# Patient Record
Sex: FEMALE | Race: WHITE | NOT HISPANIC OR LATINO | Employment: OTHER | URBAN - METROPOLITAN AREA
[De-identification: names, ages, dates, MRNs, and addresses within clinical notes are randomized per-mention and may not be internally consistent; named-entity substitution may affect disease eponyms.]

---

## 2017-11-03 ENCOUNTER — HOSPITAL ENCOUNTER (EMERGENCY)
Facility: HOSPITAL | Age: 72
Discharge: HOME | End: 2017-11-03
Attending: EMERGENCY MEDICINE | Admitting: EMERGENCY MEDICINE
Payer: COMMERCIAL

## 2017-11-03 ENCOUNTER — APPOINTMENT (EMERGENCY)
Dept: RADIOLOGY | Facility: HOSPITAL | Age: 72
End: 2017-11-03
Payer: COMMERCIAL

## 2017-11-03 VITALS
BODY MASS INDEX: 29.18 KG/M2 | TEMPERATURE: 97.7 F | OXYGEN SATURATION: 100 % | SYSTOLIC BLOOD PRESSURE: 139 MMHG | DIASTOLIC BLOOD PRESSURE: 63 MMHG | HEART RATE: 74 BPM | WEIGHT: 170 LBS | RESPIRATION RATE: 16 BRPM

## 2017-11-03 DIAGNOSIS — R55 SYNCOPE: Primary | ICD-10-CM

## 2017-11-03 LAB
ALBUMIN SERPL BCP-MCNC: 3.7 G/DL (ref 3.5–5)
ALP SERPL-CCNC: 76 U/L (ref 46–116)
ALT SERPL W P-5'-P-CCNC: 16 U/L (ref 12–78)
ANION GAP SERPL CALCULATED.3IONS-SCNC: 13 MMOL/L (ref 4–13)
APTT PPP: 24 SECONDS (ref 24–33)
AST SERPL W P-5'-P-CCNC: 16 U/L (ref 5–45)
BASOPHILS # BLD AUTO: 0 THOUSANDS/ΜL (ref 0–0.1)
BASOPHILS NFR BLD AUTO: 1 % (ref 0–1)
BILIRUB SERPL-MCNC: 0.2 MG/DL (ref 0.2–1)
BUN SERPL-MCNC: 14 MG/DL (ref 5–25)
CALCIUM SERPL-MCNC: 9 MG/DL (ref 8.3–10.1)
CHLORIDE SERPL-SCNC: 103 MMOL/L (ref 100–108)
CO2 SERPL-SCNC: 24 MMOL/L (ref 21–32)
CREAT SERPL-MCNC: 1.27 MG/DL (ref 0.6–1.3)
EOSINOPHIL # BLD AUTO: 0.2 THOUSAND/ΜL (ref 0–0.61)
EOSINOPHIL NFR BLD AUTO: 3 % (ref 0–6)
ERYTHROCYTE [DISTWIDTH] IN BLOOD BY AUTOMATED COUNT: 16.9 % (ref 11.6–15.1)
GFR SERPL CREATININE-BSD FRML MDRD: 43 ML/MIN/1.73SQ M
GLUCOSE SERPL-MCNC: 104 MG/DL (ref 65–140)
GLUCOSE SERPL-MCNC: 120 MG/DL (ref 65–140)
HCT VFR BLD AUTO: 33.2 % (ref 37–47)
HGB BLD-MCNC: 10.4 G/DL (ref 12–16)
INR PPP: 1.02 (ref 0.86–1.16)
LYMPHOCYTES # BLD AUTO: 2.6 THOUSANDS/ΜL (ref 0.6–4.47)
LYMPHOCYTES NFR BLD AUTO: 39 % (ref 14–44)
MCH RBC QN AUTO: 22.3 PG (ref 27–31)
MCHC RBC AUTO-ENTMCNC: 31.3 G/DL (ref 31.4–37.4)
MCV RBC AUTO: 71 FL (ref 82–98)
MONOCYTES # BLD AUTO: 0.6 THOUSAND/ΜL (ref 0.17–1.22)
MONOCYTES NFR BLD AUTO: 9 % (ref 4–12)
NEUTROPHILS # BLD AUTO: 3.2 THOUSANDS/ΜL (ref 1.85–7.62)
NEUTS SEG NFR BLD AUTO: 49 % (ref 43–75)
NRBC BLD AUTO-RTO: 0 /100 WBCS
PLATELET # BLD AUTO: 335 THOUSANDS/UL (ref 130–400)
PMV BLD AUTO: 7.1 FL (ref 8.9–12.7)
POTASSIUM SERPL-SCNC: 3.4 MMOL/L (ref 3.5–5.3)
PROT SERPL-MCNC: 7.2 G/DL (ref 6.4–8.2)
PROTHROMBIN TIME: 10.7 SECONDS (ref 9.4–11.7)
RBC # BLD AUTO: 4.65 MILLION/UL (ref 4.2–5.4)
SODIUM SERPL-SCNC: 140 MMOL/L (ref 136–145)
TROPONIN I SERPL-MCNC: <0.02 NG/ML
WBC # BLD AUTO: 6.5 THOUSAND/UL (ref 4.8–10.8)

## 2017-11-03 PROCEDURE — 36415 COLL VENOUS BLD VENIPUNCTURE: CPT | Performed by: EMERGENCY MEDICINE

## 2017-11-03 PROCEDURE — 82948 REAGENT STRIP/BLOOD GLUCOSE: CPT

## 2017-11-03 PROCEDURE — 85730 THROMBOPLASTIN TIME PARTIAL: CPT | Performed by: EMERGENCY MEDICINE

## 2017-11-03 PROCEDURE — 85025 COMPLETE CBC W/AUTO DIFF WBC: CPT | Performed by: EMERGENCY MEDICINE

## 2017-11-03 PROCEDURE — 84484 ASSAY OF TROPONIN QUANT: CPT | Performed by: EMERGENCY MEDICINE

## 2017-11-03 PROCEDURE — 80053 COMPREHEN METABOLIC PANEL: CPT | Performed by: EMERGENCY MEDICINE

## 2017-11-03 PROCEDURE — 70450 CT HEAD/BRAIN W/O DYE: CPT

## 2017-11-03 PROCEDURE — 99284 EMERGENCY DEPT VISIT MOD MDM: CPT

## 2017-11-03 PROCEDURE — 93005 ELECTROCARDIOGRAM TRACING: CPT | Performed by: EMERGENCY MEDICINE

## 2017-11-03 PROCEDURE — 85610 PROTHROMBIN TIME: CPT | Performed by: EMERGENCY MEDICINE

## 2017-11-04 NOTE — DISCHARGE INSTRUCTIONS
Syncope   WHAT YOU NEED TO KNOW:   Syncope is also called fainting or passing out  Syncope is a sudden, temporary loss of consciousness, followed by a fall from a standing or sitting position  Syncope ranges from not serious to a sign of a more serious condition that needs to be treated  You can control some health conditions that cause syncope  Your healthcare providers can help you create a plan to manage syncope and prevent episodes  DISCHARGE INSTRUCTIONS:   Seek care immediately if:   · You are bleeding because you hit your head when you fainted  · You suddenly have double vision, difficulty speaking, numbness, and cannot move your arms or legs  · You have chest pain and trouble breathing  · You vomit blood or material that looks like coffee grounds  · You see blood in your bowel movement  Contact your healthcare provider if:   · You have new or worsening symptoms  · You have another syncope episode  · You have a headache, fast heartbeat, or feel too dizzy to stand up  · You have questions or concerns about your condition or care  Follow up with your healthcare provider as directed:  Write down your questions so you remember to ask them during your visits  Manage syncope:   · Keep a record of your syncope episodes  Include your symptoms and your activity before and after the episode  The record can help your healthcare provider find the cause of your syncope and help you manage episodes  · Sit or lie down when needed  This includes when you feel dizzy, your throat is getting tight, and your vision changes  Raise your legs above the level of your heart  · Take slow, deep breaths if you start to breathe faster with anxiety or fear  This can help decrease dizziness and the feeling that you might faint  · Check your blood pressure often  This is important if you take medicine to lower your blood pressure   Check your blood pressure when you are lying down and when you are standing  Ask how often to check during the day  Keep a record of your blood pressure numbers  Your healthcare provider may use the record to help plan your treatment  Prevent a syncope episode:   · Move slowly and let yourself get used to one position before you move to another position  This is very important when you change from a lying or sitting position to a standing position  Take some deep breaths before you stand up from a lying position  Stand up slowly  Sudden movements may cause a fainting spell  Sit on the side of the bed or couch for a few minutes before you stand up  If you are on bedrest, try to be upright for about 2 hours each day, or as directed  Do not lock your legs if you are standing for a long period of time  Move your legs and bend your knees to keep blood flowing  · Follow your healthcare provider's recommendations  Your provider may  recommend that you drink more liquids to prevent dehydration  You may also need to have more salt to keep your blood pressure from dropping too low and causing syncope  Your provider will tell you how much liquid and sodium to have each day  · Watch for signs of low blood sugar  These include hunger, nervousness, sweating, and fast or fluttery heartbeats  Talk with your healthcare provider about ways to keep your blood sugar level steady  · Do not strain if you are constipated  You may faint if you strain to have a bowel movement  Walking is the best way to get your bowels moving  Eat foods high in fiber to make it easier to have a bowel movement  Good examples are high-fiber cereals, beans, vegetables, and whole-grain breads  Prune juice may help make bowel movements softer  · Be careful in hot weather  Heat can cause a syncope episode  Limit activity done outside on hot days  Physical activity in hot weather can lead to dehydration  This can cause an episode    © 2017 [employer] Information is for End User's use only and may not be sold, redistributed or otherwise used for commercial purposes  All illustrations and images included in CareNotes® are the copyrighted property of A D A M , Inc  or Edvin Lott  The above information is an  only  It is not intended as medical advice for individual conditions or treatments  Talk to your doctor, nurse or pharmacist before following any medical regimen to see if it is safe and effective for you

## 2017-11-04 NOTE — ED PROCEDURE NOTE
PROCEDURE  ECG 12 Lead Documentation  Date/Time: 11/3/2017 8:27 PM  Performed by: Beuford Goodell by: Higinio Reddy     ECG reviewed by me, the ED Provider: yes    Patient location:  ED  Interpretation:     Interpretation: normal    Rate:     ECG rate:  76    ECG rate assessment: normal    Rhythm:     Rhythm: sinus rhythm    Ectopy:     Ectopy: none    QRS:     QRS axis:  Normal    QRS intervals:  Normal  Conduction:     Conduction: normal    ST segments:     ST segments:  Normal  T waves:     T waves: normal

## 2017-11-04 NOTE — ED PROVIDER NOTES
History  Chief Complaint   Patient presents with    Dizziness     pt was in the store and felt a headache come on, then became dizzy and passed out  Someone behind her caught her so she did not fall  States she feels back to normal now     Patient presents for evaluation of syncope  Patient states she was busy today and did not eat much  After dog training class she was standing and felt headache/lightheaded and passed out  Person behind her caught her  States this has happened in the past when standing for periods of time  Work up in the past has been normal as per patient  Patient denies any complaints at this time  History provided by:  Patient   used: No    Dizziness   Associated symptoms: headaches        Prior to Admission Medications   Prescriptions Last Dose Informant Patient Reported? Taking? Levothyroxine Sodium (SYNTHROID PO)   Yes Yes   Sig: Take by mouth daily Indications: unsure of dosage  esomeprazole (NexIUM) 40 MG capsule   Yes Yes   Sig: Take 40 mg by mouth every morning before breakfast    rosuvastatin (CRESTOR) 40 MG tablet   Yes Yes   Sig: Take 40 mg by mouth daily  Facility-Administered Medications: None       Past Medical History:   Diagnosis Date    Disease of thyroid gland     Hypercholesteremia     Hypertension        Past Surgical History:   Procedure Laterality Date    APPENDECTOMY      CHOLECYSTECTOMY      COLON SURGERY      ruptured bowel    COLOSTOMY  2009    and closure 6 months later    HYSTERECTOMY      KNEE ARTHROSCOPY Bilateral     right x1 left x2    HI COLSC FLX W/RMVL OF TUMOR POLYP LESION SNARE TQ N/A 5/3/2016    Procedure: COLONOSCOPY ;  Surgeon: Agueda Barrett MD;  Location: [place] [address] [facility]; Service: Gastroenterology    TONSILECTOMY AND ADNOIDECTOMY      TYMPANOPLASTY Right        History reviewed  No pertinent family history  I have reviewed and agree with the history as documented      Social History   Substance Use Topics  Smoking status: Former Smoker     Years: 20 00     Quit date: 1989    Smokeless tobacco: Not on file    Alcohol use No        Review of Systems   Neurological: Positive for dizziness, light-headedness and headaches  All other systems reviewed and are negative  Physical Exam  ED Triage Vitals   Temperature Pulse Respirations Blood Pressure SpO2   11/03/17 2028 11/03/17 2028 11/03/17 2028 11/03/17 2028 11/03/17 2028   97 7 °F (36 5 °C) 83 18 170/92 97 %      Temp Source Heart Rate Source Patient Position - Orthostatic VS BP Location FiO2 (%)   11/03/17 2028 11/03/17 2028 11/03/17 2124 11/03/17 2028 --   Oral Monitor Lying Right arm       Pain Score       11/03/17 2028       No Pain           Orthostatic Vital Signs  Vitals:    11/03/17 2028 11/03/17 2124   BP: 170/92 139/63   Pulse: 83 74   Patient Position - Orthostatic VS:  Lying       Physical Exam   Constitutional: She is oriented to person, place, and time  No distress  HENT:   Mouth/Throat: Oropharynx is clear and moist    Eyes: EOM are normal  Pupils are equal, round, and reactive to light  Neck: Normal range of motion  Neck supple  Cardiovascular: Normal rate, regular rhythm and intact distal pulses  Pulmonary/Chest: Effort normal and breath sounds normal  No respiratory distress  Abdominal: Soft  There is no tenderness  Musculoskeletal: Normal range of motion  Neurological: She is alert and oriented to person, place, and time  No cranial nerve deficit or sensory deficit  She exhibits normal muscle tone  Coordination normal    Finger to nose wnl   Skin: Capillary refill takes less than 2 seconds  She is not diaphoretic  Nursing note and vitals reviewed        ED Medications  Medications - No data to display    Diagnostic Studies  Results Reviewed     Procedure Component Value Units Date/Time    Protime-INR [[id number]]  (Normal) Collected:  11/03/17 2038    Lab Status:  Final result Specimen:  Blood Updated:  11/03/17 2110 Protime 10 7 seconds      INR 1 02    APTT [[id number]]  (Normal) Collected:  11/03/17 2038    Lab Status:  Final result Specimen:  Blood Updated:  11/03/17 2110     PTT 24 seconds     Narrative: Therapeutic Heparin Range = 60-90 seconds    Troponin I [[id number]]  (Normal) Collected:  11/03/17 2038    Lab Status:  Final result Specimen:  Blood Updated:  11/03/17 2107     Troponin I <0 02 ng/mL     Narrative:         [employer] Chemistry analyzer 99% cutoff is > 0 04 ng/mL in network labs    o cTnI 99% cutoff is useful only when applied to patients in the clinical setting of myocardial ischemia  o cTnI 99% cutoff should be interpreted in the context of clinical history, ECG findings and possibly cardiac imaging to establish correct diagnosis  o cTnI 99% cutoff may be suggestive but clearly not indicative of a coronary event without the clinical setting of myocardial ischemia  Comprehensive metabolic panel [[id number]]  (Abnormal) Collected:  11/03/17 2038    Lab Status:  Final result Specimen:  Blood Updated:  11/03/17 2104     Sodium 140 mmol/L      Potassium 3 4 (L) mmol/L      Chloride 103 mmol/L      CO2 24 mmol/L      Anion Gap 13 mmol/L      BUN 14 mg/dL      Creatinine 1 27 mg/dL      Glucose 120 mg/dL      Calcium 9 0 mg/dL      AST 16 U/L      ALT 16 U/L      Alkaline Phosphatase 76 U/L      Total Protein 7 2 g/dL      Albumin 3 7 g/dL      Total Bilirubin 0 20 mg/dL      eGFR 43 ml/min/1 73sq m     Narrative:         National Kidney Disease Education Program recommendations are as follows:  GFR calculation is accurate only with a steady state creatinine  Chronic Kidney disease less than 60 ml/min/1 73 sq  meters  Kidney failure less than 15 ml/min/1 73 sq  meters      CBC and differential [[id number]]  (Abnormal) Collected:  11/03/17 2038    Lab Status:  Final result Specimen:  Blood Updated:  11/03/17 2047     WBC 6 50 Thousand/uL      RBC 4 65 Million/uL      Hemoglobin 10 4 (L) g/dL      Hematocrit 33 2 (L) %      MCV 71 (L) fL      MCH 22 3 (L) pg      MCHC 31 3 (L) g/dL      RDW 16 9 (H) %      MPV 7 1 (L) fL      Platelets 150 Thousands/uL      nRBC 0 /100 WBCs      Neutrophils Relative 49 %      Lymphocytes Relative 39 %      Monocytes Relative 9 %      Eosinophils Relative 3 %      Basophils Relative 1 %      Neutrophils Absolute 3 20 Thousands/µL      Lymphocytes Absolute 2 60 Thousands/µL      Monocytes Absolute 0 60 Thousand/µL      Eosinophils Absolute 0 20 Thousand/µL      Basophils Absolute 0 00 Thousands/µL     Fingerstick Glucose (POCT) [[id number]]  (Normal) Collected:  11/03/17 2036    Lab Status:  Final result Updated:  11/03/17 2041     POC Glucose 104 mg/dl     UA w Reflex to Microscopic [[id number]]     Lab Status:  No result Specimen:  Urine                  CT head without contrast   Final Result by Gagan Mancini MD (11/03 2255)      No acute intracranial hemorrhage  Volume loss/atrophy and white matter microangiopathy  Workstation performed: [id number]                    Procedures  Procedures       Phone Contacts  ED Phone Contact    ED Course  ED Course                                MDM  Number of Diagnoses or Management Options  Syncope:   Diagnosis management comments: Discussed observation vs outpatient follow up  Patient felt better and wanted to follow up as outpatient  Amount and/or Complexity of Data Reviewed  Clinical lab tests: ordered and reviewed  Tests in the radiology section of CPT®: ordered and reviewed    Patient Progress  Patient progress: improved    CritCare Time    Disposition  Final diagnoses:   Syncope     Time reflects when diagnosis was documented in both MDM as applicable and the Disposition within this note     Time User Action Codes Description Comment    11/3/2017 11:02 PM Suzette Ardon Add [R55] Syncope       ED Disposition     ED Disposition Condition Comment    Discharge  Stephania Alves discharge to home/self care      Condition at discharge: stable        Follow-up Information     Follow up With Specialties Details Why Peyton Cardona MD Internal Medicine In 2 days  [address] [facility] [place] [address] [phone]          Discharge Medication List as of 11/3/2017 11:02 PM      CONTINUE these medications which have NOT CHANGED    Details   esomeprazole (NexIUM) 40 MG capsule Take 40 mg by mouth every morning before breakfast , Until Discontinued, Historical Med      Levothyroxine Sodium (SYNTHROID PO) Take by mouth daily Indications: unsure of dosage  , Until Discontinued, Historical Med      rosuvastatin (CRESTOR) 40 MG tablet Take 40 mg by mouth daily  , Until Discontinued, Historical Med           No discharge procedures on file      ED Provider  Electronically Signed by           Fransisco Meade DO  11/04/17 8428

## 2017-11-06 LAB
ATRIAL RATE: 76 BPM
P AXIS: 59 DEGREES
PR INTERVAL: 154 MS
QRS AXIS: 10 DEGREES
QRSD INTERVAL: 80 MS
QT INTERVAL: 394 MS
QTC INTERVAL: 443 MS
T WAVE AXIS: 34 DEGREES
VENTRICULAR RATE: 76 BPM

## 2017-11-12 ENCOUNTER — APPOINTMENT (EMERGENCY)
Dept: RADIOLOGY | Facility: HOSPITAL | Age: 72
End: 2017-11-12
Payer: COMMERCIAL

## 2017-11-12 ENCOUNTER — HOSPITAL ENCOUNTER (EMERGENCY)
Facility: HOSPITAL | Age: 72
Discharge: HOME | End: 2017-11-12
Attending: EMERGENCY MEDICINE
Payer: COMMERCIAL

## 2017-11-12 VITALS
BODY MASS INDEX: 26.99 KG/M2 | SYSTOLIC BLOOD PRESSURE: 143 MMHG | OXYGEN SATURATION: 94 % | WEIGHT: 162 LBS | HEART RATE: 84 BPM | TEMPERATURE: 98.3 F | DIASTOLIC BLOOD PRESSURE: 65 MMHG | HEIGHT: 65 IN | RESPIRATION RATE: 20 BRPM
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
## 2017-11-12 DIAGNOSIS — R07.89 CHEST WALL PAIN: Primary | ICD-10-CM

## 2017-11-12 PROCEDURE — 71120 X-RAY EXAM BREASTBONE 2/>VWS: CPT

## 2017-11-12 PROCEDURE — 93005 ELECTROCARDIOGRAM TRACING: CPT

## 2017-11-12 PROCEDURE — 99285 EMERGENCY DEPT VISIT HI MDM: CPT

## 2017-11-12 PROCEDURE — 71020 HB CHEST X-RAY 2VW FRONTAL&LATL: CPT
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
## 2017-11-12 NOTE — ED PROCEDURE NOTE
PROCEDURE  ECG 12 Lead Documentation  Date/Time: 11/12/2017 3:10 PM  Performed by: Daysi Pal by: Jeane Power     ECG reviewed by me, the ED Provider: yes    Patient location:  ED  Previous ECG:     Previous ECG:  Compared to current    Similarity:  No change  Interpretation:     Interpretation: normal    Rate:     ECG rate:  83    ECG rate assessment: normal    Rhythm:     Rhythm: sinus rhythm    Ectopy:     Ectopy: none    QRS:     QRS axis:  Normal    QRS intervals:  Normal  Conduction:     Conduction: normal    ST segments:     ST segments:  Normal  T waves:     T waves: normal

## 2017-11-13 LAB
ATRIAL RATE: 83 BPM
P AXIS: 68 DEGREES
PR INTERVAL: 156 MS
QRS AXIS: 24 DEGREES
QRSD INTERVAL: 84 MS
QT INTERVAL: 382 MS
QTC INTERVAL: 448 MS
T WAVE AXIS: 35 DEGREES
VENTRICULAR RATE: 83 BPM

## 2017-12-05 ENCOUNTER — TRANSCRIBE ORDERS (OUTPATIENT)
Dept: ADMINISTRATIVE | Facility: HOSPITAL | Age: 72
End: 2017-12-05

## 2017-12-05 DIAGNOSIS — Z12.39 SCREENING BREAST EXAMINATION: Primary | ICD-10-CM

## 2017-12-14 ENCOUNTER — HOSPITAL ENCOUNTER (OUTPATIENT)
Dept: RADIOLOGY | Facility: HOSPITAL | Age: 72
Discharge: HOME | End: 2017-12-14
Payer: COMMERCIAL

## 2017-12-14 ENCOUNTER — GENERIC CONVERSION - ENCOUNTER (OUTPATIENT)
Dept: OTHER | Facility: OTHER | Age: 72
End: 2017-12-14

## 2017-12-14 DIAGNOSIS — Z12.39 SCREENING BREAST EXAMINATION: ICD-10-CM

## 2017-12-14 PROCEDURE — 77063 BREAST TOMOSYNTHESIS BI: CPT

## 2017-12-14 PROCEDURE — G0202 SCR MAMMO BI INCL CAD: HCPCS

## 2018-12-06 ENCOUNTER — TRANSCRIBE ORDERS (OUTPATIENT)
Dept: ADMINISTRATIVE | Facility: HOSPITAL | Age: 73
End: 2018-12-06

## 2018-12-06 DIAGNOSIS — Z12.39 SCREENING BREAST EXAMINATION: Primary | ICD-10-CM

## 2019-01-07 ENCOUNTER — HOSPITAL ENCOUNTER (OUTPATIENT)
Dept: RADIOLOGY | Facility: HOSPITAL | Age: 74
Discharge: HOME | End: 2019-01-07
Attending: INTERNAL MEDICINE
Payer: COMMERCIAL

## 2019-01-07 VITALS — BODY MASS INDEX: 27.66 KG/M2 | WEIGHT: 162 LBS | HEIGHT: 64 IN

## 2019-01-07 DIAGNOSIS — Z12.39 SCREENING BREAST EXAMINATION: ICD-10-CM

## 2019-01-07 PROCEDURE — 77063 BREAST TOMOSYNTHESIS BI: CPT

## 2019-01-07 PROCEDURE — 77067 SCR MAMMO BI INCL CAD: CPT

## 2019-01-08 ENCOUNTER — TELEPHONE (OUTPATIENT)
Dept: RADIOLOGY | Facility: HOSPITAL | Age: 74
End: 2019-01-08

## 2019-01-23 ENCOUNTER — HOSPITAL ENCOUNTER (OUTPATIENT)
Dept: RADIOLOGY | Facility: HOSPITAL | Age: 74
Discharge: HOME | End: 2019-01-23
Attending: INTERNAL MEDICINE
Payer: COMMERCIAL

## 2019-01-23 VITALS — WEIGHT: 162 LBS | HEIGHT: 65 IN | BODY MASS INDEX: 26.99 KG/M2

## 2019-01-23 DIAGNOSIS — R92.8 ABNORMAL MAMMOGRAM: ICD-10-CM

## 2019-01-23 PROCEDURE — 77065 DX MAMMO INCL CAD UNI: CPT

## 2019-01-23 PROCEDURE — 76642 ULTRASOUND BREAST LIMITED: CPT

## 2019-01-23 PROCEDURE — G0279 TOMOSYNTHESIS, MAMMO: HCPCS

## 2019-07-09 ENCOUNTER — TRANSCRIBE ORDERS (OUTPATIENT)
Dept: ADMINISTRATIVE | Facility: HOSPITAL | Age: 74
End: 2019-07-09

## 2019-07-09 DIAGNOSIS — Z12.39 BREAST SCREENING, UNSPECIFIED: Primary | ICD-10-CM

## 2019-07-17 ENCOUNTER — OFFICE VISIT (OUTPATIENT)
Dept: PODIATRY | Facility: CLINIC | Age: 74
End: 2019-07-17
Payer: COMMERCIAL

## 2019-07-17 VITALS — SYSTOLIC BLOOD PRESSURE: 104 MMHG | DIASTOLIC BLOOD PRESSURE: 58 MMHG | RESPIRATION RATE: 16 BRPM | HEART RATE: 80 BPM

## 2019-07-17 DIAGNOSIS — B35.1 ONYCHOMYCOSIS: ICD-10-CM

## 2019-07-17 DIAGNOSIS — L60.0 INGROWN TOENAIL: ICD-10-CM

## 2019-07-17 DIAGNOSIS — R20.2 PARESTHESIA OF LOWER EXTREMITY: ICD-10-CM

## 2019-07-17 DIAGNOSIS — G60.8 PERIPHERAL SENSORY NEUROPATHY: Primary | ICD-10-CM

## 2019-07-17 PROCEDURE — 99203 OFFICE O/P NEW LOW 30 MIN: CPT | Performed by: PODIATRIST

## 2019-07-17 RX ORDER — HYDROXYCHLOROQUINE SULFATE 200 MG/1
TABLET, FILM COATED ORAL
Refills: 3 | COMMUNITY
Start: 2019-06-13

## 2019-07-17 RX ORDER — MELATONIN
1000 DAILY
COMMUNITY

## 2019-07-17 RX ORDER — FOLIC ACID 1 MG/1
TABLET ORAL
Refills: 5 | COMMUNITY
Start: 2019-07-09

## 2019-07-17 NOTE — PROGRESS NOTES
Assessment/Plan:    Aseptic debridement and planning of nails x10 and manually and mechanically  Patient will apply topical medication daily patient will call if any signs of infection  Discussed gabapentin or Lyrica pain worsens  Discussed importance of exercise patient will try B vitamin complex  Patient will discussed neuropathy with her rheumatologist  Follow-up 1 month     Diagnoses and all orders for this visit:    Peripheral sensory neuropathy    Paresthesia of lower extremity    Onychomycosis  -     ciclopirox (PENLAC) 8 % solution; Apply topically daily at bedtime    Ingrown toenail    Other orders  -     folic acid (FOLVITE) 1 mg tablet  -     hydroxychloroquine (PLAQUENIL) 200 mg tablet  -     cholecalciferol (VITAMIN D3) 1,000 units tablet; Take 1,000 Units by mouth daily          Subjective:      Patient ID: Brooke Tang is a 68 y o  female  Patient has had numbness and burning in the feet for the past year  Patient was recently diagnosed was Sjogren's syndrome  Patient has been seeing rheumatologist   Patient has severe knee arthritis only walk short distances denies any cramping in legs patient has chronic swelling in the legs  Patient has shooting and burning pain in right 2nd interspace  Previously had neuroma injection several years ago but says the pain is been back for the past year  Rates pain between 4 and 7/10 on a pain scale  Pain mostly when walking  Patient has bunion and contracted hammertoes bilateral  Patient has severe nail fungus nails are extremely elongated and several of the nails have not been cut in over a year    There is some incurvation and pain in nails      Past Medical History:   Diagnosis Date    Disease of thyroid gland     Hypercholesteremia     Hypertension          Current Outpatient Medications:     cholecalciferol (VITAMIN D3) 1,000 units tablet, Take 1,000 Units by mouth daily, Disp: , Rfl:     ciclopirox (PENLAC) 8 % solution, Apply topically daily at bedtime, Disp: 6 6 mL, Rfl: 4    esomeprazole (NexIUM) 40 MG capsule, Take 40 mg by mouth every morning before breakfast , Disp: , Rfl:     folic acid (FOLVITE) 1 mg tablet, , Disp: , Rfl: 5    hydroxychloroquine (PLAQUENIL) 200 mg tablet, , Disp: , Rfl: 3    Levothyroxine Sodium (SYNTHROID PO), Take by mouth daily Indications: unsure of dosage  , Disp: , Rfl:     rosuvastatin (CRESTOR) 40 MG tablet, Take 20 mg by mouth daily  , Disp: , Rfl:     Past Surgical History:   Procedure Laterality Date    APPENDECTOMY      BREAST BIOPSY Right     benign    CHOLECYSTECTOMY      COLON SURGERY      ruptured bowel    COLOSTOMY  2009    and closure 6 months later    HYSTERECTOMY  2002    total    KNEE ARTHROSCOPY Bilateral     right x1 left x2    [place] COLSC FLX W/RMVL OF TUMOR POLYP LESION SNARE TQ N/A 5/3/2016    Procedure: COLONOSCOPY ;  Surgeon: Lydia Brown MD;  Location: [facility] GI LAB; Service: Gastroenterology    TONSILECTOMY AND ADNOIDECTOMY      TYMPANOPLASTY Right        Allergies   Allergen Reactions    Penicillins Rash       There is no problem list on file for this patient  Review of Systems   Constitutional: Negative  HENT: Negative  Eyes: Negative  Respiratory: Negative  Cardiovascular: Negative  Gastrointestinal: Negative  Endocrine: Negative  Genitourinary: Negative  Musculoskeletal: Positive for arthralgias and back pain  Skin: Positive for color change  Allergic/Immunologic: Negative  Neurological: Negative  Hematological: Negative  Psychiatric/Behavioral: Negative  Objective:  Patient's shoes and socks were removed, feet examined       /58   Pulse 80   Resp 16       Foot Exam    General  General Appearance: appears stated age and healthy   Orientation: alert and oriented to person, place, and time   Affect: appropriate   Gait: unimpaired       Right Foot/Ankle     Inspection and Palpation  Arch: pes planus  Hammertoes: second toe, fifth toe, fourth toe and third toe  Hallux valgus: yes  Skin Exam: callus and skin changes; Neurovascular  Dorsalis pedis: 1+  Posterior tibial: 1+      Left Foot/Ankle      Inspection and Palpation  Arch: pes planus  Hammertoes: second toe, fifth toe, fourth toe and third toe  Hallux valgus: yes  Skin Exam: callus and skin changes; Neurovascular  Dorsalis pedis: 1+  Posterior tibial: 1+          Right Foot/Ankle   Right Foot Inspection  Skin Exam: callus and callus                              Vascular    The right DP pulse is 1+  The right PT pulse is 1+  Right Toe  - Comprehensive Exam  Arch: pes planus  Hammertoes: second toe, fifth toe, fourth toe and third toe  Hallux valgus: yes    Left Foot/Ankle  Left Foot Inspection  Skin Exam: callus                                           Vascular    The left DP pulse is 1+  The left PT pulse is 1+  Left Toe  - Comprehensive Exam  Arch: pes planus  Hammertoes: second toe, fifth toe, fourth toe and third toe  Hallux valgus: yes         Physical Exam   Cardiovascular:   Pulses:       Dorsalis pedis pulses are 1+ on the right side, and 1+ on the left side  Posterior tibial pulses are 1+ on the right side, and 1+ on the left side  Feet:   Right Foot:   Skin Integrity: Positive for callus  Left Foot:   Skin Integrity: Positive for callus           Procedures     Absent vibratory sensation bilateral legs up to the knee intact of than a  Monofilament sensation significantly reduced bilateral feet 8/10 absent  Negative Tinel sign tarsal tunnel bilateral  Positive Bob sign right 2nd interspace  Rigidly contracted hammertoes 2 through 5 bilateral  Moderate bunion bilateral  Plus one pitting edema bilateral feet and ankles  All nails severely mycotic thickened dystrophic  Third digit nails extremely elongated incurvated and ingrown  Mild ingrown bilateral hallux no signs of infection  Significant equinus bilateral 0 degrees dorsiflexion  Muscle strength 5/5 all groups bilateral feet and ankles

## 2019-08-07 ENCOUNTER — HOSPITAL ENCOUNTER (OUTPATIENT)
Dept: RADIOLOGY | Facility: HOSPITAL | Age: 74
Discharge: HOME | End: 2019-08-07
Attending: INTERNAL MEDICINE
Payer: COMMERCIAL

## 2019-08-07 VITALS — BODY MASS INDEX: 26.99 KG/M2 | WEIGHT: 162 LBS | HEIGHT: 65 IN

## 2019-08-07 DIAGNOSIS — Z12.39 BREAST SCREENING, UNSPECIFIED: ICD-10-CM

## 2019-08-07 PROCEDURE — 77065 DX MAMMO INCL CAD UNI: CPT

## 2019-08-07 PROCEDURE — G0279 TOMOSYNTHESIS, MAMMO: HCPCS

## 2019-09-11 ENCOUNTER — OFFICE VISIT (OUTPATIENT)
Dept: PODIATRY | Facility: CLINIC | Age: 74
End: 2019-09-11
Payer: COMMERCIAL

## 2019-09-11 VITALS
SYSTOLIC BLOOD PRESSURE: 120 MMHG | RESPIRATION RATE: 17 BRPM | DIASTOLIC BLOOD PRESSURE: 68 MMHG | HEIGHT: 65 IN | BODY MASS INDEX: 26.99 KG/M2 | HEART RATE: 113 BPM | WEIGHT: 162 LBS

## 2019-09-11 DIAGNOSIS — B35.1 ONYCHOMYCOSIS: Primary | ICD-10-CM

## 2019-09-11 DIAGNOSIS — M54.16 RADICULOPATHY OF LUMBAR REGION: ICD-10-CM

## 2019-09-11 DIAGNOSIS — M77.42 METATARSALGIA OF BOTH FEET: ICD-10-CM

## 2019-09-11 DIAGNOSIS — G60.8 PERIPHERAL SENSORY NEUROPATHY: ICD-10-CM

## 2019-09-11 DIAGNOSIS — M77.41 METATARSALGIA OF BOTH FEET: ICD-10-CM

## 2019-09-11 PROCEDURE — 99213 OFFICE O/P EST LOW 20 MIN: CPT | Performed by: PODIATRIST

## 2019-09-11 RX ORDER — TERBINAFINE HYDROCHLORIDE 250 MG/1
TABLET ORAL
Qty: 15 TABLET | Refills: 0 | Status: SHIPPED | OUTPATIENT
Start: 2019-09-11 | End: 2019-09-11

## 2019-09-11 RX ORDER — GABAPENTIN 100 MG/1
100 CAPSULE ORAL 3 TIMES DAILY
Qty: 90 CAPSULE | Refills: 0 | Status: SHIPPED | OUTPATIENT
Start: 2019-09-11 | End: 2019-10-11

## 2019-09-11 NOTE — PROGRESS NOTES
Assessment/Plan:     Aseptic debridement and planning of nails x10 and manually and mechanically  Patient will apply topical medication daily patient will call if any signs of infection  Discussed gabapentin or Lyrica pain worsens  Discussed importance of exercise patient will try B vitamin complex  Patient will discussed neuropathy with her rheumatologist  Follow-up 1 month    Patient will try terbinafine  We will also add an empiric course of gabapentin       Diagnoses and all orders for this visit:     Peripheral sensory neuropathy     Paresthesia of lower extremity     Onychomycosis  -          Ingrown toenail     Other orders  -     folic acid (FOLVITE) 1 mg tablet  -     hydroxychloroquine (PLAQUENIL) 200 mg tablet  -     cholecalciferol (VITAMIN D3) 1,000 units tablet; Take 1,000 Units by mouth daily            Subjective:       Patient ID: Mackenzie De Jesus is a 68 y o  female      Patient has had numbness and burning in the feet for the past year  Patient was recently diagnosed was Sjogren's syndrome  Patient has been seeing rheumatologist   Patient has severe knee arthritis only walk short distances denies any cramping in legs patient has chronic swelling in the legs  Patient has shooting and burning pain in right 2nd interspace  Previously had neuroma injection several years ago but says the pain is been back for the past year  Rates pain between 4 and 7/10 on a pain scale  Pain mostly when walking  Patient has bunion and contracted hammertoes bilateral  Patient has severe nail fungus nails are extremely elongated and several of the nails have not been cut in over a year    There is some incurvation and pain in nails             Past Medical History:   Diagnosis Date    Disease of thyroid gland      Hypercholesteremia      Hypertension              Current Outpatient Medications:     cholecalciferol (VITAMIN D3) 1,000 units tablet, Take 1,000 Units by mouth daily, Disp: , Rfl:     ciclopirox (PENLAC) 8 % solution, Apply topically daily at bedtime, Disp: 6 6 mL, Rfl: 4    esomeprazole (NexIUM) 40 MG capsule, Take 40 mg by mouth every morning before breakfast , Disp: , Rfl:     folic acid (FOLVITE) 1 mg tablet, , Disp: , Rfl: 5    hydroxychloroquine (PLAQUENIL) 200 mg tablet, , Disp: , Rfl: 3    Levothyroxine Sodium (SYNTHROID PO), Take by mouth daily Indications: unsure of dosage  , Disp: , Rfl:     rosuvastatin (CRESTOR) 40 MG tablet, Take 20 mg by mouth daily  , Disp: , Rfl:            Past Surgical History:   Procedure Laterality Date    APPENDECTOMY        BREAST BIOPSY Right       benign    CHOLECYSTECTOMY        COLON SURGERY         ruptured bowel    COLOSTOMY   2009     and closure 6 months later    HYSTERECTOMY   2002     total    KNEE ARTHROSCOPY Bilateral       right x1 left x2    [place] COLSC FLX W/RMVL OF TUMOR POLYP LESION SNARE TQ N/A 5/3/2016     Procedure: COLONOSCOPY ;  Surgeon: Cheryle Gavia, MD;  Location: [facility]; Service: Gastroenterology    TONSILECTOMY AND ADNOIDECTOMY        TYMPANOPLASTY Right                Allergies   Allergen Reactions    Penicillins Rash         There is no problem list on file for this patient         Review of Systems   Constitutional: Negative  HENT: Negative  Eyes: Negative  Respiratory: Negative  Cardiovascular: Negative  Gastrointestinal: Negative  Endocrine: Negative  Genitourinary: Negative  Musculoskeletal: Positive for arthralgias and back pain  Skin: Positive for color change  Allergic/Immunologic: Negative  Neurological: Negative  Hematological: Negative      Psychiatric/Behavioral: Negative            Objective:  Patient's shoes and socks were removed, feet examined       /58   Pulse 80   Resp 16         Foot Exam     General  General Appearance: appears stated age and healthy   Orientation: alert and oriented to person, place, and time   Affect: appropriate   Gait: unimpaired       Right Foot/Ankle      Inspection and Palpation  Arch: pes planus  Hammertoes: second toe, fifth toe, fourth toe and third toe  Hallux valgus: yes  Skin Exam: callus and skin changes;      Neurovascular  Dorsalis pedis: 1+  Posterior tibial: 1+        Left Foot/Ankle       Inspection and Palpation  Arch: pes planus  Hammertoes: second toe, fifth toe, fourth toe and third toe  Hallux valgus: yes  Skin Exam: callus and skin changes;      Neurovascular  Dorsalis pedis: 1+  Posterior tibial: 1+              Right Foot/Ankle   Right Foot Inspection  Skin Exam: callus and callus                                 Vascular     The right DP pulse is 1+  The right PT pulse is 1+  Right Toe  - Comprehensive Exam  Arch: pes planus  Hammertoes: second toe, fifth toe, fourth toe and third toe  Hallux valgus: yes     Left Foot/Ankle  Left Foot Inspection  Skin Exam: callus                                             Vascular     The left DP pulse is 1+  The left PT pulse is 1+  Left Toe  - Comprehensive Exam  Arch: pes planus  Hammertoes: second toe, fifth toe, fourth toe and third toe  Hallux valgus: yes          Physical Exam   Cardiovascular:   Pulses:       Dorsalis pedis pulses are 1+ on the right side, and 1+ on the left side  Posterior tibial pulses are 1+ on the right side, and 1+ on the left side  Feet:   Right Foot:   Skin Integrity: Positive for callus     Left Foot:   Skin Integrity: Positive for callus           Procedures      Absent vibratory sensation bilateral legs up to the knee intact of than a  Monofilament sensation significantly reduced bilateral feet 8/10 absent  Negative Tinel sign tarsal tunnel bilateral  Positive Bob sign right 2nd interspace  Rigidly contracted hammertoes 2 through 5 bilateral  Moderate bunion bilateral  Plus one pitting edema bilateral feet and ankles  All nails severely mycotic thickened dystrophic  Third digit nails extremely elongated incurvated and ingrown  Mild ingrown bilateral hallux no signs of infection  Significant equinus bilateral 0 degrees dorsiflexion  Muscle strength 5/5 all groups bilateral feet and ankles

## 2019-11-20 ENCOUNTER — OFFICE VISIT (OUTPATIENT)
Dept: CARDIOLOGY CLINIC | Facility: CLINIC | Age: 74
End: 2019-11-20
Payer: COMMERCIAL

## 2019-11-20 VITALS
BODY MASS INDEX: 26.33 KG/M2 | HEART RATE: 100 BPM | SYSTOLIC BLOOD PRESSURE: 122 MMHG | HEIGHT: 65 IN | OXYGEN SATURATION: 97 % | WEIGHT: 158 LBS | DIASTOLIC BLOOD PRESSURE: 82 MMHG

## 2019-11-20 DIAGNOSIS — M35.00 SJOGREN'S SYNDROME, WITH UNSPECIFIED ORGAN INVOLVEMENT (HCC): ICD-10-CM

## 2019-11-20 DIAGNOSIS — E03.9 ACQUIRED HYPOTHYROIDISM: ICD-10-CM

## 2019-11-20 DIAGNOSIS — G60.8 PERIPHERAL SENSORY NEUROPATHY: ICD-10-CM

## 2019-11-20 DIAGNOSIS — R55 RECURRENT SYNCOPE: ICD-10-CM

## 2019-11-20 DIAGNOSIS — M54.16 RADICULOPATHY OF LUMBAR REGION: ICD-10-CM

## 2019-11-20 DIAGNOSIS — I95.1 ORTHOSTATIC HYPOTENSION: ICD-10-CM

## 2019-11-20 DIAGNOSIS — E78.5 DYSLIPIDEMIA: ICD-10-CM

## 2019-11-20 PROCEDURE — 99205 OFFICE O/P NEW HI 60 MIN: CPT | Performed by: INTERNAL MEDICINE

## 2019-11-20 PROCEDURE — 93000 ELECTROCARDIOGRAM COMPLETE: CPT | Performed by: INTERNAL MEDICINE

## 2019-11-20 NOTE — PROGRESS NOTES
Consultation - Cardiology Office  [facility]  Ela Cornejo 69 y o  female MRN: [id number]  : 1945  Unit/Bed#:  Encounter: [id number]      Assessment:     1  Orthostatic hypotension    2  Recurrent syncope    3  Radiculopathy of lumbar region    4  Dyslipidemia    5  Acquired hypothyroidism    6  Peripheral sensory neuropathy    7  Sjogren's syndrome, with unspecified organ involvement ([facility] [address] )        Discussion summary and Plan:    1  History of recurrent syncope and orthostatic hypotension  Patient did drop blood pressure by about 6-8 mm in our office but was asymptomatic  Most of the episodes of syncope happened while she was standing in line for 30-40 minutes  She does get anxious also  She has no cardiac workup done recently  Agree with getting tilt-table test   Will check echo Doppler, Holter monitor to rule out any arrhythmia and exercise stress test rule ischemia  2  Dyslipidemia  Patient is taking Crestor she takes 20 mg daily  Cholesterol profile acceptable managed by patient's primary care doctor  3  History of Sjogren syndrome  She has been taking hydroxychloroquine  Management as per Rheumatology    4  History of hypothyroidism on levothyroxine  Monitor TSH    5  History of peripheral neuropathy and lumbar radiculopathy  Currently stable    Plan  In view of recurrent syncope patient will be scheduled for tilt-table test, echo Doppler, exercise stress test and Holter monitor  Further plan as also of these tests become available  Lifestyle modification recommended including decreasing ice tea intake and drinking more fluids  Avoid situation where she has to stand and prolonged period of time  Keep herself hydrated  Thank you for your consultation  If you have any question please call me at [phone]    Counseling :  A description of the counseling  Goals and Barriers    Patient's ability to self care: Yes  Medication side effect reviewed with patient in detail and all their questions answered to their satisfaction  Primary Care Physician Requesting Consult: Yadi Lawrence MD      Reason for Consult / Principal Problem:  Orthostatic hypotension and recurrent syncope  HPI :     Carlos Alberto Linares is a 69y o  year old female who was referred by primary care doctor for orthostatic hypotension and recurrent falls  Patient who has a past medical history significant for Sjogren syndrome, peripheral neuropathy, dyslipidemia, and 1 episode of syncope about 2 years ago was sent by primary care doctor due to decrease in blood pressure when she stands  Patient has learned to control her symptoms of decrease in blood pressure as she is well aware that if he stands for sometime she will feel dizzy lightheaded and 1 time she has passed down  Other time she almost passed down but she was able to sit down and she felt better  All these episode happened while she was standing in a line for 30-40 minutes  She still occasionally get dizziness when she quickly gets up  In our office her initial blood pressure checked by me was around 130 over 71 when she quickly stood up the blood pressure dropped to around 122  She was asymptomatic with it  She really never had any cardiac workup related to it  She get easily anxious her heart rate when she arrived was 100 beats per minute currently is 90 beats per minute  She has some nonspecific ST changes  She has no nausea no vomiting no other significant complaint  She used to smoke she quit 30 years ago  Patient lives alone with her 2 dogs  She had a family history of heart problem with brother having MI at the age of 48  No other significant complaint today  No nausea no vomiting no fever no chills  She does not drink alcohol  She does not use excessive caffeine  She tried to drink water and ice tea        Review of Systems   Constitutional: Negative for activity change, chills, diaphoresis, fever and unexpected weight change  HENT: Negative for congestion  Eyes: Negative for discharge and redness  Respiratory: Negative for cough, chest tightness, shortness of breath and wheezing  Cardiovascular: Negative  Negative for chest pain, palpitations and leg swelling  Gastrointestinal: Negative for abdominal pain, diarrhea and nausea  Endocrine: Negative  Genitourinary: Negative for decreased urine volume and urgency  Musculoskeletal: Negative  Negative for arthralgias, back pain and gait problem  Skin: Negative for rash and wound  Allergic/Immunologic: Negative  Neurological: Positive for dizziness  Negative for seizures, syncope, weakness, light-headedness and headaches  With history of orthostatic hypotension   Hematological: Negative  Psychiatric/Behavioral: Negative for agitation and confusion  The patient is nervous/anxious  Historical Information   Past Medical History:   Diagnosis Date    Disease of thyroid gland     Hypercholesteremia     Hypertension      Past Surgical History:   Procedure Laterality Date    APPENDECTOMY      BREAST BIOPSY Right     benign    CHOLECYSTECTOMY      COLON SURGERY      ruptured bowel    COLOSTOMY      and closure 6 months later    HYSTERECTOMY  2002    total    KNEE ARTHROSCOPY Bilateral     right x1 left x2    [place] COLSC FLX W/RMVL OF TUMOR POLYP LESION SNARE TQ N/A 5/3/2016    Procedure: COLONOSCOPY ;  Surgeon: Elroy Hearn MD;  Location: [place] [address] GI LAB;   Service: Gastroenterology    TONSILECTOMY AND ADNOIDECTOMY      TYMPANOPLASTY Right      Social History     Substance and Sexual Activity   Alcohol Use No     Social History     Substance and Sexual Activity   Drug Use No     Social History     Tobacco Use   Smoking Status Former Smoker    Years: 20 00    Last attempt to quit: Elias Castellanos Years since quittin 9   Smokeless Tobacco Never Used     Family History:   Family History   Problem Relation Age of Onset    Breast cancer Paternal Aunt 95       Meds/Allergies     Allergies   Allergen Reactions    Penicillins Rash       Current Outpatient Medications:     cholecalciferol (VITAMIN D3) 1,000 units tablet, Take 1,000 Units by mouth daily, Disp: , Rfl:     esomeprazole (NexIUM) 40 MG capsule, Take 40 mg by mouth every morning before breakfast , Disp: , Rfl:     folic acid (FOLVITE) 1 mg tablet, , Disp: , Rfl: 5    hydroxychloroquine (PLAQUENIL) 200 mg tablet, , Disp: , Rfl: 3    Levothyroxine Sodium (SYNTHROID PO), Take by mouth daily Indications: unsure of dosage  , Disp: , Rfl:     rosuvastatin (CRESTOR) 40 MG tablet, Take 20 mg by mouth daily  , Disp: , Rfl:     ciclopirox (PENLAC) 8 % solution, Apply topically daily at bedtime (Patient not taking: Reported on 11/20/2019), Disp: 6 6 mL, Rfl: 4    gabapentin (NEURONTIN) 100 mg capsule, Take 1 capsule (100 mg total) by mouth 3 (three) times a day, Disp: 90 capsule, Rfl: 0    Vitals: Blood pressure 122/82, pulse 100, height 5' 5" (1 651 m), weight 71 7 kg (158 lb), SpO2 97 %  Body mass index is 26 29 kg/m²  Vitals:    11/20/19 1620   Weight: 71 7 kg (158 lb)     BP Readings from Last 3 Encounters:   11/20/19 122/82   09/11/19 120/68   07/17/19 104/58         Physical Exam   Constitutional: She is oriented to person, place, and time  She appears well-developed and well-nourished  No distress  HENT:   Head: Normocephalic and atraumatic  Eyes: Pupils are equal, round, and reactive to light  Neck: Neck supple  No JVD present  No tracheal deviation present  No thyromegaly present  Cardiovascular: Normal rate, regular rhythm, S1 normal, S2 normal and intact distal pulses  Exam reveals no gallop, no S3, no S4, no distant heart sounds and no friction rub  Murmur heard  Systolic (ejection) murmur is present with a grade of 2/6  S1-S2 regular with a 2/6 as murmur S4 present   Pulmonary/Chest: Effort normal and breath sounds normal  No respiratory distress   She has no wheezes  She has no rales  She exhibits no tenderness  Abdominal: Soft  Bowel sounds are normal  She exhibits no distension  There is no tenderness  Musculoskeletal: She exhibits no edema or deformity  Neurological: She is alert and oriented to person, place, and time  Skin: Skin is warm and dry  No rash noted  She is not diaphoretic  No pallor  Psychiatric: She has a normal mood and affect  Her behavior is normal  Judgment normal          Diagnostic Studies Review Cardio:  None recently    EKG:    Twelve lead EKG done 11/20/2019 shows normal sinus rhythm nonspecific ST changes heart rate 90 beats per minute  She gets regular blood test with her primary care doctor  Lab Review   Lab Results   Component Value Date    WBC 6 50 11/03/2017    HGB 10 4 (L) 11/03/2017    HCT 33 2 (L) 11/03/2017    MCV 71 (L) 11/03/2017    RDW 16 9 (H) 11/03/2017     11/03/2017     BMP:  Lab Results   Component Value Date    SODIUM 140 11/03/2017    K 3 4 (L) 11/03/2017     11/03/2017    CO2 24 11/03/2017    ANIONGAP 15 2 12/28/2015    BUN 14 11/03/2017    CREATININE 1 27 11/03/2017    GLUC 120 11/03/2017    CALCIUM 9 0 11/03/2017    EGFR 43 11/03/2017    MG 1 9 10/04/2016     LFT:  Lab Results   Component Value Date    AST 16 11/03/2017    ALT 16 11/03/2017    ALKPHOS 76 11/03/2017    TP 7 2 11/03/2017    ALB 3 7 11/03/2017      Lab Results   Component Value Date    LXJ9SRQOSDNP 2 515 10/04/2016     Lab Results   Component Value Date    HGBA1C 6 0 (H) 03/21/2016     Lipid Profile:   Lab Results   Component Value Date    CHOLESTEROL 160 10/04/2016    HDL 69 (H) 10/04/2016    LDLCALC 75 10/04/2016    TRIG 78 10/04/2016     Lab Results   Component Value Date    CHOLESTEROL 160 10/04/2016    CHOLESTEROL 155 03/21/2016     Lab Results   Component Value Date    TROPONINI <0 02 11/03/2017           Dr Ximena Landin MD [facility]      "This note has been constructed using a voice recognition system  Therefore there may be syntax, spelling, and/or grammatical errors   Please call if you have any questions  "

## 2019-12-03 ENCOUNTER — OFFICE VISIT (OUTPATIENT)
Dept: PODIATRY | Facility: CLINIC | Age: 74
End: 2019-12-03
Payer: COMMERCIAL

## 2019-12-03 ENCOUNTER — TRANSCRIBE ORDERS (OUTPATIENT)
Dept: ADMINISTRATIVE | Facility: HOSPITAL | Age: 74
End: 2019-12-03

## 2019-12-03 ENCOUNTER — HOSPITAL ENCOUNTER (OUTPATIENT)
Dept: NON INVASIVE DIAGNOSTICS | Facility: HOSPITAL | Age: 74
Discharge: HOME | End: 2019-12-03
Attending: INTERNAL MEDICINE | Admitting: INTERNAL MEDICINE
Payer: COMMERCIAL

## 2019-12-03 VITALS
SYSTOLIC BLOOD PRESSURE: 132 MMHG | HEART RATE: 77 BPM | RESPIRATION RATE: 16 BRPM | WEIGHT: 158 LBS | HEIGHT: 65 IN | BODY MASS INDEX: 26.33 KG/M2 | DIASTOLIC BLOOD PRESSURE: 69 MMHG

## 2019-12-03 DIAGNOSIS — M77.41 METATARSALGIA OF BOTH FEET: Primary | ICD-10-CM

## 2019-12-03 DIAGNOSIS — B35.1 ONYCHOMYCOSIS: ICD-10-CM

## 2019-12-03 DIAGNOSIS — M54.16 RADICULOPATHY OF LUMBAR REGION: ICD-10-CM

## 2019-12-03 DIAGNOSIS — I95.1 ORTHOSTATIC HYPOTENSION: ICD-10-CM

## 2019-12-03 DIAGNOSIS — M77.42 METATARSALGIA OF BOTH FEET: Primary | ICD-10-CM

## 2019-12-03 DIAGNOSIS — R55 RECURRENT SYNCOPE: ICD-10-CM

## 2019-12-03 DIAGNOSIS — L60.0 INGROWN TOENAIL: ICD-10-CM

## 2019-12-03 DIAGNOSIS — G60.8 PERIPHERAL SENSORY NEUROPATHY: ICD-10-CM

## 2019-12-03 PROCEDURE — 99213 OFFICE O/P EST LOW 20 MIN: CPT | Performed by: PODIATRIST

## 2019-12-03 PROCEDURE — 93660 TILT TABLE EVALUATION: CPT

## 2019-12-03 NOTE — PROGRESS NOTES
Assessment/Plan:     Aseptic debridement and planning of nails x10 and manually and mechanically  Patient will apply topical medication daily patient will call if any signs of infection  Discussed gabapentin or Lyrica pain worsens  Discussed importance of exercise patient will try B vitamin complex  Patient will discussed neuropathy with her rheumatologist  Follow-up 1 month     Patient will try terbinafine  We will also add an empiric course of gabapentin       Diagnoses and all orders for this visit:     Peripheral sensory neuropathy     Paresthesia of lower extremity     Onychomycosis  -          Ingrown toenail     Other orders  -     folic acid (FOLVITE) 1 mg tablet  -     hydroxychloroquine (PLAQUENIL) 200 mg tablet  -     cholecalciferol (VITAMIN D3) 1,000 units tablet; Take 1,000 Units by mouth daily           Subjective:       Patient ID: Naomi Brennan is a 68 y  o  female      Patient has had numbness and burning in the feet for the past year  Fausto Guerrero was recently diagnosed was Sjogren's syndrome  Fausto Guerrero has been seeing rheumatologist  Fausto Guerrero has severe knee arthritis only walk short distances denies any cramping in legs patient has chronic swelling in the legs  Patient has shooting and burning pain in right 2nd interspace   Previously had neuroma injection several years ago but says the pain is been back for the past year   Rates pain between 4 and 7/10 on a pain scale   Pain mostly when walking  Patient has bunion and contracted hammertoes bilateral  Patient has severe nail fungus nails are extremely elongated and several of the nails have not been cut in over a year  Nighat Yasmin is some incurvation and pain in nails                Past Medical History:   Diagnosis Date    Disease of thyroid gland      Hypercholesteremia      Hypertension              Current Outpatient Medications:     cholecalciferol (VITAMIN D3) 1,000 units tablet, Take 1,000 Units by mouth daily, Disp: , Rfl:     ciclopirox (PENLAC) 8 % solution, Apply topically daily at bedtime, Disp: 6 6 mL, Rfl: 4    esomeprazole (NexIUM) 40 MG capsule, Take 40 mg by mouth every morning before breakfast , Disp: , Rfl:     folic acid (FOLVITE) 1 mg tablet, , Disp: , Rfl: 5    hydroxychloroquine (PLAQUENIL) 200 mg tablet, , Disp: , Rfl: 3    Levothyroxine Sodium (SYNTHROID PO), Take by mouth daily Indications: unsure of dosage  , Disp: , Rfl:     rosuvastatin (CRESTOR) 40 MG tablet, Take 20 mg by mouth daily  , Disp: , Rfl:                Past Surgical History:   Procedure Laterality Date    APPENDECTOMY        BREAST BIOPSY Right       benign    CHOLECYSTECTOMY        COLON SURGERY         ruptured bowel    COLOSTOMY   2009     and closure 6 months later    HYSTERECTOMY   2002     total    KNEE ARTHROSCOPY Bilateral       right x1 left x2    FL COLSC FLX W/RMVL OF TUMOR POLYP LESION SNARE TQ N/A 5/3/2016     Procedure: COLONOSCOPY ;  Surgeon: Elisha Bergeron MD;  Location: [facility];  Service: Gastroenterology    TONSILECTOMY AND ADNOIDECTOMY        TYMPANOPLASTY Right                   Allergies   Allergen Reactions    Penicillins Rash         There is no problem list on file for this patient         Review of Systems   Constitutional: Negative     HENT: Negative     Eyes: Negative     Respiratory: Negative     Cardiovascular: Negative     Gastrointestinal: Negative     Endocrine: Negative     Genitourinary: Negative     Musculoskeletal: Positive for arthralgias and back pain  Skin: Positive for color change     Allergic/Immunologic: Negative     Neurological: Negative     Hematological: Negative     Psychiatric/Behavioral: Negative           Objective:  Patient's shoes and socks were removed, feet examined       /58   Pulse 80   Resp 16         Foot Exam     General  General Appearance: appears stated age and healthy   Orientation: alert and oriented to person, place, and time   Affect: appropriate   Gait: unimpaired         Right Foot/Ankle      Inspection and Palpation  Arch: pes planus  Hammertoes: second toe, fifth toe, fourth toe and third toe  Hallux valgus: yes  Skin Exam: callus and skin changes;      Neurovascular  Dorsalis pedis: 1+  Posterior tibial: 1+        Left Foot/Ankle       Inspection and Palpation  Arch: pes planus  Hammertoes: second toe, fifth toe, fourth toe and third toe  Hallux valgus: yes  Skin Exam: callus and skin changes;      Neurovascular  Dorsalis pedis: 1+  Posterior tibial: 1+              Right Foot/Ankle   Right Foot Inspection  Skin Exam: callus and callus                     Vascular     The right DP pulse is 1+  The right PT pulse is 1+  Right Toe  - Comprehensive Exam  Arch: pes planus  Hammertoes: second toe, fifth toe, fourth toe and third toe  Hallux valgus: yes     Left Foot/Ankle  Left Foot Inspection  Skin Exam: callus                    Vascular     The left DP pulse is 1+  The left PT pulse is 1+  Left Toe  - Comprehensive Exam  Arch: pes planus  Hammertoes: second toe, fifth toe, fourth toe and third toe  Hallux valgus: yes          Physical Exam   Cardiovascular:   Pulses:       Dorsalis pedis pulses are 1+ on the right side, and 1+ on the left side         Posterior tibial pulses are 1+ on the right side, and 1+ on the left side  Feet:   Right Foot:   Skin Integrity: Positive for callus     Left Foot:   Skin Integrity: Positive for callus          Procedures      Absent vibratory sensation bilateral legs up to the knee intact of than a  Monofilament sensation significantly reduced bilateral feet 8/10 absent  Negative Tinel sign tarsal tunnel bilateral  Positive Bob sign right 2nd interspace  Rigidly contracted hammertoes 2 through 5 bilateral  Moderate bunion bilateral  Plus one pitting edema bilateral feet and ankles  All nails severely mycotic thickened dystrophic  Third digit nails extremely elongated incurvated and ingrown  Mild ingrown bilateral hallux no signs of infection  Significant equinus bilateral 0 degrees dorsiflexion  Muscle strength 5/5 all groups bilateral feet and ankles

## 2019-12-05 ENCOUNTER — TELEPHONE (OUTPATIENT)
Dept: CARDIOLOGY CLINIC | Facility: CLINIC | Age: 74
End: 2019-12-05

## 2019-12-05 ENCOUNTER — HOSPITAL ENCOUNTER (OUTPATIENT)
Dept: NON INVASIVE DIAGNOSTICS | Facility: HOSPITAL | Age: 74
Discharge: HOME | End: 2019-12-05
Attending: INTERNAL MEDICINE
Payer: COMMERCIAL

## 2019-12-05 DIAGNOSIS — I95.1 ORTHOSTATIC HYPOTENSION: ICD-10-CM

## 2019-12-05 DIAGNOSIS — R55 RECURRENT SYNCOPE: ICD-10-CM

## 2019-12-05 PROCEDURE — 93306 TTE W/DOPPLER COMPLETE: CPT | Performed by: INTERNAL MEDICINE

## 2019-12-05 PROCEDURE — 93306 TTE W/DOPPLER COMPLETE: CPT

## 2019-12-05 NOTE — TELEPHONE ENCOUNTER
----- Message from Yolanda Wilkinson MD sent at 12/5/2019  2:28 PM EST -----  Patient's echo shows normal LV systolic function  No significant valvular disease  Patient can keep an appointment  Please call patient about echo report

## 2019-12-08 PROCEDURE — 93660 TILT TABLE EVALUATION: CPT | Performed by: INTERNAL MEDICINE

## 2019-12-09 ENCOUNTER — TELEPHONE (OUTPATIENT)
Dept: CARDIOLOGY CLINIC | Facility: CLINIC | Age: 74
End: 2019-12-09

## 2019-12-09 NOTE — TELEPHONE ENCOUNTER
----- Message from Yolanda Wilkinson MD sent at 12/9/2019  8:22 AM EST -----  Tilt-table test is normal

## 2020-02-11 ENCOUNTER — OFFICE VISIT (OUTPATIENT)
Dept: PODIATRY | Facility: CLINIC | Age: 75
End: 2020-02-11
Payer: COMMERCIAL

## 2020-02-11 VITALS
WEIGHT: 158 LBS | SYSTOLIC BLOOD PRESSURE: 133 MMHG | HEART RATE: 87 BPM | DIASTOLIC BLOOD PRESSURE: 80 MMHG | RESPIRATION RATE: 17 BRPM | HEIGHT: 65 IN | BODY MASS INDEX: 26.33 KG/M2

## 2020-02-11 DIAGNOSIS — M77.41 METATARSALGIA OF BOTH FEET: Primary | ICD-10-CM

## 2020-02-11 DIAGNOSIS — M54.16 RADICULOPATHY OF LUMBAR REGION: ICD-10-CM

## 2020-02-11 DIAGNOSIS — M77.42 METATARSALGIA OF BOTH FEET: Primary | ICD-10-CM

## 2020-02-11 DIAGNOSIS — B35.1 ONYCHOMYCOSIS: ICD-10-CM

## 2020-02-11 PROCEDURE — 99213 OFFICE O/P EST LOW 20 MIN: CPT | Performed by: PODIATRIST

## 2020-02-11 RX ORDER — GABAPENTIN 300 MG/1
300 CAPSULE ORAL 2 TIMES DAILY
Qty: 60 CAPSULE | Refills: 1 | Status: SHIPPED | OUTPATIENT
Start: 2020-02-11 | End: 2020-03-12

## 2020-02-11 RX ORDER — KETOCONAZOLE 20 MG/G
CREAM TOPICAL DAILY
Qty: 60 G | Refills: 1 | Status: SHIPPED | OUTPATIENT
Start: 2020-02-11 | End: 2020-03-12

## 2020-02-11 NOTE — PROGRESS NOTES
Assessment/Plan:  Pain upon ambulation  Metatarsalgia  Radiculopathy  Mycosis of nail and skin resolving slowly  Plan  Patient educated on condition  All nails debrided  We will continue with topical antifungal   Will increase gabapentin dosing  Diagnoses and all orders for this visit:    Metatarsalgia of both feet  -     gabapentin (NEURONTIN) 300 mg capsule; Take 1 capsule (300 mg total) by mouth 2 (two) times a day    Radiculopathy of lumbar region  -     gabapentin (NEURONTIN) 300 mg capsule; Take 1 capsule (300 mg total) by mouth 2 (two) times a day    Onychomycosis  -     ketoconazole (NIZORAL) 2 % cream; Apply topically daily          Subjective:  Patient is doing well  She has decreased pain in her feet  She took gabapentin without problem  Allergies   Allergen Reactions    Penicillins Rash         Current Outpatient Medications:     cholecalciferol (VITAMIN D3) 1,000 units tablet, Take 1,000 Units by mouth daily, Disp: , Rfl:     ciclopirox (PENLAC) 8 % solution, Apply topically daily at bedtime (Patient not taking: Reported on 11/20/2019), Disp: 6 6 mL, Rfl: 4    esomeprazole (NexIUM) 40 MG capsule, Take 40 mg by mouth every morning before breakfast , Disp: , Rfl:     folic acid (FOLVITE) 1 mg tablet, , Disp: , Rfl: 5    gabapentin (NEURONTIN) 100 mg capsule, Take 1 capsule (100 mg total) by mouth 3 (three) times a day, Disp: 90 capsule, Rfl: 0    gabapentin (NEURONTIN) 300 mg capsule, Take 1 capsule (300 mg total) by mouth 2 (two) times a day, Disp: 60 capsule, Rfl: 1    hydroxychloroquine (PLAQUENIL) 200 mg tablet, , Disp: , Rfl: 3    ketoconazole (NIZORAL) 2 % cream, Apply topically daily, Disp: 60 g, Rfl: 1    Levothyroxine Sodium (SYNTHROID PO), Take by mouth daily Indications: unsure of dosage  , Disp: , Rfl:     rosuvastatin (CRESTOR) 40 MG tablet, Take 20 mg by mouth daily  , Disp: , Rfl:     Patient Active Problem List   Diagnosis    Onychomycosis    Peripheral sensory neuropathy    Radiculopathy of lumbar region    Metatarsalgia of both feet    Dyslipidemia    Acquired hypothyroidism    Sjogren's syndrome ([facility] [address] )    Orthostatic hypotension    Recurrent syncope          Patient ID: Joyce Duke is a 69 y o  female  HPI    The following portions of the patient's history were reviewed and updated as appropriate:     family history includes Breast cancer (age of onset: 80) in her paternal aunt  reports that she quit smoking about 31 years ago  She quit after 20 00 years of use  She has never used smokeless tobacco  She reports that she does not drink alcohol or use drugs  Vitals:    02/11/20 1001   BP: 133/80   Pulse: 87   Resp: 17       Review of Systems      Objective:  Patient's shoes and socks removed  Foot ExamPhysical Exam         Foot Exam     General  General Appearance: appears stated age and healthy   Orientation: alert and oriented to person, place, and time   Affect: appropriate   Gait: unimpaired         Right Foot/Ankle      Inspection and Palpation  Arch: pes planus  Hammertoes: second toe, fifth toe, fourth toe and third toe  Hallux valgus: yes  Skin Exam: callus and skin changes;      Neurovascular  Dorsalis pedis: 1+  Posterior tibial: 1+        Left Foot/Ankle       Inspection and Palpation  Arch: pes planus  Hammertoes: second toe, fifth toe, fourth toe and third toe  Hallux valgus: yes  Skin Exam: callus and skin changes;      Neurovascular  Dorsalis pedis: 1+  Posterior tibial: 1+              Right Foot/Ankle   Right Foot Inspection  Skin Exam: callus and callus                     Vascular     The right DP pulse is 1+  The right PT pulse is 1+  Right Toe  - Comprehensive Exam  Arch: pes planus  Hammertoes: second toe, fifth toe, fourth toe and third toe  Hallux valgus: yes     Left Foot/Ankle  Left Foot Inspection  Skin Exam: callus                    Vascular     The left DP pulse is 1+  The left PT pulse is 1+     Left Toe  - Comprehensive Exam  Arch: pes planus  Hammertoes: second toe, fifth toe, fourth toe and third toe  Hallux valgus: yes          Physical Exam   Cardiovascular:   Pulses:       Dorsalis pedis pulses are 1+ on the right side, and 1+ on the left side         Posterior tibial pulses are 1+ on the right side, and 1+ on the left side  Feet:   Right Foot:   Skin Integrity: Positive for callus    [place] tinea pedis resolving  Left Foot:   Skin Integrity: Positive for callus   [place] tinea pedis resolving      Absent vibratory sensation bilateral legs up to the knee intact of than a  Monofilament sensation significantly reduced bilateral feet 8/10 absent  Negative Tinel sign tarsal tunnel bilateral  Positive Bob sign right 2nd interspace  Rigidly contracted hammertoes 2 through 5 bilateral  Moderate bunion bilateral  Plus one pitting edema bilateral feet and ankles  All nails severely mycotic thickened dystrophic  Third digit nails extremely elongated incurvated and ingrown  Mild ingrown bilateral hallux no signs of infection  Significant equinus bilateral 0 degrees dorsiflexion  Muscle strength 5/5 all groups bilateral feet and ankles

## 2020-02-28 ENCOUNTER — TRANSCRIBE ORDERS (OUTPATIENT)
Dept: ADMINISTRATIVE | Facility: HOSPITAL | Age: 75
End: 2020-02-28

## 2020-02-28 DIAGNOSIS — Z13.820 SCREENING FOR OSTEOPOROSIS: ICD-10-CM

## 2020-02-28 DIAGNOSIS — Z12.31 ENCOUNTER FOR SCREENING MAMMOGRAM FOR MALIGNANT NEOPLASM OF BREAST: Primary | ICD-10-CM

## 2020-05-06 ENCOUNTER — OFFICE VISIT (OUTPATIENT)
Dept: PODIATRY | Facility: CLINIC | Age: 75
End: 2020-05-06
Payer: COMMERCIAL

## 2020-05-06 VITALS
SYSTOLIC BLOOD PRESSURE: 133 MMHG | DIASTOLIC BLOOD PRESSURE: 80 MMHG | BODY MASS INDEX: 26.33 KG/M2 | HEART RATE: 87 BPM | WEIGHT: 158 LBS | RESPIRATION RATE: 16 BRPM | HEIGHT: 65 IN

## 2020-05-06 DIAGNOSIS — M77.41 METATARSALGIA OF BOTH FEET: Primary | ICD-10-CM

## 2020-05-06 DIAGNOSIS — B35.1 ONYCHOMYCOSIS: ICD-10-CM

## 2020-05-06 DIAGNOSIS — G60.8 PERIPHERAL SENSORY NEUROPATHY: ICD-10-CM

## 2020-05-06 DIAGNOSIS — L60.0 INGROWN TOENAIL: ICD-10-CM

## 2020-05-06 DIAGNOSIS — M54.16 RADICULOPATHY OF LUMBAR REGION: ICD-10-CM

## 2020-05-06 DIAGNOSIS — M77.42 METATARSALGIA OF BOTH FEET: Primary | ICD-10-CM

## 2020-05-06 PROCEDURE — 99213 OFFICE O/P EST LOW 20 MIN: CPT | Performed by: PODIATRIST

## 2020-06-15 ENCOUNTER — HOSPITAL ENCOUNTER (OUTPATIENT)
Dept: RADIOLOGY | Facility: HOSPITAL | Age: 75
Discharge: HOME | End: 2020-06-15
Attending: INTERNAL MEDICINE
Payer: COMMERCIAL

## 2020-06-15 VITALS — WEIGHT: 158 LBS | BODY MASS INDEX: 26.33 KG/M2 | HEIGHT: 65 IN

## 2020-06-15 DIAGNOSIS — Z13.820 SCREENING FOR OSTEOPOROSIS: ICD-10-CM

## 2020-06-15 DIAGNOSIS — Z12.31 ENCOUNTER FOR SCREENING MAMMOGRAM FOR MALIGNANT NEOPLASM OF BREAST: ICD-10-CM

## 2020-06-15 PROCEDURE — 77067 SCR MAMMO BI INCL CAD: CPT

## 2020-06-15 PROCEDURE — 77080 DXA BONE DENSITY AXIAL: CPT

## 2020-06-15 PROCEDURE — 77063 BREAST TOMOSYNTHESIS BI: CPT

## 2020-07-08 ENCOUNTER — OFFICE VISIT (OUTPATIENT)
Dept: PODIATRY | Facility: CLINIC | Age: 75
End: 2020-07-08
Payer: COMMERCIAL

## 2020-07-08 VITALS
RESPIRATION RATE: 17 BRPM | SYSTOLIC BLOOD PRESSURE: 133 MMHG | HEIGHT: 65 IN | DIASTOLIC BLOOD PRESSURE: 80 MMHG | BODY MASS INDEX: 26.33 KG/M2 | WEIGHT: 158 LBS | HEART RATE: 87 BPM

## 2020-07-08 DIAGNOSIS — M54.16 RADICULOPATHY OF LUMBAR REGION: ICD-10-CM

## 2020-07-08 DIAGNOSIS — R20.2 PARESTHESIA OF LOWER EXTREMITY: ICD-10-CM

## 2020-07-08 DIAGNOSIS — M77.42 METATARSALGIA OF BOTH FEET: Primary | ICD-10-CM

## 2020-07-08 DIAGNOSIS — B35.1 ONYCHOMYCOSIS: ICD-10-CM

## 2020-07-08 DIAGNOSIS — M77.41 METATARSALGIA OF BOTH FEET: Primary | ICD-10-CM

## 2020-07-08 DIAGNOSIS — G60.8 PERIPHERAL SENSORY NEUROPATHY: ICD-10-CM

## 2020-07-08 PROCEDURE — 99213 OFFICE O/P EST LOW 20 MIN: CPT | Performed by: PODIATRIST

## 2020-07-08 NOTE — PROGRESS NOTES
Assessment/Plan:  Metatarsalgia  Pain upon ambulation  Mycosis of nail  Pes planus  Radiculopathy  Plan  Foot exam performed  Patient educated on condition  She will continue to take gabapentin as directed  All nails debrided without pain or complication       Diagnoses and all orders for this visit:    Metatarsalgia of both feet    Onychomycosis    Peripheral sensory neuropathy    Radiculopathy of lumbar region    Paresthesia of lower extremity          Subjective:  Patient has pain in her feet and toes with ambulation  She has pain when she wears shoes  No history of trauma  She is taking gabapentin    Allergies   Allergen Reactions    Penicillins Rash         Current Outpatient Medications:     cholecalciferol (VITAMIN D3) 1,000 units tablet, Take 1,000 Units by mouth daily, Disp: , Rfl:     ciclopirox (PENLAC) 8 % solution, Apply topically daily at bedtime (Patient not taking: Reported on 11/20/2019), Disp: 6 6 mL, Rfl: 4    esomeprazole (NexIUM) 40 MG capsule, Take 40 mg by mouth every morning before breakfast , Disp: , Rfl:     folic acid (FOLVITE) 1 mg tablet, , Disp: , Rfl: 5    gabapentin (NEURONTIN) 100 mg capsule, Take 1 capsule (100 mg total) by mouth 3 (three) times a day, Disp: 90 capsule, Rfl: 0    gabapentin (NEURONTIN) 300 mg capsule, Take 1 capsule (300 mg total) by mouth 2 (two) times a day, Disp: 60 capsule, Rfl: 1    hydroxychloroquine (PLAQUENIL) 200 mg tablet, , Disp: , Rfl: 3    ketoconazole (NIZORAL) 2 % cream, Apply topically daily, Disp: 60 g, Rfl: 1    Levothyroxine Sodium (SYNTHROID PO), Take by mouth daily Indications: unsure of dosage  , Disp: , Rfl:     rosuvastatin (CRESTOR) 40 MG tablet, Take 20 mg by mouth daily  , Disp: , Rfl:     Patient Active Problem List   Diagnosis    Onychomycosis    Peripheral sensory neuropathy    Radiculopathy of lumbar region    Metatarsalgia of both feet    Dyslipidemia    Acquired hypothyroidism    Sjogren's syndrome ([facility] )    Orthostatic hypotension    Recurrent syncope          Patient ID: Fabi Pandey is a 69 y o  female  HPI    The following portions of the patient's history were reviewed and updated as appropriate:     family history includes Breast cancer (age of onset: 80) in her paternal aunt  reports that she quit smoking about 31 years ago  She quit after 20 00 years of use  She has never used smokeless tobacco  She reports that she does not drink alcohol or use drugs  Vitals:    07/08/20 1352   BP: 133/80   Pulse: 87   Resp: 17       Review of Systems      Objective:  Patient's shoes and socks removed  Foot ExamPhysical Exam         Foot Exam     General  General Appearance: appears stated age and healthy   Orientation: alert and oriented to person, place, and time   Affect: appropriate   Gait: unimpaired         Right Foot/Ankle      Inspection and Palpation  Arch: pes planus  Hammertoes: second toe, fifth toe, fourth toe and third toe  Hallux valgus: yes  Skin Exam: callus and skin changes;      Neurovascular  Dorsalis pedis: 1+  Posterior tibial: 1+        Left Foot/Ankle       Inspection and Palpation  Arch: pes planus  Hammertoes: second toe, fifth toe, fourth toe and third toe  Hallux valgus: yes  Skin Exam: callus and skin changes;      Neurovascular  Dorsalis pedis: 1+  Posterior tibial: 1+              Right Foot/Ankle   Right Foot Inspection  Skin Exam: callus and callus                     Vascular     The right DP pulse is 1+  The right PT pulse is 1+  Right Toe  - Comprehensive Exam  Arch: pes planus  Hammertoes: second toe, fifth toe, fourth toe and third toe  Hallux valgus: yes     Left Foot/Ankle  Left Foot Inspection  Skin Exam: callus                    Vascular     The left DP pulse is 1+  The left PT pulse is 1+     Left Toe  - Comprehensive Exam  Arch: pes planus  Hammertoes: second toe, fifth toe, fourth toe and third toe  Hallux valgus: yes          Physical Exam   Cardiovascular:   Pulses:       Dorsalis pedis pulses are 1+ on the right side, and 1+ on the left side         Posterior tibial pulses are 1+ on the right side, and 1+ on the left side     Feet:   Right Foot:   Skin Integrity: Positive for callus   Philo tinea pedis resolving  Left Foot:   Skin Integrity: Positive for callus   Philo tinea pedis resolving      Absent vibratory sensation bilateral legs up to the knee intact of than a  Monofilament sensation significantly reduced bilateral feet 8/10 absent  Negative Tinel sign tarsal tunnel bilateral  Positive Bob sign right 2nd interspace  Rigidly contracted hammertoes 2 through 5 bilateral  Moderate bunion bilateral  Plus one pitting edema bilateral feet and ankles  All nails severely mycotic thickened dystrophic  Third digit nails extremely elongated incurvated and ingrown  Mild ingrown bilateral hallux no signs of infection  Significant equinus bilateral 0 degrees dorsiflexion  Muscle strength 5/5 all groups bilateral feet and ankles

## 2020-09-28 ENCOUNTER — OFFICE VISIT (OUTPATIENT)
Dept: PODIATRY | Facility: CLINIC | Age: 75
End: 2020-09-28
Payer: COMMERCIAL

## 2020-09-28 VITALS
WEIGHT: 158 LBS | SYSTOLIC BLOOD PRESSURE: 130 MMHG | RESPIRATION RATE: 17 BRPM | DIASTOLIC BLOOD PRESSURE: 75 MMHG | BODY MASS INDEX: 26.33 KG/M2 | HEIGHT: 65 IN

## 2020-09-28 DIAGNOSIS — M77.41 METATARSALGIA OF BOTH FEET: Primary | ICD-10-CM

## 2020-09-28 DIAGNOSIS — M77.42 METATARSALGIA OF BOTH FEET: Primary | ICD-10-CM

## 2020-09-28 DIAGNOSIS — B35.1 ONYCHOMYCOSIS: ICD-10-CM

## 2020-09-28 DIAGNOSIS — M54.16 RADICULOPATHY OF LUMBAR REGION: ICD-10-CM

## 2020-09-28 DIAGNOSIS — G60.8 PERIPHERAL SENSORY NEUROPATHY: ICD-10-CM

## 2020-09-28 PROCEDURE — 99213 OFFICE O/P EST LOW 20 MIN: CPT | Performed by: PODIATRIST

## 2020-09-28 NOTE — PROGRESS NOTES
Assessment/Plan:  Metatarsalgia  Pain upon ambulation  Mycosis of nail  Pes planus  Radiculopathy      Plan  Foot exam performed  Patient educated on condition  She will continue to take gabapentin as directed  All nails debrided without pain or complication         Diagnoses and all orders for this visit:     Metatarsalgia of both feet     Onychomycosis     Peripheral sensory neuropathy     Radiculopathy of lumbar region     Paresthesia of lower extremity            Subjective:  Patient has pain in her feet and toes with ambulation  She has pain when she wears shoes  No history of trauma  She is taking gabapentin  Refill called in           Allergies   Allergen Reactions    Penicillins Rash            Current Outpatient Medications:     cholecalciferol (VITAMIN D3) 1,000 units tablet, Take 1,000 Units by mouth daily, Disp: , Rfl:     ciclopirox (PENLAC) 8 % solution, Apply topically daily at bedtime (Patient not taking: Reported on 11/20/2019), Disp: 6 6 mL, Rfl: 4    esomeprazole (NexIUM) 40 MG capsule, Take 40 mg by mouth every morning before breakfast , Disp: , Rfl:     folic acid (FOLVITE) 1 mg tablet, , Disp: , Rfl: 5    gabapentin (NEURONTIN) 100 mg capsule, Take 1 capsule (100 mg total) by mouth 3 (three) times a day, Disp: 90 capsule, Rfl: 0    gabapentin (NEURONTIN) 300 mg capsule, Take 1 capsule (300 mg total) by mouth 2 (two) times a day, Disp: 60 capsule, Rfl: 1    hydroxychloroquine (PLAQUENIL) 200 mg tablet, , Disp: , Rfl: 3    ketoconazole (NIZORAL) 2 % cream, Apply topically daily, Disp: 60 g, Rfl: 1    Levothyroxine Sodium (SYNTHROID PO), Take by mouth daily Indications: unsure of dosage  , Disp: , Rfl:     rosuvastatin (CRESTOR) 40 MG tablet, Take 20 mg by mouth daily  , Disp: , Rfl:          Patient Active Problem List   Diagnosis    Onychomycosis    Peripheral sensory neuropathy    Radiculopathy of lumbar region    Metatarsalgia of both feet    Dyslipidemia    Acquired hypothyroidism    Sjogren's syndrome ([facility] )    Orthostatic hypotension    Recurrent syncope             Patient ID: Go Graham is a 69 y o  female      HPI     The following portions of the patient's history were reviewed and updated as appropriate:      family history includes Breast cancer (age of onset: 80) in her paternal aunt        reports that she quit smoking about 31 years ago  She quit after 20 00 years of use  She has never used smokeless tobacco  She reports that she does not drink alcohol or use drugs          Vitals:     07/08/20 1352   BP: 133/80   Pulse: 87   Resp: 17         Review of Systems       Objective:  Patient's shoes and socks removed  Foot ExamPhysical Exam          Foot Exam     General  General Appearance: appears stated age and healthy   Orientation: alert and oriented to person, place, and time   Affect: appropriate   Gait: unimpaired         Right Foot/Ankle      Inspection and Palpation  Arch: pes planus  Hammertoes: second toe, fifth toe, fourth toe and third toe  Hallux valgus: yes  Skin Exam: callus and skin changes;      Neurovascular  Dorsalis pedis: 1+  Posterior tibial: 1+        Left Foot/Ankle       Inspection and Palpation  Arch: pes planus  Hammertoes: second toe, fifth toe, fourth toe and third toe  Hallux valgus: yes  Skin Exam: callus and skin changes;      Neurovascular  Dorsalis pedis: 1+  Posterior tibial: 1+              Right Foot/Ankle   Right Foot Inspection  Skin Exam: callus and callus                     Vascular     The right DP pulse is 1+  The right PT pulse is 1+  Right Toe  - Comprehensive Exam  Arch: pes planus  Hammertoes: second toe, fifth toe, fourth toe and third toe  Hallux valgus: yes     Left Foot/Ankle  Left Foot Inspection  Skin Exam: callus                    Vascular     The left DP pulse is 1+  The left PT pulse is 1+     Left Toe  - Comprehensive Exam  Arch: pes planus  Hammertoes: second toe, fifth toe, fourth toe and third toe  Hallux valgus: yes          Physical Exam   Cardiovascular:   Pulses:       Dorsalis pedis pulses are 1+ on the right side, and 1+ on the left side         Posterior tibial pulses are 1+ on the right side, and 1+ on the left side     Feet:   Right Foot:   Skin Integrity: Positive for callus   Hazard tinea pedis resolving  Left Foot:   Skin Integrity: Positive for callus   Hazard tinea pedis resolving      Absent vibratory sensation bilateral legs up to the knee intact of than a  Monofilament sensation significantly reduced bilateral feet 8/10 absent  Negative Tinel sign tarsal tunnel bilateral  Positive Bob sign right 2nd interspace  Rigidly contracted hammertoes 2 through 5 bilateral  Moderate bunion bilateral  Plus one pitting edema bilateral feet and ankles  All nails severely mycotic thickened dystrophic  Third digit nails extremely elongated incurvated and ingrown  Mild ingrown bilateral hallux no signs of infection  Significant equinus bilateral 0 degrees dorsiflexion  Muscle strength 5/5 all groups bilateral feet and ankles

## 2021-11-15 ENCOUNTER — OFFICE VISIT (OUTPATIENT)
Dept: PHYSICAL THERAPY | Facility: CLINIC | Age: 76
End: 2021-11-15
Payer: COMMERCIAL

## 2021-11-15 DIAGNOSIS — R29.6 FREQUENT FALLS: Primary | ICD-10-CM

## 2021-11-15 PROCEDURE — 97163 PT EVAL HIGH COMPLEX 45 MIN: CPT

## 2021-11-19 ENCOUNTER — APPOINTMENT (OUTPATIENT)
Dept: PHYSICAL THERAPY | Facility: CLINIC | Age: 76
End: 2021-11-19
Payer: COMMERCIAL

## 2021-11-22 ENCOUNTER — OFFICE VISIT (OUTPATIENT)
Dept: PHYSICAL THERAPY | Facility: CLINIC | Age: 76
End: 2021-11-22
Payer: COMMERCIAL

## 2021-11-22 DIAGNOSIS — R29.6 FREQUENT FALLS: Primary | ICD-10-CM

## 2021-11-22 PROCEDURE — 97110 THERAPEUTIC EXERCISES: CPT

## 2021-11-22 PROCEDURE — 97530 THERAPEUTIC ACTIVITIES: CPT

## 2021-11-24 ENCOUNTER — APPOINTMENT (OUTPATIENT)
Dept: PHYSICAL THERAPY | Facility: CLINIC | Age: 76
End: 2021-11-24
Payer: COMMERCIAL

## 2021-11-29 ENCOUNTER — OFFICE VISIT (OUTPATIENT)
Dept: PHYSICAL THERAPY | Facility: CLINIC | Age: 76
End: 2021-11-29
Payer: COMMERCIAL

## 2021-11-29 DIAGNOSIS — R29.6 FREQUENT FALLS: Primary | ICD-10-CM

## 2021-11-29 PROCEDURE — 97112 NEUROMUSCULAR REEDUCATION: CPT

## 2021-12-01 ENCOUNTER — APPOINTMENT (OUTPATIENT)
Dept: PHYSICAL THERAPY | Facility: CLINIC | Age: 76
End: 2021-12-01
Payer: COMMERCIAL

## 2021-12-06 ENCOUNTER — OFFICE VISIT (OUTPATIENT)
Dept: PHYSICAL THERAPY | Facility: CLINIC | Age: 76
End: 2021-12-06
Payer: COMMERCIAL

## 2021-12-06 DIAGNOSIS — R29.6 FREQUENT FALLS: Primary | ICD-10-CM

## 2021-12-06 PROCEDURE — 97110 THERAPEUTIC EXERCISES: CPT | Performed by: PHYSICAL THERAPIST

## 2021-12-06 PROCEDURE — 97530 THERAPEUTIC ACTIVITIES: CPT | Performed by: PHYSICAL THERAPIST

## 2021-12-08 ENCOUNTER — OFFICE VISIT (OUTPATIENT)
Dept: PHYSICAL THERAPY | Facility: CLINIC | Age: 76
End: 2021-12-08
Payer: COMMERCIAL

## 2021-12-08 DIAGNOSIS — R29.6 FREQUENT FALLS: Primary | ICD-10-CM

## 2021-12-08 PROCEDURE — 97112 NEUROMUSCULAR REEDUCATION: CPT

## 2021-12-13 ENCOUNTER — OFFICE VISIT (OUTPATIENT)
Dept: PHYSICAL THERAPY | Facility: CLINIC | Age: 76
End: 2021-12-13
Payer: COMMERCIAL

## 2021-12-13 DIAGNOSIS — R29.6 FREQUENT FALLS: Primary | ICD-10-CM

## 2021-12-13 PROCEDURE — 97112 NEUROMUSCULAR REEDUCATION: CPT

## 2021-12-15 ENCOUNTER — EVALUATION (OUTPATIENT)
Dept: PHYSICAL THERAPY | Facility: CLINIC | Age: 76
End: 2021-12-15
Payer: COMMERCIAL

## 2021-12-15 DIAGNOSIS — R29.6 FREQUENT FALLS: Primary | ICD-10-CM

## 2021-12-15 PROCEDURE — 97530 THERAPEUTIC ACTIVITIES: CPT

## 2021-12-17 ENCOUNTER — HOSPITAL ENCOUNTER (OUTPATIENT)
Dept: RADIOLOGY | Facility: HOSPITAL | Age: 76
Discharge: HOME | End: 2021-12-17
Attending: INTERNAL MEDICINE
Payer: COMMERCIAL

## 2021-12-17 VITALS — BODY MASS INDEX: 24.66 KG/M2 | HEIGHT: 65 IN | WEIGHT: 148 LBS

## 2021-12-17 DIAGNOSIS — Z12.31 ENCOUNTER FOR SCREENING MAMMOGRAM FOR MALIGNANT NEOPLASM OF BREAST: ICD-10-CM

## 2021-12-17 PROCEDURE — 77067 SCR MAMMO BI INCL CAD: CPT

## 2021-12-17 PROCEDURE — 77063 BREAST TOMOSYNTHESIS BI: CPT

## 2021-12-20 ENCOUNTER — OFFICE VISIT (OUTPATIENT)
Dept: PHYSICAL THERAPY | Facility: CLINIC | Age: 76
End: 2021-12-20
Payer: COMMERCIAL

## 2021-12-20 DIAGNOSIS — R29.6 FREQUENT FALLS: Primary | ICD-10-CM

## 2021-12-20 PROCEDURE — 97112 NEUROMUSCULAR REEDUCATION: CPT

## 2021-12-21 ENCOUNTER — IMMUNIZATIONS (OUTPATIENT)
Dept: FAMILY MEDICINE CLINIC | Facility: HOSPITAL | Age: 76
End: 2021-12-21

## 2021-12-21 DIAGNOSIS — Z23 ENCOUNTER FOR IMMUNIZATION: Primary | ICD-10-CM

## 2021-12-21 PROCEDURE — 0064A COVID-19 MODERNA VACC 0.25 ML BOOSTER: CPT

## 2021-12-21 PROCEDURE — 91306 COVID-19 MODERNA VACC 0.25 ML BOOSTER: CPT

## 2021-12-22 ENCOUNTER — OFFICE VISIT (OUTPATIENT)
Dept: PHYSICAL THERAPY | Facility: CLINIC | Age: 76
End: 2021-12-22
Payer: COMMERCIAL

## 2021-12-22 DIAGNOSIS — R29.6 FREQUENT FALLS: Primary | ICD-10-CM

## 2021-12-22 PROCEDURE — 97112 NEUROMUSCULAR REEDUCATION: CPT

## 2021-12-27 ENCOUNTER — APPOINTMENT (OUTPATIENT)
Dept: PHYSICAL THERAPY | Facility: CLINIC | Age: 76
End: 2021-12-27
Payer: COMMERCIAL

## 2021-12-29 ENCOUNTER — APPOINTMENT (OUTPATIENT)
Dept: PHYSICAL THERAPY | Facility: CLINIC | Age: 76
End: 2021-12-29
Payer: COMMERCIAL

## 2021-12-30 ENCOUNTER — OFFICE VISIT (OUTPATIENT)
Dept: PHYSICAL THERAPY | Facility: CLINIC | Age: 76
End: 2021-12-30
Payer: COMMERCIAL

## 2021-12-30 DIAGNOSIS — R29.6 FREQUENT FALLS: Primary | ICD-10-CM

## 2021-12-30 PROCEDURE — 97112 NEUROMUSCULAR REEDUCATION: CPT | Performed by: PHYSICAL THERAPIST

## 2022-01-03 ENCOUNTER — OFFICE VISIT (OUTPATIENT)
Dept: PHYSICAL THERAPY | Facility: CLINIC | Age: 77
End: 2022-01-03
Payer: COMMERCIAL

## 2022-01-03 DIAGNOSIS — R29.6 FREQUENT FALLS: Primary | ICD-10-CM

## 2022-01-03 PROCEDURE — 97112 NEUROMUSCULAR REEDUCATION: CPT

## 2022-01-03 NOTE — PROGRESS NOTES
Daily Note   IE 11/15/2021 (POC 2022)    Insurance:  AMA/CMS Eval/ Re-eval POC expires Giovanna Marycarmen #/ Referral # Totalvisits Start date  Expiration date Extension  Visit limitation? PT only or  PT+OT? Co-Insurance   [employer]  CMS 11/15/21 2/7/22  [id number] 11/15/21 5/14/22   PT                                                                   Date 11/15 11/22 11/29 12/6 12/8 12/13 12/15 12/20 12/22      Visits:  Used 1 2 3 4 5 6 7 8 9      Authed:  Remaining  18 16 32 64 16 75 81 90 39              Today's date: 1/3/2022  Patient name: Delio Agee  : 1945  MRN: [id number]  Referring provider: Anita Vance*  Dx:   Encounter Diagnosis     ICD-10-CM    1  Frequent falls  R29 6                   Subjective: Patient reports to session w/ SPC; no new issues, falls, or complaints  Objective: See treatment diary below    NMR:   - STS 2 x 10 w/ one airex pad on chair; foam ball between legs  - Fwd/lat step ups (8") x 15 each; 1 UE support  - Sidestepping along // bars 4 cycles x 8 ft down/back w/ red TB proximal to knees  - Squats w/ red TB proximal to knee 2 x 10    Treadmill training: emphasis on increased step length and upright posture  - 3 mins @ 1 0 mph    Assessment: Patient tolerated treatment well today with continued focus on proximal hip strengthening and gait training  Patient required less frequent seated rest breaks today  Tolerated increase in treadmill training trial to 3 minutes, suggesting improved cardiovascular endurance and activity tolerance  Progress proximal hip strengthening exercises per patient tolerance  Patient will continue to benefit from skilled PT services to address deficits in strength, balance, and gait in order to maximize functional mobility at home and reduce overall risk for falls  Plan: Continue per plan of care  Progress treatment as tolerated           Outcome Measures Initial Eval  11/15/2021 DN  2021 PN  12/15/2021      5xSTS 17 94 sec  25 12 sec (2 UE pushoff)      TUG 22 75 sec  18 18 sec      10 meter m/s 0 59 m/s 0 63 m/s      HOLGUIN 36/56  41/56      FGA /30        DGI /24        mCTSIB  - FTEO (firm)  - FTEC (firm)  - FTEO (foam)  - FTEC (foam)   30 sec  30 sec  3 28 sec  0 sec    30 sec  30 sec  30 sec  6 02 sec      6MWT  ft 445 ft 660 ft      [facility]  70% 68 12%

## 2022-01-05 ENCOUNTER — OFFICE VISIT (OUTPATIENT)
Dept: PHYSICAL THERAPY | Facility: CLINIC | Age: 77
End: 2022-01-05
Payer: COMMERCIAL

## 2022-01-05 DIAGNOSIS — R29.6 FREQUENT FALLS: Primary | ICD-10-CM

## 2022-01-05 PROCEDURE — 97112 NEUROMUSCULAR REEDUCATION: CPT

## 2022-01-05 NOTE — PROGRESS NOTES
Daily Note   PN 12/15/2021 (POC 2022)    Insurance:  AMA/CMS Eval/ Re-eval POC expires Azael Collins #/ Referral # Totalvisits Start date  Expiration date Extension  Visit limitation? PT only or  PT+OT? Co-Insurance   [employer] Medicare  CMS 11/15/21 2/7/22  [id number] 11/15/21 5/14/22   PT                                                                   Date 11/15 11/22 11/29 12/6 12/8 12/13 12/15 12/20 12/22 1/5     Visits:  Used 1 2 3 4 5 6 7 8 9 10     Authed:  Remaining  49 48 47 46 45 44 43 42 41 40         GOES BY " PENELOPE"    Today's date: 2022  Patient name: Bel Sandoval  : 1945  MRN: [id number]  Referring provider: Naveed Ramos*  Dx:   Encounter Diagnosis     ICD-10-CM    1  Frequent falls  R29 6                   Subjective: Patient reports to session w/ SPC; no new issues, falls, or complaints  Objective: See treatment diary below    NMR: Added in B/L 3 lb ankle weights   - STS 3 x 10 w/ one airex pad on chair; foam ball between legs  - Fwd/lat step ups (6") x 15 each; 1 UE support  - Sidestepping along // bars 6 cycles x 8 ft down/back w/ red TB proximal to knees  - Overground ambulation to/from lobby 150 ft x2 w/ SPC , VC's for sequencing of SPC    Treadmill training: emphasis on increased step length and upright posture  - 3 mins @ 1 0 mph    Assessment: Patient tolerated treatment well today with continued focus on proximal hip strengthening and gait training  Patient able to be progressed with increased repetitions and addition of ankle weights this session  Patient demonstrates substantial knee valgus in standing and when performing most functional mobility, demonstrated little difference with or without TB around proximal knees  Patient unable to perform step ups at 8'' height this session, but able to complete with modification to a 6'' step   Patient will continue to benefit from skilled PT services to address deficits in strength, balance, and gait in order to maximize functional mobility at home and reduce overall risk for falls  Plan: Continue per plan of care  Progress treatment as tolerated           Outcome Measures Initial Eval  11/15/2021 DN  11/22/2021 PN  12/15/2021      5xSTS 17 94 sec  25 12 sec (2 UE pushoff)      TUG 22 75 sec  18 18 sec      10 meter m/s 0 59 m/s 0 63 m/s      HOLGUIN 36/56  41/56      FGA /30        DGI /24        mCTSIB  - FTEO (firm)  - FTEC (firm)  - FTEO (foam)  - FTEC (foam)   30 sec  30 sec  3 28 sec  0 sec    30 sec  30 sec  30 sec  6 02 sec      6MWT  ft 445 ft 660 ft      ABC  70% 68 12%

## 2022-01-12 ENCOUNTER — APPOINTMENT (OUTPATIENT)
Dept: PHYSICAL THERAPY | Facility: CLINIC | Age: 77
End: 2022-01-12
Payer: COMMERCIAL

## 2022-01-17 ENCOUNTER — APPOINTMENT (OUTPATIENT)
Dept: PHYSICAL THERAPY | Facility: CLINIC | Age: 77
End: 2022-01-17
Payer: COMMERCIAL

## 2022-01-19 ENCOUNTER — APPOINTMENT (OUTPATIENT)
Dept: PHYSICAL THERAPY | Facility: CLINIC | Age: 77
End: 2022-01-19
Payer: COMMERCIAL

## 2022-01-25 ENCOUNTER — EVALUATION (OUTPATIENT)
Dept: PHYSICAL THERAPY | Facility: CLINIC | Age: 77
End: 2022-01-25
Payer: COMMERCIAL

## 2022-01-25 DIAGNOSIS — R29.6 FREQUENT FALLS: Primary | ICD-10-CM

## 2022-01-25 PROCEDURE — 97530 THERAPEUTIC ACTIVITIES: CPT

## 2022-01-25 NOTE — LETTER
2022    Tarah Cooper MD  [address]  [address] [facility] [address]    Patient: Donte Hayward   YOB: 1945   Date of Visit: 2022     Encounter Diagnosis     ICD-10-CM    1  Frequent falls  R29 6        Dear Dr Pablo Breed:    Thank you for your recent referral of Donte Hayward  Please review the attached evaluation summary from Naomi's recent visit  Please verify that you agree with the plan of care by signing the attached order  If you have any questions or concerns, please do not hesitate to call  I sincerely appreciate the opportunity to share in the care of one of your patients and hope to have another opportunity to work with you in the near future  Sincerely,    Sandra Street, PT      Referring Provider:      I certify that I have read the below Plan of Care and certify the need for these services furnished under this plan of treatment while under my care  Tarah Cooper MD  [address]  [address] [facility] [address]  Via Fax: [phone]                                                                              PT Re-Evaluation   Today's date: 2022  Patient name: Donte Hayward  : 1945  MRN: [id number]  Referring provider: Yue Mckeon  Dx:   Encounter Diagnosis     ICD-10-CM    1  Frequent falls  R29 6          Assessment  Assessment details: Patient is a 77year old female who has been reporting to skilled outpatient PT for deficits related to LE weakness, gait, and balance resulting in frequent falls and reduced overall safety with ADLs  She has demonstrated improvements in these areas as indicated by scores associated with 5xSTS, 10 meter, and TUG with results falling below age matched normative values and depict HIGH risk for falls   Improvements in static balance via HOLGUIN depicts LOW risk for falls with ability to perform high level balance tasks and results of FGA indicate HIGH risk for falls with most difficulty and LoB when ambulating with head turns indicating increased visual reliance  Reduction in patient's balance confidence possibly due to patient with gap in therapy due to worsening cold weather and icy conditions  She will continue to benefit from skilled outpatient PT in order to maximize her function with balance tasks, reduce visual reliance, and improved safety with ambulation and ADLs  Impairments: Abnormal gait, Impaired balance and Impaired physical strength  Understanding of Dx/Px/POC: Good  Prognosis: Good    Patient verbalized understanding of POC  Please contact me if you have any questions or recommendations  Thank you for the referral and the opportunity to share in [address] [place] [address] [employer] care          Plan  Plan details: 6 MWT, 10 MWT, ABC next visit; balance training    Patient would benefit from: PT Eval and Skilled PT  Planned modality interventions: Biofeedback, Cryotherapy, TENS and Thermotherapy: Hydrocollator Packs  Planned therapy interventions: Balance, Gait training, HEP, Neuromuscular re-education, Patient education, Strengthening, Therapeutic activities and Therapeutic exercises  Frequency: 2x/wk  Duration in weeks: 12  Plan of Care beginning date: 11/15/2021  Plan of Care expiration date: 12 weeks - 2/7/2022  Treatment plan discussed with: Patient       Goals  Short Term Goals (4 weeks):    - Patient will improve time on TUG by 2 9 seconds from 22 75 seconds to 19 85 seconds to facilitate improved safety in all ambulation MET  - Patient will be independent in basic HEP 2-3 weeks MET  - Patient will improve 5xSTS score by 2 3 seconds from 17 94 seconds to 15 64 seconds to promote improved LE functional strength needed for ADLs NOT MET  - Patient will preform 6 MWT, 10 MWT, and ABC Scale in 2-3 weeks in order to assess cardiovascular endurance, ambulation speed, and balance confidence MET    Long Term Goals (12 weeks):  - Patient will be independent in a comprehensive home exercise program NOT MET  - Patient will improve gait speed by 0 18 m/s to improve safety with community ambulation NOT MET (progressing)  - Patient will improve HOLGUIN by 6 points from 36/56 to 42/56 to facilitate return to safe independent ambulation MET  - Patient will be able to demonstrate HT in gait without veering NOT MET  - Patient will improve 6 Minute Walk Test score by 190 feet to promote improved cardiovascular endurance MET  - Patient will report 50% reduction in near falls in order to improve safety with functional tasks and reduce his risk for falls NOT MET  - Patient will report going on walks at least 3 days per week to promote independence and improved cardiovascular endurance NOT MET  - Patient will be able to ascend/descend stairs reciprocally with 1 UE assist to promote independence and safety with ADLs NOT MET  - Patient will report 50% reduction in near falls when ambulating on uneven terrain NOT MET      Cut off score    All date taken from APTA Neuro Section or Rehab Measures      Holguin/64  MDC: 6 pts  Age Norms:  61-76: M - 54   F - 55  70-79: M - 47   F - 53  80-89: M - 48   F - 50 5xSTS: Roselia et al 2010  MDC: 2 3 sec  Age Norms:  60-69: 11 1 sec  70-79: 12 6 sec  80-89: 14 8 sec   TUG  MDC: 4 14 sec  Cut off score:  >13 5 sec community dwelling adults  >32 2 frail elderly  <20 I for basic transfers  >30 dependent on transfers 10 Meter Walk Test: Mandy Folks and Sharyne Skiff al   MDC: 0 18 m/s  20-29: M - 1 35 m   F - 1 34 m  30-39: M - 1 43 m   F - 1 34 m  40-49: M - 1 43 m   F - 1 39 m  50-59: M - 1 43 m   F - 1 31 m  60-69: M - 1 34 m   F - 1 24 m  70-79: M - 1 26 m   F - 1 13 m  80-89: M - 0 97 m   F - 0 94 m    Household Ambulator < 0 4 m/s  Limited [employer] 0 4 - 0 8 m/s  [employer] 0 8 - 1 2 m/s  Safely cross the street > 1 2 m/s   FGA  MCID: 4 pts  Geriatrics/community < 22/30 fall risk  Geriatrics/community < 20/30 unexplained falls    DGI  MDC: vestibular - 4 pts  MDC: geriatric/community - 3 pts  Falls risk <19/24 mCTSIB  Norm: 20-60 yrs  Eyes open firm: norm sway 0 21-0 48  Eyes closed firm: norm sway 0 48-0 99  Eyes open foam: norm sway 0 38-0 71  Eyes closed foam: norm sway 0 70-2 22   6 Minute Walk Test  MDC: 190 98 ft  MCID: 164 ft    Age Norms  61-76: M - 1876 ft (571 80 m)  F - 1765 ft (537 98 m)  70-79: M - 1729 ft (527 00 m)  F - 1545 ft (470 92 m)  80-89: M - 1368 ft (416 97 m)  F - 1286 ft (391 97 m) ABC: Na Vasquez, 2003  <67% increased risk for falls         Subjective    History of Present Illness  - Mechanism of injury: Patient is a 77year old female who is presenting to skilled OPPT for IE due to imbalance and frequent falls  Patient reports she fell on November 2nd (Tuesday) and was unable to get up  Her friend found her on the floor Wednesday night and called EMS, but patient refused to go to hospital, stating she did not feel she had broken anything  She recalls sneezing before she fell and next thing she knew she was on the floor  Patient denies any other falls in the past 6 months, but reports a fall outside approximately a year ago in which someone found her and called EMS  She states she has had a "tilt table test" and MRI completed, with no significant findings  She notes feeling lightheaded frequently  PMH significant for orthostatic hypotension and peripheral neuropathy  PSHx significant for B/L knee surgeries (including meniscus repair)  Patient additionally reports onset of tremor and contractures in fingers approximately one month ago, and has an appointment scheduled with an orthopedist this week  Updated (12/15/2021): Patient reports she enjoys coming to physical therapy  She notes she has more confidence when getting up from a chair at home  She has had several near falls since IE, but no falls  She reports compliance with HEP  She wants to work on loosening up her knees   She also notes feeling more confident when walking since obtaining the [facility]  Updated (1-): Patient reports gap in therapy due to losing her purse and worsening icy conditions  She stated "some days my balance is really good and other days it feel like I could just fall over  "    - Primary AD: none  - Assist level at home: independent  - Decreased fine motor tasks: Yes, MRI with Dr Sarah Guzman this week      Social Support  - Steps to enter house: 5 ESPERANZA  - Stairs in house: flight to basement  - Lives in: single floor home  - Lives with: alone    - Employment status: retired  - Hand dominance: (R)    Treatments  - Previous treatment: PT for prior knee surgeries  - Current treatment: none  - Diagnostic Testing: MRI      Objective     LE MMT  - R Hip Flexion: 4-/5   L Hip Flexion: 3+/5  - R Hip Extension: 4/5   L Hip Extension: 4-/5  - R Hip Abduction: 4/5   L Hip Abduction: 4/5  - R Hip Adduction: 4/5   L Hip Adduction: 4/5  - R Knee Extension: 4/5  L Knee Extension: 4-/5  - R Knee Flexion: 4/5   L Knee Flexion: 4-/5  - R Ankle DF: 4+/5   L Ankle DF: 4/5  - R Ankle PF: 4+/5   L Ankle PF: 4+/5    Sensation  - Light touch: diminished  - Deep pressure: diminished      Coordination  - Heel to Shin: intact  - Alternate Toe Taps: intact  - Finger to Nose: intact, intension tremor on right  - Finger to Finger: decreased (L > R), intention tremor on right    Myelopathy Screen (>3/5 +)  - Rose's Reflex: -  - Babinski Reflex: -  - Inverted Supinator Sign: -  - Age > 45: Yes  - Gait Deviation: Yes    Gait  - Abnormalities: wide DYLAN, diminished heel strike/toe off, B/L knee flexion, slight forward trunk lean    Outcome Measures Initial Eval  11/15/2021 DN  11/22/2021 PN  12/15/2021 RE  1-     5xSTS 17 94 sec  25 12 sec (2 UE pushoff) 19 59 sec (2 UE)     TUG 22 75 sec  18 18 sec 14 61 sec     10 meter m/s 0 59 m/s 0 63 m/s 0 80 m/s     HOLGUIN 36/56  41/56 43/56     FGA /30   14/30     DGI /24        [facility]  - FTEO (firm)  - FTEC (firm)  - FTEO (foam)  - FTEC (foam)   30 sec  30 sec  3 28 sec  0 sec    30 sec  30 sec  30 sec  6 02 sec   30 sec  30 sec  30 sec  30 sec (+)     6MWT  ft 445 ft 660 ft 750 ft     ABC  70% 68 12% 52 5%                       Precautions: orthostatic hypotension, high fall risk  Past Medical History:   Diagnosis Date    Disease of thyroid gland     Hypercholesteremia     Hypertension

## 2022-01-25 NOTE — LETTER
2022    Will Piecre MD  [address]  [address] [facility] [address]    Patient: Emily Dewey   YOB: 1945   Date of Visit: 2022     Encounter Diagnosis     ICD-10-CM    1  Frequent falls  R29 6        Dear Dr Ball Right:    Thank you for your recent referral of Emily Dewey  Please review the attached evaluation summary from Naomi's recent visit  Please verify that you agree with the plan of care by signing the attached order  If you have any questions or concerns, please do not hesitate to call  I sincerely appreciate the opportunity to share in the care of one of your patients and hope to have another opportunity to work with you in the near future  Sincerely,    Merissa Hidalgo, PT      Referring Provider:      I certify that I have read the below Plan of Care and certify the need for these services furnished under this plan of treatment while under my care  Will Pierce MD  [address]  [address] [facility] [address]  Via Fax: [phone]                                                                              PT Re-Evaluation   Today's date: 2022  Patient name: Emily Dewey  : 1945  MRN: [id number]  Referring provider: Nery Parnell*  Dx:   Encounter Diagnosis     ICD-10-CM    1  Frequent falls  R29 6          Assessment  Assessment details: Patient is a 77year old female who has been reporting to skilled outpatient PT for deficits related to LE weakness, gait, and balance resulting in frequent falls and reduced overall safety with ADLs  She has demonstrated improvements in these areas as indicated by scores associated with 5xSTS, 10 meter, and TUG with results falling below age matched normative values and depict HIGH risk for falls   Improvements in static balance via HOLGUIN depicts LOW risk for falls with ability to perform high level balance tasks and results of FGA indicate HIGH risk for falls with most difficulty and LoB when ambulating with head turns indicating increased visual reliance  Reduction in patient's balance confidence possibly due to patient with gap in therapy due to worsening cold weather and icy conditions  She will continue to benefit from skilled outpatient PT in order to maximize her function with balance tasks, reduce visual reliance, and improved safety with ambulation and ADLs  Impairments: Abnormal gait, Impaired balance and Impaired physical strength  Understanding of Dx/Px/POC: Good  Prognosis: Good    Patient verbalized understanding of POC  Please contact me if you have any questions or recommendations  Thank you for the referral and the opportunity to share in [facility] Workday care          Plan  Plan details: 6 MWT, 10 MWT, ABC next visit; balance training    Patient would benefit from: PT Eval and Skilled PT  Planned modality interventions: Biofeedback, Cryotherapy, TENS and Thermotherapy: Hydrocollator Packs  Planned therapy interventions: Balance, Gait training, HEP, Neuromuscular re-education, Patient education, Strengthening, Therapeutic activities and Therapeutic exercises  Frequency: 2x/wk  Duration in weeks: 8  Plan of Care beginning date: 1-  Plan of Care expiration date: 8 weeks - 3-  Treatment plan discussed with: Patient       Goals  Short Term Goals (4 weeks):    - Patient will improve time on TUG by 2 9 seconds from 22 75 seconds to 19 85 seconds to facilitate improved safety in all ambulation MET  - Patient will be independent in basic HEP 2-3 weeks MET  - Patient will improve 5xSTS score by 2 3 seconds from 17 94 seconds to 15 64 seconds to promote improved LE functional strength needed for ADLs NOT MET  - Patient will preform 6 MWT, 10 MWT, and ABC Scale in 2-3 weeks in order to assess cardiovascular endurance, ambulation speed, and balance confidence MET    Long Term Goals (12 weeks):  - Patient will be independent in a comprehensive home exercise program NOT MET  - Patient will improve gait speed by 0 18 m/s to improve safety with community ambulation NOT MET (progressing)  - Patient will improve HOLGUIN by 6 points from 36/56 to 42/56 to facilitate return to safe independent ambulation MET  - Patient will be able to demonstrate HT in gait without veering NOT MET  - Patient will improve 6 Minute Walk Test score by 190 feet to promote improved cardiovascular endurance MET  - Patient will report 50% reduction in near falls in order to improve safety with functional tasks and reduce his risk for falls NOT MET  - Patient will report going on walks at least 3 days per week to promote independence and improved cardiovascular endurance NOT MET  - Patient will be able to ascend/descend stairs reciprocally with 1 UE assist to promote independence and safety with ADLs NOT MET  - Patient will report 50% reduction in near falls when ambulating on uneven terrain NOT MET      Cut off score    All date taken from [facility]TA Neuro Section or Rehab Measures      Holguin/64  MDC: 6 pts  Age Norms:  61-76: M - 54   F - 55  70-79: M - 47   F - 53  80-89: M - 48   F - 50 5xSTS: Roselia et al 2010  MDC: 2 3 sec  Age Norms:  60-69: 11 1 sec  70-79: 12 6 sec  80-89: 14 8 sec   TUG  MDC: 4 14 sec  Cut off score:  >13 5 sec community dwelling adults  >32 2 frail elderly  <20 I for basic transfers  >30 dependent on transfers 10 Meter Walk Test: Andrea Homans and Isabelle garrison 2011  MDC: 0 18 m/s  20-29: M - 1 35 m   F - 1 34 m  30-39: M - 1 43 m   F - 1 34 m  40-49: M - 1 43 m   F - 1 39 m  50-59: M - 1 43 m   F - 1 31 m  60-69: M - 1 34 m   F - 1 24 m  70-79: M - 1 26 m   F - 1 13 m  80-89: M - 0 97 m   F - 0 94 m    Household Ambulator < 0 4 m/s  Limited [employer] 0 4 - 0 8 m/s  [employer] 0 8 - 1 2 m/s  Safely cross the street > 1 2 m/s   FGA  MCID: 4 pts  Geriatrics/community < 22/30 fall risk  Geriatrics/community < 20/30 unexplained falls    DGI  MDC: vestibular - 4 pts  MDC: geriatric/community - 3 pts  Falls risk <19/24 mCTSIB  Norm: 20-60 yrs  Eyes open firm: norm sway 0 21-0 48  Eyes closed firm: norm sway 0 48-0 99  Eyes open foam: norm sway 0 38-0 71  Eyes closed foam: norm sway 0 70-2 22   6 Minute Walk Test  MDC: 190 98 ft  MCID: 164 ft    Age Norms  61-76: M - 1876 ft (571 80 m)  F - 1765 ft (537 98 m)  70-79: M - 1729 ft (527 00 m)  F - 1545 ft (470 92 m)  80-89: M - 1368 ft (416 97 m)  F - 1286 ft (391 97 m) ABC: Laverne Ferguson, 2003  <67% increased risk for falls         Subjective    History of Present Illness  - Mechanism of injury: Patient is a 77year old female who is presenting to skilled OPPT for IE due to imbalance and frequent falls  Patient reports she fell on November 2nd (Tuesday) and was unable to get up  Her friend found her on the floor Wednesday night and called EMS, but patient refused to go to hospital, stating she did not feel she had broken anything  She recalls sneezing before she fell and next thing she knew she was on the floor  Patient denies any other falls in the past 6 months, but reports a fall outside approximately a year ago in which someone found her and called EMS  She states she has had a "tilt table test" and MRI completed, with no significant findings  She notes feeling lightheaded frequently  PMH significant for orthostatic hypotension and peripheral neuropathy  PSHx significant for B/L knee surgeries (including meniscus repair)  Patient additionally reports onset of tremor and contractures in fingers approximately one month ago, and has an appointment scheduled with an orthopedist this week  Updated (12/15/2021): Patient reports she enjoys coming to physical therapy  She notes she has more confidence when getting up from a chair at home  She has had several near falls since IE, but no falls  She reports compliance with HEP  She wants to work on loosening up her knees   She also notes feeling more confident when walking since obtaining the [facility]  Updated (1-): Patient reports gap in therapy due to losing her purse and worsening icy conditions  She stated "some days my balance is really good and other days it feel like I could just fall over  "    - Primary AD: none  - Assist level at home: independent  - Decreased fine motor tasks: Yes, MRI with Dr Anita Morris this week      Social Support  - Steps to enter house: 5 ESPERANZA  - Stairs in house: flight to basement  - Lives in: single floor home  - Lives with: alone    - Employment status: retired  - Hand dominance: (R)    Treatments  - Previous treatment: PT for prior knee surgeries  - Current treatment: none  - Diagnostic Testing: MRI      Objective     LE MMT  - R Hip Flexion: 4-/5   L Hip Flexion: 3+/5  - R Hip Extension: 4/5   L Hip Extension: 4-/5  - R Hip Abduction: 4/5   L Hip Abduction: 4/5  - R Hip Adduction: 4/5   L Hip Adduction: 4/5  - R Knee Extension: 4/5  L Knee Extension: 4-/5  - R Knee Flexion: 4/5   L Knee Flexion: 4-/5  - R Ankle DF: 4+/5   L Ankle DF: 4/5  - R Ankle PF: 4+/5   L Ankle PF: 4+/5    Sensation  - Light touch: diminished  - Deep pressure: diminished      Coordination  - Heel to Shin: intact  - Alternate Toe Taps: intact  - Finger to Nose: intact, intension tremor on right  - Finger to Finger: decreased (L > R), intention tremor on right    Myelopathy Screen (>3/5 +)  - Rose's Reflex: -  - Babinski Reflex: -  - Inverted Supinator Sign: -  - Age > 45: Yes  - Gait Deviation: Yes    Gait  - Abnormalities: wide DYLAN, diminished heel strike/toe off, B/L knee flexion, slight forward trunk lean    Outcome Measures Initial Eval  11/15/2021 DN  11/22/2021 PN  12/15/2021 RE  1-     5xSTS 17 94 sec  25 12 sec (2 UE pushoff) 19 59 sec (2 UE)     TUG 22 75 sec  18 18 sec 14 61 sec     10 meter m/s 0 59 m/s 0 63 m/s 0 80 m/s     HOLGUIN 36/56  41/56 43/56     FGA /30   14/30     DGI /24        mCTSIB  - FTEO (firm)  - FTEC (firm)  - FTEO (foam)  - FTEC (foam)   30 sec  30 sec  3 28 sec  0 sec    30 sec  30 sec  30 sec  6 02 sec   30 sec  30 sec  30 sec  30 sec (+)     6MWT  ft 445 ft 660 ft 750 ft     ABC  70% 68 12% 52 5%                       Precautions: orthostatic hypotension, high fall risk  Past Medical History:   Diagnosis Date    Disease of thyroid gland     Hypercholesteremia     Hypertension

## 2022-01-25 NOTE — PROGRESS NOTES
PT Re-Evaluation   Today's date: 2022  Patient name: Emily Dewey  : 1945  MRN: [id number]  Referring provider: Nery Pelaez  Dx:   Encounter Diagnosis     ICD-10-CM    1  Frequent falls  R29 6          Assessment  Assessment details: Patient is a 77year old female who has been reporting to skilled outpatient PT for deficits related to LE weakness, gait, and balance resulting in frequent falls and reduced overall safety with ADLs  She has demonstrated improvements in these areas as indicated by scores associated with 5xSTS, 10 meter, and TUG with results falling below age matched normative values and depict HIGH risk for falls  Improvements in static balance via HOLGUIN depicts LOW risk for falls with ability to perform high level balance tasks and results of FGA indicate HIGH risk for falls with most difficulty and LoB when ambulating with head turns indicating increased visual reliance  Reduction in patient's balance confidence possibly due to patient with gap in therapy due to worsening cold weather and icy conditions  She will continue to benefit from skilled outpatient PT in order to maximize her function with balance tasks, reduce visual reliance, and improved safety with ambulation and ADLs  Impairments: Abnormal gait, Impaired balance and Impaired physical strength  Understanding of Dx/Px/POC: Good  Prognosis: Good    Patient verbalized understanding of POC  Please contact me if you have any questions or recommendations  Thank you for the referral and the opportunity to share in [phone] care          Plan  Plan details: 6 MWT, 10 MWT, ABC next visit; balance training    Patient would benefit from: PT Eval and Skilled PT  Planned modality interventions: Biofeedback, Cryotherapy, TENS and Thermotherapy: Hydrocollator Packs  Planned therapy interventions: Balance, Gait training, HEP, Neuromuscular re-education, Patient education, Strengthening, Therapeutic activities and Therapeutic exercises  Frequency: 2x/wk  Duration in weeks: 8  Plan of Care beginning date: 1-  Plan of Care expiration date: 8 weeks - 3-  Treatment plan discussed with: Patient       Goals  Short Term Goals (4 weeks):    - Patient will improve time on TUG by 2 9 seconds from 22 75 seconds to 19 85 seconds to facilitate improved safety in all ambulation MET  - Patient will be independent in basic HEP 2-3 weeks MET  - Patient will improve 5xSTS score by 2 3 seconds from 17 94 seconds to 15 64 seconds to promote improved LE functional strength needed for ADLs NOT MET  - Patient will preform 6 MWT, 10 MWT, and ABC Scale in 2-3 weeks in order to assess cardiovascular endurance, ambulation speed, and balance confidence MET    Long Term Goals (12 weeks):  - Patient will be independent in a comprehensive home exercise program NOT MET  - Patient will improve gait speed by 0 18 m/s to improve safety with community ambulation NOT MET (progressing)  - Patient will improve HOLGUIN by 6 points from 36/56 to 42/56 to facilitate return to safe independent ambulation MET  - Patient will be able to demonstrate HT in gait without veering NOT MET  - Patient will improve 6 Minute Walk Test score by 190 feet to promote improved cardiovascular endurance MET  - Patient will report 50% reduction in near falls in order to improve safety with functional tasks and reduce his risk for falls NOT MET  - Patient will report going on walks at least 3 days per week to promote independence and improved cardiovascular endurance NOT MET  - Patient will be able to ascend/descend stairs reciprocally with 1 UE assist to promote independence and safety with ADLs NOT MET  - Patient will report 50% reduction in near falls when ambulating on uneven terrain NOT MET      Cut off score    All date taken from [facility] Neuro Section or Rehab Measures      Holguin: 55/64  MDC: 6 pts  Age Norms:  61-76: M - 54   F - 55  70-79: M - 47   F - 53  80-89: M - 48   F - 50 5xSTS: Roselia et al 2010  Dora Dillmar 112: 2 3 sec  Age Norms:  60-69: 11 1 sec  70-79: 12 6 sec  80-89: 14 8 sec   TUG  MDC: 4 14 sec  Cut off score:  >13 5 sec community dwelling adults  >32 2 frail elderly  <20 I for basic transfers  >30 dependent on transfers 10 Meter Walk Test: Itzel Mar President al 2011  MDC: 0 18 m/s  20-29: M - 1 35 m   F - 1 34 m  30-39: M - 1 43 m   F - 1 34 m  40-49: M - 1 43 m   F - 1 39 m  50-59: M - 1 43 m   F - 1 31 m  60-69: M - 1 34 m   F - 1 24 m  70-79: M - 1 26 m   F - 1 13 m  80-89: M - 0 97 m   F - 0 94 m    Household Ambulator < 0 4 m/s  Limited Community Ambulator 0 4 - 0 8 m/s  [employer] Ambulator 0 8 - 1 2 m/s  Safely cross the street > 1 2 m/s   FGA  MCID: 4 pts  Geriatrics/community < 22/30 fall risk  Geriatrics/community < 20/30 unexplained falls    DGI  MDC: vestibular - 4 pts  MDC: geriatric/community - 3 pts  Falls risk <19/24 mCTSIB  Norm: 20-60 yrs  Eyes open firm: norm sway 0 21-0 48  Eyes closed firm: norm sway 0 48-0 99  Eyes open foam: norm sway 0 38-0 71  Eyes closed foam: norm sway 0 70-2 22   6 Minute Walk Test  MDC: 190 98 ft  MCID: 164 ft    Age Norms  61-76: M - 1876 ft (571 80 m)  F - 1765 ft (537 98 m)  70-79: M - 1729 ft (527 00 m)  F - 1545 ft (470 92 m)  80-89: M - 1368 ft (416 97 m)  F - 1286 ft (391 97 m) ABC: Cici Thomas, 2003  <67% increased risk for falls         Subjective    History of Present Illness  - Mechanism of injury: Patient is a 77year old female who is presenting to skilled OPPT for IE due to imbalance and frequent falls  Patient reports she fell on November 2nd (Tuesday) and was unable to get up  Her friend found her on the floor Wednesday night and called EMS, but patient refused to go to hospital, stating she did not feel she had broken anything  She recalls sneezing before she fell and next thing she knew she was on the floor  Patient denies any other falls in the past 6 months, but reports a fall outside approximately a year ago in which someone found her and called EMS  She states she has had a "tilt table test" and MRI completed, with no significant findings  She notes feeling lightheaded frequently  PMH significant for orthostatic hypotension and peripheral neuropathy  PSHx significant for B/L knee surgeries (including meniscus repair)  Patient additionally reports onset of tremor and contractures in fingers approximately one month ago, and has an appointment scheduled with an orthopedist this week  Updated (12/15/2021): Patient reports she enjoys coming to physical therapy  She notes she has more confidence when getting up from a chair at home  She has had several near falls since IE, but no falls  She reports compliance with HEP  She wants to work on loosening up her knees  She also notes feeling more confident when walking since obtaining the [facility]  Updated (1-): Patient reports gap in therapy due to losing her purse and worsening icy conditions  She stated "some days my balance is really good and other days it feel like I could just fall over  "    - Primary AD: none  - Assist level at home: independent  - Decreased fine motor tasks: Yes, MRI with Dr Renato Banerjee this week      Social Support  - Steps to enter house: 5 ESPERANZA  - Stairs in house: flight to basement  - Lives in: single floor home  - Lives with: alone    - Employment status: retired  - Hand dominance: (R)    Treatments  - Previous treatment: PT for prior knee surgeries  - Current treatment: none  - Diagnostic Testing: MRI      Objective     LE MMT  - R Hip Flexion: 4-/5   L Hip Flexion: 3+/5  - R Hip Extension: 4/5   L Hip Extension: 4-/5  - R Hip Abduction: 4/5   L Hip Abduction: 4/5  - R Hip Adduction: 4/5   L Hip Adduction: 4/5  - R Knee Extension: 4/5  L Knee Extension: 4-/5  - R Knee Flexion: 4/5   L Knee Flexion: 4-/5  - R Ankle DF: 4+/5   L Ankle DF: 4/5  - R Ankle PF: 4+/5   L Ankle PF: 4+/5    Sensation  - Light touch: diminished  - Deep pressure: diminished      Coordination  - Heel to Shin: intact  - Alternate Toe Taps: intact  - Finger to Nose: intact, intension tremor on right  - Finger to Finger: decreased (L > R), intention tremor on right    Myelopathy Screen (>3/5 +)  - Rose's Reflex: -  - Babinski Reflex: -  - Inverted Supinator Sign: -  - Age > 45: Yes  - Gait Deviation: Yes    Gait  - Abnormalities: wide DYLAN, diminished heel strike/toe off, B/L knee flexion, slight forward trunk lean    Outcome Measures Initial Eval  11/15/2021 DN  11/22/2021 PN  12/15/2021 RE  1-     5xSTS 17 94 sec  25 12 sec (2 UE pushoff) 19 59 sec (2 UE)     TUG 22 75 sec  18 18 sec 14 61 sec     10 meter m/s 0 59 m/s 0 63 m/s 0 80 m/s     HOLGUIN 36/56  41/56 43/56     FGA /30   14/30     DGI /24        mCTSIB  - FTEO (firm)  - FTEC (firm)  - FTEO (foam)  - FTEC (foam)   30 sec  30 sec  3 28 sec  0 sec    30 sec  30 sec  30 sec  6 02 sec   30 sec  30 sec  30 sec  30 sec (+)     6MWT  ft 445 ft 660 ft 750 ft     ABC  70% 68 12% 52 5%                       Precautions: orthostatic hypotension, high fall risk  Past Medical History:   Diagnosis Date    Disease of thyroid gland     Hypercholesteremia     Hypertension

## 2022-01-27 ENCOUNTER — APPOINTMENT (OUTPATIENT)
Dept: PHYSICAL THERAPY | Facility: CLINIC | Age: 77
End: 2022-01-27
Payer: COMMERCIAL

## 2022-02-01 ENCOUNTER — OFFICE VISIT (OUTPATIENT)
Dept: PHYSICAL THERAPY | Facility: CLINIC | Age: 77
End: 2022-02-01
Payer: COMMERCIAL

## 2022-02-01 DIAGNOSIS — R29.6 FREQUENT FALLS: Primary | ICD-10-CM

## 2022-02-01 PROCEDURE — 97112 NEUROMUSCULAR REEDUCATION: CPT

## 2022-02-01 NOTE — PROGRESS NOTES
Daily Note     Insurance:  A/CMS Eval/ Re-eval POC expires Naveen Coronel #/ Referral # Totalvisits Start date  Expiration date Extension  Visit limitation? PT only or  PT+OT? Co-Insurance   [facility] Medicare CMS 11/15/21 2/7/22  [id number] 50 11/15/21 5/14/22   PT     1/25 3/22/22                                                             Date 11/15 11/22 11/29 12/6 12/8 12/13 12/15 12/20 12/22 12/30 1/3 1/5   Visits:  Used 1 2 3 4 5 6 7 8 9 10 11 12   Authed:  Remaining  49 48 47 46 45 44 43 42 41 40 39 38        Date              Visits:  Used 13 14             Authed:  Remaining  37 36                 GOES BY "PENELOPE"    Today's date: 2022  Patient name: Chely Campbell  : 1945  MRN: [id number]  Referring provider: Zev Rodríguez*  Dx:   Encounter Diagnosis     ICD-10-CM    1  Frequent falls  R29 6                   Subjective: Patient reports to session w/o AD  She notes no new changes, complaints, or falls  Objective: See treatment diary below    NMR: (3# ankle weights donned at start of session)  - STS 3 x 10 w/ one airex pad on chair  - Fwd/lat step ups (6") x 15 each; 1 UE support  - Sidestepping along // bars 6 cycles x 10 ft down/back; 0 UE support  - Supine SLR x 10 each, 2 second iso hold  - Supine hip abduction in hooklying w/ red tband x 20, 3 second iso hold  - Supine hip adduction x 30, 3 second iso hold       Assessment: Patient tolerated treatment well today with continued focus on proximal hip strengthening  Initiated supine exercises secondary to significant B/L genu valgus and concurrent weak proximal hip musculature  Attempted to decrease seated rest breaks today with fair patient response  Patient will continue to benefit from skilled PT services to address deficits in strength, balance, and gait in order to maximize functional mobility at home and reduce overall risk for falls  Plan: Continue per plan of care  Progress treatment as tolerated           Outcome Measures Initial Eval  11/15/2021 DN  11/22/2021 PN  12/15/2021 RE  1-     5xSTS 17 94 sec  25 12 sec (2 UE pushoff) 19 59 sec (2 UE)     TUG 22 75 sec  18 18 sec 14 61 sec     10 meter m/s 0 59 m/s 0 63 m/s 0 80 m/s     HOLGUIN 36/56  41/56 43/56     FGA /30   14/30     DGI /24        mCTSIB  - FTEO (firm)  - FTEC (firm)  - FTEO (foam)  - FTEC (foam)   30 sec  30 sec  3 28 sec  0 sec    30 sec  30 sec  30 sec  6 02 sec   30 sec  30 sec  30 sec  30 sec (+)     6MWT  ft 445 ft 660 ft 750 ft     ABC  70% 68 12% 52 5%

## 2022-02-03 ENCOUNTER — OFFICE VISIT (OUTPATIENT)
Dept: PHYSICAL THERAPY | Facility: CLINIC | Age: 77
End: 2022-02-03
Payer: COMMERCIAL

## 2022-02-03 DIAGNOSIS — R29.6 FREQUENT FALLS: Primary | ICD-10-CM

## 2022-02-03 PROCEDURE — 97112 NEUROMUSCULAR REEDUCATION: CPT

## 2022-02-03 NOTE — PROGRESS NOTES
Daily Note     Insurance:  AMA/CMS Eval/ Re-eval POC expires Nakia Francisca #/ Referral # Totalvisits Start date  Expiration date Extension  Visit limitation? PT only or  PT+OT? Co-Insurance   [employer] Medicare  CMS 11/15/21 2/7/22  [id number] 50 11/15/21 5/14/22   PT     1/25 3/22/22                                                             Date 11/15 11/22 11/29 12/6 12/8 12/13 12/15 12/20 12/22 12/30 1/3 1/5   Visits:  Used 1 2 3 4 5 6 7 8 9 10 11 12   Authed:  Remaining  49 48 47 46 45 44 43 42 41 40 39 38        Date             Visits:  Used  15            Authed:  Remaining  37 36 35                GOES BY "PENELOPE"    Today's date: 2/3/2022  Patient name: Will Cruz  : 1945  MRN: [id number]  Referring provider: Stevan Putnam*  Dx:   Encounter Diagnosis     ICD-10-CM    1  Frequent falls  R29 6                   Subjective: Patient reports to session w/o AD  She notes no new changes, complaints, or falls  Objective: See treatment diary below    NMR: (3# ankle weights donned at start of session)  - STS w/ TB (yellow) around B/L knees: 2 x 10 w/ one airex pad on chair  - Sidestepping along // bars w/ TB (yellow) around B/L knees: 6 cycles x 10 ft down/back, 0 UE support  - Fwd step ups onto foam: 4", 10 reps B/L, 0 UE  - Lat step ups onto foam: 4", 5 reps B/L, 0 UE  - Ambulation w/ alternate hand to knee taps:       Assessment: Patient able to tolerate treatment session well today with continued focus on proximal hip strengthening  Trialed use of TB as tactile cues around B/L knees to avoid B/L medial knee collapse for stability  Most difficulty with lateral step ups onto compliant surface requiring haptic UE support and demonstrated significant hesitancy and lowering strategy  Patient will continue to benefit from skilled PT services to address deficits in strength, balance, and gait in order to maximize functional mobility at home and reduce overall risk for falls        Plan: Continue per plan of care  Progress treatment as tolerated           Outcome Measures Initial Eval  11/15/2021 DN  11/22/2021 PN  12/15/2021 RE  1-     5xSTS 17 94 sec  25 12 sec (2 UE pushoff) 19 59 sec (2 UE)     TUG 22 75 sec  18 18 sec 14 61 sec     10 meter m/s 0 59 m/s 0 63 m/s 0 80 m/s     HOLGUIN 36/56  41/56 43/56     FGA /30   14/30     DGI /24        mCTSIB  - FTEO (firm)  - FTEC (firm)  - FTEO (foam)  - FTEC (foam)   30 sec  30 sec  3 28 sec  0 sec    30 sec  30 sec  30 sec  6 02 sec   30 sec  30 sec  30 sec  30 sec (+)     6MWT  ft 445 ft 660 ft 750 ft     ABC  70% 68 12% 52 5%

## 2022-02-08 ENCOUNTER — OFFICE VISIT (OUTPATIENT)
Dept: PHYSICAL THERAPY | Facility: CLINIC | Age: 77
End: 2022-02-08
Payer: COMMERCIAL

## 2022-02-08 DIAGNOSIS — R29.6 FREQUENT FALLS: Primary | ICD-10-CM

## 2022-02-08 PROCEDURE — 97112 NEUROMUSCULAR REEDUCATION: CPT

## 2022-02-08 PROCEDURE — 97110 THERAPEUTIC EXERCISES: CPT

## 2022-02-08 NOTE — PROGRESS NOTES
Daily Note     Insurance:  [place]/CMS Eval/ Re-eval POC expires Angel Franklin #/ Referral # Totalvisits Start date  Expiration date Extension  Visit limitation? PT only or  PT+OT? Co-Insurance   [address] Medicare  CMS 11/15/21 2/7/22  [id number] 50 11/15/21 5/14/22   PT     1/25 3/22/22                                                             Date 11/15 11/22 11/29 12/6 12/8 12/13 12/15 12/20 12/22 12/30 1/3 1/5   Visits:  Used 1 2 3 4 5 6 7 8 9 10 11 12   Authed:  Remaining  49 48 47 46 45 44 43 42 41 40 39 38        Date  2-8           Visits:  Used  14 15 16           Authed:  Remaining  37 36 35 34               GOES BY "PENELOPE"    Today's date: 2022  Patient name: Hyacinth Carbone  : 1945  MRN: [id number]  Referring provider: Graeme Ibrahim*  Dx:   Encounter Diagnosis     ICD-10-CM    1  Frequent falls  R29 6                   Subjective: Patient reports to session w/o AD  She notes no new changes, complaints, or falls  Objective: See treatment diary below    TE: (3# ankle weights donned at start of session)  - STS w/ TB (yellow) around B/L knees: 2 x 10 w/ one airex pad on chair  - Sidestepping along // bars w/ TB (yellow) around B/L knees: 10 cycles x 10 ft down/back, 0 UE support    NMR:  - Ambulation w/ alternate hand to knee taps: 2 laps, 50 ft down/back, CS  - Fwd step ups onto foam: 4", 10 reps, 0 UE  - Cone weave w/ posterior resistance (yellow): 4 laps, CG/Natali      Assessment: Patient able to tolerate treatment session well today with continued focus on proximal hip strengthening and dynamic balance  She continued to demonstrate hesitation with exercises on compliant surfaces and posterior LoB/retropulsion requiring UE and up to Natali to maintain balance  Patient with diminished kinesthetic awareness during cone weave negotiation requiring significant verbal cues   Patient will continue to benefit from skilled PT services to address deficits in strength, balance, and gait in order to maximize functional mobility at home and reduce overall risk for falls  Plan: Continue per plan of care  Progress treatment as tolerated           Outcome Measures Initial Eval  11/15/2021 DN  11/22/2021 PN  12/15/2021 RE  1-     5xSTS 17 94 sec  25 12 sec (2 UE pushoff) 19 59 sec (2 UE)     TUG 22 75 sec  18 18 sec 14 61 sec     10 meter m/s 0 59 m/s 0 63 m/s 0 80 m/s     HOLGUIN 36/56  41/56 43/56     FGA /30   14/30     DGI /24        mCTSIB  - FTEO (firm)  - FTEC (firm)  - FTEO (foam)  - FTEC (foam)   30 sec  30 sec  3 28 sec  0 sec    30 sec  30 sec  30 sec  6 02 sec   30 sec  30 sec  30 sec  30 sec (+)     6MWT  ft 445 ft 660 ft 750 ft     ABC  70% 68 12% 52 5%

## 2022-02-10 ENCOUNTER — OFFICE VISIT (OUTPATIENT)
Dept: PHYSICAL THERAPY | Facility: CLINIC | Age: 77
End: 2022-02-10
Payer: COMMERCIAL

## 2022-02-10 DIAGNOSIS — R29.6 FREQUENT FALLS: Primary | ICD-10-CM

## 2022-02-10 PROCEDURE — 97110 THERAPEUTIC EXERCISES: CPT | Performed by: PHYSICAL THERAPIST

## 2022-02-10 PROCEDURE — 97112 NEUROMUSCULAR REEDUCATION: CPT | Performed by: PHYSICAL THERAPIST

## 2022-02-10 NOTE — PROGRESS NOTES
Daily Note     Insurance:  AMA/CMS Eval/ Re-eval POC expires Kathryn Das #/ Referral # Totalvisits Start date  Expiration date Extension  Visit limitation? PT only or  PT+OT? Co-Insurance   [employer] Medicare  CMS 11/15/21 2/7/22  [id number] 50 11/15/21 5/14/22   PT     1/25 3/22/22                                                             Date 11/15 11/22 11/29 12/6 12/8 12/13 12/15 12/20 12/22 12/30 1/3 1/5   Visits:  Used 1 2 3 4 5 6 7 8 9 10 11 12   Authed:  Remaining  49 48 47 46 45 44 43 42 41 40 39 38        Date  2-8 2-10          Visits:  Used  14 15 16 17          Authed:  Remaining  37 36 35 34 33              GOES BY "PENELOPE"    Today's date: 2/10/2022  Patient name: Nazario Bajwa  : 1945  MRN: [id number]  Referring provider: Manuel Lancaster*  Dx:   Encounter Diagnosis     ICD-10-CM    1  Frequent falls  R29 6                   Subjective: Patient reports her knees have been starting to hurt her, thinks it's due to the weather  Objective: See treatment diary below    TE: (3# ankle weights donned at start of session)  - STS w/ TB (yellow) around B/L knees: 2 x 10 w/ one airex pad on chair  - Sidestepping along // bars w/ TB (yellow) around B/L knees: 10 cycles x 10 ft down/back, 0 UE support    NMR (3# ankle weights):  - Ambulation w/ alternate hand to knee taps: 4 laps, 50 ft down/back, CS  - Fwd step ups onto foam: 4", 10 reps each leg, 0 UE  - Cone weave w/ posterior resistance (yellow): 4 laps  - Sidestepping with cone taps, 1 UE support: 4x down/back       Assessment: Patient able to tolerate treatment session well today  She required 1 UE support for new addition of sidestepping with cone taps  Improvements in kinesthetic awareness when weaving around tall boomwhackers rather than cones  No LOB with step ups onto foam today   Patient will continue to benefit from skilled PT services to address deficits in strength, balance, and gait in order to maximize functional mobility at home and reduce overall risk for falls  Plan: Continue per plan of care  Progress treatment as tolerated           Outcome Measures Initial Eval  11/15/2021 DN  11/22/2021 PN  12/15/2021 RE  1-     5xSTS 17 94 sec  25 12 sec (2 UE pushoff) 19 59 sec (2 UE)     TUG 22 75 sec  18 18 sec 14 61 sec     10 meter m/s 0 59 m/s 0 63 m/s 0 80 m/s     HOLGUIN 36/56  41/56 43/56     FGA /30   14/30     DGI /24        mCTSIB  - FTEO (firm)  - FTEC (firm)  - FTEO (foam)  - FTEC (foam)   30 sec  30 sec  3 28 sec  0 sec    30 sec  30 sec  30 sec  6 02 sec   30 sec  30 sec  30 sec  30 sec (+)     6MWT  ft 445 ft 660 ft 750 ft     ABC  70% 68 12% 52 5%

## 2022-02-15 ENCOUNTER — OFFICE VISIT (OUTPATIENT)
Dept: PODIATRY | Facility: CLINIC | Age: 77
End: 2022-02-15
Payer: COMMERCIAL

## 2022-02-15 ENCOUNTER — OFFICE VISIT (OUTPATIENT)
Dept: PHYSICAL THERAPY | Facility: CLINIC | Age: 77
End: 2022-02-15
Payer: COMMERCIAL

## 2022-02-15 VITALS — WEIGHT: 148 LBS | HEIGHT: 65 IN | RESPIRATION RATE: 16 BRPM | BODY MASS INDEX: 24.66 KG/M2

## 2022-02-15 DIAGNOSIS — M79.671 PAIN IN BOTH FEET: ICD-10-CM

## 2022-02-15 DIAGNOSIS — M79.672 PAIN IN BOTH FEET: ICD-10-CM

## 2022-02-15 DIAGNOSIS — R29.6 FREQUENT FALLS: Primary | ICD-10-CM

## 2022-02-15 DIAGNOSIS — B35.1 ONYCHOMYCOSIS: ICD-10-CM

## 2022-02-15 DIAGNOSIS — I70.209 PERIPHERAL ARTERIOSCLEROSIS (HCC): Primary | ICD-10-CM

## 2022-02-15 DIAGNOSIS — G57.62 MORTON'S NEUROMA OF THIRD INTERSPACE OF LEFT FOOT: ICD-10-CM

## 2022-02-15 PROCEDURE — 11721 DEBRIDE NAIL 6 OR MORE: CPT | Performed by: PODIATRIST

## 2022-02-15 PROCEDURE — 64455 NJX AA&/STRD PLTR COM DG NRV: CPT | Performed by: PODIATRIST

## 2022-02-15 PROCEDURE — 97112 NEUROMUSCULAR REEDUCATION: CPT

## 2022-02-15 PROCEDURE — 97110 THERAPEUTIC EXERCISES: CPT

## 2022-02-15 NOTE — PROGRESS NOTES
Daily Note     Insurance:  A/CMS Eval/ Re-eval POC expires Naveen Coronel #/ Referral # Totalvisits Start date  Expiration date Extension  Visit limitation? PT only or  PT+OT? Co-Insurance   [address] Medicare  CMS 11/15/21 2/7/22  [id number] 50 11/15/21 5/14/22   PT     1/25 3/22/22                                                             Date 11/15 11/22 11/29 12/6 12/8 12/13 12/15 12/20 12/22 12/30 1/3 1/5   Visits:  Used 1 2 3 4 5 6 7 8 9 10 11 12   Authed:  Remaining  49 48 47 46 45 44 43 42 41 40 39 38        Date  2 2-8 2-10 2-15         Visits:  Used  14 15 16 17 18         Authed:  Remaining  37 36 35 34 33 32             GOES BY "PENELOPE"    Today's date: 2/15/2022  Patient name: Chely Campbell  : 1945  MRN: [id number]  Referring provider: Zev Rodríguez*  Dx:   Encounter Diagnosis     ICD-10-CM    1  Frequent falls  R29 6                   Subjective: Patient reports her knees have been starting to hurt her, thinks it's due to the weather  Objective: See treatment diary below    TE: (3# ankle weights donned at start of session)  - STS w/ TB (yellow) around B/L knees: 3 x 10 w/ one airex pad on chair  - Sidestepping along // bars w/ TB (yellow) around B/L knees: 10 cycles x 10 ft down/back, 0 UE support  - Hip 3-way: 20 reps B/L, 1 UE    NMR (3# ankle weights):  - Ambulation w/ alternate hand to knee taps: 10 laps, 50 ft down/back, CS  - Fwd step ups onto foam: 4", 10 reps each leg, 0 UE      Assessment: Patient able to tolerate treatment session well today with continued focus on LE strengthening and balance  She demonstrated reduced hesitancy and decreased tendency to use UE support with exercises on compliant surfaces indicating improving confidence is balance and foot placement  Reduction in duration of seated rest breaks   She will continue to benefit from skilled outpatient PT in order to address deficits in strength, balance, and gait in order to maximize functional mobility at home and reduce overall risk for falls  Plan: Continue per plan of care  Progress treatment as tolerated           Outcome Measures Initial Eval  11/15/2021 DN  11/22/2021 PN  12/15/2021 RE  1-     5xSTS 17 94 sec  25 12 sec (2 UE pushoff) 19 59 sec (2 UE)     TUG 22 75 sec  18 18 sec 14 61 sec     10 meter m/s 0 59 m/s 0 63 m/s 0 80 m/s     HOLGUIN 36/56  41/56 43/56     FGA /30   14/30     DGI /24        mCTSIB  - FTEO (firm)  - FTEC (firm)  - FTEO (foam)  - FTEC (foam)   30 sec  30 sec  3 28 sec  0 sec    30 sec  30 sec  30 sec  6 02 sec   30 sec  30 sec  30 sec  30 sec (+)     6MWT  ft 445 ft 660 ft 750 ft     ABC  70% 68 12% 52 5%

## 2022-02-15 NOTE — PROGRESS NOTES
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  Peripheral artery disease      Plan   Foot exam performed   Patient educated on condition   She will continue to take gabapentin as directed   All nails debrided without pain or complication  In addition, nerve block done today  Into the 3rd right intermetatarsal space, 1 cc of Kenalog injected with 2 cc 2% lidocaine plain         Diagnoses and all orders for this visit:     Metatarsalgia of both feet     Onychomycosis     Peripheral sensory neuropathy     Radiculopathy of lumbar region     Paresthesia of lower extremity            Subjective:  Patient has pain in her feet and toes with ambulation   She has pain when she wears shoes   No history of trauma   She is taking gabapentin  Refill called in              Allergies   Allergen Reactions    Penicillins Rash            Current Outpatient Medications:     cholecalciferol (VITAMIN D3) 1,000 units tablet, Take 1,000 Units by mouth daily, Disp: , Rfl:     ciclopirox (PENLAC) 8 % solution, Apply topically daily at bedtime (Patient not taking: Reported on 11/20/2019), Disp: 6 6 mL, Rfl: 4    esomeprazole (NexIUM) 40 MG capsule, Take 40 mg by mouth every morning before breakfast , Disp: , Rfl:     folic acid (FOLVITE) 1 mg tablet, , Disp: , Rfl: 5    gabapentin (NEURONTIN) 100 mg capsule, Take 1 capsule (100 mg total) by mouth 3 (three) times a day, Disp: 90 capsule, Rfl: 0    gabapentin (NEURONTIN) 300 mg capsule, Take 1 capsule (300 mg total) by mouth 2 (two) times a day, Disp: 60 capsule, Rfl: 1    hydroxychloroquine (PLAQUENIL) 200 mg tablet, , Disp: , Rfl: 3    ketoconazole (NIZORAL) 2 % cream, Apply topically daily, Disp: 60 g, Rfl: 1    Levothyroxine Sodium (SYNTHROID PO), Take by mouth daily Indications: unsure of dosage  , Disp: , Rfl:     rosuvastatin (CRESTOR) 40 MG tablet, Take 20 mg by mouth daily  , Disp: , Rfl:            Patient Active Problem List   Diagnosis    Onychomycosis    Peripheral sensory neuropathy    Radiculopathy of lumbar region    Metatarsalgia of both feet    Dyslipidemia    Acquired hypothyroidism    Sjogren's syndrome ([facility] [address] )    Orthostatic hypotension    Recurrent syncope             Patient ID: Naomi Brennan is a 69 y  o  female      HPI     The following portions of the patient's history were reviewed and updated as appropriate:      family history includes Breast cancer (age of onset: 80) in her paternal aunt        reports that she quit smoking about 31 years ago  She quit after 20 00 years of use  She has never used smokeless tobacco  She reports that she does not drink alcohol or use drugs         Objective:  Patient's shoes and socks removed    Foot ExamPhysical Exam          Foot Exam     General  General Appearance: appears stated age and healthy   Orientation: alert and oriented to person, place, and time   Affect: appropriate   Gait: unimpaired         Right Foot/Ankle      Inspection and Palpation  Arch: pes planus  Hammertoes: second toe, fifth toe, fourth toe and third toe  Hallux valgus: yes  Skin Exam: callus and skin changes;      Neurovascular  Dorsalis pedis: 1+  Posterior tibial: 1+        Left Foot/Ankle       Inspection and Palpation  Arch: pes planus  Hammertoes: second toe, fifth toe, fourth toe and third toe  Hallux valgus: yes  Skin Exam: callus and skin changes;      Neurovascular  Dorsalis pedis: 1+  Posterior tibial: 1+              Right Foot/Ankle   Right Foot Inspection  Skin Exam: callus and callus                     Vascular     The right DP pulse is 1+  The right PT pulse is 1+  Right Toe  - Comprehensive Exam  Arch: pes planus  Hammertoes: second toe, fifth toe, fourth toe and third toe  Hallux valgus: yes     Left Foot/Ankle  Left Foot Inspection  Skin Exam: callus                    Vascular     The left DP pulse is 1+   The left PT pulse is 1+  Left Toe  - Comprehensive Exam  Arch: pes planus  Hammertoes: second toe, fifth toe, fourth toe and third toe  Hallux valgus: yes     Positive Bob sign 3rd left intermetatarsal space         Physical Exam   Cardiovascular:   Pulses:       Dorsalis pedis pulses are 1+ on the right side, and 1+ on the left side         Posterior tibial pulses are 1+ on the right side, and 1+ on the left side     Feet:   Right Foot:   Skin Integrity: Positive for callus   [place] tinea pedis resolving  Left Foot:   Skin Integrity: Positive for callus   [place] tinea pedis resolving      Absent vibratory sensation bilateral legs up to the knee intact of than a  Monofilament sensation significantly reduced bilateral feet 8/10 absent  Negative Tinel sign tarsal tunnel bilateral  Positive Bob sign right 2nd interspace  Rigidly contracted hammertoes 2 through 5 bilateral  Moderate bunion bilateral  Plus one pitting edema bilateral feet and ankles  All nails severely mycotic thickened dystrophic  Third digit nails extremely elongated incurvated and ingrown  Mild ingrown bilateral hallux no signs of infection  Significant equinus bilateral 0 degrees dorsiflexion  Muscle strength 5/5 all groups bilateral feet and ankles

## 2022-02-17 ENCOUNTER — OFFICE VISIT (OUTPATIENT)
Dept: PHYSICAL THERAPY | Facility: CLINIC | Age: 77
End: 2022-02-17
Payer: COMMERCIAL

## 2022-02-17 DIAGNOSIS — R29.6 FREQUENT FALLS: Primary | ICD-10-CM

## 2022-02-17 PROCEDURE — 97112 NEUROMUSCULAR REEDUCATION: CPT

## 2022-02-17 NOTE — PROGRESS NOTES
Daily Note     Insurance:  AMA/CMS Eval/ Re-eval POC expires Kirby Sifuentes #/ Referral # Totalvisits Start date  Expiration date Extension  Visit limitation? PT only or  PT+OT? Co-Insurance   [place] Medicare  CMS 11/15/21 2/7/22  [id number] 50 11/15/21 5/14/22   PT     1/25 3/22/22                                                             Date 11/15 11/22 11/29 12/6 12/8 12/13 12/15 12/20 12/22 12/30 1/3 1/5   Visits:  Used 1 2 3 4 5 6 7 8 9 10 11 12   Authed:  Remaining  49 48 47 46 45 44 43 42 41 40 39 38        Date  2 2-8 2-10 2-15 2/17        Visits:  Used  14 15 16 17 18 19        Authed:  Remaining  37 36 35 34 33 32 31            GOES BY "PENELOPE"    Today's date: 2022  Patient name: Desiree Umana  : 1945  MRN: [id number]  Referring provider: Bernice Hart*  Dx:   Encounter Diagnosis     ICD-10-CM    1  Frequent falls  R29 6                   Subjective: Patient reports to session approximately 15 minutes late, states her knee is still bothering her  Objective: See treatment diary below      NMR (3# ankle weights):  - STS w/ TB (yellow) around B/L knees: 3 x 10 w/ one airex pad on chair  - Hip 3-way: 20 reps B/L, 1 UE, 2-3 sec iso hold  - Sidestepping along // bars w/ TB (yellow) around B/L knees: 10 cycles x 10 ft down/back, 0 UE support  - Ambulation w/ alternate hand to knee taps: 10 laps, 50 ft down/back, CS  - Fwd step ups onto foam: 4", 10 reps each leg, 0 UE  - Marching on foam x 20 each leg; 0 UE      Assessment: Patient able to tolerate treatment session well today with continued focus on LE strengthening and balance  Demonstrated increased genu valgus during STS with yellow tband with increased repetitions, likely secondary to hip stabilizer muscle fatigue  Unable to achieve large amplitude with marching on foam, which may be attributed to weakness and reduced dynamic balance on compliance surfaces   She will continue to benefit from skilled outpatient PT in order to address deficits in strength, balance, and gait in order to maximize functional mobility at home and reduce overall risk for falls  Plan: Continue per plan of care  Progress treatment as tolerated           Outcome Measures Initial Eval  11/15/2021 DN  11/22/2021 PN  12/15/2021 RE  1-     5xSTS 17 94 sec  25 12 sec (2 UE pushoff) 19 59 sec (2 UE)     TUG 22 75 sec  18 18 sec 14 61 sec     10 meter m/s 0 59 m/s 0 63 m/s 0 80 m/s     HOLGUIN 36/56  41/56 43/56     FGA /30   14/30     DGI /24        mCTSIB  - FTEO (firm)  - FTEC (firm)  - FTEO (foam)  - FTEC (foam)   30 sec  30 sec  3 28 sec  0 sec    30 sec  30 sec  30 sec  6 02 sec   30 sec  30 sec  30 sec  30 sec (+)     6MWT  ft 445 ft 660 ft 750 ft     ABC  70% 68 12% 52 5%

## 2022-02-22 ENCOUNTER — OFFICE VISIT (OUTPATIENT)
Dept: PHYSICAL THERAPY | Facility: CLINIC | Age: 77
End: 2022-02-22
Payer: COMMERCIAL

## 2022-02-22 DIAGNOSIS — R29.6 FREQUENT FALLS: Primary | ICD-10-CM

## 2022-02-22 PROCEDURE — 97110 THERAPEUTIC EXERCISES: CPT

## 2022-02-22 PROCEDURE — 97112 NEUROMUSCULAR REEDUCATION: CPT

## 2022-02-22 NOTE — PROGRESS NOTES
Daily Note     Insurance:  A/CMS Eval/ Re-eval POC expires Joshua Trejo #/ Referral # Totalvisits Start date  Expiration date Extension  Visit limitation? PT only or  PT+OT? Co-Insurance   [address] Medicare  CMS 11/15/21 2/7/22  [id number] 50 11/15/21 5/14/22   PT     1/25 3/22/22                                                             Date 11/15 11/22 11/29 12/6 12/8 12/13 12/15 12/20 12/22 12/30 1/3 1/5   Visits:  Used 1 2 3 4 5 6 7 8 9 10 11 12   Authed:  Remaining  49 48 47 46 45 44 43 42 41 40 39 38        Date  2-8 2-10 2-15 2/17 2-22       Visits:  Used 13 14 15 16 17 18 19 20       Authed:  Remaining  37 36 35 34 33 32 31 30           GOES BY "PENELOPE"    Today's date: 2022  Patient name: Nieves Vargas  : 1945  MRN: [id number]  Referring provider: Lukasz Garza*  Dx:   Encounter Diagnosis     ICD-10-CM    1  Frequent falls  R29 6                   Subjective: Patient reports to session with no new changes, complaints, or falls  Objective: See treatment diary below      TE (3# ankle weights):  - STS w/ TB (yellow) around B/L knees: 3 x 10 w/ one airex pad on chair  - Standing hip flexion: 3 sets, 10 reps, 1 UE, 3 sec holds  - Standing hip abduction: 3 sets, 10 reps, 2 UE, PT tactile cues    NMR:  - Fwd step ups onto foam: 4", 10 reps each leg, 0 UE  - Ambulation w/ alternate hand to knee taps: 2 laps, 50 ft down/back, CS  - Lateral hurdles (6"): 5 laps, 10 ft down/back, 0-1 UE      Assessment: Patient able to tolerate treatment session well today with continued focus on LE strengthening and balance  She continued to demonstrated lateral instability throughout with 50% self correction  She displayed significant lateral trunk lean with standing hip abduction exercises requiring 2 UE support and PT modA to maintain vertical posture and appropriate mechanics   She will continue to benefit from skilled outpatient PT in order to address deficits in strength, balance, and gait in order to maximize functional mobility at home and reduce overall risk for falls  Plan: Continue per plan of care  Progress treatment as tolerated           Outcome Measures Initial Eval  11/15/2021 DN  11/22/2021 PN  12/15/2021 RE  1-     5xSTS 17 94 sec  25 12 sec (2 UE pushoff) 19 59 sec (2 UE)     TUG 22 75 sec  18 18 sec 14 61 sec     10 meter m/s 0 59 m/s 0 63 m/s 0 80 m/s     HOLGUIN 36/56  41/56 43/56     FGA /30   14/30     DGI /24        mCTSIB  - FTEO (firm)  - FTEC (firm)  - FTEO (foam)  - FTEC (foam)   30 sec  30 sec  3 28 sec  0 sec    30 sec  30 sec  30 sec  6 02 sec   30 sec  30 sec  30 sec  30 sec (+)     6MWT  ft 445 ft 660 ft 750 ft     ABC  70% 68 12% 52 5%

## 2022-02-24 ENCOUNTER — OFFICE VISIT (OUTPATIENT)
Dept: PHYSICAL THERAPY | Facility: CLINIC | Age: 77
End: 2022-02-24
Payer: COMMERCIAL

## 2022-02-24 DIAGNOSIS — R29.6 FREQUENT FALLS: Primary | ICD-10-CM

## 2022-02-24 PROCEDURE — 97530 THERAPEUTIC ACTIVITIES: CPT

## 2022-02-24 PROCEDURE — 97112 NEUROMUSCULAR REEDUCATION: CPT
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
## 2022-02-28 ENCOUNTER — OFFICE VISIT (OUTPATIENT)
Dept: PHYSICAL THERAPY | Facility: CLINIC | Age: 77
End: 2022-02-28
Payer: COMMERCIAL

## 2022-02-28 DIAGNOSIS — R29.6 FREQUENT FALLS: Primary | ICD-10-CM

## 2022-02-28 PROCEDURE — 97110 THERAPEUTIC EXERCISES: CPT

## 2022-02-28 PROCEDURE — 97112 NEUROMUSCULAR REEDUCATION: CPT

## 2022-02-28 NOTE — PROGRESS NOTES
Daily Note     Insurance:  A/CMS Eval/ Re-eval POC expires Brenda Hannon #/ Referral # Totalvisits Start date  Expiration date Extension  Visit limitation? PT only or  PT+OT? Co-Insurance   [address] Medicare  CMS 11/15/21 2/7/22  [id number] 50 11/15/21 5/14/22   PT     1/25 3/22/22                                                             Date 11/15 11/22 11/29 12/6 12/8 12/13 12/15 12/20 12/22 12/30 1/3 1/5   Visits:  Used 1 2 3 4 5 6 7 8 9 10 11 12   Authed:  Remaining  49 48 47 46 45 44 43 42 41 40 39 38        Date  2 2-8 2-10 2-15 2/17 2- 2-      Visits:  Used 13 14 15 16 17 18 19 20 21      Authed:  Remaining  37 36 35 34 33 32 31 30 29          GOES BY "PENELOPE"    Today's date: 2022  Patient name: Maya Sierra  : 1945  MRN: [id number]  Referring provider: Cecy Salvador*  Dx:   Encounter Diagnosis     ICD-10-CM    1  Frequent falls  R29 6                   Subjective: Patient reports to session with no new changes, complaints, or falls  Objective: See treatment diary below      TE (3# ankle weights):  - STS w/ TB (yellow) around B/L knees: 3 x 10 w/ one airex pad on chair  - Standing hip flexion: 3 sets, 10 reps, 1 UE, 3 sec holds  - Standing hip abduction: 3 sets, 10 reps, 2 UE, PT tactile cues    NMR:  - TM ambulation: 1 0 mph, 7 mins, 0 11 miles total   - Ambulation w/ alternate hand to knee taps: 2 laps, 50 ft down/back, CS  - Lateral hurdles (6"): 5 laps, 10 ft down/back, 0-1 UE      Assessment: Patient able to tolerate treatment session well today with continued focus on LE strengthening and additional focus on endurance per most recent progress note  Added in more endurance based exercises this session with fair tolerance from patient  Patient required consistent verbal instructions for increased stride length with fair carryover   She will continue to benefit from skilled outpatient PT in order to address deficits in strength, balance, and gait in order to maximize functional mobility at home and reduce overall risk for falls  Plan: Continue per plan of care  Progress treatment as tolerated             Outcome Measures Initial Eval  11/15/2021 DN  11/22/2021 PN  12/15/2021 RE  1- PN  2/24/2022    5xSTS 17 94 sec  25 12 sec (2 UE pushoff) 19 59 sec (2 UE) 17 34 sec (1 airex, no UE)    TUG 22 75 sec  18 18 sec 14 61 sec 14 35 sec    10 meter m/s 0 59 m/s 0 63 m/s 0 80 m/s 0 73 m/s    HOLGUIN 36/56  41/56 43/56 44/56    FGA /30   14/30 16/30    DGI /24        mCTSIB  - FTEO (firm)  - FTEC (firm)  - FTEO (foam)  - FTEC (foam)   30 sec  30 sec  3 28 sec  0 sec    30 sec  30 sec  30 sec  6 02 sec   30 sec  30 sec  30 sec  30 sec (+)   30 sec  30 sec (+)  30 sec  30 sec (+)    6MWT  ft 445 ft 660 ft 750 ft 710 ft    ABC  70% 68 12% 52 5% 65%

## 2022-03-02 ENCOUNTER — OFFICE VISIT (OUTPATIENT)
Dept: PHYSICAL THERAPY | Facility: CLINIC | Age: 77
End: 2022-03-02
Payer: COMMERCIAL

## 2022-03-02 DIAGNOSIS — R29.6 FREQUENT FALLS: Primary | ICD-10-CM

## 2022-03-02 PROCEDURE — 97112 NEUROMUSCULAR REEDUCATION: CPT

## 2022-03-02 PROCEDURE — 97110 THERAPEUTIC EXERCISES: CPT

## 2022-03-02 NOTE — PROGRESS NOTES
Daily Note     Insurance:  A/CMS Eval/ Re-eval POC expires Adriane William #/ Referral # Totalvisits Start date  Expiration date Extension  Visit limitation? PT only or  PT+OT? Co-Insurance   [address] Medicare  CMS 11/15/21 2/7/22  [id number] 50 11/15/21 5/14/22   PT     1/25 3/22/22                                                             Date 11/15 11/22 11/29 12/6 12/8 12/13 12/15 12/20 12/22 12/30 1/3 1/5   Visits:  Used 1 2 3 4 5 6 7 8 9 10 11 12   Authed:  Remaining  49 48 47 46 45 44 43 42 41 40 39 38        Date  2/ 2-8 2-10 2-15 2/17 2- 2-24 2- 3-2    Visits:  Used 13 14 15 16 17 18 19 20 21 22 23    Authed:  Remaining  37 36 35 34 33 32 31 30 29 28 27        GOES BY "PENELOPE"    Today's date: 3/2/2022  Patient name: Fabi Sultana  : 1945  MRN: [id number]  Referring provider: Siva Hearn*  Dx:   Encounter Diagnosis     ICD-10-CM    1  Frequent falls  R29 6                   Subjective: Patient reports to session with no new changes, complaints, or falls  Objective: See treatment diary below      TE (3# ankle weights):  - STS w/ TB (yellow) around B/L knees: 2 x 10 w/ one airex pad on chair  - Standing hip flexion: 3 sets, 10 reps, 1 UE, 3 sec holds  - Standing hip abduction: 3 sets, 10 reps, 2 UE, PT tactile cues    NMR:  - TM ambulation: 1 0 mph, 7 mins  - Dynamic gait drills   FWD ambulation EC, BWD ambulation EO 2 x 30 ft each   Ambulation w/ HT/HN 2 x 30 ft each      Assessment: Patient able to tolerate treatment session well today with continued focus on LE strengthening, dynamic balance, and endurance  Incorporated dynamic gait drills today to address patient's low FGA and DGI scores that place her at increased risk for falls  Demonstrated significant lateral deviation with EC ambulation, suggesting increased visual reliance for balance  Plan to progress dynamic balance tasks per patient tolerance   She will continue to benefit from skilled outpatient PT in order to address deficits in strength, balance, and gait in order to maximize functional mobility at home and reduce overall risk for falls  Plan: Continue per plan of care  Progress treatment as tolerated             Outcome Measures Initial Eval  11/15/2021 DN  11/22/2021 PN  12/15/2021 RE  1- PN  2/24/2022    5xSTS 17 94 sec  25 12 sec (2 UE pushoff) 19 59 sec (2 UE) 17 34 sec (1 airex, no UE)    TUG 22 75 sec  18 18 sec 14 61 sec 14 35 sec    10 meter m/s 0 59 m/s 0 63 m/s 0 80 m/s 0 73 m/s    HOLGUIN 36/56  41/56 43/56 44/56    FGA /30   14/30 16/30    DGI /24        mCTSIB  - FTEO (firm)  - FTEC (firm)  - FTEO (foam)  - FTEC (foam)   30 sec  30 sec  3 28 sec  0 sec    30 sec  30 sec  30 sec  6 02 sec   30 sec  30 sec  30 sec  30 sec (+)   30 sec  30 sec (+)  30 sec  30 sec (+)    6MWT  ft 445 ft 660 ft 750 ft 710 ft    [facility]  70% 68 12% 52 5% 65%

## 2022-03-07 ENCOUNTER — OFFICE VISIT (OUTPATIENT)
Dept: PHYSICAL THERAPY | Facility: CLINIC | Age: 77
End: 2022-03-07
Payer: COMMERCIAL

## 2022-03-07 DIAGNOSIS — R29.6 FREQUENT FALLS: Primary | ICD-10-CM

## 2022-03-07 PROCEDURE — 97110 THERAPEUTIC EXERCISES: CPT | Performed by: PHYSICAL THERAPIST

## 2022-03-07 PROCEDURE — 97112 NEUROMUSCULAR REEDUCATION: CPT | Performed by: PHYSICAL THERAPIST

## 2022-03-07 NOTE — PROGRESS NOTES
Daily Note     Insurance:  [place]/CMS Eval/ Re-eval POC expires Odalis Malik #/ Referral # Totalvisits Start date  Expiration date Extension  Visit limitation? PT only or  PT+OT? Co-Insurance   [address] Medicare  CMS 11/15/21 2/7/22  [id number] 50 11/15/21 5/14/22   PT     1/25 3/22/22             2/24                                                Date 11/15 11/22 11/29 12/6 12/8 12/13 12/15 12/20 12/22 12/30 1/3 1/5   Visits:  Used 1 2 3 4 5 6 7 8 9 10 11 12   Authed:  Remaining  49 48 47 46 45 44 43 42 41 40 39 38        Date  2/ 2-8 2-10 2-15 2/17 2- 2-24 2-28 3-2 3-7   Visits:  Used 13 14 15 16 17 18 19 20 21 22 23 24   Authed:  Remaining  37 36 35 34 33 32 31 30 29 28 27 26       GOES BY "PENELOPE"    Today's date: 3/7/2022  Patient name: Consuelo Balderas  : 1945  MRN: [id number]  Referring provider: Terry Cole*  Dx:   Encounter Diagnosis     ICD-10-CM    1  Frequent falls  R29 6                   Subjective: Patient reports to session with no new changes, complaints, or falls  Objective: See treatment diary below      TE (3# ankle weights):  - STS w/ TB (yellow) around B/L knees: 2 x 10 w/ one airex pad on chair  - Standing hip flexion: 3 sets, 10 reps, 1 UE, 3 sec holds, hold 3 to 10 seconds iso hold   - Standing hip abduction: 3 sets, 10 reps, 2 UE, PT tactile cues, hold 3 to 10 seconds iso hold   - standing hip extension: 2 sets, 10 reps 2 UE suppot, PT TC, hold 3 to 10 seconds iso hold     NMR:  - TM ambulation: 1 1 mph, 8 mins  - Dynamic gait drills   FWD ambulation EC, BWD ambulation EO 2 x 30 ft each   Ambulation w/ HT/HN 2 x 30 ft each      Assessment: Patient able to tolerate treatment session well today with continued focus on LE strengthening, dynamic balance, and endurance  Improvement in dynamic gait with reduction in sway, most challenged with vertical direction  Cuing to reduce speed with head turns  Modification to NMR noted above with iso hold with 3 way hip   Increase in TM speed to 1 1 secondary to shorten steps, overall improvement with TM with speed and endurance  She will continue to benefit from skilled outpatient PT in order to address deficits in strength, balance, and gait in order to maximize functional mobility at home and reduce overall risk for falls  Plan: Continue per plan of care  Progress treatment as tolerated             Outcome Measures Initial Eval  11/15/2021 DN  11/22/2021 PN  12/15/2021 RE  1- PN  2/24/2022    5xSTS 17 94 sec  25 12 sec (2 UE pushoff) 19 59 sec (2 UE) 17 34 sec (1 airex, no UE)    TUG 22 75 sec  18 18 sec 14 61 sec 14 35 sec    10 meter m/s 0 59 m/s 0 63 m/s 0 80 m/s 0 73 m/s    HOLGUIN 36/56  41/56 43/56 44/56    FGA /30   14/30 16/30    DGI /24        mCTSIB  - FTEO (firm)  - FTEC (firm)  - FTEO (foam)  - FTEC (foam)   30 sec  30 sec  3 28 sec  0 sec    30 sec  30 sec  30 sec  6 02 sec   30 sec  30 sec  30 sec  30 sec (+)   30 sec  30 sec (+)  30 sec  30 sec (+)    6MWT  ft 445 ft 660 ft 750 ft 710 ft    [facility]  70% 68 12% 52 5% 65%

## 2022-03-09 ENCOUNTER — OFFICE VISIT (OUTPATIENT)
Dept: PHYSICAL THERAPY | Facility: CLINIC | Age: 77
End: 2022-03-09
Payer: COMMERCIAL

## 2022-03-09 DIAGNOSIS — R29.6 FREQUENT FALLS: Primary | ICD-10-CM

## 2022-03-09 PROCEDURE — 97112 NEUROMUSCULAR REEDUCATION: CPT | Performed by: PHYSICAL THERAPIST

## 2022-03-09 PROCEDURE — 97116 GAIT TRAINING THERAPY: CPT | Performed by: PHYSICAL THERAPIST

## 2022-03-09 NOTE — PROGRESS NOTES
Daily Note     Insurance:  A/CMS Eval/ Re-eval POC expires Carmen Anders #/ Referral # Totalvisits Start date  Expiration date Extension  Visit limitation? PT only or  PT+OT? Co-Insurance   [address] Medicare  CMS 11/15/21 2/7/22  [id number] 50 11/15/21 5/14/22   PT     1/25 3/22/22             2/24                                                Date 11/15 11/22 11/29 12/6 12/8 12/13 12/15 12/20 12/22 12/30 1/3 1/5   Visits:  Used 1 2 3 4 5 6 7 8 9 10 11 12   Authed:  Remaining  49 48 47 46 45 44 43 42 41 40 39 38        Date  2/ 2-8 2-10 2-15 2/17 2- 2-24 2-28 3-2 3-7   Visits:  Used 13 14 15 16 17 18 19 20 21 22 23 24   Authed:  Remaining  37 36 35 34 33 32 31 30 29 28 27 26       GOES BY "PENELOPE"    Today's date: 3/9/2022  Patient name: Santos Cooney  : 1945  MRN: [id number]  Referring provider: Juan Miguel Chester*  Dx:   Encounter Diagnosis     ICD-10-CM    1  Frequent falls  R29 6        Start Time:   Stop Time: 930  Total time in clinic (min): 38 minutes    Subjective: Patient reports to session with no new changes, complaints, or falls  Mild left knee pain reported  Tolerated last PT session well  Objective: See treatment diary below      TE (3# ankle weights):  - STS w/ TB (yellow) around B/L knees: 2 x 10 w/ one airex pad on chair  - Standing hip flexion: 2 sets, 10 reps, 1 UE, 3 sec holds, hold 3 to 10 seconds iso hold   - Standing hip abduction: 2 sets, 10 reps, 1 UE, PT tactile cues, hold 3 to 10 seconds iso hold   - standing hip extension: 2 sets, 10 reps 2 UE suppot, PT TC, hold 3 to 10 seconds iso hold     NMR:  - TM ambulation: 1 1 mph, 8 mins (not today, TM in use)  - Dynamic gait drills   FWD ambulation EC, BWD ambulation EO 2 x 30 ft each   Ambulation w/ HT/HN 5 x 30 ft each      Assessment: Patient able to tolerate treatment session well today with continued focus on LE strengthening, dynamic balance, and endurance   Pt required verbal cuing to maintain hip hip abduction during STS  Patient began to struggle with last 3-5 reps of 2nd set of STS and required VC to scoot forward in chair to improve efficiency of movement  Pt able to progress to unilateral UE support with standing hip abd but required VC/TC to prevent trunk lean  During dynamic gait activities patient demonstrates continued decreased step length and foot clearance (L>R) but is able to briefly correct with cuing  Pt will continue to benefit from skilled outpatient PT in order to address deficits in strength, balance, and gait in order to maximize functional mobility at home and reduce overall risk for falls  Plan: Continue per plan of care  Progress treatment as tolerated             Outcome Measures Initial Eval  11/15/2021 DN  11/22/2021 PN  12/15/2021 RE  1- PN  2/24/2022    5xSTS 17 94 sec  25 12 sec (2 UE pushoff) 19 59 sec (2 UE) 17 34 sec (1 airex, no UE)    TUG 22 75 sec  18 18 sec 14 61 sec 14 35 sec    10 meter m/s 0 59 m/s 0 63 m/s 0 80 m/s 0 73 m/s    HOLGUIN 36/56  41/56 43/56 44/56    FGA /30   14/30 16/30    DGI /24        mCTSIB  - FTEO (firm)  - FTEC (firm)  - FTEO (foam)  - FTEC (foam)   30 sec  30 sec  3 28 sec  0 sec    30 sec  30 sec  30 sec  6 02 sec   30 sec  30 sec  30 sec  30 sec (+)   30 sec  30 sec (+)  30 sec  30 sec (+)    6MWT  ft 445 ft 660 ft 750 ft 710 ft    [facility]  70% 68 12% 52 5% 65%

## 2022-03-14 ENCOUNTER — OFFICE VISIT (OUTPATIENT)
Dept: PHYSICAL THERAPY | Facility: CLINIC | Age: 77
End: 2022-03-14
Payer: COMMERCIAL

## 2022-03-14 DIAGNOSIS — R29.6 FREQUENT FALLS: Primary | ICD-10-CM

## 2022-03-14 PROCEDURE — 97112 NEUROMUSCULAR REEDUCATION: CPT

## 2022-03-14 NOTE — PROGRESS NOTES
Daily Note     Insurance:  [place]/CMS Eval/ Re-eval POC expires Donna Whalen #/ Referral # Totalvisits Start date  Expiration date Extension  Visit limitation? PT only or  PT+OT? Co-Insurance   [address] Medicare  CMS 11/15/21 2/7/22  [id number] 50 11/15/21 5/14/22   PT     1/25 3/22/22             2/24                                                Date 11/15 11/22 11/29 12/6 12/8 12/13 12/15 12/20 12/22 12/30 1/3 1/5   Visits:  Used 1 2 3 4 5 6 7 8 9 10 11 12   Authed:  Remaining  49 48 47 46 45 44 43 42 41 40 39 38        Date / 2/ 2-8 2-10 2-15 2 2- 2-24 2-28 3-2 3-7   Visits:  Used 13 14 15 16 17 18 19 20 21 22 23 24   Authed:  Remaining  37 36 35 34 33 32 31 30 29 28 27 26        Date 3/9 3/14             Visits:  Used              Authed:  Remaining  25 24                 GOES BY "PENELOPE"    Today's date: 3/14/2022  Patient name: Dori Mendez  : 1945  MRN: [id number]  Referring provider: Shahla Brewster*  Dx:   Encounter Diagnosis     ICD-10-CM    1  Frequent falls  R29 6                   Subjective: Patient reports to session with reports of a fall on Saturday 3/12  States she did not notice her dogs lying by her feet and when she went to get up she tripped forward over them and fell on to her knees, she was able to get up immediately  She denies hitting her head but states reports mild pain in B/L knees      Objective: See treatment diary below      TE (3# ankle weights - not today):  - STS w/ TB (yellow) around B/L knees: 2 x 15 w/ one airex pad on chair (holding 4# med ball for 2nd set)  - Sidestepping w/ yellow tband around knees 8 x 10 ft  - Monster walk w/ yellow tband around knees 6 x 10 ft    NMR:  - TM ambulation: 1 1 mph, 8 mins  - Dynamic gait drills (NOT TODAY)   FWD ambulation EC, BWD ambulation EO 2 x 30 ft each   Ambulation w/ HT/HN 5 x 30 ft each  - VOR x 1 (self-paced "as fast as possible," FA, firm) x 30 sec (H&V) - no dizziness reported    TA:  - Head thrust test: (+) to R      Assessment: Patient able to tolerate treatment session well today with continued focus on LE strengthening, dynamic balance, and endurance  Increased difficulty of STS (repetitions and static med ball hold) with good patient tolerance  Plan to initiate floor to stand transfers and fall recovery in upcoming sessions when knee pain is resolved  Performed head thrust test today due to patient's past medical history being significant for autoimmune disease, with (+) findings to R  Based on these findings, initiated VOR x 1 exercises  Patient will continue to benefit from skilled outpatient PT in order to address deficits in strength, balance, and gait in order to maximize functional mobility at home and reduce overall risk for falls  Plan: Continue per plan of care  Progress treatment as tolerated             Outcome Measures Initial Eval  11/15/2021 DN  11/22/2021 PN  12/15/2021 RE  1- PN  2/24/2022    5xSTS 17 94 sec  25 12 sec (2 UE pushoff) 19 59 sec (2 UE) 17 34 sec (1 airex, no UE)    TUG 22 75 sec  18 18 sec 14 61 sec 14 35 sec    10 meter m/s 0 59 m/s 0 63 m/s 0 80 m/s 0 73 m/s    HOLGUIN 36/56  41/56 43/56 44/56    FGA /30   14/30 16/30    DGI /24        mCTSIB  - FTEO (firm)  - FTEC (firm)  - FTEO (foam)  - FTEC (foam)   30 sec  30 sec  3 28 sec  0 sec    30 sec  30 sec  30 sec  6 02 sec   30 sec  30 sec  30 sec  30 sec (+)   30 sec  30 sec (+)  30 sec  30 sec (+)    6MWT  ft 445 ft 660 ft 750 ft 710 ft    [facility]  70% 68 12% 52 5% 65%

## 2022-03-16 ENCOUNTER — OFFICE VISIT (OUTPATIENT)
Dept: PHYSICAL THERAPY | Facility: CLINIC | Age: 77
End: 2022-03-16
Payer: COMMERCIAL

## 2022-03-16 DIAGNOSIS — R29.6 FREQUENT FALLS: Primary | ICD-10-CM

## 2022-03-16 PROCEDURE — 97112 NEUROMUSCULAR REEDUCATION: CPT

## 2022-03-16 PROCEDURE — 97110 THERAPEUTIC EXERCISES: CPT

## 2022-03-16 NOTE — PROGRESS NOTES
Daily Note     Insurance:  [place]/CMS Eval/ Re-eval POC expires Naveen Coronel #/ Referral # Totalvisits Start date  Expiration date Extension  Visit limitation? PT only or  PT+OT? Co-Insurance   [address] Medicare  CMS 11/15/21 2/7/22  [id number] 50 11/15/21 5/14/22   PT     1/25 3/22/22             2/24                                                Date 11/15 11/22 11/29 12/6 12/8 12/13 12/15 12/20 12/22 12/30 1/3 1/5   Visits:  Used 1 2 3 4 5 6 7 8 9 10 11 12   Authed:  Remaining  49 48 47 46 45 44 43 42 41 40 39 38        Date / 2/ 2-8 2-10 2-15 2/17 2- 2-24 2-28 3-2 3-7   Visits:  Used 13 14 15 16 17 18 19 20 21 22 23 24   Authed:  Remaining  37 36 35 34 33 32 31 30 29 28 27 26        Date 3/9 3/14 3/16            Visits:  Used 25 26 27            Authed:  Remaining  25 24 23                GOES BY "PENELOPE"    Today's date: 3/16/2022  Patient name: Chely Campbell  : 1945  MRN: [id number]  Referring provider: Zev Rodríguez*  Dx:   Encounter Diagnosis     ICD-10-CM    1  Frequent falls  R29 6                   Subjective: Patient reports to session and states she woke up feeling sore in B/L hips and knees, which she attributes to changes in the weather  Objective: See treatment diary below      TE (3# ankle weights):  - STS w/ TB (yellow) around B/L knees + 4# med ball hold: 2 x 15 w/ one airex pad on chair  - Sidestepping w/ yellow tband around knees 8 x 10 ft  - Monster walk w/ yellow tband around knees 2 x 10 ft    NMR (3# ankle weights):  - TM ambulation: 1 1 mph, 8 mins (focus on heel strike and upright posture)  - Dynamic gait drills   FWD ambulation EC, BWD ambulation EO 2 x 30 ft each   Ambulation w/ HT/HN 2 x 30 ft each  - VOR x 1 (self-paced "as fast as possible," FA, firm) 2 x 30 sec (H&V) - no dizziness reported      Assessment: Patient able to tolerate treatment session well today with continued focus on LE strengthening, dynamic balance, and endurance   Demonstrated overall poor mechanics with monster walks, likely secondary to poor proximal hip strength and overall excessive anterior trunk lean  VCs for increasing heel strike and step length on treadmill with fair-good improvements noted  Required occasional haptic touch when performing VOR x 1 to maintain balance  Patient will continue to benefit from skilled outpatient PT in order to address deficits in strength, balance, and gait in order to maximize functional mobility at home and reduce overall risk for falls  Plan: Continue per plan of care  Progress treatment as tolerated             Outcome Measures Initial Eval  11/15/2021 DN  11/22/2021 PN  12/15/2021 RE  1- PN  2/24/2022    5xSTS 17 94 sec  25 12 sec (2 UE pushoff) 19 59 sec (2 UE) 17 34 sec (1 airex, no UE)    TUG 22 75 sec  18 18 sec 14 61 sec 14 35 sec    10 meter m/s 0 59 m/s 0 63 m/s 0 80 m/s 0 73 m/s    HOLGUIN 36/56  41/56 43/56 44/56    FGA /30   14/30 16/30    DGI /24        mCTSIB  - FTEO (firm)  - FTEC (firm)  - FTEO (foam)  - FTEC (foam)   30 sec  30 sec  3 28 sec  0 sec    30 sec  30 sec  30 sec  6 02 sec   30 sec  30 sec  30 sec  30 sec (+)   30 sec  30 sec (+)  30 sec  30 sec (+)    6MWT  ft 445 ft 660 ft 750 ft 710 ft    [facility]  70% 68 12% 52 5% 65%

## 2022-03-21 ENCOUNTER — OFFICE VISIT (OUTPATIENT)
Dept: PHYSICAL THERAPY | Facility: CLINIC | Age: 77
End: 2022-03-21
Payer: COMMERCIAL

## 2022-03-21 DIAGNOSIS — R29.6 FREQUENT FALLS: Primary | ICD-10-CM

## 2022-03-21 PROCEDURE — 97110 THERAPEUTIC EXERCISES: CPT | Performed by: PHYSICAL THERAPIST

## 2022-03-21 PROCEDURE — 97112 NEUROMUSCULAR REEDUCATION: CPT | Performed by: PHYSICAL THERAPIST

## 2022-03-21 NOTE — PROGRESS NOTES
Daily Note     Insurance:  A/CMS Eval/ Re-eval POC expires Jose A Koroma #/ Referral # Totalvisits Start date  Expiration date Extension  Visit limitation? PT only or  PT+OT? Co-Insurance   [address] Medicare  CMS 11/15/21 2/7/22  [id number] 50 11/15/21 5/14/22   PT     1/25 3/22/22             2/24                                                Date 11/15 11/22 11/29 12/6 12/8 12/13 12/15 12/20 12/22 12/30 1/3 1/5   Visits:  Used 1 2 3 4 5 6 7 8 9 10 11 12   Authed:  Remaining  49 48 47 46 45 44 43 42 41 40 39 38        Date / 2/ 2-8 2-10 2-15 2/17 2- 2-24 2-28 3-2 3-7   Visits:  Used 13 14 15 16 17 18 19 20 21 22 23 24   Authed:  Remaining  37 36 35 34 33 32 31 30 29 28 27 26        Date 3/9 3/14 3/16 3/21           Visits:  Used 25 26 27 28           Authed:  Remaining  25 24 23 22               GOES BY "PENELOPE"    Today's date: 3/21/2022  Patient name: Brandy Lepe  : 1945  MRN: [id number]  Referring provider: Jonathan Clarke*  Dx:   Encounter Diagnosis     ICD-10-CM    1  Frequent falls  R29 6                   Subjective: Patient reports to session stating her legs are very sore today, states she thinks it is because of the weather      Objective: See treatment diary below      TE:  - STS w/ TB (green) around B/L knees + 4# med ball hold: 2 x 15 w/ one airex pad on chair  - Seated hip abduction: 15x 2 sets w/ green TB  - Sidestepping w/ yellow tband around knees 10 x 10 ft      NMR:  - TM ambulation: 1 1 mph, 8:30 (focus on heel strike and upright posture)  - Dynamic gait drills   FWD/BWD ambulation: 30 ft x 5 laps   FWD/BWD ambulation w/ EC: 30 ft x 5 laps      Assessment: Patient tolerated treatment well  Increased patient to use green TB around knees to provide resistance and tactile cueing to prevent valgus collapse when in standing or transferring to/from standing   She was able to increase 30 seconds to overall treadmill ambulation, with occasional verbal cueing to prevent forward trunk flexion  Patient does demonstrate step-through gait mechanics when moving forward but requires step-to when moving backward to prevent LOB  Patient will continue to benefit from skilled outpatient PT in order to address deficits in strength, balance, and gait in order to maximize functional mobility at home and reduce overall risk for falls  Plan: Continue per plan of care  Progress treatment as tolerated             Outcome Measures Initial Eval  11/15/2021 DN  11/22/2021 PN  12/15/2021 RE  1- PN  2/24/2022    5xSTS 17 94 sec  25 12 sec (2 UE pushoff) 19 59 sec (2 UE) 17 34 sec (1 airex, no UE)    TUG 22 75 sec  18 18 sec 14 61 sec 14 35 sec    10 meter m/s 0 59 m/s 0 63 m/s 0 80 m/s 0 73 m/s    HOLGUIN 36/56  41/56 43/56 44/56    FGA /30   14/30 16/30    DGI /24        mCTSIB  - FTEO (firm)  - FTEC (firm)  - FTEO (foam)  - FTEC (foam)   30 sec  30 sec  3 28 sec  0 sec    30 sec  30 sec  30 sec  6 02 sec   30 sec  30 sec  30 sec  30 sec (+)   30 sec  30 sec (+)  30 sec  30 sec (+)    6MWT  ft 445 ft 660 ft 750 ft 710 ft    [facility]  70% 68 12% 52 5% 65%

## 2022-03-28 ENCOUNTER — EVALUATION (OUTPATIENT)
Dept: PHYSICAL THERAPY | Facility: CLINIC | Age: 77
End: 2022-03-28
Payer: COMMERCIAL

## 2022-03-28 DIAGNOSIS — R29.6 FREQUENT FALLS: Primary | ICD-10-CM

## 2022-03-28 PROCEDURE — 97164 PT RE-EVAL EST PLAN CARE: CPT

## 2022-03-28 NOTE — PROGRESS NOTES
PT Re-Evaluation   Today's date: 3/28/2022  Patient name: Will Cruz  : 1945  MRN: [id number]  Referring provider: Stevan Putnam*  Dx:   Encounter Diagnosis     ICD-10-CM    1  Frequent falls  R29 6          Assessment  Assessment details: Patient is a 77year old female who has been reporting to skilled outpatient PT for deficits related to LE weakness, gait, and balance resulting in frequent falls and reduced overall safety with ADLs  Patient demonstrated overall improvements in the following outcome measures since last reassessment: 5 x STS, TUG, Brown, 10 MWT, and 6 MWT suggesting overall improvements in functional LE strength, safe ambulation, ambulation speed, and cardiovascular endurance, respectively  Overall plateau noted in FGA, which may be secondary to lack of focus on dynamic balance exercises in past month  Per cutoff scores for the Brown, FGA, and TUG she is considered HIGH fall risk  All cutoff scores and normative values taken from the APTA Neuro Section and Rehab Measures  Additional improvements noted in subjective balance confidence per improvements in ABC Scale  Plan to incorporate floor transfers due to patient's continued reports of falling at home to ensure safe fall recovery as well as dynamic balance activities to address continued lack of progress in FGA score  She has met 3/4 short term goals and 4/10 long term goals  She will continue to benefit from skilled outpatient PT in order to maximize her function with balance tasks, reduce visual reliance, and improved safety with ambulation and ADLs  Impairments: Abnormal gait, Impaired balance and Impaired physical strength  Understanding of Dx/Px/POC: Good  Prognosis: Good    Patient verbalized understanding of POC  Please contact me if you have any questions or recommendations   Thank you for the referral and the opportunity to share in [facility] YESSICA Brennan's care          Plan  Plan details: 6 MWT, 10 MWT, ABC next visit; balance training    Patient would benefit from: PT Eval and Skilled PT  Planned modality interventions: Biofeedback, Cryotherapy, TENS and Thermotherapy: Hydrocollator Packs  Planned therapy interventions: Balance, Gait training, HEP, Neuromuscular re-education, Patient education, Strengthening, Therapeutic activities and Therapeutic exercises  Frequency: 2x/wk  Duration in weeks: 8  Plan of Care beginning date: 3/28/2022  Plan of Care expiration date: 8 weeks - 5/23/2022  Treatment plan discussed with: Patient       Goals  Short Term Goals (4 weeks):    - Patient will improve time on TUG by 2 9 seconds from 22 75 seconds to 19 85 seconds to facilitate improved safety in all ambulation MET  - Patient will be independent in basic HEP 2-3 weeks MET  - Patient will improve 5xSTS score by 2 3 seconds from 17 94 seconds to 15 64 seconds to promote improved LE functional strength needed for ADLs NEARLY MET  - Patient will preform 6 MWT, 10 MWT, and ABC Scale in 2-3 weeks in order to assess cardiovascular endurance, ambulation speed, and balance confidence MET    Long Term Goals (12 weeks):  - Patient will be independent in a comprehensive home exercise program NOT MET  - Patient will improve gait speed by 0 18 m/s to improve safety with community ambulation MET  - Patient will improve HOLGUIN by 6 points from 36/56 to 42/56 to facilitate return to safe independent ambulation MET  - Patient will be able to demonstrate HT in gait without veering NOT MET  - Patient will improve 6 Minute Walk Test score by 190 feet to promote improved cardiovascular endurance MET  - Patient will report 50% reduction in near falls in order to improve safety with functional tasks and reduce his risk for falls MET  - Patient will report going on walks at least 3 days per week to promote independence and improved cardiovascular endurance NOT MET  - Patient will be able to ascend/descend stairs reciprocally with 1 UE assist to promote independence and safety with ADLs NOT MET  - Patient will report 50% reduction in near falls when ambulating on uneven terrain NOT MET  - Patient will improve FGA score by 4 points from  to  to progress safety with dynamic tasks - NOT MET      Cut off score    All date taken from APTA Neuro Section or Rehab Measures      Brown/56  MDC: 6 pts  Age Norms:  61-76: M - 54   F - 55  70-79: M - 47   F - 53  80-89: M - 48   F - 50 5xSTS: Roselia et al 2010  MDC: 2 3 sec  Age Norms:  60-69: 11 1 sec  70-79: 12 6 sec  80-89: 14 8 sec   TUG  MDC: 4 14 sec  Cut off score:  >13 5 sec community dwelling adults  >32 2 frail elderly  <20 I for basic transfers  >30 dependent on transfers 10 Meter Walk Test: David Sullivan and Marita garrison 2011  MDC: 0 18 m/s  20-29: M - 1 35 m   F - 1 34 m  30-39: M - 1 43 m   F - 1 34 m  40-49: M - 1 43 m   F - 1 39 m  50-59: M - 1 43 m   F - 1 31 m  60-69: M - 1 34 m   F - 1 24 m  70-79: M - 1 26 m   F - 1 13 m  80-89: M - 0 97 m   F - 0 94 m    Household Ambulator < 0 4 m/s  [employer] Community Ambulator 0 4 - 0 8 m/s  [employer] Ambulator 0 8 - 1 2 m/s  Safely cross the street > 1 2 m/s   FGA  MCID: 4 pts  Geriatrics/community < 22/30 fall risk  Geriatrics/community < 20/30 unexplained falls    DGI  MDC: vestibular - 4 pts  MDC: geriatric/community - 3 pts  Falls risk <19/24 mCTSIB  Norm: 20-60 yrs  Eyes open firm: norm sway 0 21-0 48  Eyes closed firm: norm sway 0 48-0 99  Eyes open foam: norm sway 0 38-0 71  Eyes closed foam: norm sway 0 70-2 22   6 Minute Walk Test  MDC: 190 98 ft  MCID: 164 ft    Age Norms  61-76: M - 1876 ft (571 80 m)  F - 1765 ft (537 98 m)  70-79: M - 1729 ft (527 00 m)  F - 1545 ft (470 92 m)  80-89: M - 1368 ft (416 97 m)  F - 1286 ft (391 97 m) ABC: [facility], 2003  <67% increased risk for falls         Subjective    History of Present Illness  - Mechanism of injury: Patient is a 77year old female who is presenting to skilled OPPT for IE due to imbalance and frequent falls  Patient reports she fell on November 2nd (Tuesday) and was unable to get up  Her friend found her on the floor Wednesday night and called EMS, but patient refused to go to hospital, stating she did not feel she had broken anything  She recalls sneezing before she fell and next thing she knew she was on the floor  Patient denies any other falls in the past 6 months, but reports a fall outside approximately a year ago in which someone found her and called EMS  She states she has had a "tilt table test" and MRI completed, with no significant findings  She notes feeling lightheaded frequently  PMH significant for orthostatic hypotension and peripheral neuropathy  PSHx significant for B/L knee surgeries (including meniscus repair)  Patient additionally reports onset of tremor and contractures in fingers approximately one month ago, and has an appointment scheduled with an orthopedist this week  Updated (12/15/2021): Patient reports she enjoys coming to physical therapy  She notes she has more confidence when getting up from a chair at home  She has had several near falls since IE, but no falls  She reports compliance with HEP  She wants to work on loosening up her knees  She also notes feeling more confident when walking since obtaining the [address]  Updated (1-): Patient reports gap in therapy due to losing her purse and worsening icy conditions  She stated "some days my balance is really good and other days it feel like I could just fall over "    Updated (2/24/2022): Patient reports she feels she is getting stronger and is starting to feel more confident  She does not feel as confident on foam and notices she sometimes will step on blankets at home that her dogs drag around and she feels unsteady  She wants to specifically work on strengthening her legs   Her most recent fall was after tripping over the weekend, but notes she was able to get up right away  Updated (3/28/2022): Patient states she feels she has been doing pretty good with therapy until this past weekend, when she developed B/L knee pain  She reports more recently she notices when she stands up she feels lightheaded and has to take her time        - Primary AD: none  - Assist level at home: independent  - Decreased fine motor tasks: Yes, MRI with Dr Rubi Ron this week      Social Support  - Steps to enter house: 5 ESPERANZA  - Stairs in house: flight to basement  - Lives in: single floor home  - Lives with: alone    - Employment status: retired  - Hand dominance: (R)    Treatments  - Previous treatment: PT for prior knee surgeries  - Current treatment: none  - Diagnostic Testing: MRI      Objective     LE MMT  - R Hip Flexion: 4-/5   L Hip Flexion: 3+/5  - R Hip Extension: 4/5   L Hip Extension: 4-/5  - R Hip Abduction: 4/5   L Hip Abduction: 4/5  - R Hip Adduction: 4/5   L Hip Adduction: 4/5  - R Knee Extension: 4/5  L Knee Extension: 4-/5  - R Knee Flexion: 4/5   L Knee Flexion: 4-/5  - R Ankle DF: 4+/5   L Ankle DF: 4/5  - R Ankle PF: 4+/5   L Ankle PF: 4+/5    Sensation  - Light touch: diminished  - Deep pressure: diminished      Coordination  - Heel to Shin: intact  - Alternate Toe Taps: intact  - Finger to Nose: intact, intension tremor on right  - Finger to Finger: decreased (L > R), intention tremor on right    Myelopathy Screen (>3/5 +)  - Rose's Reflex: -  - Babinski Reflex: -  - Inverted Supinator Sign: -  - Age > 45: Yes  - Gait Deviation: Yes    Gait  - Abnormalities: wide DYLAN, diminished heel strike/toe off, B/L knee flexion, slight forward trunk lean    Outcome Measures Initial Eval  11/15/2021 DN  11/22/2021 PN  12/15/2021 RE  1- PN  2/24/2022 PN  3/28/2022   5xSTS 17 94 sec  25 12 sec (2 UE pushoff) 19 59 sec (2 UE) 17 34 sec (1 airex, no UE) 15 93 sec (1 airex, no UE)   TUG 22 75 sec  18 18 sec 14 61 sec 14 35 sec 13 25 sec   10 meter m/s 0 59 m/s 0 63 m/s 0 80 m/s 0 73 m/s 0 78 m/s   HOLGUIN 36/56  41/56 43/56 44/56 46/56   FGA /30   14/30 16/30 16/30   DGI /24        mCTSIB  - FTEO (firm)  - FTEC (firm)  - FTEO (foam)  - FTEC (foam)   30 sec  30 sec  3 28 sec  0 sec    30 sec  30 sec  30 sec  6 02 sec   30 sec  30 sec  30 sec  30 sec (+)   30 sec  30 sec (+)  30 sec  30 sec (+)   30 sec  30 sec  30 sec  23 sec   6MWT  ft 445 ft 660 ft 750 ft 710 ft 800 ft   ABC  70% 68 12% 52 5% 65% 68 75%                   Precautions: orthostatic hypotension, high fall risk  Past Medical History:   Diagnosis Date    Disease of thyroid gland     Hypercholesteremia     Hypertension

## 2022-03-28 NOTE — LETTER
2022    Nathan Dejesus MD  [address]  [address] [facility] [address]    Patient: Jong Waldron   YOB: 1945   Date of Visit: 3/28/2022     Encounter Diagnosis     ICD-10-CM    1  Frequent falls  R29 6        Dear Dr Renee Atkinson:    Thank you for your recent referral of Jong Waldron  Please review the attached evaluation summary from Naomi's recent visit  Please verify that you agree with the plan of care by signing the attached order  If you have any questions or concerns, please do not hesitate to call  I sincerely appreciate the opportunity to share in the care of one of your patients and hope to have another opportunity to work with you in the near future  Sincerely,    Suzanne Krishnan, PT      Referring Provider:      I certify that I have read the below Plan of Care and certify the need for these services furnished under this plan of treatment while under my care  Nathan Dejesus MD  [address]  [address] [facility] [address]  Via Fax: [phone]                                                                              PT Re-Evaluation   Today's date: 3/28/2022  Patient name: Jong Waldron  : 1945  MRN: [id number]  Referring provider: Ysabel Cabello*  Dx:   Encounter Diagnosis     ICD-10-CM    1  Frequent falls  R29 6          Assessment  Assessment details: Patient is a 77year old female who has been reporting to skilled outpatient PT for deficits related to LE weakness, gait, and balance resulting in frequent falls and reduced overall safety with ADLs  Patient demonstrated overall improvements in the following outcome measures since last reassessment: 5 x STS, TUG, Brown, 10 MWT, and 6 MWT suggesting overall improvements in functional LE strength, safe ambulation, ambulation speed, and cardiovascular endurance, respectively   Overall plateau noted in FGA, which may be secondary to lack of focus on dynamic balance exercises in past month  Per cutoff scores for the Brown, FGA, and TUG she is considered HIGH fall risk  All cutoff scores and normative values taken from the APTA Neuro Section and Rehab Measures  Additional improvements noted in subjective balance confidence per improvements in ABC Scale  Plan to incorporate floor transfers due to patient's continued reports of falling at home to ensure safe fall recovery as well as dynamic balance activities to address continued lack of progress in FGA score  She has met 3/4 short term goals and 4/10 long term goals  She will continue to benefit from skilled outpatient PT in order to maximize her function with balance tasks, reduce visual reliance, and improved safety with ambulation and ADLs  Impairments: Abnormal gait, Impaired balance and Impaired physical strength  Understanding of Dx/Px/POC: Good  Prognosis: Good    Patient verbalized understanding of POC  Please contact me if you have any questions or recommendations  Thank you for the referral and the opportunity to share in [employer] care          Plan  Plan details: 6 MWT, 10 MWT, ABC next visit; balance training    Patient would benefit from: PT Eval and Skilled PT  Planned modality interventions: Biofeedback, Cryotherapy, TENS and Thermotherapy: Hydrocollator Packs  Planned therapy interventions: Balance, Gait training, HEP, Neuromuscular re-education, Patient education, Strengthening, Therapeutic activities and Therapeutic exercises  Frequency: 2x/wk  Duration in weeks: 8  Plan of Care beginning date: 3/28/2022  Plan of Care expiration date: 8 weeks - 5/23/2022  Treatment plan discussed with: Patient       Goals  Short Term Goals (4 weeks):    - Patient will improve time on TUG by 2 9 seconds from 22 75 seconds to 19 85 seconds to facilitate improved safety in all ambulation MET  - Patient will be independent in basic HEP 2-3 weeks MET  - Patient will improve 5xSTS score by 2 3 seconds from 17 94 seconds to 15 64 seconds to promote improved LE functional strength needed for ADLs NEARLY MET  - Patient will preform 6 MWT, 10 MWT, and ABC Scale in 2-3 weeks in order to assess cardiovascular endurance, ambulation speed, and balance confidence MET    Long Term Goals (12 weeks):  - Patient will be independent in a comprehensive home exercise program NOT MET  - Patient will improve gait speed by 0 18 m/s to improve safety with community ambulation MET  - Patient will improve HOLGUIN by 6 points from 36/56 to 42/56 to facilitate return to safe independent ambulation MET  - Patient will be able to demonstrate HT in gait without veering NOT MET  - Patient will improve 6 Minute Walk Test score by 190 feet to promote improved cardiovascular endurance MET  - Patient will report 50% reduction in near falls in order to improve safety with functional tasks and reduce his risk for falls MET  - Patient will report going on walks at least 3 days per week to promote independence and improved cardiovascular endurance NOT MET  - Patient will be able to ascend/descend stairs reciprocally with 1 UE assist to promote independence and safety with ADLs NOT MET  - Patient will report 50% reduction in near falls when ambulating on uneven terrain NOT MET  - Patient will improve FGA score by 4 points from  to  to progress safety with dynamic tasks - NOT MET      Cut off score    All date taken from APTA Neuro Section or Rehab Measures      Holguni/64  MDC: 6 pts  Age Norms:  60-69: M - 54   F - 55  70-79: M - 47   F - 53  80-89: M - 48   F - 50 5xSTS: Roselia et al 2010  MDC: 2 3 sec  Age Norms:  60-69: 11 1 sec  70-79: 12 6 sec  80-: 14 8 sec   TUG  MDC: 4 14 sec  Cut off score:  >13 5 sec community dwelling adults  >32 2 frail elderly  <20 I for basic transfers  >30 dependent on transfers 10 Meter Walk Test: Misael Patch al 2011  MDC: 0 18 m/s  20-29: M - 1 35 m   F - 1 34 m  30-39: M - 1 43 m   F - 1 34 m  40-49: M - 1 43 m   F - 1 39 m  50-59: M - 1 43 m   F - 1 31 m  60-69: M - 1 34 m   F - 1 24 m  70-79: M - 1 26 m   F - 1 13 m  80-89: M - 0 97 m   F - 0 94 m    Household Ambulator < 0 4 m/s  [employer] Ambulator 0 4 - 0 8 m/s  [employer] Ambulator 0 8 - 1 2 m/s  Safely cross the street > 1 2 m/s   FGA  MCID: 4 pts  Geriatrics/community < 22/30 fall risk  Geriatrics/community < 20/30 unexplained falls    DGI  MDC: vestibular - 4 pts  MDC: geriatric/community - 3 pts  Falls risk <19/24 mCTSIB  Norm: 20-60 yrs  Eyes open firm: norm sway 0 21-0 48  Eyes closed firm: norm sway 0 48-0 99  Eyes open foam: norm sway 0 38-0 71  Eyes closed foam: norm sway 0 70-2 22   6 Minute Walk Test  MDC: 190 98 ft  MCID: 164 ft    Age Norms  61-76: M - 1876 ft (571 80 m)  F - 1765 ft (537 98 m)  70-79: M - 1729 ft (527 00 m)  F - 1545 ft (470 92 m)  80-89: M - 1368 ft (416 97 m)  F - 1286 ft (391 97 m) ABC: Octavio Bautista, 2003  <67% increased risk for falls         Subjective    History of Present Illness  - Mechanism of injury: Patient is a 77year old female who is presenting to skilled OPPT for IE due to imbalance and frequent falls  Patient reports she fell on November 2nd (Tuesday) and was unable to get up  Her friend found her on the floor Wednesday night and called EMS, but patient refused to go to hospital, stating she did not feel she had broken anything  She recalls sneezing before she fell and next thing she knew she was on the floor  Patient denies any other falls in the past 6 months, but reports a fall outside approximately a year ago in which someone found her and called EMS  She states she has had a "tilt table test" and MRI completed, with no significant findings  She notes feeling lightheaded frequently  PMH significant for orthostatic hypotension and peripheral neuropathy  PSHx significant for B/L knee surgeries (including meniscus repair)   Patient additionally reports onset of tremor and contractures in fingers approximately one month ago, and has an appointment scheduled with an orthopedist this week  Updated (12/15/2021): Patient reports she enjoys coming to physical therapy  She notes she has more confidence when getting up from a chair at home  She has had several near falls since IE, but no falls  She reports compliance with HEP  She wants to work on loosening up her knees  She also notes feeling more confident when walking since obtaining the [facility]  Updated (1-): Patient reports gap in therapy due to losing her purse and worsening icy conditions  She stated "some days my balance is really good and other days it feel like I could just fall over "    Updated (2/24/2022): Patient reports she feels she is getting stronger and is starting to feel more confident  She does not feel as confident on foam and notices she sometimes will step on blankets at home that her dogs drag around and she feels unsteady  She wants to specifically work on strengthening her legs  Her most recent fall was after tripping over the weekend, but notes she was able to get up right away  Updated (3/28/2022): Patient states she feels she has been doing pretty good with therapy until this past weekend, when she developed B/L knee pain  She reports more recently she notices when she stands up she feels lightheaded and has to take her time        - Primary AD: none  - Assist level at home: independent  - Decreased fine motor tasks: Yes, MRI with Dr Arianna Gonsales this week      Social Support  - Steps to enter house: 5 ESPERANZA  - Stairs in house: flight to basement  - Lives in: single floor home  - Lives with: alone    - Employment status: retired  - Hand dominance: (R)    Treatments  - Previous treatment: PT for prior knee surgeries  - Current treatment: none  - Diagnostic Testing: MRI      Objective     LE MMT  - R Hip Flexion: 4-/5   L Hip Flexion: 3+/5  - R Hip Extension: 4/5   L Hip Extension: 4-/5  - R Hip Abduction: 4/5   L Hip Abduction: 4/5  - R Hip Adduction: 4/5   L Hip Adduction: 4/5  - R Knee Extension: 4/5  L Knee Extension: 4-/5  - R Knee Flexion: 4/5   L Knee Flexion: 4-/5  - R Ankle DF: 4+/5   L Ankle DF: 4/5  - R Ankle PF: 4+/5   L Ankle PF: 4+/5    Sensation  - Light touch: diminished  - Deep pressure: diminished      Coordination  - Heel to Shin: intact  - Alternate Toe Taps: intact  - Finger to Nose: intact, intension tremor on right  - Finger to Finger: decreased (L > R), intention tremor on right    Myelopathy Screen (>3/5 +)  - Rose's Reflex: -  - Babinski Reflex: -  - Inverted Supinator Sign: -  - Age > 45: Yes  - Gait Deviation: Yes    Gait  - Abnormalities: wide DYLAN, diminished heel strike/toe off, B/L knee flexion, slight forward trunk lean    Outcome Measures Initial Eval  11/15/2021 DN  11/22/2021 PN  12/15/2021 RE  1- PN  2/24/2022 PN  3/28/2022   5xSTS 17 94 sec  25 12 sec (2 UE pushoff) 19 59 sec (2 UE) 17 34 sec (1 airex, no UE) 15 93 sec (1 airex, no UE)   TUG 22 75 sec  18 18 sec 14 61 sec 14 35 sec 13 25 sec   10 meter m/s 0 59 m/s 0 63 m/s 0 80 m/s 0 73 m/s 0 78 m/s   HOLGUIN 36/56  41/56 43/56 44/56 46/56   FGA /30 14/30 16/30 16/30   DGI /24        mCTSIB  - FTEO (firm)  - FTEC (firm)  - FTEO (foam)  - FTEC (foam)   30 sec  30 sec  3 28 sec  0 sec    30 sec  30 sec  30 sec  6 02 sec   30 sec  30 sec  30 sec  30 sec (+)   30 sec  30 sec (+)  30 sec  30 sec (+)   30 sec  30 sec  30 sec  23 sec   6MWT  ft 445 ft 660 ft 750 ft 710 ft 800 ft   ABC  70% 68 12% 52 5% 65% 68 75%                   Precautions: orthostatic hypotension, high fall risk  Past Medical History:   Diagnosis Date    Disease of thyroid gland     Hypercholesteremia     Hypertension

## 2022-03-30 ENCOUNTER — APPOINTMENT (OUTPATIENT)
Dept: PHYSICAL THERAPY | Facility: CLINIC | Age: 77
End: 2022-03-30
Payer: COMMERCIAL

## 2022-04-06 ENCOUNTER — OFFICE VISIT (OUTPATIENT)
Dept: PHYSICAL THERAPY | Facility: CLINIC | Age: 77
End: 2022-04-06
Payer: COMMERCIAL

## 2022-04-06 DIAGNOSIS — R29.6 FREQUENT FALLS: Primary | ICD-10-CM

## 2022-04-06 PROCEDURE — 97112 NEUROMUSCULAR REEDUCATION: CPT

## 2022-04-06 PROCEDURE — 97110 THERAPEUTIC EXERCISES: CPT

## 2022-04-06 NOTE — PROGRESS NOTES
Daily Note     Insurance:  AMA/CMS Eval/ Re-eval POC expires Kiran Card #/ Referral # Totalvisits Start date  Expiration date Extension  Visit limitation? PT only or  PT+OT? Co-Insurance   Baker Davis Leonela Medicare  CMS 11/15/21 2/7/22  [id number] 50 11/15/21 5/14/22   PT     1/25 3/22/22             2/24                                                Date 11/15 11/22 11/29 12/6 12/8 12/13 12/15 12/20 12/22 12/30 1/3 1/5   Visits:  Used 1 2 3 4 5 6 7 8 9 10 11 12   Authed:  Remaining  49 48 47 46 45 44 43 42 41 40 39 38        Date / 2/ 2-8 2-10 2-15 2/17 2- 2-24 2- 3-2 3-7   Visits:  Used 13 14 15 16 17 18 19 20 21 22 23 24   Authed:  Remaining  37 36 35 34 33 32 31 30 29 28 27 26        Date 3/9 3/14 3/16 3/21 3/28 4/4         Visits:  Used 25 26 27 28 29 30         Authed:  Remaining  25 24 23 22 21 20             GOES BY "PENELOPE"    Today's date: 2022  Patient name: Deandre Jay  : 1945  MRN: [id number]  Referring provider: Claudene Seitz*  Dx:   Encounter Diagnosis     ICD-10-CM    1  Frequent falls  R29 6                   Subjective: Patient reports to session with no new issues or complaints  Objective: See treatment diary below      TE:  - STS w/ TB (yellow) around B/L knee: 3 x 10 w/ one airex pad on chair  - TM ambulation: 1 1 mph, 8:30 (focus on heel strike and upright posture)    NMR:  - Dynamic gait drills   Ambulation EC/Backwards ambulation 2 x 50 ft each   Ambulation w/ HT (H&V) 2 x 50 ft each  - Multidirectional stepping w/ opposite side resistance (anterior, posterior, and lateral - yellow) 5 x 15 ft each direction      Assessment: Patient tolerated treatment well today with increased focus on higher level dynamic balance activities  Patient demonstrates overall decreased amplitude of movement when ambulating backwards  Incorporated resisted multidirectional stepping to challenge reactive balance, with patient demonstrating occasional stepping strategy   Continues to demonstrate knee valgus B/L during functional mobility  Patient will continue to benefit from skilled outpatient PT in order to address deficits in strength, balance, and gait in order to maximize functional mobility at home and reduce overall risk for falls  Plan: Continue per plan of care  Progress treatment as tolerated             Outcome Measures Initial Eval  11/15/2021 DN  11/22/2021 PN  12/15/2021 RE  1- PN  2/24/2022 PN  3/28/2022   5xSTS 17 94 sec  25 12 sec (2 UE pushoff) 19 59 sec (2 UE) 17 34 sec (1 airex, no UE) 15 93 sec (1 airex, no UE)   TUG 22 75 sec  18 18 sec 14 61 sec 14 35 sec 13 25 sec   10 meter m/s 0 59 m/s 0 63 m/s 0 80 m/s 0 73 m/s 0 78 m/s   HOLGUIN 36/56  41/56 43/56 44/56 46/56   FGA /30   14/30 16/30 16/30   DGI /24        mCTSIB  - FTEO (firm)  - FTEC (firm)  - FTEO (foam)  - FTEC (foam)   30 sec  30 sec  3 28 sec  0 sec    30 sec  30 sec  30 sec  6 02 sec   30 sec  30 sec  30 sec  30 sec (+)   30 sec  30 sec (+)  30 sec  30 sec (+)   30 sec  30 sec  30 sec  23 sec   6MWT  ft 445 ft 660 ft 750 ft 710 ft 800 ft   ABC  70% 68 12% 52 5% 65% 68 75%

## 2022-04-11 ENCOUNTER — OFFICE VISIT (OUTPATIENT)
Dept: PHYSICAL THERAPY | Facility: CLINIC | Age: 77
End: 2022-04-11
Payer: COMMERCIAL

## 2022-04-11 DIAGNOSIS — R29.6 FREQUENT FALLS: Primary | ICD-10-CM

## 2022-04-11 PROCEDURE — 97112 NEUROMUSCULAR REEDUCATION: CPT

## 2022-04-11 PROCEDURE — 97110 THERAPEUTIC EXERCISES: CPT

## 2022-04-11 NOTE — PROGRESS NOTES
Daily Note     Insurance:  AMA/CMS Eval/ Re-eval POC expires Shoshana Palm #/ Referral # Totalvisits Start date  Expiration date Extension  Visit limitation? PT only or  PT+OT? Co-Insurance   Caden Vincent Medicare  CMS 11/15/21 2/7/22  [id number] 50 11/15/21 5/14/22   PT     1/25 3/22/22             2/24                                                Date 11/15 11/22 11/29 12/6 12/8 12/13 12/15 12/20 12/22 12/30 1/3 1/5   Visits:  Used 1 2 3 4 5 6 7 8 9 10 11 12   Authed:  Remaining  49 48 47 46 45 44 43 42 41 40 39 38        Date  2/ 2-8 2-10 2-15 2/17 2- 2-24 2- 3-2 3-7   Visits:  Used 13 14 15 16 17 18 19 20 21 22 23 24   Authed:  Remaining  37 36 35 34 33 32 31 30 29 28 27 26        Date 3/9 3/14 3/16 3/21 3/28 4/6 4/11        Visits:  Used 25 26 27 28 29 30 31        Authed:  Remaining  25 24 23 22 21 20 19            GOES BY "PENELOPE"    Today's date: 2022  Patient name: Carlos Alberto Linares  : 1945  MRN: [id number]  Referring provider: Arleth Branham*  Dx:   Encounter Diagnosis     ICD-10-CM    1  Frequent falls  R29 6                   Subjective: Patient reports to session with no new falls, issues, or complaints  Objective: See treatment diary below      TE:  - STS w/ TB (yellow) around B/L knee: 2 x 10 w/ one airex pad on chair + 5# med ball hold  - TM ambulation: 1 1 mph, 8:30 (focus on heel strike and upright posture)    NMR:  - Dynamic gait drills   Ambulation EC/Backwards ambulation 2 x 50 ft each   Ambulation w/ HT (H&V) 2 x 50 ft each  - Multidirectional stepping w/ opposite side resistance (anterior, posterior, and lateral - yellow) 4 x 15 ft each direction + yellow tband around knees      Assessment: Patient tolerated treatment well today with increased focus on higher level dynamic balance activities  Difficulty observed with maintaining gluteus medius activation with functional activities including STS as evidenced by B/L knee collapse into genu valgus   Poor-fair carryover noted with use of theraband as tactile cue for proximal hip activation during ambulatory tasks  Patient will continue to benefit from skilled outpatient PT in order to address deficits in strength, balance, and gait in order to maximize functional mobility at home and reduce overall risk for falls  Plan: Continue per plan of care  Progress treatment as tolerated             Outcome Measures Initial Eval  11/15/2021 DN  11/22/2021 PN  12/15/2021 RE  1- PN  2/24/2022 PN  3/28/2022   5xSTS 17 94 sec  25 12 sec (2 UE pushoff) 19 59 sec (2 UE) 17 34 sec (1 airex, no UE) 15 93 sec (1 airex, no UE)   TUG 22 75 sec  18 18 sec 14 61 sec 14 35 sec 13 25 sec   10 meter m/s 0 59 m/s 0 63 m/s 0 80 m/s 0 73 m/s 0 78 m/s   HOLGUIN 36/56  41/56 43/56 44/56 46/56   FGA /30   14/30 16/30 16/30   DGI /24        mCTSIB  - FTEO (firm)  - FTEC (firm)  - FTEO (foam)  - FTEC (foam)   30 sec  30 sec  3 28 sec  0 sec    30 sec  30 sec  30 sec  6 02 sec   30 sec  30 sec  30 sec  30 sec (+)   30 sec  30 sec (+)  30 sec  30 sec (+)   30 sec  30 sec  30 sec  23 sec   6MWT  ft 445 ft 660 ft 750 ft 710 ft 800 ft   ABC  70% 68 12% 52 5% 65% 68 75%

## 2022-04-13 ENCOUNTER — OFFICE VISIT (OUTPATIENT)
Dept: PHYSICAL THERAPY | Facility: CLINIC | Age: 77
End: 2022-04-13
Payer: COMMERCIAL

## 2022-04-13 DIAGNOSIS — R29.6 FREQUENT FALLS: Primary | ICD-10-CM

## 2022-04-13 PROCEDURE — 97112 NEUROMUSCULAR REEDUCATION: CPT

## 2022-04-13 NOTE — PROGRESS NOTES
Daily Note     Insurance:  AMA/CMS Eval/ Re-eval POC expires Zoila Kenyon #/ Referral # Totalvisits Start date  Expiration date Extension  Visit limitation? PT only or  PT+OT? Co-Insurance   Baker Davis Leonela Medicare  CMS 11/15/21 2/7/22  [id number] 50 11/15/21 5/14/22   PT     1/25 3/22/22             2/24                                                Date 11/15 11/22 11/29 12/6 12/8 12/13 12/15 12/20 12/22 12/30 1/3 1/5   Visits:  Used 1 2 3 4 5 6 7 8 9 10 11 12   Authed:  Remaining  49 48 47 46 45 44 43 42 41 40 39 38        Date  2/ 2/ 2-8 2-10 2-15 2/17 2- 2- 2- 3-2 3-7   Visits:  Used 13 14 15 16 17 18 19 20 21 22 23 24   Authed:  Remaining  37 36 35 34 33 32 31 30 29 28 27 26        Date 3/9 3/14 3/16 3/21 3/28 4/6 4/11 4/13       Visits:  Used 25 26 27 28 29 30 31 32       Authed:  Remaining  25 24 23 22 21 20 19 18           GOES BY "PENELOPE"    Today's date: 2022  Patient name: Kyleigh Farr  : 1945  MRN: [id number]  Referring provider: Tyrno Martinez*  Dx:   Encounter Diagnosis     ICD-10-CM    1  Frequent falls  R29 6                   Subjective: Patient reports to session with no new falls, issues, or complaints  Objective: See treatment diary below      TE:  - STS w/ TB (yellow) around B/L knee: 2 x 10 w/ one airex pad on chair + 5# med ball hold (no ball today)  - TM ambulation: 1 1 mph, 8:30 (focus on heel strike and upright posture) - NOT TODAY    NMR:  - VOR x 1 (FT on firm, self-paced "as fast as possible") x 30 sec   H: 4/10    V: 0/10   H: 3/10   V: 0/10    - Dynamic gait drills   Ambulation EC/Backwards ambulation 2 x 50 ft each   Ambulation w/ HT (H&V) 2 x 50 ft each  - Sidestepping on foam beam x 4 cycles down/back; haptic touch  - Tandem ambulation along // bars 6 x 10 ft; haptic touch      Assessment: Patient tolerated treatment well today with shifted focus to balance exercises that sensitize the vestibular system   Patient demonstrated increased difficulty with initiation of STS today, reporting increased stiffness in posterior R knee and thus withheld ball hold  Reintegrated VOR x 1 exercise today as patient reports occasional dizziness and she had a (+) head thrust test in the past  Patient will continue to benefit from skilled outpatient PT in order to address deficits in strength, balance, and gait in order to maximize functional mobility at home and reduce overall risk for falls  Plan: Continue per plan of care  Progress treatment as tolerated             Outcome Measures Initial Eval  11/15/2021 DN  11/22/2021 PN  12/15/2021 RE  1- PN  2/24/2022 PN  3/28/2022   5xSTS 17 94 sec  25 12 sec (2 UE pushoff) 19 59 sec (2 UE) 17 34 sec (1 airex, no UE) 15 93 sec (1 airex, no UE)   TUG 22 75 sec  18 18 sec 14 61 sec 14 35 sec 13 25 sec   10 meter m/s 0 59 m/s 0 63 m/s 0 80 m/s 0 73 m/s 0 78 m/s   HOLGUIN 36/56  41/56 43/56 44/56 46/56   FGA /30   14/30 16/30 16/30   DGI /24        mCTSIB  - FTEO (firm)  - FTEC (firm)  - FTEO (foam)  - FTEC (foam)   30 sec  30 sec  3 28 sec  0 sec    30 sec  30 sec  30 sec  6 02 sec   30 sec  30 sec  30 sec  30 sec (+)   30 sec  30 sec (+)  30 sec  30 sec (+)   30 sec  30 sec  30 sec  23 sec   6MWT  ft 445 ft 660 ft 750 ft 710 ft 800 ft   ABC  70% 68 12% 52 5% 65% 68 75%

## 2022-04-18 ENCOUNTER — OFFICE VISIT (OUTPATIENT)
Dept: PHYSICAL THERAPY | Facility: CLINIC | Age: 77
End: 2022-04-18
Payer: COMMERCIAL

## 2022-04-18 DIAGNOSIS — R29.6 FREQUENT FALLS: Primary | ICD-10-CM

## 2022-04-18 PROCEDURE — 97110 THERAPEUTIC EXERCISES: CPT

## 2022-04-18 PROCEDURE — 97112 NEUROMUSCULAR REEDUCATION: CPT

## 2022-04-18 NOTE — PROGRESS NOTES
Daily Note     Insurance:  AMA/CMS Eval/ Re-eval POC expires Yasmine Grajeda #/ Referral # Totalvisits Start date  Expiration date Extension  Visit limitation? PT only or  PT+OT? Co-Insurance   Eleonora Ortiz Medicare  CMS 11/15/21 2/7/22  [id number] 50 11/15/21 5/14/22   PT     1/25 3/22/22             2/24                                                Date 11/15 11/22 11/29 12/6 12/8 12/13 12/15 12/20 12/22 12/30 1/3 1/5   Visits:  Used 1 2 3 4 5 6 7 8 9 10 11 12   Authed:  Remaining  49 48 47 46 45 44 43 42 41 40 39 38        Date  2/ 2/ 2-8 2-10 2-15 2/17 2- 2- 2- 3-2 3-7   Visits:  Used 13 14 15 16 17 18 19 20 21 22 23 24   Authed:  Remaining  37 36 35 34 33 32 31 30 29 28 27 26        Date 3/9 3/14 3/16 3/21 3/28 4/6 4/11 4/13 4/18      Visits:  Used 25 26 27 28 29 30 31 32 33      Authed:  Remaining  25 24 23 22 21 20 19 18 17          GOES BY "PENELOPE"    Today's date: 2022  Patient name: Jessika Gonzalez  : 1945  MRN: [id number]  Referring provider: Hilary Torrez*  Dx:   Encounter Diagnosis     ICD-10-CM    1  Frequent falls  R29 6                   Subjective: Patient reports to session approximately 5 minutes late with no new falls, issues, or complaints  Objective: See treatment diary below      TE:  - STS w/ TB (yellow) around B/L knee: 2 x 10 w/ one airex pad on chair  - Physioball rollouts 10 x 10 sec hold    NMR:  - VOR x 1 (FT on firm, self-paced "as fast as possible") x 30 sec   H: 4/10   V: 0/10   H: 3/10   V: 0/10    - Dynamic gait drills   Ambulation w/ HT (H&V) 2 x 50 ft each  - Sidestepping on foam beam x 2 cycles down/back; haptic touch  - Tandem ambulation along // bars 6 x 10 ft; haptic touch      Assessment: Patient tolerated treatment well today with continued focus on sensitization of the vestibular system   Patient continues to demonstrate inconsistent performance with STS with overall increased fatigue with second set as evidenced by use of UE to pushoff from armrest or thighs  Difficulty with head/neck dissociation (H > V) while performing VOR x 1 today  Performance improved with second set with feet apart, suggesting balance component  Patient will continue to benefit from skilled outpatient PT in order to address deficits in strength, balance, and gait in order to maximize functional mobility at home and reduce overall risk for falls  Plan: Continue per plan of care  Progress treatment as tolerated             Outcome Measures Initial Eval  11/15/2021 DN  11/22/2021 PN  12/15/2021 RE  1- PN  2/24/2022 PN  3/28/2022   5xSTS 17 94 sec  25 12 sec (2 UE pushoff) 19 59 sec (2 UE) 17 34 sec (1 airex, no UE) 15 93 sec (1 airex, no UE)   TUG 22 75 sec  18 18 sec 14 61 sec 14 35 sec 13 25 sec   10 meter m/s 0 59 m/s 0 63 m/s 0 80 m/s 0 73 m/s 0 78 m/s   HOLGUIN 36/56  41/56 43/56 44/56 46/56   FGA /30   14/30 16/30 16/30   DGI /24        mCTSIB  - FTEO (firm)  - FTEC (firm)  - FTEO (foam)  - FTEC (foam)   30 sec  30 sec  3 28 sec  0 sec    30 sec  30 sec  30 sec  6 02 sec   30 sec  30 sec  30 sec  30 sec (+)   30 sec  30 sec (+)  30 sec  30 sec (+)   30 sec  30 sec  30 sec  23 sec   6MWT  ft 445 ft 660 ft 750 ft 710 ft 800 ft   ABC  70% 68 12% 52 5% 65% 68 75%

## 2022-04-19 ENCOUNTER — OFFICE VISIT (OUTPATIENT)
Dept: OCCUPATIONAL THERAPY | Facility: CLINIC | Age: 77
End: 2022-04-19
Payer: COMMERCIAL

## 2022-04-19 ENCOUNTER — OFFICE VISIT (OUTPATIENT)
Dept: OBGYN CLINIC | Facility: CLINIC | Age: 77
End: 2022-04-19
Payer: COMMERCIAL

## 2022-04-19 VITALS
HEIGHT: 65 IN | BODY MASS INDEX: 23.43 KG/M2 | WEIGHT: 140.6 LBS | SYSTOLIC BLOOD PRESSURE: 136 MMHG | HEART RATE: 100 BPM | DIASTOLIC BLOOD PRESSURE: 69 MMHG

## 2022-04-19 DIAGNOSIS — M66.242 SPONTANEOUS RUPTURE OF EXTENSOR TENDON OF LEFT HAND: Primary | ICD-10-CM

## 2022-04-19 DIAGNOSIS — M66.241 NONTRAUMATIC SUBLUXATION OF EXTENSOR TENDON AT MCP JOINT OF HAND, RIGHT: ICD-10-CM

## 2022-04-19 DIAGNOSIS — M66.242 NONTRAUMATIC SUBLUXATION OF EXTENSOR TENDON AT MCP JOINT OF HAND, LEFT: Primary | ICD-10-CM

## 2022-04-19 PROCEDURE — L3913 HFO W/O JOINTS CF: HCPCS

## 2022-04-19 PROCEDURE — 99203 OFFICE O/P NEW LOW 30 MIN: CPT | Performed by: ORTHOPAEDIC SURGERY

## 2022-04-19 RX ORDER — DULOXETIN HYDROCHLORIDE 30 MG/1
1 CAPSULE, DELAYED RELEASE ORAL DAILY
COMMUNITY
Start: 2021-11-16

## 2022-04-19 RX ORDER — SOLIFENACIN SUCCINATE 5 MG/1
1 TABLET, FILM COATED ORAL DAILY
COMMUNITY
Start: 2021-11-09

## 2022-04-19 NOTE — PROGRESS NOTES
Assessment/Plan:  1  Nontraumatic subluxation of extensor tendon at MCP joint of hand, left  Ambulatory Referral to Orthopedic Surgery    Ambulatory Referral to PT/OT Hand Therapy   2  Nontraumatic subluxation of extensor tendon at MCP joint of hand, right  Ambulatory Referral to Orthopedic Surgery    Ambulatory Referral to PT/OT Hand Therapy       Scribe Attestation    I,:  Ottoniel Paz PA-C am acting as a scribe while in the presence of the attending physician :       I,:  Saint Buff, DO personally performed the services described in this documentation    as scribed in my presence  :           77year old female with left ring/long and right long finger 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  We discussed patient's MRI report findings with her today and explained the pathology of rheumatological disease and how this can lead to the development of soft tissue injury  Since patient's finger has been flexed for quite some time, she has likely developed some joint contractures, so I cannot guarantee that bracing will be optimal for her  We did discuss that surgery to correct this would likely need to involve both correction of the tendon as well possible joint release  This type of surgery will have a long recovery and since I am leaving the practice in June, I did recommend she follow up with one of my partners to further discuss the possibility of this  In the meantime, we will trial a yoke splint  Patient states understanding and agreement with tx plan  Subjective: Soham Reese is a 77 y o  female who presents to the office today for evaluation and treatment of left ring finger deformity  Patient has a h/o Sjrogen's syndrome and states she has had some discomfort and stiffness, but her symptoms started to worsen over the last summer  Since early Fall, she's had trouble with her left ring finger being stuck in a flexed position   She did see Dr Maryam Kevin who provided pt with a left ring trigger finger injx on 21  She states this did not help at all  She then had an MRI which showed lateral subluxation of the extensor tendon and was informed that she would need a much larger surgery to correct this  She is here today for a second opinion  She states her right middle finger also gets stuck, but not nearly as frequently  Patient does follow up with Dr CHRISTIANSON [facility] [place] from rheumatology  States at one point she was on methotrexate which seemed to help her discomfort, but she ran out of pills and they were not called in for refill  Patient is retired  Review of Systems   Musculoskeletal:        As noted in HPI  All other systems reviewed and are negative  Past Medical History:   Diagnosis Date    Disease of thyroid gland     Hypercholesteremia     Hypertension        Past Surgical History:   Procedure Laterality Date    APPENDECTOMY      BREAST BIOPSY Right     benign    CHOLECYSTECTOMY      COLON SURGERY      ruptured bowel    COLOSTOMY      and closure 6 months later    HYSTERECTOMY      total    KNEE ARTHROSCOPY Bilateral     right x1 left x2    [place] COLSC FLX W/RMVL OF TUMOR POLYP LESION SNARE TQ N/A 5/3/2016    Procedure: COLONOSCOPY ;  Surgeon: Tomeka Cartwright MD;  Location: [place] [address] [facility];   Service: Gastroenterology    TONSILECTOMY AND ADNOIDECTOMY      TYMPANOPLASTY Right        Family History   Problem Relation Age of Onset    Breast cancer Paternal Aunt 95       Social History     Occupational History    Not on file   Tobacco Use    Smoking status: Former Smoker     Years: 20 00     Quit date:      Years since quittin 3    Smokeless tobacco: Never Used   Substance and Sexual Activity    Alcohol use: No    Drug use: No    Sexual activity: Not on file         Current Outpatient Medications:     cholecalciferol (VITAMIN D3) 1,000 units tablet, Take 1,000 Units by mouth daily, Disp: , Rfl:     DULoxetine (CYMBALTA) 30 mg delayed release capsule, Take 1 capsule by mouth daily, Disp: , Rfl:     esomeprazole (NexIUM) 40 MG capsule, Take 40 mg by mouth every morning before breakfast , Disp: , Rfl:     folic acid (FOLVITE) 1 mg tablet, , Disp: , Rfl: 5    hydroxychloroquine (PLAQUENIL) 200 mg tablet, , Disp: , Rfl: 3    Levothyroxine Sodium (SYNTHROID PO), Take by mouth daily Indications: unsure of dosage  , Disp: , Rfl:     methotrexate 2 5 mg tablet, Take 12 5 mg by mouth, Disp: , Rfl:     rosuvastatin (CRESTOR) 40 MG tablet, Take 20 mg by mouth daily  , Disp: , Rfl:     solifenacin (VESICARE) 5 mg tablet, Take 1 tablet by mouth daily, Disp: , Rfl:     ciclopirox (PENLAC) 8 % solution, Apply topically daily at bedtime (Patient not taking: Reported on 11/20/2019), Disp: 6 6 mL, Rfl: 4    gabapentin (NEURONTIN) 300 mg capsule, Take 1 capsule (300 mg total) by mouth 2 (two) times a day, Disp: 60 capsule, Rfl: 1    ketoconazole (NIZORAL) 2 % cream, Apply topically daily, Disp: 60 g, Rfl: 1    Allergies   Allergen Reactions    Penicillins Rash       Objective:  Vitals:    04/19/22 1026   BP: 136/69   Pulse: 100       Ortho Exam   Left Hand:  Ulnar deviation deformity to the MCP joints  Patient with flexion deformity of the ring > long finger  She is noted to have ulnar extensor tendon subluxation of both the long and ring fingers  She is able to extend the long finger, but cannot extend the ring finger  Passively, I am able to extend the finger more, but still not completely and patient likely with some joint contracture here  Brisk capillary refill  Right Hand:  Patient currently has no flexion deformities here  Her extensor tendon of the long finger does slightly sublux ulnarly with further degrees of flexion, but patient is able to actively extend this finger without issue  Brisk capillary refill  Physical Exam  Vitals reviewed  Constitutional:       Appearance: Normal appearance  She is well-developed     HENT:      Head: Normocephalic and atraumatic  Eyes:      Extraocular Movements: Extraocular movements intact  Conjunctiva/sclera: Conjunctivae normal    Neck:      Trachea: No tracheal deviation  Cardiovascular:      Pulses: Normal pulses  Pulmonary:      Effort: Pulmonary effort is normal    Abdominal:      Tenderness: There is no guarding  Skin:     General: Skin is warm and dry  Neurological:      Mental Status: She is alert and oriented to person, place, and time  Psychiatric:         Behavior: Behavior normal          Thought Content: Thought content normal          Judgment: Judgment normal          Studies Reviewed:  I have personally reviewed pertinent films in PACS and my interpretation is as follows:  MRI images not available for review but report describes ulnar subluxation of the extensor tendon of the left ring finger at the MP joint  Mild tendonitis of flexor tendon complex of the ring finger as well  Evidence of osteoarthritis and joint effusions       Procedures Performed:  Procedures  No Procedures performed today

## 2022-04-19 NOTE — PROGRESS NOTES
Orthosis    Diagnosis:   1  Spontaneous rupture of extensor tendon of left hand       Indication: Motion Blocking    Location: Left  ring finger  Supplies: Custom Fit Orthotic  Orthosis type: MP motion blocking  Wearing Schedule: Remove for hygiene only  Describe Position: Extension at MP  PIP/DIP free      Precautions: Universal (skin contact/breakdown)    Patient or Caregiver expresses understanding of wearing Schedule and Precautions? Yes  Patient or Caregiver able to don/doff orthotic independently? Yes    Written orders provided to patient?  No  Orders Obtained: Written  Orders Obtained from: Dr Simba Alvarez    Return for evaluation and treatment No

## 2022-04-20 ENCOUNTER — OFFICE VISIT (OUTPATIENT)
Dept: PHYSICAL THERAPY | Facility: CLINIC | Age: 77
End: 2022-04-20
Payer: COMMERCIAL

## 2022-04-20 DIAGNOSIS — R29.6 FREQUENT FALLS: Primary | ICD-10-CM

## 2022-04-20 PROCEDURE — 97112 NEUROMUSCULAR REEDUCATION: CPT

## 2022-04-20 PROCEDURE — 97110 THERAPEUTIC EXERCISES: CPT

## 2022-04-20 NOTE — PROGRESS NOTES
Daily Note     Insurance:  [employer]/CMS Eval/ Re-eval POC expires Lexie Varghese #/ Referral # Totalvisits Start date  Expiration date Extension  Visit limitation? PT only or  PT+OT? Co-Insurance   Costa dunlap Medicare  CMS 11/15/21 2/7/22  [id number] 50 11/15/21 5/14/22   PT     1/25 3/22/22             2/24                                                Date 11/15 11/22 11/29 12/6 12/8 12/13 12/15 12/20 12/22 12/30 1/3 1/5   Visits:  Used 1 2 3 4 5 6 7 8 9 10 11 12   Authed:  Remaining  49 48 47 46 45 44 43 42 41 40 39 38        Date / 2/ 2-8 2-10 2-15 2/17 2- 2- 2- 3-2 3-7   Visits:  Used 13 14 15 16 17 18 19 20 21 22 23 24   Authed:  Remaining  37 36 35 34 33 32 31 30 29 28 27 26        Date 3/9 3/14 3/16 3/21 3/28 4/6 4/11 4/13 4/18 4/20     Visits:  Used 25 26 27 28 29 30 31 32 33 34     Authed:  Remaining  25 24 23 22 21 20 19 18 17 16         GOES BY "PENELOPE"    Today's date: 2022  Patient name: Ilda Perez  : 1945  MRN: [id number]  Referring provider: Sofia Lewis*  Dx:   Encounter Diagnosis     ICD-10-CM    1  Frequent falls  R29 6                   Subjective: Patient reports to session approximately 5 minutes early with no new falls  Patient presents with L hand splint and states that Dr Madhu Pino is suggesting use of it to decrease the chance of need for surgery         Objective: See treatment diary below      TE:  - STS w/ TB (yellow) around B/L knee: 2 x 10 w/ one airex pad on chair  - Physioball rollouts 10 x 10 sec hold    NMR:  - VOR x 1 (FT on firm, self-paced "as fast as possible")    H: 1-2/10, 30 sec   V: 5 /10, 60 sec     - VOR x1  (FT on firm, self-paced "as fast as possible")    H: 5/10   V: 3/10    - Sidestepping on foam beam x 8 cycles down/back; haptic touch    - Step on/off foam beam: HN/HT x10 ea     - Tandem ambulation along // bars 8 x 10 ft; haptic touch      Assessment: Patient tolerated treatment well today with continued focus on sensitization of the vestibular system  Patient did require occasional verbal instructions for proper technique with VOR x1, but overall progressing well  Patient demonstrated occasional stepping strategy when negotiating a compliant surface  Patient will continue to benefit from skilled outpatient PT in order to address deficits in strength, balance, and gait in order to maximize functional mobility at home and reduce overall risk for falls  Plan: Continue per plan of care  Progress treatment as tolerated             Outcome Measures Initial Eval  11/15/2021 DN  11/22/2021 PN  12/15/2021 RE  1- PN  2/24/2022 PN  3/28/2022   5xSTS 17 94 sec  25 12 sec (2 UE pushoff) 19 59 sec (2 UE) 17 34 sec (1 airex, no UE) 15 93 sec (1 airex, no UE)   TUG 22 75 sec  18 18 sec 14 61 sec 14 35 sec 13 25 sec   10 meter m/s 0 59 m/s 0 63 m/s 0 80 m/s 0 73 m/s 0 78 m/s   HOLGUIN 36/56  41/56 43/56 44/56 46/56   FGA /30   14/30 16/30 16/30   DGI /24        mCTSIB  - FTEO (firm)  - FTEC (firm)  - FTEO (foam)  - FTEC (foam)   30 sec  30 sec  3 28 sec  0 sec    30 sec  30 sec  30 sec  6 02 sec   30 sec  30 sec  30 sec  30 sec (+)   30 sec  30 sec (+)  30 sec  30 sec (+)   30 sec  30 sec  30 sec  23 sec   6MWT  ft 445 ft 660 ft 750 ft 710 ft 800 ft   ABC  70% 68 12% 52 5% 65% 68 75%

## 2022-04-25 ENCOUNTER — OFFICE VISIT (OUTPATIENT)
Dept: PHYSICAL THERAPY | Facility: CLINIC | Age: 77
End: 2022-04-25
Payer: COMMERCIAL

## 2022-04-25 DIAGNOSIS — R29.6 FREQUENT FALLS: Primary | ICD-10-CM

## 2022-04-25 PROCEDURE — 97112 NEUROMUSCULAR REEDUCATION: CPT

## 2022-04-25 PROCEDURE — 97110 THERAPEUTIC EXERCISES: CPT

## 2022-04-25 NOTE — PROGRESS NOTES
Daily Note     Insurance:  AMA/CMS Eval/ Re-eval POC expires Abby Melendez #/ Referral # Totalvisits Start date  Expiration date Extension  Visit limitation? PT only or  PT+OT? Co-Insurance   [facility] [employer] Medicare  CMS 11/15/21 2/7/22  [id number] 50 11/15/21 5/14/22   PT     1/25 3/22/22             2/24                                                Date 11/15 11/22 11/29 12/6 12/8 12/13 12/15 12/20 12/22 12/30 1/3 1/5   Visits:  Used 1 2 3 4 5 6 7 8 9 10 11 12   Authed:  Remaining  49 48 47 46 45 44 43 42 41 40 39 38        Date / 2/ 2-8 2-10 2-15 2/17 2- 2- 2- 3-2 3-7   Visits:  Used 13 14 15 16 17 18 19 20 21 22 23 24   Authed:  Remaining  37 36 35 34 33 32 31 30 29 28 27 26        Date 3/9 3/14 3/16 3/21 3/28 4/6 4/11 4/13 4/18 4/20 4/25    Visits:  Used 25 26 27 28 29 30 31 32 33 34 35    Authed:  Remaining  25 24 23 22 21 20 19 18 17 16 15        GOES BY "PENELOPE"    Today's date: 2022  Patient name: Eboni Fan  : 1945  MRN: [id number]  Referring provider: Santos Mitchell*  Dx:   Encounter Diagnosis     ICD-10-CM    1  Frequent falls  R29 6                   Subjective: Patient reports to session reporting near-fall this morning in which she sneezed and lost her balance  Objective: See treatment diary below      TE:  - STS w/ TB (yellow) around B/L knee: 2 x 10 w/ one airex pad on chair  - Cervical snags 5 x 10 sec B/L    NMR:  - VOR x 1 (FT on firm, self-paced "as fast as possible") x 60 sec   H: 0/10   V: 4/10     - VOR x1  (FT on firm, self-paced "as fast as possible") x 30 sec   H: 0/10   V: 0/10    - Sidestepping on foam beam x 8 cycles down/back; haptic touch    - Backward ambulation w/ anterior resistance (yellow) 4 x 40 ft      Assessment: Patient tolerated treatment well today with continued focus on sensitization of the vestibular system  Demonstrated overall neck fatigue and decreased amplitude of movement with VOR in both directions   Overall difficulty with head/neck dissociations; consider incorporation of cervical mobility exercises to assist  Decreased reliance on haptic touch observed with tandem ambulation  Plan to perform progress note next session  Patient will continue to benefit from skilled outpatient PT in order to address deficits in strength, balance, and gait in order to maximize functional mobility at home and reduce overall risk for falls  Plan: Continue per plan of care  Progress treatment as tolerated             Outcome Measures Initial Eval  11/15/2021 DN  11/22/2021 PN  12/15/2021 RE  1- PN  2/24/2022 PN  3/28/2022   5xSTS 17 94 sec  25 12 sec (2 UE pushoff) 19 59 sec (2 UE) 17 34 sec (1 airex, no UE) 15 93 sec (1 airex, no UE)   TUG 22 75 sec  18 18 sec 14 61 sec 14 35 sec 13 25 sec   10 meter m/s 0 59 m/s 0 63 m/s 0 80 m/s 0 73 m/s 0 78 m/s   HOLGUIN 36/56  41/56 43/56 44/56 46/56   FGA /30   14/30 16/30 16/30   DGI /24        mCTSIB  - FTEO (firm)  - FTEC (firm)  - FTEO (foam)  - FTEC (foam)   30 sec  30 sec  3 28 sec  0 sec    30 sec  30 sec  30 sec  6 02 sec   30 sec  30 sec  30 sec  30 sec (+)   30 sec  30 sec (+)  30 sec  30 sec (+)   30 sec  30 sec  30 sec  23 sec   6MWT  ft 445 ft 660 ft 750 ft 710 ft 800 ft   ABC  70% 68 12% 52 5% 65% 68 75%

## 2022-04-27 ENCOUNTER — EVALUATION (OUTPATIENT)
Dept: PHYSICAL THERAPY | Facility: CLINIC | Age: 77
End: 2022-04-27
Payer: COMMERCIAL

## 2022-04-27 DIAGNOSIS — R29.6 FREQUENT FALLS: Primary | ICD-10-CM

## 2022-04-27 PROCEDURE — 97530 THERAPEUTIC ACTIVITIES: CPT

## 2022-04-27 NOTE — LETTER
April 27, 2022    MD Alessio Trujillo [place] [id number]    Patient: Claudia Orellana   YOB: 1945   Date of Visit: 4/27/2022     Encounter Diagnosis     ICD-10-CM    1  Frequent falls  R29 6        Dear Dr Cesar Medina:    Thank you for your recent referral of Claudia Orellana  Please review the attached evaluation summary from Naomi's recent visit  Please verify that you agree with the plan of care by signing the attached order  If you have any questions or concerns, please do not hesitate to call  I sincerely appreciate the opportunity to share in the care of one of your patients and hope to have another opportunity to work with you in the near future  Sincerely,    Johana Leigh, PT      Referring Provider:      I certify that I have read the below Plan of Care and certify the need for these services furnished under this plan of treatment while under my care  MD Alessio Trujillo [address]  [place] [id number]  Via Fax: [phone]                                                                              PT Re-Evaluation        Insurance:  AMA/CMS Eval/ Re-eval POC expires Jaswant Zoiehoff #/ Referral # Totalvisits Start date  Expiration date Extension  Visit limitation? PT only or  PT+OT?  Co-Insurance   [facility] Medicare CMS 11/15/21 2/7/22  [id number] 27 11/15/21 5/14/22   PT     1/25 3/22/22             2/24                                                Date 11/15 11/22 11/29 12/6 12/8 12/13 12/15 12/20 12/22 12/30 1/3 1/5   Visits:  Used 1 2 3 4 5 6 7 8 9 10 11 12   Authed:  Remaining  49 48 47 46 45 44 43 42 41 40 39 38        Date 1/25 2/1 2/2 2-8 2-10 2-15 2/17 2-22 2-24 2-28 3-2 3-7   Visits:  Used 13 14 15 16 17 18 19 20 21 22 23 24   Authed:  Remaining  37 36 35 34 33 32 31 30 29 28 27 26        Date 3/9 3/14 3/16 3/21 3/28 4/6 4/11 4/13 4/18 4/20 4/25 4/27   Visits:  Used 25 26 27 28 29 30 31 32 33 34 35 36 Authed:  Remaining  25 24 23 22 21 20 19 18 17 16 15 14         Today's date: 2022  Patient name: Ya Jalloh  : 1945  MRN: [id number]  Referring provider: Adriel Whalen*  Dx:   Encounter Diagnosis     ICD-10-CM    1  Frequent falls  R29 6          Assessment  Assessment details: Patient is a 77year old female who has been reporting to skilled outpatient PT for deficits related to LE weakness, gait, and balance resulting in frequent falls and reduced overall safety with functional mobility  Patient demonstrated overall improvements in the following outcome measures since last reassessment: Brown, mCTSIB, FGA, 10 MWT, and 6 MWT suggesting overall improvements in safe ambulation, dynamic balance, static balance, and cardiovascular endurance, respectively  Overall plateau noted in TUG, ABC and 5x STS which may be secondary to overall fatigue due to lack of sleep last night  Patient a LOW risk for falls according to TUG  However, per cutoff scores for the Brown and FGA, she is considered HIGH fall risk  All cutoff scores and normative values taken from the APTA Neuro Section and Rehab Measures  Plan is one more month focused on dynamic balance with goal to transition to skilled maintenance as patient unable to safely complete balance program at home independently as well as a history of regression without skilled PT services  She has met 3/4 short term goals and 5/10 long term goals  She will continue to benefit from skilled outpatient PT in order to maximize her function with balance tasks, reduce visual reliance, and improved safety with ambulation and ADLs  Impairments: Abnormal gait, Impaired balance and Impaired physical strength  Understanding of Dx/Px/POC: Good  Prognosis: Good    Patient verbalized understanding of POC  Please contact me if you have any questions or recommendations   Thank you for the referral and the opportunity to share in [address] care         Plan  Plan details: 6 MWT, 10 MWT, ABC next visit; balance training    Patient would benefit from: PT Eval and Skilled PT  Planned modality interventions: Biofeedback, Cryotherapy, TENS and Thermotherapy: Hydrocollator Packs  Planned therapy interventions: Balance, Gait training, HEP, Neuromuscular re-education, Patient education, Strengthening, Therapeutic activities and Therapeutic exercises  Frequency: 2x/wk  Duration in weeks: 4  Plan of Care beginning date: 4/27/2022  Plan of Care expiration date: 4 weeks - 5/27/2022  Treatment plan discussed with: Patient       Goals  Short Term Goals (4 weeks):    - Patient will improve time on TUG by 2 9 seconds from 22 75 seconds to 19 85 seconds to facilitate improved safety in all ambulation MET  - Patient will be independent in basic HEP 2-3 weeks MET  - Patient will improve 5xSTS score by 2 3 seconds from 17 94 seconds to 15 64 seconds to promote improved LE functional strength needed for ADLs NEARLY MET  - Patient will preform 6 MWT, 10 MWT, and ABC Scale in 2-3 weeks in order to assess cardiovascular endurance, ambulation speed, and balance confidence MET    Long Term Goals (12 weeks):  - Patient will be independent in a comprehensive home exercise program PROGRESSING  - Patient will improve gait speed by 0 18 m/s to improve safety with community ambulation MET  - Patient will improve HOLGUIN by 6 points from 36/56 to 42/56 to facilitate return to safe independent ambulation MET  - Patient will be able to demonstrate HT in gait without veering NOT MET  - Patient will improve 6 Minute Walk Test score by 190 feet to promote improved cardiovascular endurance MET  - Patient will report 50% reduction in near falls in order to improve safety with functional tasks and reduce his risk for falls MET  - Patient will report going on walks at least 3 days per week to promote independence and improved cardiovascular endurance NOT MET  - Patient will be able to ascend/descend stairs reciprocally with 1 UE assist to promote independence and safety with ADLs NOT MET  - Patient will report 50% reduction in near falls when ambulating on uneven terrain NOT MET  - Patient will improve FGA score by 4 points from  to  to progress safety with dynamic tasks - MET      Cut off score    All date taken from APTA Neuro Section or Rehab Measures      Brown/56  MDC: 6 pts  Age Norms:  61-76: M - 54   F - 55  70-79: M - 47   F - 53  80-89: M - 48   F - 50 5xSTS: Roselia et al 2010  MDC: 2 3 sec  Age Norms:  60-69: 11 1 sec  70-79: 12 6 sec  80-89: 14 8 sec   TUG  MDC: 4 14 sec  Cut off score:  >13 5 sec community dwelling adults  >32 2 frail elderly  <20 I for basic transfers  >30 dependent on transfers 10 Meter Walk Test: Maryam Ashraf and [place] Del al 2011  MDC: 0 18 m/s  20-29: M - 1 35 m   F - 1 34 m  30-39: M - 1 43 m   F - 1 34 m  40-49: M - 1 43 m   F - 1 39 m  50-59: M - 1 43 m   F - 1 31 m  60-69: M - 1 34 m   F - 1 24 m  70-79: M - 1 26 m   F - 1 13 m  80-89: M - 0 97 m   F - 0 94 m    Household Ambulator < 0 4 m/s  Limited Community Ambulator 0 4 - 0 8 m/s  [employer] Ambulator 0 8 - 1 2 m/s  Safely cross the street > 1 2 m/s   FGA  MCID: 4 pts  Geriatrics/community < 22/30 fall risk  Geriatrics/community < 20/30 unexplained falls    DGI  MDC: vestibular - 4 pts  MDC: geriatric/community - 3 pts  Falls risk <19/24 mCTSIB  Norm: 20-60 yrs  Eyes open firm: norm sway 0 21-0 48  Eyes closed firm: norm sway 0 48-0 99  Eyes open foam: norm sway 0 38-0 71  Eyes closed foam: norm sway 0 70-2 22   6 Minute Walk Test  MDC: 190 98 ft  MCID: 164 ft    Age Norms  61-76: M - 1876 ft (571 80 m)  F - 1765 ft (537 98 m)  70-79: M - 1729 ft (527 00 m)  F - 1545 ft (470 92 m)  80-89: M - 1368 ft (416 97 m)  F - 1286 ft (391 97 m) ABC: [facility], 2003  <67% increased risk for falls         Subjective    History of Present Illness  - Mechanism of injury: Patient is a 77year old female who is presenting to skilled OPPT for IE due to imbalance and frequent falls  Patient reports she fell on November 2nd (Tuesday) and was unable to get up  Her friend found her on the floor Wednesday night and called EMS, but patient refused to go to hospital, stating she did not feel she had broken anything  She recalls sneezing before she fell and next thing she knew she was on the floor  Patient denies any other falls in the past 6 months, but reports a fall outside approximately a year ago in which someone found her and called EMS  She states she has had a "tilt table test" and MRI completed, with no significant findings  She notes feeling lightheaded frequently  PMH significant for orthostatic hypotension and peripheral neuropathy  PSHx significant for B/L knee surgeries (including meniscus repair)  Patient additionally reports onset of tremor and contractures in fingers approximately one month ago, and has an appointment scheduled with an orthopedist this week  Updated (12/15/2021): Patient reports she enjoys coming to physical therapy  She notes she has more confidence when getting up from a chair at home  She has had several near falls since IE, but no falls  She reports compliance with HEP  She wants to work on loosening up her knees  She also notes feeling more confident when walking since obtaining the [facility]  Updated (1-): Patient reports gap in therapy due to losing her purse and worsening icy conditions  She stated "some days my balance is really good and other days it feel like I could just fall over "    Updated (2/24/2022): Patient reports she feels she is getting stronger and is starting to feel more confident  She does not feel as confident on foam and notices she sometimes will step on blankets at home that her dogs drag around and she feels unsteady  She wants to specifically work on strengthening her legs   Her most recent fall was after tripping over the weekend, but notes she was able to get up right away  Updated (3/28/2022): Patient states she feels she has been doing pretty good with therapy until this past weekend, when she developed B/L knee pain  She reports more recently she notices when she stands up she feels lightheaded and has to take her time  Update (4/27/22): Patient reports the hardest thing are stairs and getting up from the chair  Patient reports if she did not come in she would be nervous of getting worst  Would like to transition to maintenance       - Primary AD: none  - Assist level at home: independent  - Decreased fine motor tasks: Yes, MRI with Dr Mario Alberto Chavez this week      Social Support  - Steps to enter house: 5 ESPERANZA  - Stairs in house: flight to basement  - Lives in: single floor home  - Lives with: alone    - Employment status: retired  - Hand dominance: (R)    Treatments  - Previous treatment: PT for prior knee surgeries  - Current treatment: none  - Diagnostic Testing: MRI      Objective     LE MMT  - R Hip Flexion: 4-/5   L Hip Flexion: 3+/5  - R Hip Extension: 4/5   L Hip Extension: 4-/5  - R Hip Abduction: 4/5   L Hip Abduction: 4/5  - R Hip Adduction: 4/5   L Hip Adduction: 4/5  - R Knee Extension: 4/5  L Knee Extension: 4-/5  - R Knee Flexion: 4/5   L Knee Flexion: 4-/5  - R Ankle DF: 4+/5   L Ankle DF: 4/5  - R Ankle PF: 4+/5   L Ankle PF: 4+/5    Sensation  - Light touch: diminished  - Deep pressure: diminished      Coordination  - Heel to Shin: intact  - Alternate Toe Taps: intact  - Finger to Nose: intact, intension tremor on right  - Finger to Finger: decreased (L > R), intention tremor on right    Myelopathy Screen (>3/5 +)  - Rose's Reflex: -  - Babinski Reflex: -  - Inverted Supinator Sign: -  - Age > 45: Yes  - Gait Deviation: Yes    Gait  - Abnormalities: wide DYLAN, diminished heel strike/toe off, B/L knee flexion, slight forward trunk lean    Outcome Measures Initial Eval  11/15/2021 DN  11/22/2021 PN  12/15/2021 RE  1- PN  2/24/2022 PN  3/28/2022 PN  4/27/22   5xSTS 17 94 sec  25 12 sec (2 UE pushoff) 19 59 sec (2 UE) 17 34 sec (1 airex, no UE) 15 93 sec (1 airex, no UE) 16 01 sec (1 airex, no UE)   TUG 22 75 sec  18 18 sec 14 61 sec 14 35 sec 13 25 sec 13 30 sec   10 meter m/s 0 59 m/s 0 63 m/s 0 80 m/s 0 73 m/s 0 78 m/s 0 82 m/s   HOLGUIN 36/56  41/56 43/56 44/56 46/56 47/56   FGA /30   14/30 16/30 16/30 19/30   DGI /24         mCTSIB  - FTEO (firm)  - FTEC (firm)  - FTEO (foam)  - FTEC (foam)   30 sec  30 sec  3 28 sec  0 sec    30 sec  30 sec  30 sec  6 02 sec   30 sec  30 sec  30 sec  30 sec (+)   30 sec  30 sec (+)  30 sec  30 sec (+)   30 sec  30 sec  30 sec  23 sec   30 sec  30 sec  30 sec  30 sec   6MWT  ft 445 ft 660 ft 750 ft 710 ft 800 ft 840 ft    ABC  70% 68 12% 52 5% 65% 68 75% 68 75%                    Precautions: orthostatic hypotension, high fall risk  Past Medical History:   Diagnosis Date    Disease of thyroid gland     Hypercholesteremia     Hypertension

## 2022-04-27 NOTE — PROGRESS NOTES
PT Re-Evaluation        Insurance:  A/CMS Eval/ Re-eval POC expires Jarrett Wells #/ Referral # Totalvisits Start date  Expiration date Extension  Visit limitation? PT only or  PT+OT? Co-Insurance   [place] Medicare CMS 11/15/21 2/7/22  [id number] 50 11/15/21 5/14/22   PT     1/25 3/22/22             2/24                                                Date 11/15 11/22 11/29 12/6 12/8 12/13 12/15 12/20 12/22 12/30 1/3 1/5   Visits:  Used 1 2 3 4 5 6 7 8 9 10 11 12   Authed:  Remaining  49 48 47 46 45 44 43 42 41 40 39 38        Date  2/ 2/2 2-8 2-10 2-15 2/17 2- 2- 2- 3-2 3-7   Visits:  Used 13 14 15 16 17 18 19 20 21 22 23 24   Authed:  Remaining  37 36 35 34 33 32 31 30 29 28 27 26        Date 3/9 3/14 3/16 3/21 3/28 4/6 4/11 4/13 4/18 4/20 4/25 4/27   Visits:  Used 25 26 27 28 29 30 31 32 33 34 35 36   Authed:  Remaining  25 24 23 22 21 20 19 18 17 16 15 14         Today's date: 2022  Patient name: Srniath Freeman  : 1945  MRN: [id number]  Referring provider: Katie Smallwood*  Dx:   Encounter Diagnosis     ICD-10-CM    1  Frequent falls  R29 6          Assessment  Assessment details: Patient is a 77year old female who has been reporting to skilled outpatient PT for deficits related to LE weakness, gait, and balance resulting in frequent falls and reduced overall safety with functional mobility  Patient demonstrated overall improvements in the following outcome measures since last reassessment: Brown, mCTSIB, FGA, 10 MWT, and 6 MWT suggesting overall improvements in safe ambulation, dynamic balance, static balance, and cardiovascular endurance, respectively  Overall plateau noted in TUG, ABC and 5x STS which may be secondary to overall fatigue due to lack of sleep last night  Patient a LOW risk for falls according to TUG  However, per cutoff scores for the Brown and FGA, she is considered HIGH fall risk   All cutoff scores and normative values taken from the APTA Neuro Section and Rehab Measures  Plan is one more month focused on dynamic balance with goal to transition to skilled maintenance as patient unable to safely complete balance program at home independently as well as a history of regression without skilled PT services  She has met 3/4 short term goals and 5/10 long term goals  She will continue to benefit from skilled outpatient PT in order to maximize her function with balance tasks, reduce visual reliance, and improved safety with ambulation and ADLs  Impairments: Abnormal gait, Impaired balance and Impaired physical strength  Understanding of Dx/Px/POC: Good  Prognosis: Good    Patient verbalized understanding of POC  Please contact me if you have any questions or recommendations  Thank you for the referral and the opportunity to share in [phone] care          Plan  Plan details: 6 MWT, 10 MWT, ABC next visit; balance training    Patient would benefit from: PT Eval and Skilled PT  Planned modality interventions: Biofeedback, Cryotherapy, TENS and Thermotherapy: Hydrocollator Packs  Planned therapy interventions: Balance, Gait training, HEP, Neuromuscular re-education, Patient education, Strengthening, Therapeutic activities and Therapeutic exercises  Frequency: 2x/wk  Duration in weeks: 4  Plan of Care beginning date: 4/27/2022  Plan of Care expiration date: 4 weeks - 5/27/2022  Treatment plan discussed with: Patient       Goals  Short Term Goals (4 weeks):    - Patient will improve time on TUG by 2 9 seconds from 22 75 seconds to 19 85 seconds to facilitate improved safety in all ambulation MET  - Patient will be independent in basic HEP 2-3 weeks MET  - Patient will improve 5xSTS score by 2 3 seconds from 17 94 seconds to 15 64 seconds to promote improved LE functional strength needed for ADLs NEARLY MET  - Patient will preform 6 MWT, 10 MWT, and ABC Scale in 2-3 weeks in order to assess cardiovascular endurance, ambulation speed, and balance confidence MET    Long Term Goals (12 weeks):  - Patient will be independent in a comprehensive home exercise program PROGRESSING  - Patient will improve gait speed by 0 18 m/s to improve safety with community ambulation MET  - Patient will improve HOLGUIN by 6 points from 36/56 to 42/56 to facilitate return to safe independent ambulation MET  - Patient will be able to demonstrate HT in gait without veering NOT MET  - Patient will improve 6 Minute Walk Test score by 190 feet to promote improved cardiovascular endurance MET  - Patient will report 50% reduction in near falls in order to improve safety with functional tasks and reduce his risk for falls MET  - Patient will report going on walks at least 3 days per week to promote independence and improved cardiovascular endurance NOT MET  - Patient will be able to ascend/descend stairs reciprocally with 1 UE assist to promote independence and safety with ADLs NOT MET  - Patient will report 50% reduction in near falls when ambulating on uneven terrain NOT MET  - Patient will improve FGA score by 4 points from  to  to progress safety with dynamic tasks - MET      Cut off score    All date taken from APTA Neuro Section or Rehab Measures      Holguin/64  MDC: 6 pts  Age Norms:  60-69: M - 54   F - 55  70-79: M - 47   F - 53  80-89: M - 48   F - 50 5xSTS: Roselia et al 2010  MDC: 2 3 sec  Age Norms:  60-69: 11 1 sec  70-79: 12 6 sec  80-89: 14 8 sec   TUG  MDC: 4 14 sec  Cut off score:  >13 5 sec community dwelling adults  >32 2 frail elderly  <20 I for basic transfers  >30 dependent on transfers 10 Meter Walk Test: Fely Daugherty and Malou garrison 2011  MDC: 0 18 m/s  20-29: M - 1 35 m   F - 1 34 m  30-39: M - 1 43 m   F - 1 34 m  40-49: M - 1 43 m   F - 1 39 m  50-59: M - 1 43 m   F - 1 31 m  60-69: M - 1 34 m   F - 1 24 m  70-79: M - 1 26 m   F - 1 13 m  80-89: M - 0 97 m   F - 0 94 m    Household Ambulator < 0 4 m/s  Limited Community Ambulator 0 4 - 0 8 m/s  Community Ambulator 0 8 - 1 2 m/s  Safely cross the street > 1 2 m/s   FGA  MCID: 4 pts  Geriatrics/community < 22/30 fall risk  Geriatrics/community < 20/30 unexplained falls    DGI  MDC: vestibular - 4 pts  MDC: geriatric/community - 3 pts  Falls risk <19/24 mCTSIB  Norm: 20-60 yrs  Eyes open firm: norm sway 0 21-0 48  Eyes closed firm: norm sway 0 48-0 99  Eyes open foam: norm sway 0 38-0 71  Eyes closed foam: norm sway 0 70-2 22   6 Minute Walk Test  MDC: 190 98 ft  MCID: 164 ft    Age Norms  61-76: M - 1876 ft (571 80 m)  F - 1765 ft (537 98 m)  70-79: M - 1729 ft (527 00 m)  F - 1545 ft (470 92 m)  80-89: M - 1368 ft (416 97 m)  F - 1286 ft (391 97 m) ABC: [facility], 2003  <67% increased risk for falls         Subjective    History of Present Illness  - Mechanism of injury: Patient is a 77year old female who is presenting to skilled OPPT for IE due to imbalance and frequent falls  Patient reports she fell on November 2nd (Tuesday) and was unable to get up  Her friend found her on the floor Wednesday night and called EMS, but patient refused to go to hospital, stating she did not feel she had broken anything  She recalls sneezing before she fell and next thing she knew she was on the floor  Patient denies any other falls in the past 6 months, but reports a fall outside approximately a year ago in which someone found her and called EMS  She states she has had a "tilt table test" and MRI completed, with no significant findings  She notes feeling lightheaded frequently  PMH significant for orthostatic hypotension and peripheral neuropathy  PSHx significant for B/L knee surgeries (including meniscus repair)  Patient additionally reports onset of tremor and contractures in fingers approximately one month ago, and has an appointment scheduled with an orthopedist this week  Updated (12/15/2021): Patient reports she enjoys coming to physical therapy   She notes she has more confidence when getting up from a chair at home  She has had several near falls since IE, but no falls  She reports compliance with HEP  She wants to work on loosening up her knees  She also notes feeling more confident when walking since obtaining the [facility]  Updated (1-): Patient reports gap in therapy due to losing her purse and worsening icy conditions  She stated "some days my balance is really good and other days it feel like I could just fall over "    Updated (2/24/2022): Patient reports she feels she is getting stronger and is starting to feel more confident  She does not feel as confident on foam and notices she sometimes will step on blankets at home that her dogs drag around and she feels unsteady  She wants to specifically work on strengthening her legs  Her most recent fall was after tripping over the weekend, but notes she was able to get up right away  Updated (3/28/2022): Patient states she feels she has been doing pretty good with therapy until this past weekend, when she developed B/L knee pain  She reports more recently she notices when she stands up she feels lightheaded and has to take her time  Update (4/27/22): Patient reports the hardest thing are stairs and getting up from the chair  Patient reports if she did not come in she would be nervous of getting worst  Would like to transition to maintenance       - Primary AD: none  - Assist level at home: independent  - Decreased fine motor tasks: Yes, MRI with Dr Brady Cee this week      Social Support  - Steps to enter house: 5 ESPERANZA  - Stairs in house: flight to basement  - Lives in: single floor home  - Lives with: alone    - Employment status: retired  - Hand dominance: (R)    Treatments  - Previous treatment: PT for prior knee surgeries  - Current treatment: none  - Diagnostic Testing: MRI      Objective     LE MMT  - R Hip Flexion: 4-/5   L Hip Flexion: 3+/5  - R Hip Extension: 4/5   L Hip Extension: 4-/5  - R Hip Abduction: 4/5   L Hip Abduction: 4/5  - R Hip Adduction: 4/5   L Hip Adduction: 4/5  - R Knee Extension: 4/5  L Knee Extension: 4-/5  - R Knee Flexion: 4/5   L Knee Flexion: 4-/5  - R Ankle DF: 4+/5   L Ankle DF: 4/5  - R Ankle PF: 4+/5   L Ankle PF: 4+/5    Sensation  - Light touch: diminished  - Deep pressure: diminished      Coordination  - Heel to Shin: intact  - Alternate Toe Taps: intact  - Finger to Nose: intact, intension tremor on right  - Finger to Finger: decreased (L > R), intention tremor on right    Myelopathy Screen (>3/5 +)  - Rose's Reflex: -  - Babinski Reflex: -  - Inverted Supinator Sign: -  - Age > 45: Yes  - Gait Deviation: Yes    Gait  - Abnormalities: wide DYLAN, diminished heel strike/toe off, B/L knee flexion, slight forward trunk lean    Outcome Measures Initial Eval  11/15/2021 DN  11/22/2021 PN  12/15/2021 RE  1- PN  2/24/2022 PN  3/28/2022 PN  4/27/22   5xSTS 17 94 sec  25 12 sec (2 UE pushoff) 19 59 sec (2 UE) 17 34 sec (1 airex, no UE) 15 93 sec (1 airex, no UE) 16 01 sec (1 airex, no UE)   TUG 22 75 sec  18 18 sec 14 61 sec 14 35 sec 13 25 sec 13 30 sec   10 meter m/s 0 59 m/s 0 63 m/s 0 80 m/s 0 73 m/s 0 78 m/s 0 82 m/s   HOLGUIN 36/56  41/56 43/56 44/56 46/56 47/56   FGA /30   14/30 16/30 16/30 19/30   DGI /24         mCTSIB  - FTEO (firm)  - FTEC (firm)  - FTEO (foam)  - FTEC (foam)   30 sec  30 sec  3 28 sec  0 sec    30 sec  30 sec  30 sec  6 02 sec   30 sec  30 sec  30 sec  30 sec (+)   30 sec  30 sec (+)  30 sec  30 sec (+)   30 sec  30 sec  30 sec  23 sec   30 sec  30 sec  30 sec  30 sec   6MWT  ft 445 ft 660 ft 750 ft 710 ft 800 ft 840 ft    ABC  70% 68 12% 52 5% 65% 68 75% 68 75%                    Precautions: orthostatic hypotension, high fall risk  Past Medical History:   Diagnosis Date    Disease of thyroid gland     Hypercholesteremia     Hypertension

## 2022-05-02 ENCOUNTER — OFFICE VISIT (OUTPATIENT)
Dept: PHYSICAL THERAPY | Facility: CLINIC | Age: 77
End: 2022-05-02
Payer: COMMERCIAL

## 2022-05-02 DIAGNOSIS — R29.6 FREQUENT FALLS: Primary | ICD-10-CM

## 2022-05-02 PROCEDURE — 97112 NEUROMUSCULAR REEDUCATION: CPT

## 2022-05-02 NOTE — PROGRESS NOTES
Daily Note       Insurance:  A/CMS Eval/ Re-eval POC expires Marilee Curiel #/ Referral # Totalvisits Start date  Expiration date Extension  Visit limitation? PT only or  PT+OT? Co-Insurance   [place] dunlap Medicare  CMS 11/15/21 2/7/22  [id number] 50 11/15/21 5/14/22   PT     1/25 3/22/22             2/24                                                Date 11/15 11/22 11/29 12/6 12/8 12/13 12/15 12/20 12/22 12/30 1/3 1/5   Visits:  Used 1 2 3 4 5 6 7 8 9 10 11 12   Authed:  Remaining  49 48 47 46 45 44 43 42 41 40 39 38        Date / 2/ 2-8 2-10 2-15 2/17 2- 2- 2- 3-2 3-7   Visits:  Used 13 14 15 16 17 18 19 20 21 22 23 24   Authed:  Remaining  37 36 35 34 33 32 31 30 29 28 27 26        Date 3/9 3/14 3/16 3/21 3/28 4/6 4/11 4/13 4/18 4/20 4/25 4/27   Visits:  Used 25 26 27 28 29 30 31 32 33 34 35 36   Authed:  Remaining  25 24 23 22 21 20 19 18 17 16 15 14         GOES BY "PENELOPE"    Today's date: 2022  Patient name: Mo Williamson  : 1945  MRN: [id number]  Referring provider: Vladimir Gavin*  Dx:   Encounter Diagnosis     ICD-10-CM    1  Frequent falls  R29 6                   Subjective: Patient reports to session reporting no falls since she was last seen  Patient reports going shopping this weekend and felt pretty confident moving  Objective: See treatment diary below      TE:  - STS w/ TB (yellow) around B/L knee: 2 x 10 + 5 reps for 3rd set, w/ one airex pad on chair  - Cervical snags 5 x 10 sec B/L    NMR:  - VOR x 1 (FT on firm, self-paced "as fast as possible"- metronome 100 BPM vertical) x 60 sec   H: 3/10   V: 4/10     - VOR x1  (FT on firm, self-paced "as fast as possible") x 30 sec   H: 2/10   V: 2/10    - Sidestepping on foam beam x 6 cycles down/back; haptic touch    - Backward ambulation w/ anterior resistance (yellow) 4 x 50 ft      Assessment: Patient tolerated treatment well today with continued focus on sensitization of the vestibular system   Patient unable to keep sequencing for VOR x1 and therefore re-introduced metronome with fair carryover  Performed second set without metronome and decreased duration to 30 seconds with patient illustrating improved technique  Patient demonstrated improved step length when verbally cued  Patient will continue to benefit from skilled outpatient PT in order to address deficits in strength, balance, and gait in order to maximize functional mobility at home and reduce overall risk for falls  Plan: Continue per plan of care  Progress treatment as tolerated             Outcome Measures Initial Eval  11/15/2021 DN  11/22/2021 PN  12/15/2021 RE  1- PN  2/24/2022 PN  3/28/2022 PN  4/27/22   5xSTS 17 94 sec  25 12 sec (2 UE pushoff) 19 59 sec (2 UE) 17 34 sec (1 airex, no UE) 15 93 sec (1 airex, no UE) 16 01 sec (1 airex, no UE)   TUG 22 75 sec  18 18 sec 14 61 sec 14 35 sec 13 25 sec 13 30 sec   10 meter m/s 0 59 m/s 0 63 m/s 0 80 m/s 0 73 m/s 0 78 m/s 0 82 m/s   HOLGUIN 36/56  41/56 43/56 44/56 46/56 47/56   FGA /30   14/30 16/30 16/30 19/30   DGI /24         mCTSIB  - FTEO (firm)  - FTEC (firm)  - FTEO (foam)  - FTEC (foam)   30 sec  30 sec  3 28 sec  0 sec    30 sec  30 sec  30 sec  6 02 sec   30 sec  30 sec  30 sec  30 sec (+)   30 sec  30 sec (+)  30 sec  30 sec (+)   30 sec  30 sec  30 sec  23 sec   30 sec  30 sec  30 sec  30 sec   6MWT  ft 445 ft 660 ft 750 ft 710 ft 800 ft 840 ft    ABC  70% 68 12% 52 5% 65% 68 75% 68 75%

## 2022-05-04 ENCOUNTER — OFFICE VISIT (OUTPATIENT)
Dept: PHYSICAL THERAPY | Facility: CLINIC | Age: 77
End: 2022-05-04
Payer: COMMERCIAL

## 2022-05-04 DIAGNOSIS — R29.6 FREQUENT FALLS: Primary | ICD-10-CM

## 2022-05-04 PROCEDURE — 97112 NEUROMUSCULAR REEDUCATION: CPT

## 2022-05-04 NOTE — PROGRESS NOTES
Daily Note       Insurance:  A/CMS Eval/ Re-eval POC expires [place] Dave #/ Referral # Totalvisits Start date  Expiration date Extension  Visit limitation? PT only or  PT+OT? Co-Insurance   Baker Davis Leonela Medicare  CMS 11/15/21 2/7/22  [id number] 50 11/15/21 5/14/22   PT     1/25 3/22/22             2/24                                                Date 11/15 11/22 11/29 12/6 12/8 12/13 12/15 12/20 12/22 12/30 1/3 1/5   Visits:  Used 1 2 3 4 5 6 7 8 9 10 11 12   Authed:  Remaining  49 48 47 46 45 44 43 42 41 40 39 38        Date / 2/ 2-8 2-10 2-15 2/17 2- 2- 2- 3-2 3-   Visits:  Used 13 14 15 16 17 18 19 20 21 22 23 24   Authed:  Remaining  37 36 35 34 33 32 31 30 29 28 27 26        Date 3/9 3/14 3/16 3/21 3/28 4/6 4/11 4/13 4/18 4/20 4/25 4/27   Visits:  Used 25 26 27 28 29 30 31 32 33 34 35 36   Authed:  Remaining  25 24 23 22 21 20 19 18 17 16 15 14      Date               Visits:  Used 37              Authed:  Remaining  13                    GOES BY "PENELOPE"    Today's date: 2022  Patient name: Sarahi Atkinson  : 1945  MRN: [id number]  Referring provider: Carmen Gonsalez*  Dx:   Encounter Diagnosis     ICD-10-CM    1  Frequent falls  R29 6                   Subjective: Patient reports to session reporting no falls since she was last seen         Objective: See treatment diary below      TE:  - STS w/o Band (would use next session): 2 x 10 + 5 reps for 3rd set, w/ one airex pad on chair  - Cervical snags 5 x 10 sec B/L    NMR:  - VOR x 1 (FT on firm, self-paced "as fast as possible") x 45 sec   H: 5/10   V: 0/10 (slowed pace)     - VOR x1  (FT on firm, self-paced "as fast as possible") x 45 sec   H: 3/10   V: 3/10    - VOR Cx: firm surface  H: 0/10 D, 30 sec  V: 1-2/10 D, 60 sec    - Sidestepping on foam beam x 6 cycles down/back; haptic touch    - FWD/BCK hurdles 6'' height: 3 cycles up/down; haptic touch    -LAT hurdles 6-8'' height: 3 cycles up/down; haptic touch      Assessment: Patient tolerated treatment well today with continued focus on sensitization of the vestibular system  Patient consistently able to perform increased repetitions  Patient able to reach therapeutic level of dizziness for VOR x1, but can likely be progressed next session  Added in VOR cx this session with only a slight increase in dizziness would progress next session  Patient will continue to benefit from skilled outpatient PT in order to address deficits in strength, balance, and gait in order to maximize functional mobility at home and reduce overall risk for falls  Plan: Continue per plan of care  Progress treatment as tolerated             Outcome Measures Initial Eval  11/15/2021 DN  11/22/2021 PN  12/15/2021 RE  1- PN  2/24/2022 PN  3/28/2022 PN  4/27/22   5xSTS 17 94 sec  25 12 sec (2 UE pushoff) 19 59 sec (2 UE) 17 34 sec (1 airex, no UE) 15 93 sec (1 airex, no UE) 16 01 sec (1 airex, no UE)   TUG 22 75 sec  18 18 sec 14 61 sec 14 35 sec 13 25 sec 13 30 sec   10 meter m/s 0 59 m/s 0 63 m/s 0 80 m/s 0 73 m/s 0 78 m/s 0 82 m/s   HOLGUIN 36/56  41/56 43/56 44/56 46/56 47/56   FGA /30   14/30 16/30 16/30 19/30   DGI /24         mCTSIB  - FTEO (firm)  - FTEC (firm)  - FTEO (foam)  - FTEC (foam)   30 sec  30 sec  3 28 sec  0 sec    30 sec  30 sec  30 sec  6 02 sec   30 sec  30 sec  30 sec  30 sec (+)   30 sec  30 sec (+)  30 sec  30 sec (+)   30 sec  30 sec  30 sec  23 sec   30 sec  30 sec  30 sec  30 sec   6MWT  ft 445 ft 660 ft 750 ft 710 ft 800 ft 840 ft    ABC  70% 68 12% 52 5% 65% 68 75% 68 75%

## 2022-05-11 ENCOUNTER — APPOINTMENT (OUTPATIENT)
Dept: PHYSICAL THERAPY | Facility: CLINIC | Age: 77
End: 2022-05-11
Payer: COMMERCIAL

## 2022-05-16 ENCOUNTER — OFFICE VISIT (OUTPATIENT)
Dept: PHYSICAL THERAPY | Facility: CLINIC | Age: 77
End: 2022-05-16
Payer: COMMERCIAL

## 2022-05-16 DIAGNOSIS — R29.6 FREQUENT FALLS: Primary | ICD-10-CM

## 2022-05-16 PROCEDURE — 97110 THERAPEUTIC EXERCISES: CPT

## 2022-05-16 PROCEDURE — 97112 NEUROMUSCULAR REEDUCATION: CPT

## 2022-05-16 NOTE — PROGRESS NOTES
Daily Note       Insurance:  AMA/CMS Eval/ Re-eval POC expires Lucio Dire #/ Referral # Totalvisits Start date  Expiration date Extension  Visit limitation? PT only or  PT+OT? Co-Insurance   [employer] Medicare  CMS   PT No, 0$ copay                    Approved 99 units 5/15 11/11/22                           APPROVED Date               Units: 99 units Used 3              Authed:  Remaining  96                    GOES BY "PENELOPE"    Today's date: 2022  Patient name: Joyce Duke  : 1945  MRN: [id number]  Referring provider: Laura Panda*  Dx:   Encounter Diagnosis     ICD-10-CM    1  Frequent falls  R29 6                   Subjective: Patient reports to PT session with reports of a fall on Thursday in which she was coming home with her dog and was on her porch when the dog's leash got wrapped around her feet and tripped her, causing her to fall forward  She denies hitting her head, but plans to make an appt with her doctor  Objective: See treatment diary below      TE:  - STS w/o Band (would use next session): 2 x 10 + 5 reps  - Physioball rollouts 10 x 5-10 sec holds      NMR:  - VOR x 1 (FT on firm, self-paced "as fast as possible") x 45 sec   H: 3/10   V: 3/10     - VOR x1  (FT on firm, self-paced "as fast as possible") x 60 sec   H: 3/10   V: 3/10    - VOR Cx: firm surface (10 reps each)   H: 0/10   V: 1-2/10    - Sidestepping on foam beam x 3 cycles down/back; haptic touch    TA:  - Skin check on (L) lower back due to recent fall and complaints of soreness - no abrasions noted x 5 mins    Assessment: Patient tolerated treatment fairlytoday with continued focus on sensitization of the vestibular system  Deferred use of resistance today during exercises including STS due to patient's residual soreness from fall  Demonstrated min-mod sway with VOR x 1 exercise, requiring occasional haptic touch to maintain balance independently   Attempted to increase duration of VOR x 1 with poor patient tolerance; noted significant reduction in head movement likely secondary to cervical musculature fatigue  Patient will continue to benefit from skilled outpatient PT in order to address deficits in strength, balance, and gait in order to maximize functional mobility at home and reduce overall risk for falls  Plan: Continue per plan of care  Progress treatment as tolerated             Outcome Measures Initial Eval  11/15/2021 DN  11/22/2021 PN  12/15/2021 RE  1- PN  2/24/2022 PN  3/28/2022 PN  4/27/22   5xSTS 17 94 sec  25 12 sec (2 UE pushoff) 19 59 sec (2 UE) 17 34 sec (1 airex, no UE) 15 93 sec (1 airex, no UE) 16 01 sec (1 airex, no UE)   TUG 22 75 sec  18 18 sec 14 61 sec 14 35 sec 13 25 sec 13 30 sec   10 meter m/s 0 59 m/s 0 63 m/s 0 80 m/s 0 73 m/s 0 78 m/s 0 82 m/s   HOLGUIN 36/56  41/56 43/56 44/56 46/56 47/56   FGA /30   14/30 16/30 16/30 19/30   DGI /24         mCTSIB  - FTEO (firm)  - FTEC (firm)  - FTEO (foam)  - FTEC (foam)   30 sec  30 sec  3 28 sec  0 sec    30 sec  30 sec  30 sec  6 02 sec   30 sec  30 sec  30 sec  30 sec (+)   30 sec  30 sec (+)  30 sec  30 sec (+)   30 sec  30 sec  30 sec  23 sec   30 sec  30 sec  30 sec  30 sec   6MWT  ft 445 ft 660 ft 750 ft 710 ft 800 ft 840 ft    ABC  70% 68 12% 52 5% 65% 68 75% 68 75%

## 2022-05-18 ENCOUNTER — OFFICE VISIT (OUTPATIENT)
Dept: PHYSICAL THERAPY | Facility: CLINIC | Age: 77
End: 2022-05-18
Payer: COMMERCIAL

## 2022-05-18 DIAGNOSIS — R29.6 FREQUENT FALLS: Primary | ICD-10-CM

## 2022-05-18 PROCEDURE — 97110 THERAPEUTIC EXERCISES: CPT

## 2022-05-18 PROCEDURE — 97112 NEUROMUSCULAR REEDUCATION: CPT

## 2022-05-18 NOTE — PROGRESS NOTES
Daily Note       Insurance:  AMA/CMS Eval/ Re-eval POC expires Cindy Arzate #/ Referral # Totalvisits Start date  Expiration date Extension  Visit limitation? PT only or  PT+OT? Co-Insurance   [employer] Medicare  CMS   PT No, 0$ copay                    Approved 99 units 5/15 11/11/22                           APPROVED Date               Units: 99 units Used 3              Authed:  Remaining  96                    GOES BY "PENELOPE"    Today's date: 2022  Patient name: Yara Serrano  : 1945  MRN: [id number]  Referring provider: Lali Franklin*  Dx:   Encounter Diagnosis     ICD-10-CM    1  Frequent falls  R29 6                   Subjective: Patient reports to PT session with reports of no new falls  She does report feeling more out of it due to a lack of sleep  Objective: See treatment diary below      TE:  - STS w/ Band (yellow TBand): 2 x 10 + 5 reps  - Physioball rollouts 10 x 5-10 sec holds      NMR:  - VOR x 1 (FT on firm, self-paced "as fast as possible") x 45 sec   H: 3/10   V: 1-2/10     - VOR x1  (FT on firm, self-paced "as fast as possible") x 60 sec   H: 2/10   V: 3/10    - VOR Cx: firm surface , 60 sec   H: 2/10   V: 1-2/10    - Sidestepping on foam beam x 5 cycles down/back; haptic touch    - Standing chops (split stance 1 foot on firm ground 1 foot on incline in //bars) 5# 15 reps ea     - Standing Core ROT FT 5# MB: 15 reps ea side    Assessment: Patient tolerated treatment fairly today with continued focus on sensitization of the vestibular system  Added in core stabilization exercises as patient reporting increased pain in paraspinal region with transitional movements  Patient able to achieve therapeutic level of dizziness for VOR x1 and VOR Cx  Patient will continue to benefit from skilled outpatient PT in order to address deficits in strength, balance, and gait in order to maximize functional mobility at home and reduce overall risk for falls          Plan: Continue per plan of care  Progress treatment as tolerated             Outcome Measures Initial Eval  11/15/2021 DN  11/22/2021 PN  12/15/2021 RE  1- PN  2/24/2022 PN  3/28/2022 PN  4/27/22   5xSTS 17 94 sec  25 12 sec (2 UE pushoff) 19 59 sec (2 UE) 17 34 sec (1 airex, no UE) 15 93 sec (1 airex, no UE) 16 01 sec (1 airex, no UE)   TUG 22 75 sec  18 18 sec 14 61 sec 14 35 sec 13 25 sec 13 30 sec   10 meter m/s 0 59 m/s 0 63 m/s 0 80 m/s 0 73 m/s 0 78 m/s 0 82 m/s   HOLGUIN 36/56  41/56 43/56 44/56 46/56 47/56   FGA /30   14/30 16/30 16/30 19/30   DGI /24         mCTSIB  - FTEO (firm)  - FTEC (firm)  - FTEO (foam)  - FTEC (foam)   30 sec  30 sec  3 28 sec  0 sec    30 sec  30 sec  30 sec  6 02 sec   30 sec  30 sec  30 sec  30 sec (+)   30 sec  30 sec (+)  30 sec  30 sec (+)   30 sec  30 sec  30 sec  23 sec   30 sec  30 sec  30 sec  30 sec   6MWT  ft 445 ft 660 ft 750 ft 710 ft 800 ft 840 ft    ABC  70% 68 12% 52 5% 65% 68 75% 68 75%

## 2022-05-23 ENCOUNTER — OFFICE VISIT (OUTPATIENT)
Dept: PHYSICAL THERAPY | Facility: CLINIC | Age: 77
End: 2022-05-23
Payer: COMMERCIAL

## 2022-05-23 DIAGNOSIS — R29.6 FREQUENT FALLS: Primary | ICD-10-CM

## 2022-05-23 PROCEDURE — 97110 THERAPEUTIC EXERCISES: CPT

## 2022-05-23 PROCEDURE — 97112 NEUROMUSCULAR REEDUCATION: CPT

## 2022-05-23 NOTE — PROGRESS NOTES
Daily Note       Insurance:  AMA/CMS Eval/ Re-eval POC expires Sooc Beams #/ Referral # Totalvisits Start date  Expiration date Extension  Visit limitation? PT only or  PT+OT? Co-Insurance   A[employer]na Medicare  CMS   PT No, 0$ copay                    Approved 99 units 5/15 11/11/22                           APPROVED Date             Units: 99 units Used 3 3 3            Authed:  Remaining  96 93 90                  GOES BY "PENELOPE"    Today's date: 2022  Patient name: Dawson Meneses  : 1945  MRN: [id number]  Referring provider: Paulina Chauhan*  Dx:   Encounter Diagnosis     ICD-10-CM    1  Frequent falls  R29 6                   Subjective: Patient reports to PT session with reports of increased low back pain on R side secondary to recent fall  Patient advised to make an appointment with her doctor  Objective: See treatment diary below    TE:  - STS w/ Band (yellow TBand): 2 x 10  - Cervical SNAGS 5 x 10-15 sec hold    NMR:  - VOR x1  (FT on firm, self-paced "as fast as possible") x 60 sec   H: 2/10   V: 3/10   H: 2/10   V: 2/10    - VOR Cx: firm surface , 60 sec   H: 0/10   V: 0/10   CW: 2/10   CCW: 3-4/10    - Sidestepping on single foam beam x 8 cycles down/back; haptic touch  - Standing Core ROT FT on foam beam 5# MB: 15 reps ea side    TA:   - Updated HEP (2022): VOR x 1 for 60 sec (H&V), VOR cx (CW, CCW) x 60 sec - to be performed at countertop    Assessment: Patient tolerated treatment fairly today with continued focus on sensitization of the vestibular system  Patient continues to demonstrate smaller amplitude movements with VOR x 1 towards end of each trial, likely secondary to muscle fatigue  Incorporated cervical snags in between VOR x 1 trials today to aide with head/neck dissociation and maintaining larger amplitude head turns and nods during vestibular exercises  Patient provided updated HEP exercises; she verbalized good understanding   Patient will continue to benefit from skilled outpatient PT in order to address deficits in strength, balance, and gait in order to maximize functional mobility at home and reduce overall risk for falls  Plan: Continue per plan of care  Progress treatment as tolerated             Outcome Measures Initial Eval  11/15/2021 DN  11/22/2021 PN  12/15/2021 RE  1- PN  2/24/2022 PN  3/28/2022 PN  4/27/22   5xSTS 17 94 sec  25 12 sec (2 UE pushoff) 19 59 sec (2 UE) 17 34 sec (1 airex, no UE) 15 93 sec (1 airex, no UE) 16 01 sec (1 airex, no UE)   TUG 22 75 sec  18 18 sec 14 61 sec 14 35 sec 13 25 sec 13 30 sec   10 meter m/s 0 59 m/s 0 63 m/s 0 80 m/s 0 73 m/s 0 78 m/s 0 82 m/s   HOLGUIN 36/56  41/56 43/56 44/56 46/56 47/56   FGA /30   14/30 16/30 16/30 19/30   DGI /24         mCTSIB  - FTEO (firm)  - FTEC (firm)  - FTEO (foam)  - FTEC (foam)   30 sec  30 sec  3 28 sec  0 sec    30 sec  30 sec  30 sec  6 02 sec   30 sec  30 sec  30 sec  30 sec (+)   30 sec  30 sec (+)  30 sec  30 sec (+)   30 sec  30 sec  30 sec  23 sec   30 sec  30 sec  30 sec  30 sec   6MWT  ft 445 ft 660 ft 750 ft 710 ft 800 ft 840 ft    ABC  70% 68 12% 52 5% 65% 68 75% 68 75%

## 2022-05-25 ENCOUNTER — EVALUATION (OUTPATIENT)
Dept: PHYSICAL THERAPY | Facility: CLINIC | Age: 77
End: 2022-05-25
Payer: COMMERCIAL

## 2022-05-25 DIAGNOSIS — R29.6 FREQUENT FALLS: Primary | ICD-10-CM

## 2022-05-25 PROCEDURE — 97530 THERAPEUTIC ACTIVITIES: CPT

## 2022-05-25 NOTE — PROGRESS NOTES
PT/DC to Maintenance        Insurance:  AMA/CMS Eval/ Re-eval POC expires Abby Melendez #/ Referral # Totalvisits Start date  Expiration date Extension  Visit limitation? PT only or  PT+OT? Co-Insurance   A[employer]na Medicare  CMS   PT No, 0$ copay                    Approved 99 units 5/15 11/11/22                           APPROVED Date             Units: 99 units Used 3 3 3            Authed:  Remaining  96 93 90                  Today's date: 2022  Patient name: Eboni Fan  : 1945  MRN: [id number]  Referring provider: Santos Mitchell*  Dx:   Encounter Diagnosis     ICD-10-CM    1  Frequent falls  R29 6          Assessment  Assessment details: Patient is a 77year old female who has been reporting to skilled outpatient PT for deficits related to LE weakness, gait, and balance resulting in frequent falls and reduced overall safety with functional mobility  Patient demonstrated overall improvements in the following outcome measures since last reassessment: Holgu[place], FGA, 5x STS, TUG, ABC and 6 MWT suggesting overall improvements in safe ambulation, dynamic balance, static balance, and cardiovascular endurance, respectively  Overall plateau noted in 10 Meter and 6 MWT, however not enough to change MDC  Patient a LOW risk for falls according to TUG and HOLGUIN  However, per cutoff scores for FGA, she is considered HIGH fall risk  All cutoff scores and normative values taken from the APTA Neuro Section and Rehab Measures  Planl to transition to skilled maintenance as patient unable to safely complete balance program at home independently as well as a history of regression without skilled PT services  She has met 4/4 short term goals and /10 long term goals   She will continue to benefit from skilled outpatient PT in order to maximize her function with balance tasks, reduce visual reliance, and improved safety with ambulation and ADLs  Impairments: Abnormal gait, Impaired balance and Impaired physical strength  Understanding of Dx/Px/POC: Good  Prognosis: Good    Patient verbalized understanding of POC  Please contact me if you have any questions or recommendations  Thank you for the referral and the opportunity to share in [phone] care          Plan  Plan details: 6 MWT, 10 MWT, ABC next visit; balance training    Patient would benefit from: PT Eval and Skilled PT  Planned modality interventions: Biofeedback, Cryotherapy, TENS and Thermotherapy: Hydrocollator Packs  Planned therapy interventions: Balance, Gait training, HEP, Neuromuscular re-education, Patient education, Strengthening, Therapeutic activities and Therapeutic exercises  Frequency: 2x/wk  Duration in weeks: 4  Plan of Care beginning date: 4/27/2022  Plan of Care expiration date: 4 weeks - 5/27/2022  Treatment plan discussed with: Patient       Goals  Short Term Goals (4 weeks):    - Patient will improve time on TUG by 2 9 seconds from 22 75 seconds to 19 85 seconds to facilitate improved safety in all ambulation MET  - Patient will be independent in basic HEP 2-3 weeks MET  - Patient will improve 5xSTS score by 2 3 seconds from 17 94 seconds to 15 64 seconds to promote improved LE functional strength needed for ADLs -MET  - Patient will preform 6 MWT, 10 MWT, and ABC Scale in 2-3 weeks in order to assess cardiovascular endurance, ambulation speed, and balance confidence MET    Long Term Goals (12 weeks):  - Patient will be independent in a comprehensive home exercise program PROGRESSING  - Patient will improve gait speed by 0 18 m/s to improve safety with community ambulation MET  - Patient will improve HOLGUIN by 6 points from 36/56 to 42/56 to facilitate return to safe independent ambulation MET  - Patient will be able to demonstrate HT in gait without veering  MET  - Patient will improve 6 Minute Walk Test score by 190 feet to promote improved cardiovascular endurance MET  - Patient will report 50% reduction in near falls in order to improve safety with functional tasks and reduce his risk for falls MET  - Patient will report going on walks at least 3 days per week to promote independence and improved cardiovascular endurance NOT MET  - Patient will be able to ascend/descend stairs reciprocally with 1 UE assist to promote independence and safety with ADLs NOT MET  - Patient will report 50% reduction in near falls when ambulating on uneven terrain NOT MET  - Patient will improve FGA score by 4 points from  to  to progress safety with dynamic tasks - MET      Cut off score    All date taken from APTA Neuro Section or Rehab Measures      Brown/64  MDC: 6 pts  Age Norms:  61-76: M - 54   F - 55  70-79: M - 47   F - 53  80-89: M - 48   F - 50 5xSTS: Roselia et al 2010  MDC: 2 3 sec  Age Norms:  60-69: 11 1 sec  70-79: 12 6 sec  80-89: 14 8 sec   TUG  MDC: 4 14 sec  Cut off score:  >13 5 sec community dwelling adults  >32 2 frail elderly  <20 I for basic transfers  >30 dependent on transfers 10 Meter Walk Test: Brooke garrison   MDC: 0 18 m/s  20-29: M - 1 35 m   F - 1 34 m  30-39: M - 1 43 m   F - 1 34 m  40-49: M - 1 43 m   F - 1 39 m  50-59: M - 1 43 m   F - 1 31 m  60-69: M - 1 34 m   F - 1 24 m  70-79: M - 1 26 m   F - 1 13 m  80-89: M - 0 97 m   F - 0 94 m    Household Ambulator < 0 4 m/s  Limited Community Ambulator 0 4 - 0 8 m/s  [employer] Ambulator 0 8 - 1 2 m/s  Safely cross the street > 1 2 m/s   FGA  MCID: 4 pts  Geriatrics/community < 22/30 fall risk  Geriatrics/community < 20/30 unexplained falls    DGI  MDC: vestibular - 4 pts  MDC: geriatric/community - 3 pts  Falls risk <19/24 mCTSIB  Norm: 20-60 yrs  Eyes open firm: norm sway 0 21-0 48  Eyes closed firm: norm sway 0 48-0 99  Eyes open foam: norm sway 0 38-0 71  Eyes closed foam: norm sway 0 70-2 22   6 Minute Walk Test  MDC: 190 98 ft  MCID: 164 ft    Age Norms  60-69: M - 1876 ft (571 80 m)  F - 1765 ft (537 98 m)  70-79: M - 1729 ft (527 00 m)  F - 1545 ft (470 92 m)  80-89: M - 1368 ft (416 97 m)  F - 1286 ft (391 97 m) ABC: Adriana Bhandari, 2003  <67% increased risk for falls         Subjective    History of Present Illness  - Mechanism of injury: Patient is a 77year old female who is presenting to skilled OPPT for IE due to imbalance and frequent falls  Patient reports she fell on November 2nd (Tuesday) and was unable to get up  Her friend found her on the floor Wednesday night and called EMS, but patient refused to go to hospital, stating she did not feel she had broken anything  She recalls sneezing before she fell and next thing she knew she was on the floor  Patient denies any other falls in the past 6 months, but reports a fall outside approximately a year ago in which someone found her and called EMS  She states she has had a "tilt table test" and MRI completed, with no significant findings  She notes feeling lightheaded frequently  PMH significant for orthostatic hypotension and peripheral neuropathy  PSHx significant for B/L knee surgeries (including meniscus repair)  Patient additionally reports onset of tremor and contractures in fingers approximately one month ago, and has an appointment scheduled with an orthopedist this week  Updated (12/15/2021): Patient reports she enjoys coming to physical therapy  She notes she has more confidence when getting up from a chair at home  She has had several near falls since IE, but no falls  She reports compliance with HEP  She wants to work on loosening up her knees  She also notes feeling more confident when walking since obtaining the [facility]  Updated (1-): Patient reports gap in therapy due to losing her purse and worsening icy conditions  She stated "some days my balance is really good and other days it feel like I could just fall over "    Updated (2/24/2022):  Patient reports she feels she is getting stronger and is starting to feel more confident  She does not feel as confident on foam and notices she sometimes will step on blankets at home that her dogs drag around and she feels unsteady  She wants to specifically work on strengthening her legs  Her most recent fall was after tripping over the weekend, but notes she was able to get up right away  Updated (3/28/2022): Patient states she feels she has been doing pretty good with therapy until this past weekend, when she developed B/L knee pain  She reports more recently she notices when she stands up she feels lightheaded and has to take her time  Update (4/27/22): Patient reports the hardest thing are stairs and getting up from the chair  Patient reports if she did not come in she would be nervous of getting worst  Would like to transition to maintenance  Update (5/25/22): Patient reports that she has a hard time getting on the escalator, moving in crowds and general fear of balance        - Primary AD: none  - Assist level at home: independent  - Decreased fine motor tasks: Yes, MRI with Dr Jean Oleary this week      Social Support  - Steps to enter house: 5 ESPERANZA  - Stairs in house: flight to basement  - Lives in: single floor home  - Lives with: alone    - Employment status: retired  - Hand dominance: (R)    Treatments  - Previous treatment: PT for prior knee surgeries  - Current treatment: none  - Diagnostic Testing: MRI      Objective     LE MMT  - R Hip Flexion: 4-/5   L Hip Flexion: 3+/5  - R Hip Extension: 4/5   L Hip Extension: 4-/5  - R Hip Abduction: 4/5   L Hip Abduction: 4/5  - R Hip Adduction: 4/5   L Hip Adduction: 4/5  - R Knee Extension: 4/5  L Knee Extension: 4-/5  - R Knee Flexion: 4/5   L Knee Flexion: 4-/5  - R Ankle DF: 4+/5   L Ankle DF: 4/5  - R Ankle PF: 4+/5   L Ankle PF: 4+/5    Sensation  - Light touch: diminished  - Deep pressure: diminished      Coordination  - Heel to Shin: intact  - Alternate Toe Taps: intact  - Finger to Nose: intact, intension tremor on right  - Finger to Finger: decreased (L > R), intention tremor on right    Myelopathy Screen (>3/5 +)  - Rose's Reflex: -  - Babinski Reflex: -  - Inverted Supinator Sign: -  - Age > 45: Yes  - Gait Deviation: Yes    Gait  - Abnormalities: wide DYLAN, diminished heel strike/toe off, B/L knee flexion, slight forward trunk lean    Outcome Measures Initial Eval  11/15/2021 DN  11/22/2021 PN  12/15/2021 RE  1- PN  2/24/2022 PN  3/28/2022 PN  4/27/22 PN  5/25/22   5xSTS 17 94 sec  25 12 sec (2 UE pushoff) 19 59 sec (2 UE) 17 34 sec (1 airex, no UE) 15 93 sec (1 airex, no UE) 16 01 sec (1 airex, no UE) 14 49 sec (1 airex, no UE)   TUG 22 75 sec  18 18 sec 14 61 sec 14 35 sec 13 25 sec 13 30 sec 13 04 sec no AD   10 meter m/s 0 59 m/s 0 63 m/s 0 80 m/s 0 73 m/s 0 78 m/s 0 82 m/s 0 80 m s   HOLGUIN 36/56  41/56 43/56 44/56 46/56 47/56 49/56   FGA /30   14/30 16/30 16/30 19/30 20/30   DGI /24          mCTSIB  - FTEO (firm)  - FTEC (firm)  - FTEO (foam)  - FTEC (foam)   30 sec  30 sec  3 28 sec  0 sec    30 sec  30 sec  30 sec  6 02 sec   30 sec  30 sec  30 sec  30 sec (+)   30 sec  30 sec (+)  30 sec  30 sec (+)   30 sec  30 sec  30 sec  23 sec   30 sec  30 sec  30 sec  30 sec   30 sec  30 sec  30 sec  30 sec   6MWT  ft 445 ft 660 ft 750 ft 710 ft 800 ft 840 ft  840 ft   ABC  70% 68 12% 52 5% 65% 68 75% 68 75% 78 13%                     Precautions: orthostatic hypotension, high fall risk  Past Medical History:   Diagnosis Date    Disease of thyroid gland     Hypercholesteremia     Hypertension

## 2022-06-23 ENCOUNTER — TELEPHONE (OUTPATIENT)
Dept: OBGYN CLINIC | Facility: HOSPITAL | Age: 77
End: 2022-06-23

## 2022-06-23 NOTE — TELEPHONE ENCOUNTER
Sent to [facility] 60    [birth year]    MRN: [id number]     C/B - [phone]     Cov:  [facility]     RE:  Reschedule:  n2u ref by dr Humphrey Res subluxation extensor tendon mcp joint left hand and right hand    Mariah/[place]

## 2022-07-06 ENCOUNTER — EVALUATION (OUTPATIENT)
Dept: PHYSICAL THERAPY | Facility: CLINIC | Age: 77
End: 2022-07-06
Payer: COMMERCIAL

## 2022-07-06 DIAGNOSIS — R26.9 GAIT DISORDER: ICD-10-CM

## 2022-07-06 DIAGNOSIS — R26.89 BALANCE DISORDER: ICD-10-CM

## 2022-07-06 DIAGNOSIS — R29.6 FREQUENT FALLS: Primary | ICD-10-CM

## 2022-07-06 PROCEDURE — 97164 PT RE-EVAL EST PLAN CARE: CPT

## 2022-07-06 NOTE — LETTER
2022    MD [address]  [place] [address]    Patient: Go Graham   YOB: 1945   Date of Visit: 2022     Encounter Diagnosis     ICD-10-CM    1  Frequent falls  R29 6    2  Balance disorder  R26 89    3  Gait disorder  R26 9        Dear Dr Bc Grayson:    Thank you for your recent referral of Go Graham  Please review the attached evaluation summary from Naomi's recent visit  Please verify that you agree with the plan of care by signing the attached order  If you have any questions or concerns, please do not hesitate to call  I sincerely appreciate the opportunity to share in the care of one of your patients and hope to have another opportunity to work with you in the near future  Sincerely,    Lewis Vega, PT      Referring Provider:      I certify that I have read the below Plan of Care and certify the need for these services furnished under this plan of treatment while under my care  MD [address]  [place] [address]  Via Fax: [phone]                                                                              PT Re-Evaluation           Insurance:  AMA/CMS Eval/ Re-eval POC expires Jimbo Boast #/ Referral # Totalvisits Start date  Expiration date Extension  Visit limitation? PT only or  PT+OT? Co-Insurance   [employer] Medicare  CMS   PT No, 0$ copay                    Approved 99 units 5/15 11/11/22        7/6/22 9/28/22                  APPROVED Date            Units: 99 units Used 3 3 3 3           Authed:  Remaining  96 [phone]                      Today's date: 2022  Patient name: Go Graham  : 1945  MRN: [id number]  Referring provider: Ly Ross*  Dx:   Encounter Diagnosis     ICD-10-CM    1  Frequent falls  R29 6    2  Balance disorder  R26 89    3   Gait disorder  R26 9          Assessment  Assessment details: Patient is a 77year old female who has been reporting to skilled outpatient PT for deficits related to LE weakness, gait, and balance resulting in frequent falls and reduced overall safety with functional mobility  Patient presenting after an approximate month-long hiatus from PT services secondary to (L) sided flank pain with complaints of increasing mobility and balance deficits  She notes she has been utilizing her SPC more consistently for mobility at home and the community  Patient demonstrated overall regressions in the following outcome measures since last reassessment: 5 x STS, TUG, and Brown, suggesting deficits in functional LE strength, safe ambulation, and static balance, respectively  Due to time constraints, unable to assess FGA, 6 MWT, and 10 MWT; plan to assess next visit  Per cutoff scores for the 5 x STS, TUG, and Brown she is classified as HIGH risk for falls  All cutoff scores and normative values taken from the APTA Neuro Section and Rehab Measures  Plan to continue to restorative therapy and delay skilled maintenance due to patient's demonstration of regression in all outcome measures  She is in verbal agreement with plan  She has currently met 2 short term goals and no long term goals  She will continue to benefit from skilled outpatient PT in order to maximize her function with balance tasks, reduce visual reliance, and improved safety with ambulation and ADLs  Impairments: Abnormal gait, Impaired balance and Impaired physical strength  Understanding of Dx/Px/POC: Good  Prognosis: Good    Patient verbalized understanding of POC  Please contact me if you have any questions or recommendations  Thank you for the referral and the opportunity to share in [phone] care          Plan  Plan details: 6 MWT, 10 MWT, ABC next visit; balance training  Patient would benefit from: PT Eval and Skilled PT  Planned modality interventions: Biofeedback, Cryotherapy, TENS and Thermotherapy: Hydrocollator Packs  Planned therapy interventions: Balance, Gait training, HEP, Neuromuscular re-education, Patient education, Strengthening, Therapeutic activities and Therapeutic exercises  Frequency: 2x/wk  Duration in weeks: 12  Plan of Care beginning date: 7/6/2022  Plan of Care expiration date: 12 weeks - 9/28/2022  Treatment plan discussed with: Patient     Goals  Short Term Goals (4 weeks):    - Patient will improve time on TUG by 2 9 seconds from 22 75 seconds to 19 85 seconds to facilitate improved safety in all ambulation NOT MET  - Patient will be independent in basic HEP 2-3 weeks MET  - Patient will improve 5xSTS score by 2 3 seconds from 17 94 seconds to 15 64 seconds to promote improved LE functional strength needed for ADLs - NOT MET  - Patient will preform 6 MWT, 10 MWT, and ABC Scale in 2-3 weeks in order to assess cardiovascular endurance, ambulation speed, and balance confidence MET    Long Term Goals (12 weeks):  - Patient will be independent in a comprehensive home exercise program PROGRESSING  - Patient will improve gait speed by 0 18 m/s to improve safety with community ambulation NOT MET  - Patient will improve HOLGUIN by 6 points from 36/56 to 42/56 to facilitate return to safe independent ambulation NOT MET  - Patient will be able to demonstrate HT in gait without veering NOT MET  - Patient will improve 6 Minute Walk Test score by 190 feet to promote improved cardiovascular endurance NOT MET  - Patient will report 50% reduction in near falls in order to improve safety with functional tasks and reduce his risk for falls NOT MET  - Patient will report going on walks at least 3 days per week to promote independence and improved cardiovascular endurance NOT MET  - Patient will be able to ascend/descend stairs reciprocally with 1 UE assist to promote independence and safety with ADLs NOT MET  - Patient will report 50% reduction in near falls when ambulating on uneven terrain NOT MET  - Patient will improve FGA score by 4 points from  to  to progress safety with dynamic tasks - NOT MET      Cut off score    All date taken from APTA Neuro Section or Rehab Measures      Brown/56  MDC: 6 pts  Age Norms:  61-76: M - 54   F - 55  70-79: M - 47   F - 53  80-: M - 48   F - 50 5xSTS: Roselia et al 2010  MDC: 2 3 sec  Age Norms:  60-69: 11 1 sec  70-79: 12 6 sec  80-: 14 8 sec   TUG  MDC: 4 14 sec  Cut off score:  >13 5 sec community dwelling adults  >32 2 frail elderly  <20 I for basic transfers  >30 dependent on transfers 10 Meter Walk Test: Rubi Amato and Kyle Sweeneyo al 2011  MDC: 0 18 m/s  20-29: M - 1 35 m   F - 1 34 m  30-39: M - 1 43 m   F - 1 34 m  40-49: M - 1 43 m   F - 1 39 m  50-59: M - 1 43 m   F - 1 31 m  60-69: M - 1 34 m   F - 1 24 m  70-79: M - 1 26 m   F - 1 13 m  80-: M - 0 97 m   F - 0 94 m    Household Ambulator < 0 4 m/s  Limited Community Ambulator 0 4 - 0 8 m/s  [employer] Ambulator 0 8 - 1 2 m/s  Safely cross the street > 1 2 m/s   FGA  MCID: 4 pts  Geriatrics/community < 22/30 fall risk  Geriatrics/community < 20/30 unexplained falls    DGI  MDC: vestibular - 4 pts  MDC: geriatric/community - 3 pts  Falls risk <19/24 mCTSIB  Norm: 20-60 yrs  Eyes open firm: norm sway 0 21-0 48  Eyes closed firm: norm sway 0 48-0 99  Eyes open foam: norm sway 0 38-0 71  Eyes closed foam: norm sway 0 70-2 22   6 Minute Walk Test  MDC: 190 98 ft  MCID: 164 ft    Age Norms  61-76: M - 1876 ft (571 80 m)  F - 1765 ft (537 98 m)  70-79: M - 1729 ft (527 00 m)  F - 1545 ft (470 92 m)  80-89: M - 1368 ft (416 97 m)  F - 1286 ft (391 97 m) ABC: [facility],   <67% increased risk for falls         Subjective    History of Present Illness  - Mechanism of injury: Patient is a 77year old female who is presenting to skilled OPPT for IE due to imbalance and frequent falls  Patient reports she fell on  (Tuesday) and was unable to get up   Her friend found her on the floor Wednesday night and called EMS, but patient refused to go to hospital, stating she did not feel she had broken anything  She recalls sneezing before she fell and next thing she knew she was on the floor  Patient denies any other falls in the past 6 months, but reports a fall outside approximately a year ago in which someone found her and called EMS  She states she has had a "tilt table test" and MRI completed, with no significant findings  She notes feeling lightheaded frequently  PMH significant for orthostatic hypotension and peripheral neuropathy  PSHx significant for B/L knee surgeries (including meniscus repair)  Patient additionally reports onset of tremor and contractures in fingers approximately one month ago, and has an appointment scheduled with an orthopedist this week  Update (5/25/22): Patient reports that she has a hard time getting on the escalator, moving in crowds and general fear of balance  Update (7/6/2022): Patient reports to PT for reassessment after an approximate month long hiatus  Patient had a mechanical fall which resulted in (L) sided flank pain that in turn limited functional mobility  Patient reports she went to see her PCP to discuss the pain where she had her blood work taken and was placed on thyroid medication  She then had an appt with her rheumatologist a week later, where she was prescribed medication for the pain, which she feels did not significantly felt  She additionally reports getting an x-ray done where she was told the flank pain is likely due to her Sjogren's Syndrome and she should see a pain management doctor if the pain persisted  She notes she continues to experience near daily pain that she states varies from dull to sharp  She denies any falls, but has been utilizing a cane as the pain often makes her feel off balance        - Primary AD: SPC at home and in the community  - Assist level at home: independent  - Decreased fine motor tasks: No      Pain  - Current pain ratin/10  - At best pain ratin/10  - At worst pain ratin/10  - Location: (L) flank  - Aggravating factors:       Social Support  - Steps to enter house: 5 ESPERANZA  - Stairs in house: flight to basement  - Lives in: single floor home  - Lives with: alone    - Employment status: retired  - Hand dominance: (R)    Treatments  - Previous treatment: PT for prior knee surgeries  - Current treatment: none  - Diagnostic Testing: MRI      Objective     LE MMT  - R Hip Flexion: 4-/5   L Hip Flexion: 3+/5  - R Hip Extension: 4/5   L Hip Extension: 4-/5  - R Hip Abduction: 4/5   L Hip Abduction: 4/5  - R Hip Adduction: 4/5   L Hip Adduction: 4/5  - R Knee Extension: 4/5  L Knee Extension: 4-/5  - R Knee Flexion: 4/5   L Knee Flexion: 4-/5  - R Ankle DF: 4+/5   L Ankle DF: 4/5  - R Ankle PF: 4+/5   L Ankle PF: 4+/5    Sensation  - Light touch: diminished  - Deep pressure: diminished    Coordination  - Heel to Shin: intact  - Alternate Toe Taps: intact  - Finger to Nose: intact, intension tremor on right  - Finger to Finger: decreased (L > R), intention tremor on right    Myelopathy Screen (>3/5 +)  - Rose's Reflex: -  - Babinski Reflex: -  - Inverted Supinator Sign: -  - Age > 45: Yes  - Gait Deviation: Yes    OT Screen  - Difficulty w/ clothing fasteners: No  - Difficulty w/ bathing: No  - Difficulty w/ dressing: No  - Difficulty w/ toileting: No      Gait  - Abnormalities: wide DYLAN, diminished heel strike/toe off, B/L knee flexion, slight forward trunk lean    Updated (2022)    LE MMT  - R Hip Flexion: 4-/5   L Hip Flexion: 3+/5  - R Hip Extension: 4/5   L Hip Extension: 4-/5  - R Hip Abduction: 4/5   L Hip Abduction: 4/5  - R Hip Adduction: 4/5   L Hip Adduction: 4/5  - R Knee Extension: 4-/5  L Knee Extension: 3+/5  - R Knee Flexion: 4/5   L Knee Flexion: 4-/5  - R Ankle DF: 4+/5   L Ankle DF: 4/5  - R Ankle PF: 4+/5   L Ankle PF: 4+/5         Outcome Measures PN  2022 PN  2022       5xSTS 14 49 sec 21 32 sec w/ 2 UE pushoff       TUG  - Regular    - Cognitive     13 04 sec no AD     16 01 sec no AD  17 20 sec no AD       10 meter 0 80 m/s defer       HOLGUIN 49/56 41/56       FGA 20/30 defer       mCTSIB  - FTEO (firm)  - FTEC (firm)  - FTEO (foam)  - FTEC (foam)   30 sec  30 sec  30 sec  30 sec   30 sec  18 sec  30 sec  3 sec       6MWT 840 ft defer       ABC Scale 78 13% 69 38%                           Precautions: Sjogren's Syndrome, fall risk, thyroid condition  Past Medical History:   Diagnosis Date    Disease of thyroid gland     Hypercholesteremia     Hypertension

## 2022-07-06 NOTE — PROGRESS NOTES
PT Re-Evaluation           Insurance:  AMA/CMS Eval/ Re-eval POC expires Kathryn Das #/ Referral # Totalvisits Start date  Expiration date Extension  Visit limitation? PT only or  PT+OT? Co-Insurance   A[employer]na Medicare  CMS   PT No, 0$ copay                    Approved 99 units 5/15 11/11/22        7/6/22 9/28/22                  APPROVED Date            Units: 99 units Used 3 3 3 3           Authed:  Remaining  96 93 90 87                      Today's date: 2022  Patient name: Nazario Bajwa  : 1945  MRN: [id number]  Referring provider: Manuel Lancaster*  Dx:   Encounter Diagnosis     ICD-10-CM    1  Frequent falls  R29 6    2  Balance disorder  R26 89    3  Gait disorder  R26 9          Assessment  Assessment details: Patient is a 77year old female who has been reporting to skilled outpatient PT for deficits related to LE weakness, gait, and balance resulting in frequent falls and reduced overall safety with functional mobility  Patient presenting after an approximate month-long hiatus from PT services secondary to (L) sided flank pain with complaints of increasing mobility and balance deficits  She notes she has been utilizing her SPC more consistently for mobility at home and the community  Patient demonstrated overall regressions in the following outcome measures since last reassessment: 5 x STS, TUG, and Brown, suggesting deficits in functional LE strength, safe ambulation, and static balance, respectively  Due to time constraints, unable to assess FGA, 6 MWT, and 10 MWT; plan to assess next visit  Per cutoff scores for the 5 x STS, TUG, and Brown she is classified as HIGH risk for falls  All cutoff scores and normative values taken from the APTA Neuro Section and Rehab Measures   Plan to continue to restorative therapy and delay skilled maintenance due to patient's demonstration of regression in all outcome measures  She is in verbal agreement with plan  She has currently met 2 short term goals and no long term goals  She will continue to benefit from skilled outpatient PT in order to maximize her function with balance tasks, reduce visual reliance, and improved safety with ambulation and ADLs  Impairments: Abnormal gait, Impaired balance and Impaired physical strength  Understanding of Dx/Px/POC: Good  Prognosis: Good    Patient verbalized understanding of POC  Please contact me if you have any questions or recommendations  Thank you for the referral and the opportunity to share in [address] [place] [address] [facility] care          Plan  Plan details: 6 MWT, 10 MWT, ABC next visit; balance training  Patient would benefit from: PT Eval and Skilled PT  Planned modality interventions: Biofeedback, Cryotherapy, TENS and Thermotherapy: Hydrocollator Packs  Planned therapy interventions: Balance, Gait training, HEP, Neuromuscular re-education, Patient education, Strengthening, Therapeutic activities and Therapeutic exercises  Frequency: 2x/wk  Duration in weeks: 12  Plan of Care beginning date: 7/6/2022  Plan of Care expiration date: 12 weeks - 9/28/2022  Treatment plan discussed with: Patient     Goals  Short Term Goals (4 weeks):    - Patient will improve time on TUG by 2 9 seconds from 22 75 seconds to 19 85 seconds to facilitate improved safety in all ambulation NOT MET  - Patient will be independent in basic HEP 2-3 weeks MET  - Patient will improve 5xSTS score by 2 3 seconds from 17 94 seconds to 15 64 seconds to promote improved LE functional strength needed for ADLs - NOT MET  - Patient will preform 6 MWT, 10 MWT, and ABC Scale in 2-3 weeks in order to assess cardiovascular endurance, ambulation speed, and balance confidence MET    Long Term Goals (12 weeks):  - Patient will be independent in a comprehensive home exercise program PROGRESSING  - Patient will improve gait speed by 0 18 m/s to improve safety with community ambulation NOT MET  - Patient will improve HOLGUIN by 6 points from 36/56 to 42/56 to facilitate return to safe independent ambulation NOT MET  - Patient will be able to demonstrate HT in gait without veering NOT MET  - Patient will improve 6 Minute Walk Test score by 190 feet to promote improved cardiovascular endurance NOT MET  - Patient will report 50% reduction in near falls in order to improve safety with functional tasks and reduce his risk for falls NOT MET  - Patient will report going on walks at least 3 days per week to promote independence and improved cardiovascular endurance NOT MET  - Patient will be able to ascend/descend stairs reciprocally with 1 UE assist to promote independence and safety with ADLs NOT MET  - Patient will report 50% reduction in near falls when ambulating on uneven terrain NOT MET  - Patient will improve FGA score by 4 points from  to  to progress safety with dynamic tasks - NOT MET      Cut off score    All date taken from APTA Neuro Section or Rehab Measures      Holguin/64  MDC: 6 pts  Age Norms:  60-69: M - 54   F - 55  70-79: M - 47   F - 53  80-89: M - 48   F - 50 5xSTS: Roselia et al 2010  MDC: 2 3 sec  Age Norms:  60-69: 11 1 sec  70-79: 12 6 sec  80-89: 14 8 sec   TUG  MDC: 4 14 sec  Cut off score:  >13 5 sec community dwelling adults  >32 2 frail elderly  <20 I for basic transfers  >30 dependent on transfers 10 Meter Walk Test: [place] al 2011  MDC: 0 18 m/s  20-29: M - 1 35 m   F - 1 34 m  30-39: M - 1 43 m   F - 1 34 m  40-49: M - 1 43 m   F - 1 39 m  50-59: M - 1 43 m   F - 1 31 m  60-69: M - 1 34 m   F - 1 24 m  70-79: M - 1 26 m   F - 1 13 m  80-89: M - 0 97 m   F - 0 94 m    Household Ambulator < 0 4 m/s  Limited Community Ambulator 0 4 - 0 8 m/s  [employer] 0 8 - 1 2 m/s  Safely cross the street > 1 2 m/s   FGA  MCID: 4 pts  Geriatrics/community < 22/30 fall risk  Geriatrics/community < 20/30 unexplained falls    DGI  MDC: vestibular - 4 pts  MDC: geriatric/community - 3 pts  Falls risk <19/24 mCTSIB  Norm: 20-60 yrs  Eyes open firm: norm sway 0 21-0 48  Eyes closed firm: norm sway 0 48-0 99  Eyes open foam: norm sway 0 38-0 71  Eyes closed foam: norm sway 0 70-2 22   6 Minute Walk Test  MDC: 190 98 ft  MCID: 164 ft    Age Norms  61-76: M - 1876 ft (571 80 m)  F - 1765 ft (537 98 m)  70-79: M - 1729 ft (527 00 m)  F - 1545 ft (470 92 m)  80-89: M - 1368 ft (416 97 m)  F - 1286 ft (391 97 m) ABC: Yas Lala, 2003  <67% increased risk for falls         Subjective    History of Present Illness  - Mechanism of injury: Patient is a 77year old female who is presenting to skilled OPPT for IE due to imbalance and frequent falls  Patient reports she fell on November 2nd (Tuesday) and was unable to get up  Her friend found her on the floor Wednesday night and called EMS, but patient refused to go to hospital, stating she did not feel she had broken anything  She recalls sneezing before she fell and next thing she knew she was on the floor  Patient denies any other falls in the past 6 months, but reports a fall outside approximately a year ago in which someone found her and called EMS  She states she has had a "tilt table test" and MRI completed, with no significant findings  She notes feeling lightheaded frequently  PMH significant for orthostatic hypotension and peripheral neuropathy  PSHx significant for B/L knee surgeries (including meniscus repair)  Patient additionally reports onset of tremor and contractures in fingers approximately one month ago, and has an appointment scheduled with an orthopedist this week  Update (5/25/22): Patient reports that she has a hard time getting on the escalator, moving in crowds and general fear of balance  Update (7/6/2022): Patient reports to PT for reassessment after an approximate month long hiatus   Patient had a mechanical fall which resulted in (L) sided flank pain that in turn limited functional mobility  Patient reports she went to see her PCP to discuss the pain where she had her blood work taken and was placed on thyroid medication  She then had an appt with her rheumatologist a week later, where she was prescribed medication for the pain, which she feels did not significantly felt  She additionally reports getting an x-ray done where she was told the flank pain is likely due to her Sjogren's Syndrome and she should see a pain management doctor if the pain persisted  She notes she continues to experience near daily pain that she states varies from dull to sharp  She denies any falls, but has been utilizing a cane as the pain often makes her feel off balance        - Primary AD: SPC at home and in the community  - Assist level at home: independent  - Decreased fine motor tasks: No      Pain  - Current pain ratin/10  - At best pain ratin/10  - At worst pain ratin/10  - Location: (L) flank  - Aggravating factors:       Social Support  - Steps to enter house: 5 ESPERANZA  - Stairs in house: flight to basement  - Lives in: single floor home  - Lives with: alone    - Employment status: retired  - Hand dominance: (R)    Treatments  - Previous treatment: PT for prior knee surgeries  - Current treatment: none  - Diagnostic Testing: MRI      Objective     LE MMT  - R Hip Flexion: 4-/5   L Hip Flexion: 3+/5  - R Hip Extension: 4/5   L Hip Extension: 4-/5  - R Hip Abduction: 4/5   L Hip Abduction: 4/5  - R Hip Adduction: 4/5   L Hip Adduction: 4/5  - R Knee Extension: 4/5  L Knee Extension: 4-/5  - R Knee Flexion: 4/5   L Knee Flexion: 4-/5  - R Ankle DF: 4+/5   L Ankle DF: 4/5  - R Ankle PF: 4+/5   L Ankle PF: 4+/5    Sensation  - Light touch: diminished  - Deep pressure: diminished    Coordination  - Heel to Shin: intact  - Alternate Toe Taps: intact  - Finger to Nose: intact, intension tremor on right  - Finger to Finger: decreased (L > R), intention tremor on right    Myelopathy Screen (>3/5 +)  - Rose's Reflex: -  - Babinski Reflex: -  - Inverted Supinator Sign: -  - Age > 45: Yes  - Gait Deviation: Yes    OT Screen  - Difficulty w/ clothing fasteners: No  - Difficulty w/ bathing: No  - Difficulty w/ dressing: No  - Difficulty w/ toileting: No      Gait  - Abnormalities: wide DYLAN, diminished heel strike/toe off, B/L knee flexion, slight forward trunk lean    Updated (7/6/2022)    LE MMT  - R Hip Flexion: 4-/5   L Hip Flexion: 3+/5  - R Hip Extension: 4/5   L Hip Extension: 4-/5  - R Hip Abduction: 4/5   L Hip Abduction: 4/5  - R Hip Adduction: 4/5   L Hip Adduction: 4/5  - R Knee Extension: 4-/5  L Knee Extension: 3+/5  - R Knee Flexion: 4/5   L Knee Flexion: 4-/5  - R Ankle DF: 4+/5   L Ankle DF: 4/5  - R Ankle PF: 4+/5   L Ankle PF: 4+/5         Outcome Measures PN  5/25/2022 PN  7/6/2022       5xSTS 14 49 sec 21 32 sec w/ 2 UE pushoff       TUG  - Regular    - Cognitive     13 04 sec no AD     16 01 sec no AD  17 20 sec no AD       10 meter 0 80 m/s defer       HOLGUIN 49/56 41/56       FGA 20/30 defer       mCTSIB  - FTEO (firm)  - FTEC (firm)  - FTEO (foam)  - FTEC (foam)   30 sec  30 sec  30 sec  30 sec   30 sec  18 sec  30 sec  3 sec       6MWT 840 ft defer       ABC Scale 78 13% 69 38%                           Precautions: Sjogren's Syndrome, fall risk, thyroid condition  Past Medical History:   Diagnosis Date    Disease of thyroid gland     Hypercholesteremia     Hypertension

## 2022-07-12 ENCOUNTER — OFFICE VISIT (OUTPATIENT)
Dept: PHYSICAL THERAPY | Facility: CLINIC | Age: 77
End: 2022-07-12
Payer: COMMERCIAL

## 2022-07-12 DIAGNOSIS — R29.6 FREQUENT FALLS: Primary | ICD-10-CM

## 2022-07-12 DIAGNOSIS — R26.89 BALANCE DISORDER: ICD-10-CM

## 2022-07-12 DIAGNOSIS — R26.9 GAIT DISORDER: ICD-10-CM

## 2022-07-12 PROCEDURE — 97112 NEUROMUSCULAR REEDUCATION: CPT

## 2022-07-12 NOTE — PROGRESS NOTES
Daily Note       Insurance:  AMA/CMS Eval/ Re-eval POC expires Giovanna Conroy #/ Referral # Totalvisits Start date  Expiration date Extension  Visit limitation? PT only or  PT+OT? Co-Insurance   [employer] Medicare  CMS   PT No, 0$ copay                    Approved 99 units 5/15 11/11/22        722                  APPROVED Date           Units: 99 units Used 3 3 3 3 1          Authed:  Remaining  96 93 90 87 86                GOES BY "PENELOPE"    Today's date: 2022  Patient name: Delio Agee  : 1945  MRN: [id number]  Referring provider: Anita Vance*  Dx:   Encounter Diagnosis     ICD-10-CM    1  Frequent falls  R29 6    2  Balance disorder  R26 89    3  Gait disorder  R26 9                   Subjective: Patient reports to PT session with complaints of 4-5/10 (L) sided flank pain  States she plans to go back to her rheumatologist to discuss the ongoing pain  Objective: See treatment diary below    NMR (2# ankle weights donned):  - STS from standard chair + airex pad 2 x 10  - Standing hip 3-way 2 x 10; 3 sec iso hold  - FWD/LAT step ups (6") x 10 each  - Standing heel/toe raises x 20; 2 sec iso hold    Assessment: Patient tolerated treatment fairly today with with focus on mobility and LE strength due to a recent prolonged break from PT services  Patient presented with increased levels of fatigue this date, requiring frequent seated rest breaks  One occasion of knee buckling noted when performing step ups, but able to recover independently  Patient will continue to benefit from skilled outpatient PT in order to address deficits in strength, balance, and gait in order to maximize functional mobility at home and reduce overall risk for falls  Plan: Continue per plan of care  Progress treatment as tolerated             Outcome Measures Initial Eval  11/15/2021 DN  2021 PN  12/15/2021 RE  2022 PN  2022 PN  3/28/2022 PN  22   5xSTS 17 94 sec  25 12 sec (2 UE pushoff) 19 59 sec (2 UE) 17 34 sec (1 airex, no UE) 15 93 sec (1 airex, no UE) 16 01 sec (1 airex, no UE)   TUG 22 75 sec  18 18 sec 14 61 sec 14 35 sec 13 25 sec 13 30 sec   10 meter m/s 0 59 m/s 0 63 m/s 0 80 m/s 0 73 m/s 0 78 m/s 0 82 m/s   HOLGUIN 36/56  41/56 43/56 44/56 46/56 47/56   FGA /30   14/30 16/30 16/30 19/30   DGI /24         mCTSIB  - FTEO (firm)  - FTEC (firm)  - FTEO (foam)  - FTEC (foam)   30 sec  30 sec  3 28 sec  0 sec    30 sec  30 sec  30 sec  6 02 sec   30 sec  30 sec  30 sec  30 sec (+)   30 sec  30 sec (+)  30 sec  30 sec (+)   30 sec  30 sec  30 sec  23 sec   30 sec  30 sec  30 sec  30 sec   6MWT  ft 445 ft 660 ft 750 ft 710 ft 800 ft 840 ft    ABC  70% 68 12% 52 5% 65% 68 75% 68 75%

## 2022-07-14 ENCOUNTER — OFFICE VISIT (OUTPATIENT)
Dept: PHYSICAL THERAPY | Facility: CLINIC | Age: 77
End: 2022-07-14
Payer: COMMERCIAL

## 2022-07-14 DIAGNOSIS — R26.89 BALANCE DISORDER: ICD-10-CM

## 2022-07-14 DIAGNOSIS — R26.9 GAIT DISORDER: ICD-10-CM

## 2022-07-14 DIAGNOSIS — R29.6 FREQUENT FALLS: Primary | ICD-10-CM

## 2022-07-14 PROCEDURE — 97112 NEUROMUSCULAR REEDUCATION: CPT

## 2022-07-14 NOTE — PROGRESS NOTES
Daily Note       Insurance:  AMA/CMS Eval/ Re-eval POC expires Angel Franklin #/ Referral # Totalvisits Start date  Expiration date Extension  Visit limitation? PT only or  PT+OT? Co-Insurance   [employer] Medicare  CMS   PT No, 0$ copay                    Approved 99 units 5/15 11/11/22        722                  APPROVED Date          Units: 99 units Used 3 3 3 3 1 1         Authed:  Remaining  96 93 90 87 86 85               GOES BY "PENELOPE"    Today's date: 2022  Patient name: Hyacinth Carbone  : 1945  MRN: [id number]  Referring provider: Graeme Ibrahim*  Dx:   Encounter Diagnosis     ICD-10-CM    1  Frequent falls  R29 6    2  Balance disorder  R26 89    3  Gait disorder  R26 9                   Subjective: Patient reports to PT session with complaints of 4-5/10 (L) sided flank pain  States she plans to go back to her rheumatologist to discuss the ongoing pain  Objective: See treatment diary below    NMR (2# ankle weights donned):  - STS from standard chair + airex pad 2 x 10  - Seated Pball flexion + Lateral flexion: 15 reps ea direction  - Standing hip 3-way 2 x 10; 3 sec iso hold  - FWD/LAT step ups (6") x 10 each  - Side stepping 2 foam beams x 2 cycles down/back    Assessment: Patient tolerated treatment fairly today with with focus on mobility and LE strength due to a recent prolonged break from PT services  Patient presented with increased levels of fatigue this date, requiring frequent seated rest breaks  Added in pball flexion as she continues to be challenged by pain on her L side, with reports of decreased pain post performance  Patient will continue to benefit from skilled outpatient PT in order to address deficits in strength, balance, and gait in order to maximize functional mobility at home and reduce overall risk for falls  Plan: Continue per plan of care  Progress treatment as tolerated             Outcome Measures Initial Eval  11/15/2021 DN  11/22/2021 PN  12/15/2021 RE  1- PN  2/24/2022 PN  3/28/2022 PN  4/27/22   5xSTS 17 94 sec  25 12 sec (2 UE pushoff) 19 59 sec (2 UE) 17 34 sec (1 airex, no UE) 15 93 sec (1 airex, no UE) 16 01 sec (1 airex, no UE)   TUG 22 75 sec  18 18 sec 14 61 sec 14 35 sec 13 25 sec 13 30 sec   10 meter m/s 0 59 m/s 0 63 m/s 0 80 m/s 0 73 m/s 0 78 m/s 0 82 m/s   HOLGUIN 36/56  41/56 43/56 44/56 46/56 47/56   FGA /30   14/30 16/30 16/30 19/30   DGI /24         mCTSIB  - FTEO (firm)  - FTEC (firm)  - FTEO (foam)  - FTEC (foam)   30 sec  30 sec  3 28 sec  0 sec    30 sec  30 sec  30 sec  6 02 sec   30 sec  30 sec  30 sec  30 sec (+)   30 sec  30 sec (+)  30 sec  30 sec (+)   30 sec  30 sec  30 sec  23 sec   30 sec  30 sec  30 sec  30 sec   6MWT  ft 445 ft 660 ft 750 ft 710 ft 800 ft 840 ft    ABC  70% 68 12% 52 5% 65% 68 75% 68 75%

## 2022-07-21 ENCOUNTER — OFFICE VISIT (OUTPATIENT)
Dept: PHYSICAL THERAPY | Facility: CLINIC | Age: 77
End: 2022-07-21
Payer: COMMERCIAL

## 2022-07-21 DIAGNOSIS — R26.89 BALANCE DISORDER: ICD-10-CM

## 2022-07-21 DIAGNOSIS — R26.9 GAIT DISORDER: ICD-10-CM

## 2022-07-21 DIAGNOSIS — R29.6 FREQUENT FALLS: Primary | ICD-10-CM

## 2022-07-21 PROCEDURE — 97112 NEUROMUSCULAR REEDUCATION: CPT

## 2022-07-21 NOTE — PROGRESS NOTES
Daily Note       Insurance:  AMA/CMS Eval/ Re-eval POC expires Jose Donaldson #/ Referral # Totalvisits Start date  Expiration date Extension  Visit limitation? PT only or  PT+OT? Co-Insurance   [employer] Medicare  CMS   PT No, 0$ copay                    Approved 99 units 5/15 11/11/22        722                  APPROVED Date         Units: 99 units Used 3 3 3 3 1 1 1        Authed:  Remaining  96 93 90 87 86 85 84              GOES BY "PENELOPE"    Today's date: 2022  Patient name: Cameron Stover  : 1945  MRN: [id number]  Referring provider: Freya Davidson*  Dx:   Encounter Diagnosis     ICD-10-CM    1  Frequent falls  R29 6    2  Balance disorder  R26 89    3  Gait disorder  R26 9                   Subjective: Patient reports to PT session with complaints of 3/10 (L) sided flank pain  States she received a prescription for pain medication from her rheumatologist but has not yet taken it  Objective: See treatment diary below    NMR (2# ankle weights donned):  - STS from standard chair + airex pad 2 x 10  - Seated Pball flexion + Lateral flexion: 15 reps ea direction  - Standing hip 3-way 2 x 10; 3 sec iso hold  - FWD/LAT step ups (6") x 10 each  - Side stepping 2 foam beams x 2 cycles down/back    Assessment: Patient tolerated treatment fairly today with with focus on mobility and LE strength due to a recent prolonged break from PT services  Patient presented with fatigue and general malaise this date, thus requiring frequent seated rest breaks  Continues to significantly compensate for weakened gluteal musculature with forward trunk flexion  Patient will continue to benefit from skilled outpatient PT in order to address deficits in strength, balance, and gait in order to maximize functional mobility at home and reduce overall risk for falls  Plan: Continue per plan of care  Progress treatment as tolerated             Outcome Measures Initial Eval  11/15/2021 DN  11/22/2021 PN  12/15/2021 RE  1- PN  2/24/2022 PN  3/28/2022 PN  4/27/22   5xSTS 17 94 sec  25 12 sec (2 UE pushoff) 19 59 sec (2 UE) 17 34 sec (1 airex, no UE) 15 93 sec (1 airex, no UE) 16 01 sec (1 airex, no UE)   TUG 22 75 sec  18 18 sec 14 61 sec 14 35 sec 13 25 sec 13 30 sec   10 meter m/s 0 59 m/s 0 63 m/s 0 80 m/s 0 73 m/s 0 78 m/s 0 82 m/s   HOLGUIN 36/56  41/56 43/56 44/56 46/56 47/56   FGA /30   14/30 16/30 16/30 19/30   DGI /24         mCTSIB  - FTEO (firm)  - FTEC (firm)  - FTEO (foam)  - FTEC (foam)   30 sec  30 sec  3 28 sec  0 sec    30 sec  30 sec  30 sec  6 02 sec   30 sec  30 sec  30 sec  30 sec (+)   30 sec  30 sec (+)  30 sec  30 sec (+)   30 sec  30 sec  30 sec  23 sec   30 sec  30 sec  30 sec  30 sec   6MWT  ft 445 ft 660 ft 750 ft 710 ft 800 ft 840 ft    ABC  70% 68 12% 52 5% 65% 68 75% 68 75%

## 2022-07-29 ENCOUNTER — OFFICE VISIT (OUTPATIENT)
Dept: PHYSICAL THERAPY | Facility: CLINIC | Age: 77
End: 2022-07-29
Payer: COMMERCIAL

## 2022-07-29 DIAGNOSIS — R29.6 FREQUENT FALLS: Primary | ICD-10-CM

## 2022-07-29 DIAGNOSIS — R26.9 GAIT DISORDER: ICD-10-CM

## 2022-07-29 DIAGNOSIS — R26.89 BALANCE DISORDER: ICD-10-CM

## 2022-07-29 PROCEDURE — 97112 NEUROMUSCULAR REEDUCATION: CPT

## 2022-07-29 NOTE — PROGRESS NOTES
Daily Note       Insurance:  AMA/CMS Eval/ Re-eval POC expires Queenie Daughters #/ Referral # Totalvisits Start date  Expiration date Extension  Visit limitation? PT only or  PT+OT? Co-Insurance   [employer] Medicare  CMS   PT No, 0$ copay                    Approved 99 units 5/15 11/11/22        722                  APPROVED Date        Units: 99 units Used 3 3 3 3 1 1 1 3       Authed:  Remaining  96 93 90 87 86 85 84 81             GOES BY "PENELOPE"    Today's date: 2022  Patient name: Valeri Lucero  : 1945  MRN: [id number]  Referring provider: Roselia Miller*  Dx:   Encounter Diagnosis     ICD-10-CM    1  Frequent falls  R29 6    2  Balance disorder  R26 89    3  Gait disorder  R26 9                   Subjective: Patient reports to PT session with complaints of 3/10 (L) sided flank pain  States she is worried about her dog since she forgot to register him  Patient arrives 8 mins late this date  Objective: See treatment diary below    NMR (2# ankle weights donned):  - STS from standard chair + airex pad 2 x 10  - Stepping on/off Foam pad: 10 reps L/R side leading ea, 0-1 UE  - FWD hurdles 6'' height (5) hurdles: 2 cycles down/back, 0-1 UE  - LAT hurdles 6'' height (5) hurdles: 2 cycles down/back, haptic touch  - FWD tandem in // bars: 3 cycles down/back  - Side stepping on foam beams: 2 cycles down/back, haptic touch  - Step ups on 6'' step: 20 reps ea side, 0-1 UE    Assessment: Patient tolerated treatment fairly today with with focus on mobility and general balance  Patient demonstrates increased need for UE support with a longer duration of an activity  This was evident when she performed stepping to/from an unstable surface, the first few repetitons she was able to complete with 0 UE support, however at the end required 2 UE   Patient will continue to benefit from skilled outpatient PT in order to address deficits in strength, balance, and gait in order to maximize functional mobility at home and reduce overall risk for falls  Plan: Continue per plan of care  Progress treatment as tolerated             Outcome Measures PN  5/25/2022 PN  7/6/2022       5xSTS 14 49 sec 21 32 sec w/ 2 UE pushoff       TUG  - Regular    - Cognitive     13 04 sec no AD     16 01 sec no AD  17 20 sec no AD       10 meter 0 80 m/s defer       HOLGUIN 49/56 41/56       FGA 20/30 defer       mCTSIB  - FTEO (firm)  - FTEC (firm)  - FTEO (foam)  - FTEC (foam)   30 sec  30 sec  30 sec  30 sec   30 sec  18 sec  30 sec  3 sec       6MWT 840 ft defer       ABC Scale 78 13% 69 38%

## 2022-08-02 ENCOUNTER — OFFICE VISIT (OUTPATIENT)
Dept: PHYSICAL THERAPY | Facility: CLINIC | Age: 77
End: 2022-08-02
Payer: COMMERCIAL

## 2022-08-02 DIAGNOSIS — R26.89 BALANCE DISORDER: ICD-10-CM

## 2022-08-02 DIAGNOSIS — R29.6 FREQUENT FALLS: Primary | ICD-10-CM

## 2022-08-02 PROCEDURE — 97112 NEUROMUSCULAR REEDUCATION: CPT

## 2022-08-02 NOTE — PROGRESS NOTES
Daily Note       Insurance:  AMA/CMS Eval/ Re-eval POC expires Doc Flatten #/ Referral # Totalvisits Start date  Expiration date Extension  Visit limitation? PT only or  PT+OT? Co-Insurance   [employer] Medicare  CMS   PT No, 0$ copay                    Approved 99 units 5/15 11/11/22        722                  APPROVED Date       Units: 99 units Used 3 3 3 3 1 1 1 3 3      Authed:  Remaining  96 93 90 87 86 85 84 81 78            GOES BY "PENELOPE"    Today's date: 2022  Patient name: Rafael Pritchett  : 1945  MRN: [id number]  Referring provider: Mimi Vizcarra  Dx:   Encounter Diagnosis     ICD-10-CM    1  Frequent falls  R29 6    2  Balance disorder  R26 89                   Subjective: Patient reports to PT session with complaints of some left sided flank pain  She also reports an instance of lightheadedness which she does not have currently  However discussed monitoring BP at home  Objective: See treatment diary below    TA  - BP: 102/62 mmHg    NMR (2# ankle weights donned):  - STS from standard chair + airex pad 2 x 10  - FWD Marching with walking x 40 ft 2 cycles CGA  - FWD/BCK walking with ball toss x 100 ft x 2 cycles  - Walking Weave + facility dog weave x 15 cycles    Assessment: Patient tolerated treatment fairly today with with focus on mobility and general balance  Patient continues to require frequent rest breaks during session, but was able to complete more dynamic balance focused activities  Patient does require CS-CGA to complete most dynamic balance tasks  Patient will continue to benefit from skilled outpatient PT in order to address deficits in strength, balance, and gait in order to maximize functional mobility at home and reduce overall risk for falls  Plan: Continue per plan of care  Progress treatment as tolerated             Outcome Measures PN  2022 PN  2022       5xSTS 14 49 sec 21 32 sec w/ 2 UE pushoff       TUG  - Regular    - Cognitive     13 04 sec no AD     16 01 sec no AD  17 20 sec no AD       10 meter 0 80 m/s defer       HOLGUIN 49/56 41/56       FGA 20/30 defer       mCTSIB  - FTEO (firm)  - FTEC (firm)  - FTEO (foam)  - FTEC (foam)   30 sec  30 sec  30 sec  30 sec   30 sec  18 sec  30 sec  3 sec       6MWT 840 ft defer       ABC Scale 78 13% 69 38%

## 2022-08-11 ENCOUNTER — APPOINTMENT (OUTPATIENT)
Dept: PHYSICAL THERAPY | Facility: CLINIC | Age: 77
End: 2022-08-11
Payer: COMMERCIAL

## 2022-08-16 ENCOUNTER — APPOINTMENT (OUTPATIENT)
Dept: PHYSICAL THERAPY | Facility: CLINIC | Age: 77
End: 2022-08-16
Payer: COMMERCIAL

## 2022-08-18 ENCOUNTER — APPOINTMENT (OUTPATIENT)
Dept: PHYSICAL THERAPY | Facility: CLINIC | Age: 77
End: 2022-08-18
Payer: COMMERCIAL

## 2022-08-23 ENCOUNTER — TELEPHONE (OUTPATIENT)
Dept: OBGYN CLINIC | Facility: HOSPITAL | Age: 77
End: 2022-08-23

## 2022-08-23 ENCOUNTER — APPOINTMENT (OUTPATIENT)
Dept: PHYSICAL THERAPY | Facility: CLINIC | Age: 77
End: 2022-08-23
Payer: COMMERCIAL

## 2022-08-23 NOTE — TELEPHONE ENCOUNTER
Patient had fall, can not attend tomorrows appt, told she can not drive for 2 weeks, please call to r/s   Thanks

## 2022-08-24 ENCOUNTER — APPOINTMENT (OUTPATIENT)
Dept: LAB | Facility: HOSPITAL | Age: 77
End: 2022-08-24
Payer: COMMERCIAL

## 2022-08-24 PROCEDURE — 84165 PROTEIN E-PHORESIS SERUM: CPT | Performed by: PATHOLOGY

## 2022-08-25 ENCOUNTER — APPOINTMENT (OUTPATIENT)
Dept: PHYSICAL THERAPY | Facility: CLINIC | Age: 77
End: 2022-08-25
Payer: COMMERCIAL

## 2022-08-30 ENCOUNTER — APPOINTMENT (OUTPATIENT)
Dept: PHYSICAL THERAPY | Facility: CLINIC | Age: 77
End: 2022-08-30
Payer: COMMERCIAL

## 2022-09-19 NOTE — PROGRESS NOTES
Spoke with patient over phone and patient agreed to d/c at this time due to chronically missing appointments and other primary medical issues

## 2022-09-22 ENCOUNTER — HOSPITAL ENCOUNTER (OUTPATIENT)
Dept: RADIOLOGY | Facility: HOSPITAL | Age: 77
Discharge: HOME | End: 2022-09-22
Attending: INTERNAL MEDICINE
Payer: COMMERCIAL

## 2022-09-22 DIAGNOSIS — R10.9 ABDOMINAL PAIN, UNSPECIFIED ABDOMINAL LOCATION: ICD-10-CM

## 2022-09-22 PROCEDURE — 74150 CT ABDOMEN W/O CONTRAST: CPT

## 2022-09-22 RX ADMIN — IOHEXOL 50 ML: 240 INJECTION, SOLUTION INTRATHECAL; INTRAVASCULAR; INTRAVENOUS; ORAL at 13:37

## 2022-09-30 ENCOUNTER — TELEPHONE (OUTPATIENT)
Dept: SURGICAL ONCOLOGY | Facility: CLINIC | Age: 77
End: 2022-09-30

## 2022-09-30 NOTE — TELEPHONE ENCOUNTER
09/30/22  Intake received  [employer] Medicare PPO [facility] REP   [place] PPO [id number]  Deductible/ MET  OOP/ $1,000   MET $675  No outreach needed

## 2022-10-06 ENCOUNTER — PATIENT OUTREACH (OUTPATIENT)
Dept: HEMATOLOGY ONCOLOGY | Facility: CLINIC | Age: 77
End: 2022-10-06

## 2022-10-06 NOTE — PROGRESS NOTES
Intake received/ Chart reviewed: 10/6/22  Pathology completed: N/A  Imaging completed: 9/22/22 CT ABN    All records needed are in patients chart  No records retrieval needed at this time  Please review notes below regarding request for PET/CT and Bx    ----- Message -----   From: Wilman Hale MD   Sent: 10/6/2022   2:54 PM EDT   To: Mayra Aschoff, RN   Subject: RE: Additional imaging                           Thanks for being so proactive but I think at this point, we might as well just wait and see the patient  Luz Chávez can see the patient and then make decisions regarding a biopsy and then PET versus CT/bone scan etc       ----- Message -----   From: Mayra Aschoff, RN   Sent: 10/6/2022  10:04 AM EDT   To: Lenin Oakes RN, Marisela Leblanc MA, *   Subject: FW: Additional imaging                           Hi Dr Mahesh Brody, I'm covering [facility] today  Luz Chávez saw your response regarding PET  Vancela Fredy is scheduled to see you 10/20/22   Would you like a CT Chest and Bone scan ordered? Only imaging is CT abd  No pelvis or chest   Want you to have as much information as possible for the visit   Please advise  Thank you! Brendan Jordan RN OCN   ----- Message -----   From: Marisela Leblanc MA   Sent: 10/6/2022   9:34 AM EDT   To: Mayra Aschoff, RN   Subject: FW: Additional imaging                           Ayan Knapp, If you can please review patients chart and see notes below by Dr Mahesh Brody  Let me know how I can help       Thank you,   Rachel Pickett   ----- Message -----   From: Wilman Hale MD   Sent: 10/6/2022   7:39 AM EDT   To: Lenin Oakes RN   Subject: RE: Additional imaging                           You can place it but most insurance companies will not allow PET-CT until a tissue diagnosis   Respectfully, it may be a waste of your time and energy   Let me know if you have any other thoughts, thanks   ----- Message -----   From: Lenin Oakes RN   Sent: 10/4/2022   1:30 PM EDT   To: Marisela Leblanc MA, Wilman Hale MD, * Subject: Additional imaging                               Hi Dr Salazar Doctor will be seeing the above patient in consult on 10/20/2022   CT abdomen on 9/22/2022 demonstrated numerous lung nodules, concerning for metastasis and 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  Referring provider is from DR JACE PROCTOR [facility] and is deferring further work up to you  Leonora Dixon let me know if I can be of further assistance      Thanks,   Reese

## 2022-10-19 ENCOUNTER — APPOINTMENT (EMERGENCY)
Dept: RADIOLOGY | Facility: HOSPITAL | Age: 77
End: 2022-10-19

## 2022-10-19 ENCOUNTER — HOSPITAL ENCOUNTER (INPATIENT)
Facility: HOSPITAL | Age: 77
LOS: 14 days | Discharge: NURSING, REHAB OR LONG-TERM CARE | End: 2022-11-02
Attending: EMERGENCY MEDICINE | Admitting: STUDENT IN AN ORGANIZED HEALTH CARE EDUCATION/TRAINING PROGRAM

## 2022-10-19 DIAGNOSIS — R53.1 GENERALIZED WEAKNESS: ICD-10-CM

## 2022-10-19 DIAGNOSIS — R77.8 ELEVATED TROPONIN: ICD-10-CM

## 2022-10-19 DIAGNOSIS — D64.9 ANEMIA, UNSPECIFIED TYPE: ICD-10-CM

## 2022-10-19 DIAGNOSIS — G60.8 PERIPHERAL SENSORY NEUROPATHY: ICD-10-CM

## 2022-10-19 DIAGNOSIS — E87.0 HYPERNATREMIA: ICD-10-CM

## 2022-10-19 DIAGNOSIS — E44.0 MODERATE PROTEIN-CALORIE MALNUTRITION (HCC): ICD-10-CM

## 2022-10-19 DIAGNOSIS — R53.1 WEAKNESS: Primary | ICD-10-CM

## 2022-10-19 DIAGNOSIS — G89.29 OTHER CHRONIC PAIN: ICD-10-CM

## 2022-10-19 DIAGNOSIS — E43 SEVERE PROTEIN-CALORIE MALNUTRITION (HCC): ICD-10-CM

## 2022-10-19 DIAGNOSIS — E07.9 DISEASE OF THYROID GLAND: ICD-10-CM

## 2022-10-19 DIAGNOSIS — I47.1 SVT (SUPRAVENTRICULAR TACHYCARDIA) (HCC): ICD-10-CM

## 2022-10-19 DIAGNOSIS — E78.5 DYSLIPIDEMIA: ICD-10-CM

## 2022-10-19 DIAGNOSIS — I82.621 ACUTE DEEP VEIN THROMBOSIS (DVT) OF OTHER VEIN OF RIGHT UPPER EXTREMITY (HCC): ICD-10-CM

## 2022-10-19 DIAGNOSIS — E03.9 ACQUIRED HYPOTHYROIDISM: ICD-10-CM

## 2022-10-19 DIAGNOSIS — M54.16 RADICULOPATHY OF LUMBAR REGION: ICD-10-CM

## 2022-10-19 DIAGNOSIS — M77.41 METATARSALGIA OF BOTH FEET: ICD-10-CM

## 2022-10-19 DIAGNOSIS — E86.0 DEHYDRATION: ICD-10-CM

## 2022-10-19 DIAGNOSIS — K92.0 COFFEE GROUND EMESIS: ICD-10-CM

## 2022-10-19 DIAGNOSIS — M79.89 SWELLING OF RIGHT UPPER EXTREMITY: ICD-10-CM

## 2022-10-19 DIAGNOSIS — R73.09 ELEVATED GLUCOSE: ICD-10-CM

## 2022-10-19 DIAGNOSIS — I95.1 ORTHOSTATIC HYPOTENSION: ICD-10-CM

## 2022-10-19 DIAGNOSIS — K56.41 FECAL IMPACTION (HCC): ICD-10-CM

## 2022-10-19 DIAGNOSIS — R65.10 SIRS (SYSTEMIC INFLAMMATORY RESPONSE SYNDROME) (HCC): ICD-10-CM

## 2022-10-19 DIAGNOSIS — S31.000A SACRAL WOUND: ICD-10-CM

## 2022-10-19 DIAGNOSIS — R55 RECURRENT SYNCOPE: ICD-10-CM

## 2022-10-19 DIAGNOSIS — C79.51 METASTASIS TO BONE (HCC): ICD-10-CM

## 2022-10-19 DIAGNOSIS — E78.00 HYPERCHOLESTEREMIA: ICD-10-CM

## 2022-10-19 DIAGNOSIS — R91.8 PULMONARY NODULES: ICD-10-CM

## 2022-10-19 DIAGNOSIS — C79.9 METASTATIC CANCER (HCC): ICD-10-CM

## 2022-10-19 DIAGNOSIS — B35.1 ONYCHOMYCOSIS: ICD-10-CM

## 2022-10-19 DIAGNOSIS — N17.9 ACUTE KIDNEY INJURY (HCC): ICD-10-CM

## 2022-10-19 DIAGNOSIS — M77.42 METATARSALGIA OF BOTH FEET: ICD-10-CM

## 2022-10-19 DIAGNOSIS — E87.8 ELECTROLYTE ABNORMALITY: ICD-10-CM

## 2022-10-19 PROBLEM — I10 HYPERTENSION: Status: ACTIVE | Noted: 2022-10-19

## 2022-10-19 LAB
2HR DELTA HS TROPONIN: -26 NG/L
4HR DELTA HS TROPONIN: -31 NG/L
ALBUMIN SERPL BCP-MCNC: 3.5 G/DL (ref 3.5–5)
ALP SERPL-CCNC: 195 U/L (ref 46–116)
ALT SERPL W P-5'-P-CCNC: 12 U/L (ref 12–78)
AMORPH URATE CRY URNS QL MICRO: ABNORMAL /HPF
ANION GAP SERPL CALCULATED.3IONS-SCNC: 19 MMOL/L (ref 4–13)
AST SERPL W P-5'-P-CCNC: 29 U/L (ref 5–45)
ATRIAL RATE: 115 BPM
BACTERIA UR QL AUTO: ABNORMAL /HPF
BASOPHILS # BLD AUTO: 0.05 THOUSANDS/ÂΜL (ref 0–0.1)
BASOPHILS NFR BLD AUTO: 0 % (ref 0–1)
BILIRUB SERPL-MCNC: 0.73 MG/DL (ref 0.2–1)
BILIRUB UR QL STRIP: ABNORMAL
BUN SERPL-MCNC: 61 MG/DL (ref 5–25)
CALCIUM SERPL-MCNC: 9.5 MG/DL (ref 8.3–10.1)
CARDIAC TROPONIN I PNL SERPL HS: 109 NG/L
CARDIAC TROPONIN I PNL SERPL HS: 78 NG/L
CARDIAC TROPONIN I PNL SERPL HS: 83 NG/L
CHLORIDE SERPL-SCNC: 116 MMOL/L (ref 96–108)
CK SERPL-CCNC: 118 U/L (ref 26–192)
CLARITY UR: ABNORMAL
CO2 SERPL-SCNC: 25 MMOL/L (ref 21–32)
COLOR UR: YELLOW
CREAT SERPL-MCNC: 1.68 MG/DL (ref 0.6–1.3)
EOSINOPHIL # BLD AUTO: 0.03 THOUSAND/ÂΜL (ref 0–0.61)
EOSINOPHIL NFR BLD AUTO: 0 % (ref 0–6)
ERYTHROCYTE [DISTWIDTH] IN BLOOD BY AUTOMATED COUNT: 20.4 % (ref 11.6–15.1)
GFR SERPL CREATININE-BSD FRML MDRD: 29 ML/MIN/1.73SQ M
GLUCOSE SERPL-MCNC: 163 MG/DL (ref 65–140)
GLUCOSE UR STRIP-MCNC: NEGATIVE MG/DL
HCT VFR BLD AUTO: 48.4 % (ref 34.8–46.1)
HGB BLD-MCNC: 13.7 G/DL (ref 11.5–15.4)
HGB UR QL STRIP.AUTO: NEGATIVE
IMM GRANULOCYTES # BLD AUTO: 0.17 THOUSAND/UL (ref 0–0.2)
IMM GRANULOCYTES NFR BLD AUTO: 1 % (ref 0–2)
KETONES UR STRIP-MCNC: ABNORMAL MG/DL
LACTATE SERPL-SCNC: 3 MMOL/L (ref 0.5–2)
LACTATE SERPL-SCNC: 3.5 MMOL/L (ref 0.5–2)
LACTATE SERPL-SCNC: 4 MMOL/L (ref 0.5–2)
LACTATE SERPL-SCNC: 5.3 MMOL/L (ref 0.5–2)
LEUKOCYTE ESTERASE UR QL STRIP: ABNORMAL
LYMPHOCYTES # BLD AUTO: 2.59 THOUSANDS/ÂΜL (ref 0.6–4.47)
LYMPHOCYTES NFR BLD AUTO: 14 % (ref 14–44)
MAGNESIUM SERPL-MCNC: 2.5 MG/DL (ref 1.6–2.6)
MCH RBC QN AUTO: 22.3 PG (ref 26.8–34.3)
MCHC RBC AUTO-ENTMCNC: 28.3 G/DL (ref 31.4–37.4)
MCV RBC AUTO: 79 FL (ref 82–98)
MONOCYTES # BLD AUTO: 0.87 THOUSAND/ÂΜL (ref 0.17–1.22)
MONOCYTES NFR BLD AUTO: 5 % (ref 4–12)
NEUTROPHILS # BLD AUTO: 14.77 THOUSANDS/ÂΜL (ref 1.85–7.62)
NEUTS SEG NFR BLD AUTO: 80 % (ref 43–75)
NITRITE UR QL STRIP: NEGATIVE
NON-SQ EPI CELLS URNS QL MICRO: ABNORMAL /HPF
NRBC BLD AUTO-RTO: 0 /100 WBCS
P AXIS: 83 DEGREES
PH UR STRIP.AUTO: 6 [PH]
PLATELET # BLD AUTO: 546 THOUSANDS/UL (ref 149–390)
PMV BLD AUTO: 10.3 FL (ref 8.9–12.7)
POTASSIUM SERPL-SCNC: 3 MMOL/L (ref 3.5–5.3)
PR INTERVAL: 114 MS
PROCALCITONIN SERPL-MCNC: 0.28 NG/ML
PROT SERPL-MCNC: 8.2 G/DL (ref 6.4–8.4)
PROT UR STRIP-MCNC: ABNORMAL MG/DL
QRS AXIS: 73 DEGREES
QRSD INTERVAL: 80 MS
QT INTERVAL: 376 MS
QTC INTERVAL: 520 MS
RBC # BLD AUTO: 6.14 MILLION/UL (ref 3.81–5.12)
RBC #/AREA URNS AUTO: ABNORMAL /HPF
SARS-COV-2 RNA RESP QL NAA+PROBE: NEGATIVE
SODIUM SERPL-SCNC: 160 MMOL/L (ref 135–147)
SP GR UR STRIP.AUTO: 1.02 (ref 1–1.03)
T WAVE AXIS: 54 DEGREES
UROBILINOGEN UR QL STRIP.AUTO: 1 E.U./DL
VENTRICULAR RATE: 115 BPM
WBC # BLD AUTO: 18.48 THOUSAND/UL (ref 4.31–10.16)
WBC #/AREA URNS AUTO: ABNORMAL /HPF

## 2022-10-19 PROCEDURE — 5A2204Z RESTORATION OF CARDIAC RHYTHM, SINGLE: ICD-10-PCS | Performed by: EMERGENCY MEDICINE

## 2022-10-19 RX ORDER — DEXTROSE AND POTASSIUM CHLORIDE 5; .15 G/100ML; G/100ML
125 SOLUTION INTRAVENOUS CONTINUOUS
Status: DISCONTINUED | OUTPATIENT
Start: 2022-10-19 | End: 2022-10-19

## 2022-10-19 RX ORDER — PANTOPRAZOLE SODIUM 40 MG/1
40 TABLET, DELAYED RELEASE ORAL
Status: DISCONTINUED | OUTPATIENT
Start: 2022-10-20 | End: 2022-10-20

## 2022-10-19 RX ORDER — DILTIAZEM HYDROCHLORIDE 5 MG/ML
10 INJECTION INTRAVENOUS ONCE
Status: COMPLETED | OUTPATIENT
Start: 2022-10-19 | End: 2022-10-19

## 2022-10-19 RX ORDER — TRAMADOL HYDROCHLORIDE 50 MG/1
25 TABLET ORAL EVERY 6 HOURS PRN
Status: DISCONTINUED | OUTPATIENT
Start: 2022-10-19 | End: 2022-10-20

## 2022-10-19 RX ORDER — HEPARIN SODIUM 5000 [USP'U]/ML
5000 INJECTION, SOLUTION INTRAVENOUS; SUBCUTANEOUS EVERY 8 HOURS SCHEDULED
Status: DISCONTINUED | OUTPATIENT
Start: 2022-10-19 | End: 2022-10-20

## 2022-10-19 RX ORDER — OXYBUTYNIN CHLORIDE 5 MG/1
5 TABLET, EXTENDED RELEASE ORAL DAILY
Status: DISCONTINUED | OUTPATIENT
Start: 2022-10-20 | End: 2022-11-02 | Stop reason: HOSPADM

## 2022-10-19 RX ORDER — SACCHAROMYCES BOULARDII 250 MG
250 CAPSULE ORAL 2 TIMES DAILY
Status: DISCONTINUED | OUTPATIENT
Start: 2022-10-19 | End: 2022-11-02 | Stop reason: HOSPADM

## 2022-10-19 RX ORDER — DULOXETIN HYDROCHLORIDE 30 MG/1
30 CAPSULE, DELAYED RELEASE ORAL DAILY
Status: DISCONTINUED | OUTPATIENT
Start: 2022-10-20 | End: 2022-11-02 | Stop reason: HOSPADM

## 2022-10-19 RX ORDER — CEFAZOLIN SODIUM 1 G/50ML
1000 SOLUTION INTRAVENOUS EVERY 8 HOURS
Status: DISCONTINUED | OUTPATIENT
Start: 2022-10-20 | End: 2022-10-23

## 2022-10-19 RX ORDER — ONDANSETRON 2 MG/ML
4 INJECTION INTRAMUSCULAR; INTRAVENOUS EVERY 6 HOURS PRN
Status: DISCONTINUED | OUTPATIENT
Start: 2022-10-19 | End: 2022-10-25

## 2022-10-19 RX ORDER — ACETAMINOPHEN 325 MG/1
650 TABLET ORAL EVERY 6 HOURS PRN
Status: DISCONTINUED | OUTPATIENT
Start: 2022-10-19 | End: 2022-11-02 | Stop reason: HOSPADM

## 2022-10-19 RX ORDER — HYDROXYCHLOROQUINE SULFATE 200 MG/1
200 TABLET, FILM COATED ORAL 2 TIMES DAILY
Status: DISCONTINUED | OUTPATIENT
Start: 2022-10-19 | End: 2022-10-19

## 2022-10-19 RX ORDER — MELATONIN
1000 DAILY
Status: DISCONTINUED | OUTPATIENT
Start: 2022-10-20 | End: 2022-11-02 | Stop reason: HOSPADM

## 2022-10-19 RX ORDER — FOLIC ACID 1 MG/1
1 TABLET ORAL DAILY
Status: DISCONTINUED | OUTPATIENT
Start: 2022-10-20 | End: 2022-11-02 | Stop reason: HOSPADM

## 2022-10-19 RX ORDER — DEXTROSE, SODIUM CHLORIDE, AND POTASSIUM CHLORIDE 5; .45; .15 G/100ML; G/100ML; G/100ML
125 INJECTION INTRAVENOUS CONTINUOUS
Status: DISCONTINUED | OUTPATIENT
Start: 2022-10-19 | End: 2022-10-20

## 2022-10-19 RX ORDER — ATORVASTATIN CALCIUM 40 MG/1
40 TABLET, FILM COATED ORAL
Status: DISCONTINUED | OUTPATIENT
Start: 2022-10-20 | End: 2022-11-01

## 2022-10-19 RX ORDER — DICLOFENAC SODIUM 75 MG/1
75 TABLET, DELAYED RELEASE ORAL 2 TIMES DAILY
COMMUNITY
End: 2022-11-02

## 2022-10-19 RX ORDER — POTASSIUM CHLORIDE 20MEQ/15ML
40 LIQUID (ML) ORAL ONCE
Status: COMPLETED | OUTPATIENT
Start: 2022-10-19 | End: 2022-10-20

## 2022-10-19 RX ORDER — CEFEPIME HYDROCHLORIDE 2 G/50ML
2000 INJECTION, SOLUTION INTRAVENOUS ONCE
Status: COMPLETED | OUTPATIENT
Start: 2022-10-19 | End: 2022-10-19

## 2022-10-19 RX ORDER — SODIUM CHLORIDE, SODIUM GLUCONATE, SODIUM ACETATE, POTASSIUM CHLORIDE, MAGNESIUM CHLORIDE, SODIUM PHOSPHATE, DIBASIC, AND POTASSIUM PHOSPHATE .53; .5; .37; .037; .03; .012; .00082 G/100ML; G/100ML; G/100ML; G/100ML; G/100ML; G/100ML; G/100ML
500 INJECTION, SOLUTION INTRAVENOUS ONCE
Status: DISCONTINUED | OUTPATIENT
Start: 2022-10-19 | End: 2022-10-19

## 2022-10-19 RX ORDER — POTASSIUM CHLORIDE 14.9 MG/ML
20 INJECTION INTRAVENOUS ONCE
Status: COMPLETED | OUTPATIENT
Start: 2022-10-19 | End: 2022-10-19

## 2022-10-19 RX ORDER — LEVOTHYROXINE SODIUM 0.05 MG/1
50 TABLET ORAL
Status: DISCONTINUED | OUTPATIENT
Start: 2022-10-20 | End: 2022-10-20

## 2022-10-19 RX ORDER — TRAMADOL HYDROCHLORIDE 50 MG/1
50 TABLET ORAL EVERY 12 HOURS PRN
COMMUNITY
End: 2022-11-02

## 2022-10-19 RX ORDER — CEFAZOLIN SODIUM 1 G/50ML
1000 SOLUTION INTRAVENOUS EVERY 8 HOURS
Status: DISCONTINUED | OUTPATIENT
Start: 2022-10-19 | End: 2022-10-19

## 2022-10-19 RX ORDER — HYDROMORPHONE HCL/PF 1 MG/ML
0.2 SYRINGE (ML) INJECTION EVERY 6 HOURS PRN
Status: DISCONTINUED | OUTPATIENT
Start: 2022-10-19 | End: 2022-11-01

## 2022-10-19 RX ADMIN — POTASSIUM CHLORIDE 20 MEQ: 14.9 INJECTION, SOLUTION INTRAVENOUS at 20:38

## 2022-10-19 RX ADMIN — SODIUM CHLORIDE, SODIUM LACTATE, POTASSIUM CHLORIDE, AND CALCIUM CHLORIDE 500 ML: .6; .31; .03; .02 INJECTION, SOLUTION INTRAVENOUS at 23:13

## 2022-10-19 RX ADMIN — SODIUM CHLORIDE 500 ML: 0.9 INJECTION, SOLUTION INTRAVENOUS at 17:04

## 2022-10-19 RX ADMIN — DILTIAZEM HYDROCHLORIDE 10 MG: 5 INJECTION INTRAVENOUS at 16:53

## 2022-10-19 RX ADMIN — SODIUM CHLORIDE 1000 ML: 0.9 INJECTION, SOLUTION INTRAVENOUS at 16:04

## 2022-10-19 RX ADMIN — CEFEPIME HYDROCHLORIDE 2000 MG: 2 INJECTION, SOLUTION INTRAVENOUS at 16:26

## 2022-10-19 NOTE — ED NOTES
Pt  Noted dried feces and unstagable pressure wounds noted on buttocks, sacrum, labia       Gaurilinrenata Pressley RN  10/19/22 0350

## 2022-10-19 NOTE — ED PROVIDER NOTES
History  Chief Complaint   Patient presents with   • Weakness - Generalized     Patient arrives via ALS  States PD did a welfare check and found patient on the floor  Patient states she has increasing generalized weakness for about two weeks  Hasn't been taking her meds or eating/drinking  C/o constipation x several weeks and abdominal pain x 1 week  25-year-old female, presents from home after being found on ground  Patient states she went on the ground to look for something, fell and could not get up  Believe she was on ground for about 2 hours  Please department called by family and found patient on floor, brought to hospital by EMS  Patient states that she has had generalized weakness for several days with decreased appetite, has not been eating or drinking much  Denies any fevers  Denies any headache, chest pain, shortness of breath, abdominal pain, vomiting, or diarrhea  Patient currently being evaluated for possible metastatic cancer  History provided by:  Patient   used: No        Prior to Admission Medications   Prescriptions Last Dose Informant Patient Reported? Taking? DULoxetine (CYMBALTA) 30 mg delayed release capsule   Yes No   Sig: Take 1 capsule by mouth daily   Levothyroxine Sodium (SYNTHROID PO)  Self Yes No   Sig: Take by mouth daily Indications: unsure of dosage     cholecalciferol (VITAMIN D3) 1,000 units tablet  Self Yes No   Sig: Take 1,000 Units by mouth daily   ciclopirox (PENLAC) 8 % solution  Self No No   Sig: Apply topically daily at bedtime   Patient not taking: Reported on 11/20/2019   esomeprazole (NexIUM) 40 MG capsule  Self Yes No   Sig: Take 40 mg by mouth every morning before breakfast    folic acid (FOLVITE) 1 mg tablet  Self Yes No   gabapentin (NEURONTIN) 300 mg capsule   No No   Sig: Take 1 capsule (300 mg total) by mouth 2 (two) times a day   hydroxychloroquine (PLAQUENIL) 200 mg tablet  Self Yes No   ketoconazole (NIZORAL) 2 % cream No No   Sig: Apply topically daily   methotrexate 2 5 mg tablet   Yes No   Sig: Take 12 5 mg by mouth   rosuvastatin (CRESTOR) 40 MG tablet  Self Yes No   Sig: Take 20 mg by mouth daily     solifenacin (VESICARE) 5 mg tablet   Yes No   Sig: Take 1 tablet by mouth daily      Facility-Administered Medications: None       Past Medical History:   Diagnosis Date   • Disease of thyroid gland    • Hypercholesteremia    • Hypertension        Past Surgical History:   Procedure Laterality Date   • APPENDECTOMY     • BREAST BIOPSY Right     benign   • CHOLECYSTECTOMY     • COLON SURGERY      ruptured bowel   • COLOSTOMY      and closure 6 months later   • HYSTERECTOMY      total   • KNEE ARTHROSCOPY Bilateral     right x1 left x2   • NC COLSC FLX W/RMVL OF TUMOR POLYP LESION SNARE TQ N/A 5/3/2016    Procedure: COLONOSCOPY ;  Surgeon: Josy Parks MD;  Location: [facility] GI [facility]; Service: Gastroenterology   • TONSILECTOMY AND ADNOIDECTOMY     • TYMPANOPLASTY Right        Family History   Problem Relation Age of Onset   • Breast cancer Paternal Aunt 80     I have reviewed and agree with the history as documented  E-Cigarette/Vaping     E-Cigarette/Vaping Substances     Social History     Tobacco Use   • Smoking status: Former Smoker     Years: 20      Quit date:      Years since quittin 8   • Smokeless tobacco: Never Used   Substance Use Topics   • Alcohol use: No   • Drug use: No       Review of Systems   Constitutional: Positive for appetite change and fatigue  Negative for fever  HENT: Negative  Eyes: Negative  Respiratory: Negative  Negative for shortness of breath  Cardiovascular: Negative  Negative for chest pain  Gastrointestinal: Negative for abdominal pain, diarrhea, nausea and vomiting  Genitourinary: Negative  Musculoskeletal: Negative  Skin: Negative  Neurological: Negative for syncope and headaches  Hematological: Negative          Physical Exam  Physical Exam  Vitals and nursing note reviewed  Constitutional:       General: She is not in acute distress  Comments: cachectic   HENT:      Head: Normocephalic and atraumatic  Mouth/Throat:      Mouth: Mucous membranes are dry  Pharynx: Oropharynx is clear  Eyes:      Extraocular Movements: Extraocular movements intact  Pupils: Pupils are equal, round, and reactive to light  Cardiovascular:      Rate and Rhythm: Regular rhythm  Tachycardia present  Pulmonary:      Effort: Pulmonary effort is normal       Breath sounds: Normal breath sounds  Abdominal:      General: There is no distension  Palpations: Abdomen is soft  Tenderness: There is no abdominal tenderness  Musculoskeletal:         General: Normal range of motion  Cervical back: Normal range of motion and neck supple  No tenderness  Skin:     General: Skin is dry  Neurological:      General: No focal deficit present  Mental Status: She is alert and oriented to person, place, and time  Motor: No weakness           Vital Signs  ED Triage Vitals   Temperature Pulse Respirations Blood Pressure SpO2   10/19/22 1519 10/19/22 1507 10/19/22 1507 10/19/22 1455 10/19/22 1507   (!) 95 6 °F (35 3 °C) (!) 115 (!) 25 91/52 (!) 82 %      Temp Source Heart Rate Source Patient Position - Orthostatic VS BP Location FiO2 (%)   10/19/22 1519 10/19/22 1700 10/19/22 1507 10/19/22 1507 --   Tympanic Monitor Sitting Right arm       Pain Score       10/19/22 1507       6           Vitals:    10/19/22 1725 10/19/22 1735 10/19/22 1745 10/19/22 1755   BP: 128/63 135/73 113/66 123/62   Pulse: 92 92 96 96   Patient Position - Orthostatic VS:             Visual Acuity      ED Medications  Medications   sodium chloride 0 9 % bolus 1,000 mL (0 mL Intravenous Stopped 10/19/22 1704)   cefepime (MAXIPIME) IVPB (premix in dextrose) 2,000 mg 50 mL (0 mg Intravenous Stopped 10/19/22 1638)   sodium chloride 0 9 % bolus 500 mL (0 mL Intravenous Stopped 10/19/22 1804)   diltiazem (CARDIZEM) injection 10 mg (10 mg Intravenous Given 10/19/22 1653)       Diagnostic Studies  Results Reviewed     Procedure Component Value Units Date/Time    HS Troponin I 2hr [[id number]] Collected: 10/19/22 1813    Lab Status: In process Specimen: Blood from Arm, Right Updated: 10/19/22 1818    Lactic acid 2 Hours [[id number]] Collected: 10/19/22 1813    Lab Status: In process Specimen: Blood from Arm, Right Updated: 10/19/22 [address] only [[id number]]  (Normal) Collected: 10/19/22 1540    Lab Status: Final result Specimen: Nares from Nasopharyngeal Swab Updated: 10/19/22 1634     SARS-CoV-2 Negative    Narrative:      FOR PEDIATRIC PATIENTS - copy/paste COVID Guidelines URL to browser: [email or web address]    SARS-CoV-2 assay is a Nucleic Acid Amplification assay intended for the  qualitative detection of nucleic acid from SARS-CoV-2 in nasopharyngeal  swabs  Results are for the presumptive identification of SARS-CoV-2 RNA  Positive results are indicative of infection with SARS-CoV-2, the virus  causing COVID-19, but do not rule out bacterial infection or co-infection  with other viruses  Laboratories within the [place] and its  territories are required to report all positive results to the appropriate  public health authorities  Negative results do not preclude SARS-CoV-2  infection and should not be used as the sole basis for treatment or other  patient management decisions  Negative results must be combined with  clinical observations, patient history, and epidemiological information  This test has not been FDA cleared or approved  This test has been authorized by FDA under an Emergency Use Authorization  (EUA)   This test is only authorized for the duration of time the  declaration that circumstances exist justifying the authorization of the  emergency use of an in vitro diagnostic tests for detection of SARS-CoV-2  virus and/or diagnosis of COVID-19 infection under section 564(b)(1) of  the Act, 21 U  S C  094NWO-2(S)(5), unless the authorization is terminated  or revoked sooner  The test has been validated but independent review by FDA  and CLIA is pending  Test performed using [employer] GeneXpert: This RT-PCR assay targets N2,  a region unique to SARS-CoV-2  A conserved region in the E-gene was chosen  for pan-Sarbecovirus detection which includes SARS-CoV-2  According to CMS-2020-01-R, this platform meets the definition of high-throughput technology  HS Troponin 0hr (reflex protocol) [[id number]]  (Abnormal) Collected: 10/19/22 1548    Lab Status: Final result Specimen: Blood from Arm, Right Updated: 10/19/22 1624     hs TnI 0hr 109 ng/L     HS Troponin I 4hr [[id number]]     Lab Status: No result Specimen: Blood     Lactic acid [[id number]]  (Abnormal) Collected: 10/19/22 1548    Lab Status: Final result Specimen: Blood from Arm, Right Updated: 10/19/22 1618     LACTIC ACID 5 3 mmol/L     Narrative:      Result may be elevated if tourniquet was used during collection      Magnesium [[id number]]  (Normal) Collected: 10/19/22 1548    Lab Status: Final result Specimen: Blood from Arm, Right Updated: 10/19/22 1617     Magnesium 2 5 mg/dL     Comprehensive metabolic panel [[id number]]  (Abnormal) Collected: 10/19/22 1540    Lab Status: Final result Specimen: Blood from Arm, Right Updated: 10/19/22 1612     Sodium 160 mmol/L      Potassium 3 0 mmol/L      Chloride 116 mmol/L      CO2 25 mmol/L      ANION GAP 19 mmol/L      BUN 61 mg/dL      Creatinine 1 68 mg/dL      Glucose 163 mg/dL      Calcium 9 5 mg/dL      AST 29 U/L      ALT 12 U/L      Alkaline Phosphatase 195 U/L      Total Protein 8 2 g/dL      Albumin 3 5 g/dL      Total Bilirubin 0 73 mg/dL      eGFR 29 ml/min/1 73sq m     Narrative:      [place] guidelines for Chronic Kidney Disease (CKD):   •  Stage 1 with normal or high GFR (GFR > 90 mL/min/1 73 square meters)  •  Stage 2 Mild CKD (GFR = 60-89 mL/min/1 73 square meters)  •  Stage 3A Moderate CKD (GFR = 45-59 mL/min/1 73 square meters)  •  Stage 3B Moderate CKD (GFR = 30-44 mL/min/1 73 square meters)  •  Stage 4 Severe CKD (GFR = 15-29 mL/min/1 73 square meters)  •  Stage 5 End Stage CKD (GFR <15 mL/min/1 73 square meters)  Note: GFR calculation is accurate only with a steady state creatinine    Blood culture #2 [[id number]] Collected: 10/19/22 1548    Lab Status: In process Specimen: Blood from Arm, Right Updated: 10/19/22 1552    CBC and differential [[id number]]  (Abnormal) Collected: 10/19/22 1540    Lab Status: Final result Specimen: Blood from Arm, Right Updated: 10/19/22 1549     WBC 18 48 Thousand/uL      RBC 6 14 Million/uL      Hemoglobin 13 7 g/dL      Hematocrit 48 4 %      MCV 79 fL      MCH 22 3 pg      MCHC 28 3 g/dL      RDW 20 4 %      MPV 10 3 fL      Platelets 821 Thousands/uL      nRBC 0 /100 WBCs      Neutrophils Relative 80 %      Immat GRANS % 1 %      Lymphocytes Relative 14 %      Monocytes Relative 5 %      Eosinophils Relative 0 %      Basophils Relative 0 %      Neutrophils Absolute 14 77 Thousands/µL      Immature Grans Absolute 0 17 Thousand/uL      Lymphocytes Absolute 2 59 Thousands/µL      Monocytes Absolute 0 87 Thousand/µL      Eosinophils Absolute 0 03 Thousand/µL      Basophils Absolute 0 05 Thousands/µL     Blood culture #1 [[id number]] Collected: 10/19/22 1540    Lab Status: In process Specimen: Blood from Arm, Right Updated: 10/19/22 1546    UA w Reflex to Microscopic w Reflex to Culture [[id number]]     Lab Status: No result Specimen: Urine                  CT head without contrast   Final Result by Parth Herndon MD (10/19 1645)      1  No acute intracranial abnormality   2   Microangiopathic changes and progression of cerebral volume loss                        Workstation performed: [id number]         XR chest 1 view portable    (Results Pending) Procedures  ECG 12 Lead Documentation Only    Date/Time: 10/19/2022 3:43 PM  Performed by: Gisell Whitlock MD  Authorized by: Gisell Whitlock MD     ECG reviewed by me, the ED Provider: yes    Patient location:  ED  Rate:     ECG rate assessment: tachycardic    Rhythm:     Rhythm: sinus tachycardia    Ectopy:     Ectopy: none    QRS:     QRS axis:  Normal    QRS intervals:  Normal  Conduction:     Conduction: normal    ST segments:     ST segments:  Abnormal  Other findings:     Other findings: prolonged qTc interval    Comments:      Sinus tachycardia 115  Inferior lateral ST depressions, prolonged QT  Cardioversion    Date/Time: 10/19/2022 5:05 PM  Performed by: Gisell Whitlock MD  Authorized by: Gisell Whitlock MD     Emergent situation    Cardioversion basis:  Emergent  Pre-procedure rhythm:  Supraventricular tachycardia  Post-procedure rhythm:  Normal sinus rhythm  Patient tolerance:  Patient tolerated the procedure well with no immediate complications   Cardioversion performed with IV Cardizem, converted to normal sinus rhythm    CriticalCare Time  Performed by: Gisell Whitlock MD  Authorized by: Gisell Whitlock MD     Critical care provider statement:     Critical care time (minutes):  50    Critical care time was exclusive of:  Separately billable procedures and treating other patients    Critical care was necessary to treat or prevent imminent or life-threatening deterioration of the following conditions:  Circulatory failure and sepsis    Critical care was time spent personally by me on the following activities:  Obtaining history from patient or surrogate, discussions with consultants, evaluation of patient's response to treatment, examination of patient, ordering and performing treatments and interventions, ordering and review of laboratory studies, ordering and review of radiographic studies, re-evaluation of patient's condition and review of old charts             ED Course  ED Course as of 10/19/22 1850   Wed Oct 19, 2022   1631 Chest x-ray reviewed, diffuse interstitial lesions consistent with metastatic cancer  1651 Patient noted to have increasing heart rate, appears regular on monitor, rate 190, blood pressure stable  EKG done shows supraventricular tachycardia with ventricular rate of 190  Patient currently stable, will give IV Cardizem, continue monitor and re-evaluate  1704 Patient given 10 mg IV Cardizem bolus, noted to convert to normal sinus rhythm, rate in 90s on monitor  Blood pressure has remained stable since receiving IV fluids  1849 Patient has remained in normal sinus rhythm, blood pressure improved since receiving IV fluids  Patient to be admitted to medical service  Initial Sepsis Screening     Row Name 10/19/22 1847                Is the patient's history suggestive of a new or worsening infection? Yes (Proceed)  -JG        Suspected source of infection suspect infection, source unknown  -JG        Are two or more of the following signs & symptoms of infection both present and new to the patient? Yes (Proceed)  -JG        Indicate SIRS criteria Hypothermia < 36C (96 8F); Tachycardia > 90 bpm  -JG        If the answer is yes to both questions, suspicion of sepsis is present --        If severe sepsis is present AND tissue hypoperfusion perists in the hour after fluid resuscitation or lactate > 4, the patient meets criteria for SEPTIC SHOCK --        Are any of the following organ dysfunction criteria present within 6 hours of suspected infection and SIRS criteria that are NOT considered to be chronic conditions?  --        Organ dysfunction --        Date of presentation of severe sepsis --        Time of presentation of severe sepsis --        Tissue hypoperfusion persists in the hour after crystalloid fluid administration, evidenced, by either: --        Was hypotension present within one hour of the conclusion of crystalloid fluid administration? --        Date of presentation of septic shock --        Time of presentation of septic shock --              User Key  (r) = Recorded By, (t) = Taken By, (c) = Cosigned By    [address] Name Provider Type    Salvador Cuellar MD Physician              Default Flowsheet Data (last 720 hours)     Sepsis Reassess     Row Name 10/19/22 0572                   Repeat Volume Status and Tissue Perfusion Assessment Performed    Repeat Volume Status and Tissue Perfusion Assessment Performed Yes  -JG                  Volume Status and Tissue Perfusion Post Fluid Resuscitation * Must Document All *    Vital Signs Reviewed (HR, RR, BP, T) Yes  -JG        Shock Index Reviewed Yes  -JG        Arterial Oxygen Saturation Reviewed (POx, SaO2 or SpO2) Yes (comment %)  -JG        Cardio Regular rate and rhythm  -JG        Pulmonary Normal effort  -JG        Capillary Refill Brisk  -JG        Peripheral Pulses Radial  -JG        Peripheral Pulse +2  -JG        Skin Warm  -JG        Urine output assessed Adequate  -JG                  *OR*   Intensive Monitoring- Must Document One of the Following Four *:    Vital Signs Reviewed --        * Central Venous Pressure (CVP or RAP) --        * Central Venous Oxygen (SVO2, ScvO2 or Oxygen saturation via central catheter) --        * Bedside Cardiovascular US in IVC diameter and % collapse --        * Passive Leg Raise OR Crystalloid Challenge --              User Key  (r) = Recorded By, (t) = Taken By, (c) = Cosigned By    Initials Name Provider Type    Salvador Cuellar MD Physician              SBIRT 20yo+    Flowsheet Row Most Recent Value   SBIRT (23 yo +)    In order to provide better care to our patients, we are screening all of our patients for alcohol and drug use  Would it be okay to ask you these screening questions? Yes Filed at: 10/19/2022 1636   Initial Alcohol Screen: US AUDIT-C     1   How often do you have a drink containing alcohol? 0 Filed at: 10/19/2022 1636   2  How many drinks containing alcohol do you have on a typical day you are drinking? 0 Filed at: 10/19/2022 1636   3a  Male UNDER 65: How often do you have five or more drinks on one occasion? 0 Filed at: 10/19/2022 1636   3b  FEMALE Any Age, or MALE 65+: How often do you have 4 or more drinks on one occassion? 0 Filed at: 10/19/2022 1636   Audit-C Score 0 Filed at: 10/19/2022 1636   YENNY: How many times in the past year have you    Used an illegal drug or used a prescription medication for non-medical reasons? Never Filed at: 10/19/2022 1636                    MDM  Number of Diagnoses or Management Options  Diagnosis management comments: 28-year-old female, presents with generalized weakness after fall, inability to get up  Patient appears cachectic and dehydrated, hypothermic  Differential diagnosis includes sepsis, dehydration, malignancy among other diagnosis  IV fluids, imaging, labs ordered  Will continue to monitor in ED and re-evaluate         Amount and/or Complexity of Data Reviewed  Clinical lab tests: ordered and reviewed  Tests in the radiology section of CPT®: ordered and reviewed  Tests in the medicine section of CPT®: ordered and reviewed  Review and summarize past medical records: yes  Independent visualization of images, tracings, or specimens: yes        Disposition  Final diagnoses:   Weakness   Dehydration   SVT (supraventricular tachycardia) ([facility] [address] )     Time reflects when diagnosis was documented in both MDM as applicable and the Disposition within this note     Time User Action Codes Description Comment    10/19/2022  6:45 PM Shasha Alonso [R53 1] Weakness     10/19/2022  6:45 PM Clari Xavier [E86 0] Dehydration     10/19/2022  6:45 PM Shasha Watts Add [I47 1] SVT (supraventricular tachycardia) [facility])       ED Disposition     ED Disposition   Admit    Condition   Stable    Date/Time   Wed Oct 19, 2022  6:46 PM    Comment   Case was discussed with Dr Tory Pino and the patient's admission status was agreed to be Admission Status: inpatient status to the service of Dr Saul Mccallum   Follow-up Information    None         Patient's Medications   Discharge Prescriptions    No medications on file       No discharge procedures on file      PDMP Review     None          ED Provider  Electronically Signed by           Radha Duffy MD  10/19/22 9317

## 2022-10-19 NOTE — ED NOTES
Pt  Cachetic, obvious s/s failure to thrive    Pt melissa area caked with feces, unstageable wounds noted     Sheyla Carrasquillo, RN  10/19/22 Koko Gu, RN  10/19/22 1911

## 2022-10-19 NOTE — SEPSIS NOTE
Sepsis Note   Mary Ford 69 y o  female MRN: [id number]  Unit/Bed#: ED 07 Encounter: [id number]       qSOFA     [address] Name 10/19/22 1755 10/19/22 1745 10/19/22 1735 10/19/22 1725 10/19/22 1715    Altered mental status GCS < 15 -- -- -- -- --    Respiratory Rate > / =22 1 -- -- 1 0    Systolic BP < / =756 0 0 0 0 0    Q Sofa Score 1 0 0 1 0    Row Name 10/19/22 1705 10/19/22 1700 10/19/22 1658 10/19/22 1655 10/19/22 1651    Altered mental status GCS < 15 -- -- -- -- --    Respiratory Rate > / =22 1 1 -- 1 1    Systolic BP < / =201 0 0 0 0 0    Q Sofa Score 1 1 1 1 1    Row Name 10/19/22 1647 10/19/22 1645 10/19/22 1630 10/19/22 1615 10/19/22 1520    Altered mental status GCS < 15 -- -- -- -- --    Respiratory Rate > / =22 1 1 -- -- 1    Systolic BP < / =768 0 0 1 1 1    Q Sofa Score 1 1 2 2 2    Row Name 10/19/22 1507 10/19/22 1455             Altered mental status GCS < 15 -- --       Respiratory Rate > / =22 1 --       Systolic BP < / =648 1 1       Q Sofa Score 2 --                  Initial Sepsis Screening     Row Name 10/19/22 0724                Is the patient's history suggestive of a new or worsening infection? Yes (Proceed)  -JG        Suspected source of infection suspect infection, source unknown  -JG        Are two or more of the following signs & symptoms of infection both present and new to the patient? Yes (Proceed)  -JG        Indicate SIRS criteria Hypothermia < 36C (96 8F); Tachycardia > 90 bpm  -JG        If the answer is yes to both questions, suspicion of sepsis is present --        If severe sepsis is present AND tissue hypoperfusion perists in the hour after fluid resuscitation or lactate > 4, the patient meets criteria for SEPTIC SHOCK --        Are any of the following organ dysfunction criteria present within 6 hours of suspected infection and SIRS criteria that are NOT considered to be chronic conditions?  --        Organ dysfunction --        Date of presentation of severe sepsis -- Time of presentation of severe sepsis --        Tissue hypoperfusion persists in the hour after crystalloid fluid administration, evidenced, by either: --        Was hypotension present within one hour of the conclusion of crystalloid fluid administration? --        Date of presentation of septic shock --        Time of presentation of septic shock --              User Key  (r) = Recorded By, (t) = Taken By, (c) = Cosigned By    [address]  Name Provider Type    Lis Saba MD Physician                  Default Flowsheet Data (last 720 hours)     Sepsis Reassess     Row Name 10/19/22 5021                   Repeat Volume Status and Tissue Perfusion Assessment Performed    Repeat Volume Status and Tissue Perfusion Assessment Performed Yes  -JG                  Volume Status and Tissue Perfusion Post Fluid Resuscitation * Must Document All *    Vital Signs Reviewed (HR, RR, BP, T) Yes  -JG        Shock Index Reviewed Yes  -JG        Arterial Oxygen Saturation Reviewed (POx, SaO2 or SpO2) Yes (comment %)  -JG        Cardio Regular rate and rhythm  -JG        Pulmonary Normal effort  -JG        Capillary Refill Brisk  -JG        Peripheral Pulses Radial  -JG        Peripheral Pulse +2  -JG        Skin Warm  -JG        Urine output assessed Adequate  -JG                  *OR*   Intensive Monitoring- Must Document One of the Following Four *:    Vital Signs Reviewed --        * Central Venous Pressure (CVP or RAP) --        * Central Venous Oxygen (SVO2, ScvO2 or Oxygen saturation via central catheter) --        * Bedside Cardiovascular US in IVC diameter and % collapse --        * Passive Leg Raise OR Crystalloid Challenge --              User Key  (r) = Recorded By, (t) = Taken By, (c) = Cosigned By    [address]  Name Provider Type    Lis Saba MD Physician

## 2022-10-20 ENCOUNTER — TELEPHONE (OUTPATIENT)
Dept: HEMATOLOGY ONCOLOGY | Facility: CLINIC | Age: 77
End: 2022-10-20

## 2022-10-20 PROBLEM — E46 PROTEIN-CALORIE MALNUTRITION (HCC): Status: ACTIVE | Noted: 2022-10-20

## 2022-10-20 PROBLEM — R73.09 ELEVATED GLUCOSE: Status: ACTIVE | Noted: 2022-10-20

## 2022-10-20 PROBLEM — G89.29 CHRONIC PAIN: Status: ACTIVE | Noted: 2022-10-20

## 2022-10-20 PROBLEM — R91.8 PULMONARY NODULES: Status: ACTIVE | Noted: 2022-10-20

## 2022-10-20 PROBLEM — R77.8 ELEVATED TROPONIN: Status: RESOLVED | Noted: 2022-10-19 | Resolved: 2022-10-20

## 2022-10-20 LAB
ANION GAP SERPL CALCULATED.3IONS-SCNC: 12 MMOL/L (ref 4–13)
ANION GAP SERPL CALCULATED.3IONS-SCNC: 12 MMOL/L (ref 4–13)
ANION GAP SERPL CALCULATED.3IONS-SCNC: 14 MMOL/L (ref 4–13)
ANION GAP SERPL CALCULATED.3IONS-SCNC: 15 MMOL/L (ref 4–13)
ATRIAL RATE: 190 BPM
ATRIAL RATE: 94 BPM
BASOPHILS # BLD AUTO: 0.05 THOUSANDS/ÂΜL (ref 0–0.1)
BASOPHILS NFR BLD AUTO: 0 % (ref 0–1)
BUN SERPL-MCNC: 64 MG/DL (ref 5–25)
BUN SERPL-MCNC: 66 MG/DL (ref 5–25)
BUN SERPL-MCNC: 67 MG/DL (ref 5–25)
BUN SERPL-MCNC: 70 MG/DL (ref 5–25)
CALCIUM SERPL-MCNC: 8 MG/DL (ref 8.3–10.1)
CALCIUM SERPL-MCNC: 8 MG/DL (ref 8.3–10.1)
CALCIUM SERPL-MCNC: 8.1 MG/DL (ref 8.3–10.1)
CALCIUM SERPL-MCNC: 8.4 MG/DL (ref 8.3–10.1)
CHLORIDE SERPL-SCNC: 114 MMOL/L (ref 96–108)
CHLORIDE SERPL-SCNC: 116 MMOL/L (ref 96–108)
CHLORIDE SERPL-SCNC: 117 MMOL/L (ref 96–108)
CHLORIDE SERPL-SCNC: 117 MMOL/L (ref 96–108)
CK SERPL-CCNC: 98 U/L (ref 26–192)
CO2 SERPL-SCNC: 21 MMOL/L (ref 21–32)
CO2 SERPL-SCNC: 24 MMOL/L (ref 21–32)
CO2 SERPL-SCNC: 24 MMOL/L (ref 21–32)
CO2 SERPL-SCNC: 25 MMOL/L (ref 21–32)
CREAT SERPL-MCNC: 1.34 MG/DL (ref 0.6–1.3)
CREAT SERPL-MCNC: 1.34 MG/DL (ref 0.6–1.3)
CREAT SERPL-MCNC: 1.38 MG/DL (ref 0.6–1.3)
CREAT SERPL-MCNC: 1.5 MG/DL (ref 0.6–1.3)
EOSINOPHIL # BLD AUTO: 0 THOUSAND/ÂΜL (ref 0–0.61)
EOSINOPHIL NFR BLD AUTO: 0 % (ref 0–6)
ERYTHROCYTE [DISTWIDTH] IN BLOOD BY AUTOMATED COUNT: 19.9 % (ref 11.6–15.1)
EST. AVERAGE GLUCOSE BLD GHB EST-MCNC: 111 MG/DL
GFR SERPL CREATININE-BSD FRML MDRD: 33 ML/MIN/1.73SQ M
GFR SERPL CREATININE-BSD FRML MDRD: 37 ML/MIN/1.73SQ M
GFR SERPL CREATININE-BSD FRML MDRD: 38 ML/MIN/1.73SQ M
GFR SERPL CREATININE-BSD FRML MDRD: 38 ML/MIN/1.73SQ M
GLUCOSE SERPL-MCNC: 139 MG/DL (ref 65–140)
GLUCOSE SERPL-MCNC: 150 MG/DL (ref 65–140)
GLUCOSE SERPL-MCNC: 171 MG/DL (ref 65–140)
GLUCOSE SERPL-MCNC: 183 MG/DL (ref 65–140)
GLUCOSE SERPL-MCNC: 187 MG/DL (ref 65–140)
GLUCOSE SERPL-MCNC: 212 MG/DL (ref 65–140)
GLUCOSE SERPL-MCNC: 222 MG/DL (ref 65–140)
GLUCOSE SERPL-MCNC: 252 MG/DL (ref 65–140)
HBA1C MFR BLD: 5.5 %
HCT VFR BLD AUTO: 38.2 % (ref 34.8–46.1)
HCT VFR BLD AUTO: 43.1 % (ref 34.8–46.1)
HGB BLD-MCNC: 10.9 G/DL (ref 11.5–15.4)
HGB BLD-MCNC: 12.2 G/DL (ref 11.5–15.4)
IMM GRANULOCYTES # BLD AUTO: 0.25 THOUSAND/UL (ref 0–0.2)
IMM GRANULOCYTES NFR BLD AUTO: 1 % (ref 0–2)
LACTATE SERPL-SCNC: 1.7 MMOL/L (ref 0.5–2)
LACTATE SERPL-SCNC: 3.1 MMOL/L (ref 0.5–2)
LYMPHOCYTES # BLD AUTO: 1.54 THOUSANDS/ÂΜL (ref 0.6–4.47)
LYMPHOCYTES NFR BLD AUTO: 5 % (ref 14–44)
MAGNESIUM SERPL-MCNC: 1.9 MG/DL (ref 1.6–2.6)
MCH RBC QN AUTO: 22.2 PG (ref 26.8–34.3)
MCHC RBC AUTO-ENTMCNC: 28.3 G/DL (ref 31.4–37.4)
MCV RBC AUTO: 79 FL (ref 82–98)
MONOCYTES # BLD AUTO: 1.34 THOUSAND/ÂΜL (ref 0.17–1.22)
MONOCYTES NFR BLD AUTO: 4 % (ref 4–12)
NEUTROPHILS # BLD AUTO: 28.5 THOUSANDS/ÂΜL (ref 1.85–7.62)
NEUTS SEG NFR BLD AUTO: 90 % (ref 43–75)
NRBC BLD AUTO-RTO: 0 /100 WBCS
P AXIS: 75 DEGREES
PLATELET # BLD AUTO: 369 THOUSANDS/UL (ref 149–390)
PMV BLD AUTO: 10.3 FL (ref 8.9–12.7)
POTASSIUM SERPL-SCNC: 3.1 MMOL/L (ref 3.5–5.3)
POTASSIUM SERPL-SCNC: 3.8 MMOL/L (ref 3.5–5.3)
POTASSIUM SERPL-SCNC: 4 MMOL/L (ref 3.5–5.3)
POTASSIUM SERPL-SCNC: 4.3 MMOL/L (ref 3.5–5.3)
PR INTERVAL: 122 MS
PROCALCITONIN SERPL-MCNC: 0.53 NG/ML
QRS AXIS: 66 DEGREES
QRS AXIS: 82 DEGREES
QRSD INTERVAL: 118 MS
QRSD INTERVAL: 90 MS
QT INTERVAL: 238 MS
QT INTERVAL: 400 MS
QTC INTERVAL: 423 MS
QTC INTERVAL: 500 MS
RBC # BLD AUTO: 5.49 MILLION/UL (ref 3.81–5.12)
SODIUM SERPL-SCNC: 150 MMOL/L (ref 135–147)
SODIUM SERPL-SCNC: 153 MMOL/L (ref 135–147)
SODIUM SERPL-SCNC: 153 MMOL/L (ref 135–147)
SODIUM SERPL-SCNC: 155 MMOL/L (ref 135–147)
T WAVE AXIS: 248 DEGREES
T WAVE AXIS: 266 DEGREES
T4 FREE SERPL-MCNC: 1.12 NG/DL (ref 0.76–1.46)
TSH SERPL DL<=0.05 MIU/L-ACNC: 11.9 UIU/ML (ref 0.45–4.5)
VENTRICULAR RATE: 190 BPM
VENTRICULAR RATE: 94 BPM
WBC # BLD AUTO: 30.63 THOUSAND/UL (ref 4.31–10.16)

## 2022-10-20 PROCEDURE — XW023W7 INTRODUCTION OF COVID-19 VACCINE BOOSTER INTO MUSCLE, PERCUTANEOUS APPROACH, NEW TECHNOLOGY GROUP 7: ICD-10-PCS | Performed by: INTERNAL MEDICINE

## 2022-10-20 RX ORDER — DOCUSATE SODIUM 100 MG/1
100 CAPSULE, LIQUID FILLED ORAL 2 TIMES DAILY
Status: DISCONTINUED | OUTPATIENT
Start: 2022-10-20 | End: 2022-11-02 | Stop reason: HOSPADM

## 2022-10-20 RX ORDER — POTASSIUM CHLORIDE 14.9 MG/ML
20 INJECTION INTRAVENOUS ONCE
Status: COMPLETED | OUTPATIENT
Start: 2022-10-20 | End: 2022-10-20

## 2022-10-20 RX ORDER — LEVOTHYROXINE SODIUM 0.07 MG/1
75 TABLET ORAL
Status: DISCONTINUED | OUTPATIENT
Start: 2022-10-21 | End: 2022-11-02 | Stop reason: HOSPADM

## 2022-10-20 RX ORDER — INSULIN LISPRO 100 [IU]/ML
1-5 INJECTION, SOLUTION INTRAVENOUS; SUBCUTANEOUS
Status: DISCONTINUED | OUTPATIENT
Start: 2022-10-20 | End: 2022-11-02 | Stop reason: HOSPADM

## 2022-10-20 RX ORDER — PANTOPRAZOLE SODIUM 40 MG/10ML
40 INJECTION, POWDER, LYOPHILIZED, FOR SOLUTION INTRAVENOUS EVERY 12 HOURS
Status: DISCONTINUED | OUTPATIENT
Start: 2022-10-20 | End: 2022-10-20

## 2022-10-20 RX ORDER — SODIUM CHLORIDE 9 MG/ML
50 INJECTION, SOLUTION INTRAVENOUS CONTINUOUS
Status: DISCONTINUED | OUTPATIENT
Start: 2022-10-20 | End: 2022-10-21

## 2022-10-20 RX ADMIN — DEXTROSE, SODIUM CHLORIDE, AND POTASSIUM CHLORIDE 125 ML/HR: 5; .45; .15 INJECTION INTRAVENOUS at 00:17

## 2022-10-20 RX ADMIN — HEPARIN SODIUM 5000 UNITS: 5000 INJECTION INTRAVENOUS; SUBCUTANEOUS at 14:17

## 2022-10-20 RX ADMIN — INSULIN LISPRO 1 UNITS: 100 INJECTION, SOLUTION INTRAVENOUS; SUBCUTANEOUS at 11:42

## 2022-10-20 RX ADMIN — FOLIC ACID 1 MG: 1 TABLET ORAL at 08:53

## 2022-10-20 RX ADMIN — PANTOPRAZOLE SODIUM 40 MG: 40 TABLET, DELAYED RELEASE ORAL at 06:11

## 2022-10-20 RX ADMIN — SODIUM CHLORIDE 80 MG: 9 INJECTION, SOLUTION INTRAVENOUS at 18:06

## 2022-10-20 RX ADMIN — INSULIN LISPRO 1 UNITS: 100 INJECTION, SOLUTION INTRAVENOUS; SUBCUTANEOUS at 09:00

## 2022-10-20 RX ADMIN — SODIUM CHLORIDE 500 ML: 0.9 INJECTION, SOLUTION INTRAVENOUS at 17:49

## 2022-10-20 RX ADMIN — INFLUENZA A VIRUS A/VICTORIA/2570/2019 IVR-215 (H1N1) ANTIGEN (FORMALDEHYDE INACTIVATED), INFLUENZA A VIRUS A/DARWIN/9/2021 SAN-010 (H3N2) ANTIGEN (FORMALDEHYDE INACTIVATED), INFLUENZA B VIRUS B/PHUKET/3073/2013 ANTIGEN (FORMALDEHYDE INACTIVATED), AND INFLUENZA B VIRUS B/MICHIGAN/01/2021 ANTIGEN (FORMALDEHYDE INACTIVATED) 0.7 ML: 60; 60; 60; 60 INJECTION, SUSPENSION INTRAMUSCULAR at 00:17

## 2022-10-20 RX ADMIN — CEFAZOLIN SODIUM 1000 MG: 1 SOLUTION INTRAVENOUS at 14:17

## 2022-10-20 RX ADMIN — LEVOTHYROXINE SODIUM 50 MCG: 50 TABLET ORAL at 06:11

## 2022-10-20 RX ADMIN — SODIUM CHLORIDE 75 ML/HR: 0.9 INJECTION, SOLUTION INTRAVENOUS at 10:08

## 2022-10-20 RX ADMIN — Medication 250 MG: at 00:17

## 2022-10-20 RX ADMIN — HEPARIN SODIUM 5000 UNITS: 5000 INJECTION INTRAVENOUS; SUBCUTANEOUS at 00:15

## 2022-10-20 RX ADMIN — DULOXETINE HYDROCHLORIDE 30 MG: 30 CAPSULE, DELAYED RELEASE ORAL at 08:53

## 2022-10-20 RX ADMIN — ATORVASTATIN CALCIUM 40 MG: 40 TABLET, FILM COATED ORAL at 16:30

## 2022-10-20 RX ADMIN — DOCUSATE SODIUM 100 MG: 100 CAPSULE, LIQUID FILLED ORAL at 08:53

## 2022-10-20 RX ADMIN — CEFAZOLIN SODIUM 1000 MG: 1 SOLUTION INTRAVENOUS at 06:11

## 2022-10-20 RX ADMIN — OXYBUTYNIN 5 MG: 5 TABLET, FILM COATED, EXTENDED RELEASE ORAL at 08:53

## 2022-10-20 RX ADMIN — Medication 250 MG: at 08:53

## 2022-10-20 RX ADMIN — POTASSIUM CHLORIDE 20 MEQ: 14.9 INJECTION, SOLUTION INTRAVENOUS at 03:26

## 2022-10-20 RX ADMIN — SODIUM CHLORIDE 8 MG/HR: 9 INJECTION, SOLUTION INTRAVENOUS at 18:47

## 2022-10-20 RX ADMIN — HEPARIN SODIUM 5000 UNITS: 5000 INJECTION INTRAVENOUS; SUBCUTANEOUS at 06:11

## 2022-10-20 RX ADMIN — INSULIN LISPRO 1 UNITS: 100 INJECTION, SOLUTION INTRAVENOUS; SUBCUTANEOUS at 16:30

## 2022-10-20 RX ADMIN — POTASSIUM CHLORIDE 40 MEQ: 20 SOLUTION ORAL at 00:15

## 2022-10-20 RX ADMIN — Medication 1000 UNITS: at 08:53

## 2022-10-20 RX ADMIN — ONDANSETRON 4 MG: 2 INJECTION INTRAMUSCULAR; INTRAVENOUS at 02:12

## 2022-10-20 NOTE — ASSESSMENT & PLAN NOTE
· CXR:  2 x 5 5 cm right upper lobe paramediastinal nodule, and numerous additional smaller bilateral pulmonary nodules highly suspicious for malignancy  Both primary lung cancer and metastatic disease can have this appearance    · Onco consulted for recs

## 2022-10-20 NOTE — ASSESSMENT & PLAN NOTE
Body mass index [BMI] 19 9 or less, adult a/e/b height 5'5, weight 99 lbs, = BMI 16 51, with cachexia and self-reported weight loss treated with registered dietitian evaluation, regular diet and dietary recommendations to optimize protein/calorie intake  Nutrition consulted    BMI Findings: Body mass index is 16 51 kg/m²

## 2022-10-20 NOTE — ASSESSMENT & PLAN NOTE
Chronic arthralgia in lower back, left hip, knees, legs  Likely due to OA and Sjogren's syndrome  Patient follows Rheumatology in [facility] as outpatient  On methotrexate, Cymbalta, Voltaren, tramadol p r n  At home  · Continue Cymbalta, tramadol p r n   · Hold methotrexate for now in view of sirs versus sepsis  · Hold Voltaren due to REECE  · Will order Dilaudid p r n  For breakthrough pain

## 2022-10-20 NOTE — ASSESSMENT & PLAN NOTE
Developed in 2 weeks since sister-in-law is away  Diffuse sacral DTI on exam with surrounding redness, no active drainage, scattered black eschar on exam Hard to exclude cellulitis  · Consult surgery and wound care, offloading  · IV antibiotic for ? Cellulitis

## 2022-10-20 NOTE — CONSULTS
Consultation - General Surgery   Cox Bransoncal 69 y o  female MRN: [id number]  Unit/Bed#: [address] [place] [address] Encounter: [id number]    Assessment/Plan     Assessment:  1) sacral pressure related injuries - patient found with significant fatigue and weakness on the ground lying for at least 2 hours on her backside in a supine state, patient not significantly mobile on a regular basis, these are pressure related injuries until proven otherwise on the backside, multiple different stages and sizes with the largest being the size of a quarter shaped area, so not significantly boggy or showing any sort of express purulence, eschars appear to be acting as a antiseptic dressing, patient does have elevated leukocytosis but at this point in time not convinced that patient has leukocytosis as a result of infected sacral wounds as these wounds do also appear relatively acute are nontender and non erythematous  2) dehydration/severe protein/calorie malnutrition - BMI of 16 51, very frail billed with significant bony prominences, not eating significant caloric intake at home and was not drinking fluids regularly  3) REECE - improving, did have elevated creatinine as high as 1 68 with decreased EGFR of 29, likely prerenal in nature based on 20-1 or more on the ratio between BUN to creatinine  4) lactic acidosis - down trending lactic acid with resolution at 1 7, suspecting that this is secondary to a combination of heme concentration and REECE  5) significantly elevated TSH - likely due to underlying history of hypothyroidism, uncertain if patient has been taking levothyroxine medications, could be contributing to fatigue/weakness  6) weakness/fatigue     Plan:  1-6)   - IV fluid repletion  - protein reinforcement with nutritional supplement Angel  - may also benefit from ensure compact  - turn patient q 2 hours  - foam wedges  - wound care consult for nursing staff  - allevyn border dressings   - repeat CBC, BMP, Mag, phos  - P 500 bed  - no plans for debridement as of current   - continue to keep anal genital region cleaned from stool and urine  - may want to consider PureWick  - nephro input appreciated  - can coordinate with Infectious Disease if needed but as of current do not suspect that debridement and or deep tissue cultures of sacral wounds are needed to explain leukocytosis    History of Present Illness   HPI:  Carlos Reece is a 69 y o  female with a past medical history pertinent for hypertension, hypercholesterolemia, hypothyroidism, appendectomy, cholecystectomy, colon surgery, hysterectomy presenting to the acute care surgery team secondary to the fact that she is developed sacral ulcerations  Patient reports that she is not been eating or drinking much at home and that she is become relatively weak and fatigued  Patient was on her couch reaching for her remote where she rolled off/potentially fell onto the floor in a supine state and did not have the energy or the strength to get up  Patient denied hitting her head or having any sort of major fall or trauma  Patient did report that she was weak to the point where she can not move or call for help  Patient denied any sort of loss of consciousness, seizure, syncope  Patient claims that she was aware of what had happened  Patient reports that she was in a supine position for at least 2 hours  Patient has had progressive weight loss over time  Patient does not think that she had any significant/major wounds on her backside prior to this incident at home  Patient is not the most mobile at home and does not get up to ambulate regularly  Patient was not eating or drinking at home secondary to the fact that she did not have significant food or water  Patient does have a history of hypothyroidism  It is uncertain if patient has been taking medications  Patient denies any redness or swelling or pain surrounding her backside    Patient does not think that she is noted any sort of drainage from her wounds on her backside  Inpatient consult to Acute Care Surgery  Consult performed by: Debbie Jimenez PA-C  Consult ordered by: KIKO Cates          Review of Systems   Constitutional: Positive for appetite change and fatigue  Negative for chills and fever  HENT: Negative for congestion and rhinorrhea  Respiratory: Negative for cough, shortness of breath and wheezing  Cardiovascular: Negative for chest pain and palpitations  Gastrointestinal: Negative for abdominal distention, abdominal pain, blood in stool, constipation, diarrhea, nausea and vomiting  Genitourinary: Negative for difficulty urinating, dysuria, flank pain and hematuria  Musculoskeletal: Negative for arthralgias and gait problem  Skin: Positive for color change and wound  Neurological: Positive for weakness  Negative for dizziness, seizures and syncope  Psychiatric/Behavioral: Negative for agitation and confusion  Historical Information   Past Medical History:   Diagnosis Date   • Disease of thyroid gland    • Hypercholesteremia    • Hypertension      Past Surgical History:   Procedure Laterality Date   • APPENDECTOMY     • BREAST BIOPSY Right     benign   • CHOLECYSTECTOMY     • COLON SURGERY      ruptured bowel   • COLOSTOMY  2009    and closure 6 months later   • HYSTERECTOMY  2002    total   • KNEE ARTHROSCOPY Bilateral     right x1 left x2   • SC COLSC FLX W/RMVL OF TUMOR POLYP LESION SNARE TQ N/A 5/3/2016    Procedure: COLONOSCOPY ;  Surgeon: Nano Phillips MD;  Location: [facility] GI [facility];   Service: Gastroenterology   • TONSILECTOMY AND ADNOIDECTOMY     • TYMPANOPLASTY Right      Social History   Social History     Substance and Sexual Activity   Alcohol Use Not Currently     Social History     Substance and Sexual Activity   Drug Use No     E-Cigarette/Vaping     E-Cigarette/Vaping Substances     Social History     Tobacco Use   Smoking Status Former Smoker   • Packs/day: 1 00   • Years:    • Pack years:    • Quit date:    • Years since quittin 8   Smokeless Tobacco Never Used     Family History: non-contributory    Meds/Allergies   all current active meds have been reviewed and current meds:   Current Facility-Administered Medications   Medication Dose Route Frequency   • acetaminophen (TYLENOL) tablet 650 mg  650 mg Oral Q6H PRN   • atorvastatin (LIPITOR) tablet 40 mg  40 mg Oral Daily With Dinner   • ceFAZolin (ANCEF) IVPB (premix in dextrose) 1,000 mg 50 mL  1,000 mg Intravenous Q8H   • cholecalciferol (VITAMIN D3) tablet 1,000 Units  1,000 Units Oral Daily   • docusate sodium (COLACE) capsule 100 mg  100 mg Oral BID   • DULoxetine (CYMBALTA) delayed release capsule 30 mg  30 mg Oral Daily   • folic acid (FOLVITE) tablet 1 mg  1 mg Oral Daily   • heparin (porcine) subcutaneous injection 5,000 Units  5,000 Units Subcutaneous Q8H [address]   • HYDROmorphone (DILAUDID) injection 0 2 mg  0 2 mg Intravenous Q6H PRN   • insulin lispro (HumaLOG) 100 units/mL subcutaneous injection 1-5 Units  1-5 Units Subcutaneous TID AC   • insulin lispro (HumaLOG) 100 units/mL subcutaneous injection 1-5 Units  1-5 Units Subcutaneous HS   • [START ON 10/21/2022] levothyroxine tablet 75 mcg  75 mcg Oral Early Morning   • metoprolol tartrate (LOPRESSOR) partial tablet 12 5 mg  12 5 mg Oral Q12H [address]   • ondansetron (ZOFRAN) injection 4 mg  4 mg Intravenous Q6H PRN   • oxybutynin (DITROPAN-XL) 24 hr tablet 5 mg  5 mg Oral Daily   • pantoprazole (PROTONIX) EC tablet 40 mg  40 mg Oral Early Morning   • saccharomyces boulardii (FLORASTOR) capsule 250 mg  250 mg Oral BID   • sodium chloride 0 9 % infusion  75 mL/hr Intravenous Continuous   • traMADol (ULTRAM) tablet 25 mg  25 mg Oral Q6H PRN     Allergies   Allergen Reactions   • Penicillins Rash       Objective   First Vitals:   Blood Pressure: 91/52 (10/19/22 1455)  Pulse: (!) 115 (10/19/22 1507)  Temperature: (!) 95 6 °F (35 3 °C) (10/19/22 3544)  Temp Source: Tympanic (10/19/22 1519)  Respirations: (!) 25 (10/19/22 1507)  Height: 5' 5" (165 1 cm) (10/19/22 1507)  Weight - Scale: 45 kg (99 lb 3 3 oz) (10/19/22 1507)  SpO2: (!) 82 % (10/19/22 1507)    Current Vitals:   Blood Pressure: 108/72 (10/20/22 1506)  Pulse: (!) 112 (10/20/22 1506)  Temperature: (!) 96 9 °F (36 1 °C) (10/20/22 1506)  Temp Source: Oral (10/20/22 1230)  Respirations: 18 (10/20/22 1230)  Height: 5' 5" (165 1 cm) (10/19/22 1507)  Weight - Scale: 45 kg (99 lb 3 3 oz) (10/19/22 1507)  SpO2: 95 % (10/20/22 1506)      Intake/Output Summary (Last 24 hours) at 10/20/2022 1551  Last data filed at 10/19/2022 1906  Gross per 24 hour   Intake 1550 ml   Output 200 ml   Net 1350 ml       Invasive Devices  Report    Peripheral Intravenous Line  Duration           Peripheral IV 10/19/22 Left Antecubital <1 day    Peripheral IV 10/19/22 Proximal;Right;Ventral (anterior) Forearm <1 day                Physical Exam  Constitutional:       General: She is not in acute distress  Appearance: Normal appearance  She is cachectic  She is ill-appearing  She is not toxic-appearing or diaphoretic  Interventions: She is not intubated  HENT:      Head: Normocephalic and atraumatic  Right Ear: Hearing and external ear normal  No decreased hearing noted  Left Ear: Hearing and external ear normal  No decreased hearing noted  Nose: Nose normal    Cardiovascular:      Rate and Rhythm: Normal rate and regular rhythm  Heart sounds: Normal heart sounds, S1 normal and S2 normal    Pulmonary:      Effort: Pulmonary effort is normal  No tachypnea, bradypnea, accessory muscle usage, prolonged expiration, respiratory distress or retractions  She is not intubated  Breath sounds: Normal breath sounds  No stridor, decreased air movement or transmitted upper airway sounds  No decreased breath sounds, wheezing, rhonchi or rales  Abdominal:      General: Abdomen is flat  There is no distension        Palpations: Abdomen is soft  Tenderness: There is no abdominal tenderness  Hernia: No hernia is present  Skin:     Findings: Wound present  No erythema or laceration  Psychiatric:         Behavior: Behavior is cooperative  Lab Results:   I have personally reviewed pertinent lab results  , CBC:   Lab Results   Component Value Date    WBC 30 63 (HH) 10/20/2022    HGB 12 2 10/20/2022    HCT 43 1 10/20/2022    MCV 79 (L) 10/20/2022     10/20/2022    MCH 22 2 (L) 10/20/2022    MCHC 28 3 (L) 10/20/2022    RDW 19 9 (H) 10/20/2022    MPV 10 3 10/20/2022    NRBC 0 10/20/2022   , CMP:   Lab Results   Component Value Date    SODIUM 150 (H) 10/20/2022    K 4 3 10/20/2022     (H) 10/20/2022    CO2 21 10/20/2022    BUN 66 (H) 10/20/2022    CREATININE 1 38 (H) 10/20/2022    CALCIUM 8 4 10/20/2022    EGFR 37 10/20/2022     Imaging: I have personally reviewed pertinent reports  EKG, Pathology, and Other Studies: I have personally reviewed pertinent reports  Counseling / Coordination of Care  Total floor / unit time spent today 45 minutes  Greater than 50% of total time was spent with the patient and / or family counseling and / or coordination of care  A description of the counseling / coordination of care:  Developing plan, reviewing with attending, coordinating care, physical exam, history taking, reviewing labs, reviewing imaging, patient Education, discussing with surgical attending

## 2022-10-20 NOTE — H&P
74 Donna Muniz 1945, 69 y o  female MRN: [id number]  Unit/Bed#: [address] [place] [address] Encounter: [id number]  Primary Care Provider: Karlene Albrecht MD   Date and time admitted to hospital: 10/19/2022  2:53 PM    * Dehydration  Assessment & Plan  Patient presents with generalized weakness  Neighbor called police as they saw her trash can outside for a few days  Police found her on the floor  Patient reports rolling off the couch and was on the floor for about 2 hours  Patient denies hitting head  Reports lower abdominal pain since 5/2022  Denies any acute pain  Patient lives [place] - in-law normally takes her shopping and she was away for about 16 days  Patient reports nothing to eat at home and was hungry  · Blood work showed -  Sodium 160, potassium 3 0, AG 19, acute kidney injury, WBC 18, UA shows ketones, lactic acid elevated, procalcitonin 0 28  · Aggressive IV hydration  · Trend lactic acid  · Repeat BMP with LA  · Consult nephrology      Acute kidney injury [facility])  Assessment & Plan  Baseline creatinine appears to be around 0 7-0 8 in past year  · Creatinine today 1 68  · Likely prerenal   · IV hydration  · Avoid nephrotoxin and hypotension  · Repeat lab in the morning    SIRS (systemic inflammatory response syndrome) (HCC)  Assessment & Plan  Sirs versus sepsis, POA, as evidenced by leukocytosis, tachycardia, hypothermia  Sacral wound can not exclude infection on exam     · UA shows trace leukocytes, normal white count  Doubtful active infection  · Chest x-ray shows diffuse pulmonary nodules, no evidence infection to me, pending final read  · Lactic acid 5 3 initially, repeat trending down  Likely multifactorial secondary to dehydration and ? Infection  Continue to trend till less than 2    · Procalcitonin 0 28  · Blood cultures pending  · Will check urine culture  · Patient received IV cefepime ED, will change to Ancef 1 g q 8 hours for ? sacral wound with infection  · Repeat CBC with diff, procalcitonin in the morning  Sacral wound  Assessment & Plan  Developed in 2 weeks since sister-in-law is away  Diffuse sacral DTI on exam with surrounding redness, no active drainage, scattered black eschar on exam Hard to exclude cellulitis  · Consult surgery and wound care, offloading  · IV antibiotic for ? Cellulitis  Generalized weakness  Assessment & Plan  Likely multifactorial secondary to dehydration, metastatic cancer  Patient uses cane at baseline  Reports history of orthostatic hypotension  · Treat underlying causes  · PT OT eval treat  · Fall precautions    SVT (supraventricular tachycardia) (HCC)  Assessment & Plan  Developed SVT in ED, heart rate 190  Converted to sinus rhythm after receiving Cardizem 10 mg IV  · Telemetry  · Optimize electrolytes  Potassium 3 0, Mag 2 5  Replete potassium  · Start low-dose metoprolol     Elevated glucose  Assessment & Plan  Glucose 163  · Check A1c, SSI    Chronic pain  Assessment & Plan  Chronic arthralgia in lower back, left hip, knees, legs  Likely due to OA and Sjogren's syndrome  Patient follows Rheumatology in [facility] as outpatient  On methotrexate, Cymbalta, Voltaren, tramadol p r n  At home  · Continue Cymbalta, tramadol p r n   · Hold methotrexate for now in view of sirs versus sepsis  · Hold Voltaren due to REECE  · Will order Dilaudid p r n  For breakthrough pain  Metastatic cancer [facility])  Assessment & Plan  Patient reports intermittent lower abdominal pain since May of this year  Reports constipation at home  Reports weight loss since spring this year  · 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  · Patient was seen by PCP on 9/27, referred to Oncology as outpatient  No history of cancer    History of fibroid , status post hysterectomy and benign breast cyst underwent biopsy  · CT findings explained to sister-in-law at bedside  · Will consult oncology  Elevated troponin  Assessment & Plan  Troponin [id number]  · Initial EKG showed sinus tach, rate 115, inferior lateral ST depressions, prolonged QT   · Patient denies chest pain  · Likely type 2 MI secondary to # 1 and SIRS versus Sepsis  · Troponin downtrending  · Telemetry    Orthostatic hypotension  Assessment & Plan  Noted history  · Check orthostatic vital signs in the morning  Sjogren's syndrome [facility])  Assessment & Plan  Patient is on methotrexate 12 5 mg p o  Weekly  Follows rheumatologist in [facility]  · Will hold methotrexate for now  · Resume on discharge  · Natural tears prn for dry eye  Acquired hypothyroidism  Assessment & Plan  Continue Synthroid  Reports not taking medications at home  · Will check TSH, free T4  Dyslipidemia  Assessment & Plan  Continue statin      VTE Prophylaxis: Heparin  / reason for no mechanical VTE prophylaxis on heparin subQ   Code Status:  Full code  POLST: POLST form is not discussed and not completed at this time  Anticipated Length of Stay:  Patient will be admitted on an Inpatient basis with an anticipated length of stay of  > 2 midnights  Justification for Hospital Stay:  Gissel Sandoval, acute kidney injury, sirs versus sepsis        Total Time for Visit, including Counseling / Coordination of Care: 1 hour  Greater than 50% of this total time spent on direct patient counseling and coordination of care  Chief Complaint:   Generalized weakness    History of Present Illness: Luis A Allan is a 69 y o  female with PMH of Sjogren syndrome, chronic joint pain, hyperlipidemia, hypothyroid , orthostatic hypotension who presents with generalized weakness  Neighbor called police as they saw her trash can outside for a few days  Police found her on the floor    Patient reports rolling off the couch and was on the floor for about 2 hours  Patient denies hitting head  Reports lower abdominal pain since 5/2022  Denies any acute pain  Patient lives [place] Allen - in-law normally takes her shopping and she was away for about 16 days  Patient reports nothing to eat at home and was hungry  Patient denies chest pain, headache, dizziness, SOB, nausea vomiting, fever, chills  Reports constipation at home  Reports weight loss since spring this year  Patient reports history of blood pressure dropping when she stands up  Patient offered no other complaints  Review of Systems:    Review of Systems   Unable to perform ROS: Acuity of condition (Spoke to sister in law at bedside)   Constitutional: Positive for activity change  Generalized weakness   Gastrointestinal: Positive for abdominal pain and constipation  Musculoskeletal: Positive for arthralgias and back pain  Knee pain, leg pain   Skin: Positive for wound  Sacral wound    All other systems reviewed and are negative  Past Medical and Surgical History:     Past Medical History:   Diagnosis Date   • Disease of thyroid gland    • Hypercholesteremia    • Hypertension        Past Surgical History:   Procedure Laterality Date   • APPENDECTOMY     • BREAST BIOPSY Right     benign   • CHOLECYSTECTOMY     • COLON SURGERY      ruptured bowel   • COLOSTOMY  2009    and closure 6 months later   • HYSTERECTOMY  2002    total   • KNEE ARTHROSCOPY Bilateral     right x1 left x2   • CO COLSC FLX W/RMVL OF TUMOR POLYP LESION SNARE TQ N/A 5/3/2016    Procedure: COLONOSCOPY ;  Surgeon: Germán Mays MD;  Location[facility] GI LAB; Service: Gastroenterology   • TONSILECTOMY AND ADNOIDECTOMY     • TYMPANOPLASTY Right        Meds/Allergies:    Prior to Admission medications    Medication Sig Start Date End Date Taking?  Authorizing Provider   diclofenac (VOLTAREN) 75 mg EC tablet Take 75 mg by mouth 2 (two) times a day   Yes Historical Provider, MD   DULoxetine (CYMBALTA) 30 mg delayed release capsule Take 1 capsule by mouth daily 11/16/21  Yes Historical Provider, MD   esomeprazole (NexIUM) 40 MG capsule Take 40 mg by mouth every morning before breakfast    Yes Historical Provider, MD   traMADol (ULTRAM) 50 mg tablet Take 50 mg by mouth every 12 (twelve) hours as needed for moderate pain   Yes Historical Provider, MD   cholecalciferol (VITAMIN D3) 1,000 units tablet Take 1,000 Units by mouth daily    Historical Provider, MD   ciclopirox (PENLAC) 8 % solution Apply topically daily at bedtime  Patient not taking: Reported on 11/20/2019 7/17/19   Malia Scherer DPM   folic acid (FOLVITE) 1 mg tablet Take 1 mg by mouth every 12 (twelve) hours 7/9/19   Historical Provider, MD   gabapentin (NEURONTIN) 300 mg capsule Take 1 capsule (300 mg total) by mouth 2 (two) times a day  Patient not taking: No sig reported 2/11/20 3/12/20  Rolando Carbone DPM   ketoconazole (NIZORAL) 2 % cream Apply topically daily 2/11/20 3/12/20  Rolando Carbone DPM   Levothyroxine Sodium (SYNTHROID PO) Take 50 mcg by mouth daily    Historical Provider, MD   methotrexate 2 5 mg tablet Take 12 5 mg by mouth once a week 12/15/21   Historical Provider, MD   rosuvastatin (CRESTOR) 40 MG tablet Take 20 mg by mouth daily      Historical Provider, MD   solifenacin (VESICARE) 5 mg tablet Take 1 tablet by mouth daily 11/9/21   Historical Provider, MD     I have reviewed home medications using allscripts  Allergies:    Allergies   Allergen Reactions   • Penicillins Rash       Social History:     Marital Status: Single   Occupation:  Retired  Patient Pre-hospital Living Situation:  Lives alone  Patient Pre-hospital Level of Mobility:  Days with cane  Patient Pre-hospital Diet Restrictions:  Regular diet  Substance Use History:   Social History     Substance and Sexual Activity   Alcohol Use No     Social History     Tobacco Use   Smoking Status Former Smoker   • Packs/day: 1 00   • Years: 20 00   • Pack years: 20 00   • Quit date: 46   • Years since quittin 8   Smokeless Tobacco Never Used     Social History     Substance and Sexual Activity   Drug Use No       Family History:    non-contributory    Physical Exam:     Vitals:   Blood Pressure: 117/76 (10/20/22 0217)  Pulse: 99 (10/20/22 0217)  Temperature: (!) 97 3 °F (36 3 °C) (10/19/22 2249)  Temp Source: Tympanic (10/19/22 1519)  Respirations: 18 (10/19/22 2249)  Height: 5' 5" (165 1 cm) (10/19/22 150)  Weight - Scale: 45 kg (99 lb 3 3 oz) (10/19/22 150)  SpO2: 93 % (10/20/22 0217)    Physical Exam  Vitals and nursing note reviewed  Constitutional:       Appearance: She is well-developed  She is ill-appearing  Comments: Patient appears cachectic, weak  HENT:      Head: Normocephalic and atraumatic  Neck:      Thyroid: No thyromegaly  Vascular: No JVD  Trachea: No tracheal deviation  Cardiovascular:      Rate and Rhythm: Normal rate and regular rhythm  Heart sounds: Normal heart sounds  Pulmonary:      Effort: Pulmonary effort is normal  No respiratory distress  Breath sounds: No wheezing or rales  Comments: Breath sounds clear diminished bilateral, on room air, respirations easy  Abdominal:      General: Bowel sounds are normal  There is no distension  Palpations: Abdomen is soft  Tenderness: There is abdominal tenderness  There is no guarding  Comments: Diffuse abdomen tenderness  Old surgical scar  Musculoskeletal:         General: No swelling  Normal range of motion  Cervical back: Neck supple  Right lower leg: No edema  Left lower leg: No edema  Skin:     General: Skin is warm and dry  Neurological:      General: No focal deficit present  Mental Status: She is alert  Comments: Patient awake alert, oriented to place and person, follows commands     Psychiatric:         Mood and Affect: Mood normal          Additional Data:     Lab Results: I have personally reviewed pertinent reports  Results from last 7 days   Lab Units 10/19/22  1540   WBC Thousand/uL 18 48*   HEMOGLOBIN g/dL 13 7   HEMATOCRIT % 48 4*   PLATELETS Thousands/uL 546*   NEUTROS PCT % 80*   LYMPHS PCT % 14   MONOS PCT % 5   EOS PCT % 0     Results from last 7 days   Lab Units 10/20/22  0037 10/19/22  1540   POTASSIUM mmol/L 3 1* 3 0*   CHLORIDE mmol/L 117* 116*   CO2 mmol/L 24 25   BUN mg/dL 67* 61*   CREATININE mg/dL 1 50* 1 68*   CALCIUM mg/dL 8 0* 9 5   ALK PHOS U/L  --  195*   ALT U/L  --  12   AST U/L  --  29           Imaging: I have personally reviewed pertinent reports  CT abdomen wo contrast    Result Date: 9/25/2022  Narrative: CT - ABDOMEN WITHOUT IV CONTRAST INDICATION:   R10 9: Unspecified abdominal pain  "Left-sided  abdominal/flank pain, history of colonic perforation, colostomy" COMPARISON:  CT dated 7/21/2009 TECHNIQUE: CT examination of the abdomen was performed without intravenous contrast  Axial, sagittal, and coronal 2D reformatted images were created from the source data and submitted for interpretation  Radiation dose length product (DLP) for this visit:  258 63 mGy-cm   This examination, like all CT scans performed in the [facility], was performed utilizing techniques to minimize radiation dose exposure, including the use of iterative  reconstruction and automated exposure control  Enteric contrast was administered  FINDINGS: ABDOMEN The lack of intravenous contrast limits evaluation of certain processes, especially infectious, inflammatory and neoplastic processes  LOWER CHEST:  There are multiple new lung nodules, concerning for metastasis  For example, there is a 1 3 cm nodule at the right lung base (series 2, image #8)  There is a 1 cm left lung base nodule (image #14)  LIVER/BILIARY TREE:  Unremarkable on noncontrast CT  GALLBLADDER:  Gallbladder is surgically absent  SPLEEN:  Unremarkable  PANCREAS: Unremarkable  ADRENAL GLANDS:  Unremarkable   KIDNEYS/URETERS: No hydronephrosis  VISUALIZED STOMACH AND BOWEL:  There is interval worsening of hiatal hernia which is large in size  VISUALIZED ABDOMINAL CAVITY:  No pathologically enlarged periportal, mesenteric or upper retroperitoneal lymph nodes  No ascites  VISUALIZED BODY WALL:  Unremarkable  VESSELS:  Atherosclerotic changes are present  No evidence of aneurysm  OSSEOUS STRUCTURES:  Sclerosis of T11 and L2 vertebral bodies, 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  Impression: 1  Large hiatal hernia  2   Numerous lung nodules, concerning for metastasis  3   Sclerosis of T11 and L2 vertebral bodies, 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  The study was marked in EPIC for significant notification  Workstation performed: [id number]     CT head without contrast    Result Date: 10/19/2022  Narrative: CT BRAIN - WITHOUT CONTRAST INDICATION:   Weakness and possible fall  Found on ground    COMPARISON:  11/3/2017 TECHNIQUE:  CT examination of the brain was performed  In addition to axial images, sagittal and coronal 2D reformatted images were created and submitted for interpretation  Radiation dose length product (DLP) for this visit:  933 96 mGy-cm   This examination, like all CT scans performed in the [facility], was performed utilizing techniques to minimize radiation dose exposure, including the use of iterative  reconstruction and automated exposure control  IMAGE QUALITY:  Diagnostic  FINDINGS: PARENCHYMA: Decreased attenuation is noted in periventricular and subcortical white matter demonstrating an appearance that is statistically most likely to represent moderate microangiopathic change  No CT signs of acute infarction  No intracranial mass, mass effect or midline shift  No acute parenchymal hemorrhage   VENTRICLES AND EXTRA-AXIAL SPACES:  Ventricles and extra-axial CSF spaces are prominent commensurate with the degree of volume loss, which has increased since the prior study VISUALIZED ORBITS AND PARANASAL SINUSES:  No acute finding  Changes of chronic right mastoiditis with partial loss of air cells and bony sclerosis but unchanged from 2017 CALVARIUM AND EXTRACRANIAL SOFT TISSUES:  Normal      Impression: 1  No acute intracranial abnormality 2  Microangiopathic changes and progression of cerebral volume loss Workstation performed: [id number]       EKG, Pathology, and Other Studies Reviewed on Admission:   · EKG:  Sinus rhythm    Allscripts Records Reviewed: Yes     ** Please Note: Dragon 360 Dictation voice to text software may have been used in the creation of this document   **

## 2022-10-20 NOTE — ASSESSMENT & PLAN NOTE
Developed in 2 weeks since sister-in-law is away  Diffuse sacral DTI on exam with surrounding redness, no active drainage, scattered black eschar on exam Hard to exclude cellulitis  · Consult surgery and wound care, offloading     · IV antibiotic for Cellulitis 1 dose cefepime in ED, continue Rocephin

## 2022-10-20 NOTE — ASSESSMENT & PLAN NOTE
Sirs versus sepsis, POA, as evidenced by leukocytosis, tachycardia, hypothermia  Sacral wound can not exclude infection on exam     · UA shows trace leukocytes, normal white count  Doubtful active infection  · Chest x-ray shows diffuse pulmonary nodules, no evidence infection to me, pending final read  · Lactic acid 5 3 initially, repeat trending down  Likely multifactorial secondary to dehydration and ? Infection  Continue to trend till less than 2    · Procalcitonin 0 28  · Blood cultures pending  · Will check urine culture  · Patient received IV cefepime ED, will change to Ancef 1 g q 8 hours for ? sacral wound with infection  · Repeat CBC with diff, procalcitonin in the morning

## 2022-10-20 NOTE — CONSULTS
Consultation - Nephrology   Hyacinth Sharma 69 y o  female MRN: [id number]  Unit/Bed#: 2 [place] [address] Encounter: [id number]    ASSESSMENT and PLAN:  Acute kidney injury, POA  -Baseline creatinine:  0 7-0 8  As per Care everywhere in August 22 creatinine was 0 7  -Admission creatinine:1 68 mg/dl  - Work up:   · UA with microscopy:no rbc and just 1-2 wbc   · Imaging:CT abd no hydro  -Etiology:prerenal / volume depletion from poor oral intake/ hypotension with BP in 90's  -Hospital Course: improving to cr 1 34   -Plan:   · Due to rapid improvement in sodium level changing IV fluid to normal saline at 75 mL/hour, continue to monitor renal function, avoid hypotension  · Avoid nephrotoxins and dose all medications per EGFR  · Avoid hypotension  Hypernatremia, POA  -admission sodium 160  -likely due to decreased free water intake and volume depletion  -improving to 153   -s/p NS bolus 1 5 lts and now on D51/2 NS  -goal is 152 by 4 pm today    -Changed to NS at 75 mL/hour ml/hr  Monitor BMP q 6 hours  Goal by 4:00 a m  Tomorrow would be around sodium 145 mEq/liter    Lactic acidosis likely due to hypotension and volume depletion improved status post IV fluid  Last lactic level was 1 7    Elevated TSH/hypothyroidism, TSH level was 11 8, management per primary team   Patient was recently not taking her home medications    Leukocytosis:  Follow-up cultures  On antibiotic per primary team   Concern for infected sacral wound as per primary team note    Suspected metastatic cancer  -CT abdomen pelvis suggestive of numerous lung nodules concern for metastasis  Also sclerosis of T11 and L2 vertebral bodies 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  -workup and management per primary team, noted plan for Oncology consult    Discussed with primary team      HISTORY OF PRESENT ILLNESS:  Requesting Physician:  Bree Bustamante MD  Reason for Consult:  Hypernatremia/acute kidney injury    Dinesh Mills is a 69 y o  female with history of Sjogren syndrome, chronic joint pain, hyperlipidemia hypothyroidism who was admitted to [facility] after presenting with complain of generalized weakness  Patient was found down on the floor after neighbour called police as the saw trash can outside for few days   Patient reported ruling off the couch and was on floor for roughly 2 hours  She complained of lower abdominal pain since May 2022  She was found to have acute kidney injury and hypernatremia and so nephrology consulted  PAST MEDICAL HISTORY:  Past Medical History:   Diagnosis Date   • Disease of thyroid gland    • Hypercholesteremia    • Hypertension        PAST SURGICAL HISTORY:  Past Surgical History:   Procedure Laterality Date   • APPENDECTOMY     • BREAST BIOPSY Right     benign   • CHOLECYSTECTOMY     • COLON SURGERY      ruptured bowel   • COLOSTOMY      and closure 6 months later   • HYSTERECTOMY      total   • KNEE ARTHROSCOPY Bilateral     right x1 left x2   • [place] COLSC FLX W/RMVL OF TUMOR POLYP LESION SNARE TQ N/A 5/3/2016    Procedure: COLONOSCOPY ;  Surgeon: Nicky Lazaro MD;  Location: [facility];   Service: Gastroenterology   • TONSILECTOMY AND ADNOIDECTOMY     • TYMPANOPLASTY Right        SOCIAL HISTORY:  Social History     Substance and Sexual Activity   Alcohol Use No     Social History     Substance and Sexual Activity   Drug Use No     Social History     Tobacco Use   Smoking Status Former Smoker   • Packs/day: 1 00   • Years: 20 00   • Pack years: 20 00   • Quit date:    • Years since quittin 8   Smokeless Tobacco Never Used       FAMILY HISTORY:  Family History   Problem Relation Age of Onset   • Breast cancer Paternal Aunt 95       ALLERGIES:  Allergies   Allergen Reactions   • Penicillins Rash       MEDICATIONS:    Current Facility-Administered Medications:   •  acetaminophen (TYLENOL) tablet 650 mg, 650 mg, Oral, Q6H PRN, Cuiyin KIKO Garrison  •  atorvastatin (LIPITOR) tablet 40 mg, 40 mg, Oral, Daily With Dinner, KIKO Jovel  •  ceFAZolin (ANCEF) IVPB (premix in dextrose) 1,000 mg 50 mL, 1,000 mg, Intravenous, Q8H, KIKO Jovel, Last Rate: 100 mL/hr at 10/20/22 0611, 1,000 mg at 10/20/22 0519  •  cholecalciferol (VITAMIN D3) tablet 1,000 Units, 1,000 Units, Oral, Daily, KIKO Jovel  •  dextrose 5 % and sodium chloride 0 45 % with KCl 20 mEq/L infusion, 125 mL/hr, Intravenous, Continuous, KIKO Jovel, Last Rate: 125 mL/hr at 10/20/22 0017, 125 mL/hr at 10/20/22 0017  •  docusate sodium (COLACE) capsule 100 mg, 100 mg, Oral, BID, KIKO Jovel  •  DULoxetine (CYMBALTA) delayed release capsule 30 mg, 30 mg, Oral, Daily, KIKO Jovel  •  folic acid (FOLVITE) tablet 1 mg, 1 mg, Oral, Daily, KIKO Jovel  •  heparin (porcine) subcutaneous injection 5,000 Units, 5,000 Units, Subcutaneous, Q8H [address], 5,000 Units at 10/20/22 0611 **AND** [CANCELED] Platelet count, , , Once, KIKO Jovel  •  HYDROmorphone (DILAUDID) injection 0 2 mg, 0 2 mg, Intravenous, Q6H PRN, KIKO Jovel  •  insulin lispro (HumaLOG) 100 units/mL subcutaneous injection 1-5 Units, 1-5 Units, Subcutaneous, TID AC **AND** Fingerstick Glucose (POCT), , , TID AC, KIKO Jovel  •  insulin lispro (HumaLOG) 100 units/mL subcutaneous injection 1-5 Units, 1-5 Units, Subcutaneous, HS, KIKO Jovel  •  levothyroxine tablet 50 mcg, 50 mcg, Oral, Early Morning, KIKO Jovel, 50 mcg at 10/20/22 7888  •  metoprolol tartrate (LOPRESSOR) partial tablet 12 5 mg, 12 5 mg, Oral, Q12H MASTER, KIKO Jovel  •  ondansetron (ZOFRAN) injection 4 mg, 4 mg, Intravenous, Q6H PRN, KIKO Jovel, 4 mg at 10/20/22 0212  •  oxybutynin (DITROPAN-XL) 24 hr tablet 5 mg, 5 mg, Oral, Daily, KIKO Jovel  •  pantoprazole (PROTONIX) EC tablet 40 mg, 40 mg, Oral, Early Morning, KIKO Jovel, 40 mg at 10/20/22 3006  • saccharomyces boulardii (FLORASTOR) capsule 250 mg, 250 mg, Oral, BID, KIKO Jovel, 250 mg at 10/20/22 0017  •  traMADol (ULTRAM) tablet 25 mg, 25 mg, Oral, Q6H PRN, KIKO Jovel    REVIEW OF SYSTEMS:   Review of Systems   Constitutional: Positive for appetite change and fatigue  Negative for activity change, chills, diaphoresis and fever  HENT: Negative for congestion, facial swelling and nosebleeds  Eyes: Negative for pain and visual disturbance  Respiratory: Negative for cough, chest tightness and shortness of breath  Cardiovascular: Negative for chest pain and palpitations  Gastrointestinal: Negative for abdominal distention, abdominal pain, diarrhea, nausea and vomiting  Genitourinary: Negative for difficulty urinating, dysuria, flank pain, frequency and hematuria  Musculoskeletal: Negative for arthralgias, back pain and joint swelling  Skin: Negative for rash  Neurological: Negative for dizziness, seizures, syncope, weakness and headaches  Psychiatric/Behavioral: Negative for agitation and confusion  The patient is not nervous/anxious  All the systems were reviewed and were negative except as documented on the HPI  PHYSICAL EXAM:  Current Weight: Weight - Scale: 45 kg (99 lb 3 3 oz)  First Weight: Weight - Scale: 45 kg (99 lb 3 3 oz)  Vitals:    10/19/22 2310 10/20/22 0018 10/20/22 0217 10/20/22 0336   BP: 94/68 124/83 117/76 122/76   BP Location: Right arm      Pulse: 102 95 99 96   Resp:    18   Temp:       TempSrc:       SpO2:  97% 93% 97%   Weight:       Height:           Intake/Output Summary (Last 24 hours) at 10/20/2022 0814  Last data filed at 10/19/2022 1906  Gross per 24 hour   Intake 1550 ml   Output 200 ml   Net 1350 ml     Physical Exam  Constitutional:       General: She is not in acute distress  Appearance: Normal appearance  She is well-developed  She is ill-appearing  HENT:      Head: Normocephalic and atraumatic        Nose: Nose normal  Mouth/Throat:      Mouth: Mucous membranes are dry  Eyes:      General: No scleral icterus  Conjunctiva/sclera: Conjunctivae normal       Pupils: Pupils are equal, round, and reactive to light  Neck:      Thyroid: No thyromegaly  Vascular: No JVD  Cardiovascular:      Rate and Rhythm: Normal rate and regular rhythm  Heart sounds: Normal heart sounds  No murmur heard  No friction rub  Pulmonary:      Effort: Pulmonary effort is normal  No respiratory distress  Breath sounds: Normal breath sounds  No wheezing or rales  Abdominal:      General: Bowel sounds are normal  There is no distension  Palpations: Abdomen is soft  Tenderness: There is no abdominal tenderness  Musculoskeletal:         General: No deformity  Cervical back: Neck supple  Right lower leg: No edema  Left lower leg: No edema  Skin:     General: Skin is warm and dry  Findings: No rash  Neurological:      Mental Status: She is alert and oriented to person, place, and time  Psychiatric:         Mood and Affect: Mood normal          Behavior: Behavior normal          Thought Content:  Thought content normal            Invasive Devices:        Lab Results:   Results from last 7 days   Lab Units 10/20/22  0629 10/20/22  0037 10/19/22  1548 10/19/22  1540   WBC Thousand/uL 30 63*  --   --  18 48*   HEMOGLOBIN g/dL 12 2  --   --  13 7   HEMATOCRIT % 43 1  --   --  48 4*   PLATELETS Thousands/uL 369  --   --  546*   POTASSIUM mmol/L 4 0 3 1*  --  3 0*   CHLORIDE mmol/L 116* 117*  --  116*   CO2 mmol/L 25 24  --  25   BUN mg/dL 64* 67*  --  61*   CREATININE mg/dL 1 34* 1 50*  --  1 68*   CALCIUM mg/dL 8 1* 8 0*  --  9 5   MAGNESIUM mg/dL 1 9  --  2 5  --    ALK PHOS U/L  --   --   --  195*   ALT U/L  --   --   --  12   AST U/L  --   --   --  29       Other Studies:   Chest x-ray  IMPRESSION:     There is a 4 2 x 5 5 cm right upper lobe paramediastinal nodule, and numerous additional smaller bilateral pulmonary nodules highly suspicious for malignancy  Both primary lung cancer and metastatic disease can have this appearance      Chest CT would be helpful for further evaluation  Portions of the record may have been created with voice recognition software  Occasional wrong word or "sound a like" substitutions may have occurred due to the inherent limitations of voice recognition software  Read the chart carefully and recognize, using context, where substitutions have occurred  If you have any questions, please contact the dictating provider

## 2022-10-20 NOTE — TELEPHONE ENCOUNTER
Dr Ernie Guido was consulted and will be seeing patient on 10/21/2022  Sister in law Hugo Roche aware

## 2022-10-20 NOTE — ASSESSMENT & PLAN NOTE
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

## 2022-10-20 NOTE — ASSESSMENT & PLAN NOTE
Improving,  Patient presents with generalized weakness  Neighbor called police as they saw her trash can outside for a few days  Police found her on the floor  Patient reports rolling off the couch and was on the floor for about 2 hours  Patient denies hitting head  Reports lower abdominal pain since 5/2022  Denies any acute pain  Patient lives [place] - in-law normally takes her shopping and she was away for about 16 days  Patient reports nothing to eat at home and was hungry  · Aggressive IV hydration  · lactic acid-within normal limits  · Trend BMP with LA  · Nephrology recs: Due to rapid improvement in sodium level changing IV fluid to normal saline at 75 mL/hour, continue to monitor renal function, avoid hypotension

## 2022-10-20 NOTE — ASSESSMENT & PLAN NOTE
Troponin [id number]  · Initial EKG showed sinus tach, rate 115, inferior lateral ST depressions, prolonged QT   · Patient denies chest pain  · Likely type 2 MI secondary to # 1 and SIRS versus Sepsis  · Troponin downtrending    · Telemetry

## 2022-10-20 NOTE — QUICK NOTE
Informed by RN, patient had episode of moderate amount of coffee-ground emesis  Patient was seen and examined  Blood pressure noted to be 98/69 similar to trend since earlier today  Mild tachycardia noted  Patient was lying comfortably in bed , appeared very weak  Aware that she is in the hospital, denied any pain  Abdomen soft bowel sounds present without any tenderness  No active bleeding noted    Chart reviewed  · Will order stat CBC, type and screen  · IV  cc bolus x1  · Initiate IV PPI  · NPO, discontinue heparin subQ  · Monitor for recurrent bleeding  · GI evaluation

## 2022-10-20 NOTE — ASSESSMENT & PLAN NOTE
Chronic arthralgia in lower back, left hip, knees, legs  Likely due to OA and Sjogren's syndrome  Patient follows Rheumatology in [facility] as outpatient  On methotrexate, Cymbalta, Voltaren, tramadol p r n  At home  · Continue Cymbalta, tramadol p r n   · Hold methotrexate for now in view of sirs versus sepsis  · Hold Voltaren due to REECE  · Will order Dilaudid p r n  For breakthrough pain

## 2022-10-20 NOTE — ASSESSMENT & PLAN NOTE
Likely multifactorial secondary to dehydration, metastatic cancer  cane at baseline  Reports history of orthostatic hypotension  · CK elevated, gentle IVF  · Treat underlying causes    · PT OT eval treat  · Fall precautions

## 2022-10-20 NOTE — ASSESSMENT & PLAN NOTE
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

## 2022-10-20 NOTE — TELEPHONE ENCOUNTER
CALL RETURN FORM   Reason for patient call? Patient is currently admitted in [facility]  Patient's sister in law Urbano Malhotra calling in indicating she spoke with a nurse and was told that Dr Edwina Ramirez could visit the patient in [facility]  Patient's primary oncologist?  Dr Edwina Ramirez    Name of person the patient was calling for?  n/a   Any additional information to add, if applicable? Ana's best call back number is   [phone]    Patient appointment today 10/20 at 11:20am   Informed patient that the message will be forwarded to the team and someone will get back to them as soon as possible    Did you relay this information to the patient?  Yes

## 2022-10-20 NOTE — PROGRESS NOTES
[facility] 46  Progress Note - Mary Ford 1945, 69 y o  female MRN: [id number]  Unit/Bed#: [address] [place] [address] Encounter: [id number]  Primary Care Provider: Edwin Dewitt MD   Date and time admitted to hospital: 10/19/2022  2:53 PM    * SIRS (systemic inflammatory response syndrome) ([facility] )  Assessment & Plan  Unresolved,  Sirs versus sepsis, POA, as evidenced by leukocytosis, tachycardia, hypothermia  Sacral wound can not exclude infection on exam     · UA shows trace leukocytes, normal white count  · CXR- shows diffuse pulmonary nodules, no evidence infection to me, pending final read  · Lactic acid 5 3 initially, repeat trending down  Likely multifactorial secondary to dehydration   · Procalcitonin 0 28/ BCx- pending/ urine culture- pending  · Patient received IV cefepime ED, will change to Ancef 1 g q 8 hours sacral wound with infection  · ID consulted, recs appreciated      Protein-calorie malnutrition ([facility] )  Assessment & Plan    Body mass index [BMI] 19 9 or less, adult a/e/b height 5'5, weight 99 lbs, = BMI 16 51, with cachexia and self-reported weight loss treated with registered dietitian evaluation, regular diet and dietary recommendations to optimize protein/calorie intake  Nutrition consulted    BMI Findings: Body mass index is 16 51 kg/m²  Pulmonary nodules  Assessment & Plan  · CXR:  2 x 5 5 cm right upper lobe paramediastinal nodule, and numerous additional smaller bilateral pulmonary nodules highly suspicious for malignancy  Both primary lung cancer and metastatic disease can have this appearance  · Onco consulted for recs      Elevated glucose  Assessment & Plan  Glucose improving,  · Check A1c- 5 5%, SSI    Chronic pain  Assessment & Plan  Chronic arthralgia in lower back, left hip, knees, legs  Likely due to OA and Sjogren's syndrome  Patient follows Rheumatology in [facility] as outpatient  On methotrexate, Cymbalta, Voltaren, tramadol p r n   At home   · Continue Cymbalta, tramadol p r n   · Hold methotrexate for now in view of sirs versus sepsis  · Hold Voltaren due to REECE  · Will order Dilaudid p r n  For breakthrough pain  Generalized weakness  Assessment & Plan  Likely multifactorial secondary to dehydration, metastatic cancer  cane at baseline  Reports history of orthostatic hypotension  · CK elevated, gentle IVF  · Treat underlying causes  · PT OT eval treat  · Fall precautions    SVT (supraventricular tachycardia) (HCC)  Assessment & Plan  Developed SVT in ED, heart rate 190  Converted to sinus rhythm after receiving Cardizem 10 mg IV  · Telemetry  · Optimize electrolytes  Replete potassium  · Start low-dose metoprolol     Metastatic cancer [facility])  Assessment & Plan  Patient reports intermittent lower abdominal pain since May of this year  Reports constipation at home  Reports weight loss since spring this year  · 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  · Patient was seen by PCP on 9/27, referred to Oncology as outpatient  No history of cancer  History of fibroid , status post hysterectomy and benign breast cyst underwent biopsy  · CT findings explained to sister-in-law at bedside  · Consult oncology-recs appreciated    Sacral wound  Assessment & Plan  Developed in 2 weeks since sister-in-law is away  Diffuse sacral DTI on exam with surrounding redness, no active drainage, scattered black eschar on exam Hard to exclude cellulitis  · Consult surgery and wound care, offloading  · IV antibiotic for Cellulitis 1 dose cefepime in ED, continue Rocephin    Acute kidney injury [facility])  Assessment & Plan  Improving,  Baseline creatinine 0 7-0 8 in past year    · Creatinine 1 34  · Likely prerenal   · IV hydration  · Avoid nephrotoxin and hypotension  · Repeat lab in the morning    Dehydration  Assessment & Plan  Improving,  Patient presents with generalized weakness  Neighbor called police as they saw her trash can outside for a few days  Police found her on the floor  Patient reports rolling off the couch and was on the floor for about 2 hours  Patient denies hitting head  Reports lower abdominal pain since 5/2022  Denies any acute pain  Patient lives [place] - in-law normally takes her shopping and she was away for about 16 days  Patient reports nothing to eat at home and was hungry  · Aggressive IV hydration  · lactic acid-within normal limits  · Trend BMP with LA  · Nephrology recs: Due to rapid improvement in sodium level changing IV fluid to normal saline at 75 mL/hour, continue to monitor renal function, avoid hypotension  Orthostatic hypotension  Assessment & Plan  Noted history  · Check orthostatic vital signs in the morning  Sjogren's syndrome [facility])  Assessment & Plan  Patient is on methotrexate 12 5 mg p o  Weekly  Follows rheumatologist in [facility]  · Will hold methotrexate for now  · Resume on discharge  · Natural tears prn for dry eye  Acquired hypothyroidism  Assessment & Plan  Continue Synthroid  Reports not taking medications at home  · Will check TSH, free T4  Dyslipidemia  Assessment & Plan  Continue statin    Elevated troponin-resolved as of 10/20/2022  Assessment & Plan  Troponin 109-83-78> wnl  · Initial EKG showed sinus tach, rate 115, inferior lateral ST depressions, prolonged QT   · Patient denies chest pain  · Likely type 2 MI secondary to # 1 and SIRS versus Sepsis  · Troponin downtrending  · Telemetry        VTE Pharmacologic Prophylaxis:   Moderate Risk (Score 3-4) - Pharmacological DVT Prophylaxis Ordered: heparin  Patient Centered Rounds: I performed bedside rounds with nursing staff today    Discussions with Specialists or Other Care Team Provider: ID, GI,     Education and Discussions with Family / Patient: Attempted to update  (significant other) via phone  Unable to contact  Time Spent for Care: 30 minutes  More than 50% of total time spent on counseling and coordination of care as described above  Current Length of Stay: 1 day(s)  Current Patient Status: Inpatient   Certification Statement: The patient will continue to require additional inpatient hospital stay due to clincal course  Discharge Plan: Anticipate discharge in 48 hrs to discharge location to be determined pending rehab evaluations  Code Status: Level 1 - Full Code    Subjective:   Pt seen at bedside was being bathed, pt reported no cp, no sob, no gi bleeding or blood with cough, pt stated she lived alone and walks with a cane and does not use home O2  Objective:     Vitals:   Temp (24hrs), Av °F (36 1 °C), Min:95 6 °F (35 3 °C), Max:97 7 °F (36 5 °C)    Temp:  [95 6 °F (35 3 °C)-97 7 °F (36 5 °C)] 97 7 °F (36 5 °C)  HR:  [] 110  Resp:  [15-28] 18  BP: ()/(43-83) 103/58  SpO2:  [82 %-100 %] 94 %  Body mass index is 16 51 kg/m²  Input and Output Summary (last 24 hours): Intake/Output Summary (Last 24 hours) at 10/20/2022 1317  Last data filed at 10/19/2022 1906  Gross per 24 hour   Intake 1550 ml   Output 200 ml   Net 1350 ml       Physical Exam:   Physical Exam  Vitals and nursing note reviewed  Constitutional:       General: She is not in acute distress  Appearance: Normal appearance  She is well-developed  HENT:      Head: Normocephalic and atraumatic  Eyes:      Conjunctiva/sclera: Conjunctivae normal    Cardiovascular:      Rate and Rhythm: Regular rhythm  Tachycardia present  Heart sounds: No murmur heard  Pulmonary:      Effort: Pulmonary effort is normal  No respiratory distress  Breath sounds: Normal breath sounds  No wheezing or rales  Abdominal:      Palpations: Abdomen is soft  Tenderness: There is no abdominal tenderness  There is no guarding     Musculoskeletal: Cervical back: Neck supple  Right lower leg: No edema  Left lower leg: No edema  Skin:     General: Skin is warm and dry  Neurological:      Mental Status: She is alert and oriented to person, place, and time  Psychiatric:         Mood and Affect: Mood normal          Judgment: Judgment normal           Additional Data:     Labs:  Results from last 7 days   Lab Units 10/20/22  0629   WBC Thousand/uL 30 63*   HEMOGLOBIN g/dL 12 2   HEMATOCRIT % 43 1   PLATELETS Thousands/uL 369   NEUTROS PCT % 90*   LYMPHS PCT % 5*   MONOS PCT % 4   EOS PCT % 0     Results from last 7 days   Lab Units 10/20/22  1158 10/20/22  0037 10/19/22  1540   SODIUM mmol/L 150*   < > 160*   POTASSIUM mmol/L 4 3   < > 3 0*   CHLORIDE mmol/L 114*   < > 116*   CO2 mmol/L 21   < > 25   BUN mg/dL 66*   < > 61*   CREATININE mg/dL 1 38*   < > 1 68*   ANION GAP mmol/L 15*   < > 19*   CALCIUM mg/dL 8 4   < > 9 5   ALBUMIN g/dL  --   --  3 5   TOTAL BILIRUBIN mg/dL  --   --  0 73   ALK PHOS U/L  --   --  195*   ALT U/L  --   --  12   AST U/L  --   --  29   GLUCOSE RANDOM mg/dL 187*   < > 163*    < > = values in this interval not displayed  Results from last 7 days   Lab Units 10/20/22  1123 10/20/22  0750   POC GLUCOSE mg/dl 183* 222*     Results from last 7 days   Lab Units 10/19/22  1540   HEMOGLOBIN A1C % 5 5     Results from last 7 days   Lab Units 10/20/22  0629 10/20/22  0400 10/20/22  0037 10/19/22  2225 10/19/22  2005 10/19/22  1548 10/19/22  1540   LACTIC ACID mmol/L  --  1 7 3 1* 3 5* 4 0*   < >  --    PROCALCITONIN ng/ml 0 53*  --   --   --   --   --  0 28*    < > = values in this interval not displayed         Lines/Drains:  Invasive Devices  Report    Peripheral Intravenous Line  Duration           Peripheral IV 10/19/22 Left Antecubital <1 day    Peripheral IV 10/19/22 Proximal;Right;Ventral (anterior) Forearm <1 day                  Telemetry:  Telemetry Orders (From admission, onward)             48 Hour Telemetry Monitoring  Continuous x 48 hours        References:    Telemetry Guidelines   Question:  Reason for 48 Hour Telemetry  Answer:  Arrhythmias Requiring Medical Therapy (eg  SVT, Vtach/fib, Bradycardia, Uncontrolled A-fib)                 Telemetry Reviewed: Sinus Tachycardia  Indication for Continued Telemetry Use: Metabolic/electrolyte disturbance with high probability of dysrhythmia             Imaging: Reviewed radiology reports from this admission including: chest xray and chest CT scan    Recent Cultures (last 7 days):   Results from last 7 days   Lab Units 10/19/22  1548 10/19/22  1540   BLOOD CULTURE  Received in Microbiology Lab  Culture in Progress  Received in Microbiology Lab  Culture in Progress         Last 24 Hours Medication List:   Current Facility-Administered Medications   Medication Dose Route Frequency Provider Last Rate   • acetaminophen  650 mg Oral Q6H PRN KIKO Jovel     • atorvastatin  40 mg Oral Daily With KIKO Hernandez     • cefazolin  1,000 mg Intravenous Q8H KIKO Jovel 1,000 mg (10/20/22 6095)   • cholecalciferol  1,000 Units Oral Daily KIKO Jovel     • docusate sodium  100 mg Oral BID KIKO Jovel     • DULoxetine  30 mg Oral Daily KIKO Jovel     • folic acid  1 mg Oral Daily KIKO Jovel     • heparin (porcine)  5,000 Units Subcutaneous Q8H [address] KIKO Jovel     • HYDROmorphone  0 2 mg Intravenous Q6H PRN KIKO Jovel     • insulin lispro  1-5 Units Subcutaneous TID AC KIKO Jovel     • insulin lispro  1-5 Units Subcutaneous HS KIKO Jovel     • levothyroxine  50 mcg Oral Early Morning KIKO Jovel     • metoprolol tartrate  12 5 mg Oral Q12H [address] KIKO Jovel     • ondansetron  4 mg Intravenous Q6H PRN KIKO Jovel     • oxybutynin  5 mg Oral Daily KIKO Jovel     • pantoprazole  40 mg Oral Early Morning KIKO Jovel     • saccharomyces boulardii  250 mg Oral BID [place] Expose, KIKO     • sodium chloride  75 mL/hr Intravenous Continuous Inés Dey MD 75 mL/hr (10/20/22 1008)   • traMADol  25 mg Oral Q6H PRN KIKO Rai          Today, Patient Was Seen By: Palmira Shelley    **Please Note: This note may have been constructed using a voice recognition system  **

## 2022-10-20 NOTE — ASSESSMENT & PLAN NOTE
Patient is on methotrexate 12 5 mg p o  Weekly  Follows rheumatologist in [facility]  · Will hold methotrexate for now  · Resume on discharge  · Natural tears prn for dry eye

## 2022-10-20 NOTE — ASSESSMENT & PLAN NOTE
Improving,  Baseline creatinine 0 7-0 8 in past year    · Creatinine 1 34  · Likely prerenal   · IV hydration  · Avoid nephrotoxin and hypotension  · Repeat lab in the morning

## 2022-10-20 NOTE — ASSESSMENT & PLAN NOTE
Troponin 109-83-78> wnl  · Initial EKG showed sinus tach, rate 115, inferior lateral ST depressions, prolonged QT   · Patient denies chest pain  · Likely type 2 MI secondary to # 1 and SIRS versus Sepsis  · Troponin downtrending    · Telemetry

## 2022-10-20 NOTE — ASSESSMENT & PLAN NOTE
Unresolved,  Sirs versus sepsis, POA, as evidenced by leukocytosis, tachycardia, hypothermia  Sacral wound can not exclude infection on exam     · UA shows trace leukocytes, normal white count  · CXR- shows diffuse pulmonary nodules, no evidence infection to me, pending final read  · Lactic acid 5 3 initially, repeat trending down  Likely multifactorial secondary to dehydration   · Procalcitonin 0 28/ BCx- pending/ urine culture- pending  · Patient received IV cefepime ED, will change to Ancef 1 g q 8 hours sacral wound with infection    · ID consulted, recs appreciated

## 2022-10-20 NOTE — ASSESSMENT & PLAN NOTE
Likely multifactorial secondary to dehydration, metastatic cancer  Patient uses cane at baseline  Reports history of orthostatic hypotension  · Treat underlying causes    · PT OT eval treat  · Fall precautions

## 2022-10-20 NOTE — PLAN OF CARE
Problem: Potential for Falls  Goal: Patient will remain free of falls  Description: INTERVENTIONS:  - Educate patient/family on patient safety including physical limitations  - Instruct patient to call for assistance with activity   - Consult OT/PT to assist with strengthening/mobility   - Keep Call bell within reach  - Keep bed low and locked with side rails adjusted as appropriate  - Keep care items and personal belongings within reach  - Initiate and maintain comfort rounds  - Make Fall Risk Sign visible to staff  - Offer Toileting every  Hours, in advance of need  - Initiate/Maintain alarm  - Obtain necessary fall risk management equipment:   - Apply yellow socks and bracelet for high fall risk patients  - Consider moving patient to room near nurses station  Outcome: Progressing     Problem: Nutrition/Hydration-ADULT  Goal: Nutrient/Hydration intake appropriate for improving, restoring or maintaining nutritional needs  Description: Monitor and assess patient's nutrition/hydration status for malnutrition  Collaborate with interdisciplinary team and initiate plan and interventions as ordered  Monitor patient's weight and dietary intake as ordered or per policy  Utilize nutrition screening tool and intervene as necessary  Determine patient's food preferences and provide high-protein, high-caloric foods as appropriate       INTERVENTIONS:  - Monitor oral intake, urinary output, labs, and treatment plans  - Assess nutrition and hydration status and recommend course of action  - Evaluate amount of meals eaten  - Assist patient with eating if necessary   - Allow adequate time for meals  - Recommend/ encourage appropriate diets, oral nutritional supplements, and vitamin/mineral supplements  - Order, calculate, and assess calorie counts as needed  - Recommend, monitor, and adjust tube feedings and TPN/PPN based on assessed needs  - Assess need for intravenous fluids  - Provide specific nutrition/hydration education as appropriate  - Include patient/family/caregiver in decisions related to nutrition  Outcome: Progressing     Problem: MOBILITY - ADULT  Goal: Maintain or return to baseline ADL function  Description: INTERVENTIONS:  -  Assess patient's ability to carry out ADLs; assess patient's baseline for ADL function and identify physical deficits which impact ability to perform ADLs (bathing, care of mouth/teeth, toileting, grooming, dressing, etc )  - Assess/evaluate cause of self-care deficits   - Assess range of motion  - Assess patient's mobility; develop plan if impaired  - Assess patient's need for assistive devices and provide as appropriate  - Encourage maximum independence but intervene and supervise when necessary  - Involve family in performance of ADLs  - Assess for home care needs following discharge   - Consider OT consult to assist with ADL evaluation and planning for discharge  - Provide patient education as appropriate  Outcome: Progressing  Goal: Maintains/Returns to pre admission functional level  Description: INTERVENTIONS:  - Perform BMAT or MOVE assessment daily    - Set and communicate daily mobility goal to care team and patient/family/caregiver  - Collaborate with rehabilitation services on mobility goals if consulted  - Perform Range of Motion  times a day  - Reposition patient every  hours    - Dangle patient  times a day  - Stand patient  times a day  - Ambulate patient  times a day  - Out of bed to chair  times a day   - Out of bed for meal times a day  - Out of bed for toileting  - Record patient progress and toleration of activity level   Outcome: Progressing     Problem: INFECTION - ADULT  Goal: Absence or prevention of progression during hospitalization  Description: INTERVENTIONS:  - Assess and monitor for signs and symptoms of infection  - Monitor lab/diagnostic results  - Monitor all insertion sites, i e  indwelling lines, tubes, and drains  - Monitor endotracheal if appropriate and nasal secretions for changes in amount and color  - [place] appropriate cooling/warming therapies per order  - Administer medications as ordered  - Instruct and encourage patient and family to use good hand hygiene technique  - Identify and instruct in appropriate isolation precautions for identified infection/condition  Outcome: Progressing  Goal: Absence of fever/infection during neutropenic period  Description: INTERVENTIONS:  - Monitor WBC    Outcome: Progressing     Problem: CARDIOVASCULAR - ADULT  Goal: Maintains optimal cardiac output and hemodynamic stability  Description: INTERVENTIONS:  - Monitor I/O, vital signs and rhythm  - Monitor for S/S and trends of decreased cardiac output  - Administer and titrate ordered vasoactive medications to optimize hemodynamic stability  - Assess quality of pulses, skin color and temperature  - Assess for signs of decreased coronary artery perfusion  - Instruct patient to report change in severity of symptoms  Outcome: Progressing  Goal: Absence of cardiac dysrhythmias or at baseline rhythm  Description: INTERVENTIONS:  - Continuous cardiac monitoring, vital signs, obtain 12 lead EKG if ordered  - Administer antiarrhythmic and heart rate control medications as ordered  - Monitor electrolytes and administer replacement therapy as ordered  Outcome: Progressing     Problem: METABOLIC, FLUID AND ELECTROLYTES - ADULT  Goal: Electrolytes maintained within normal limits  Description: INTERVENTIONS:  - Monitor labs and assess patient for signs and symptoms of electrolyte imbalances  - Administer electrolyte replacement as ordered  - Monitor response to electrolyte replacements, including repeat lab results as appropriate  - Instruct patient on fluid and nutrition as appropriate  Outcome: Progressing     Problem: Prexisting or High Potential for Compromised Skin Integrity  Goal: Skin integrity is maintained or improved  Description: INTERVENTIONS:  - Identify patients at risk for skin breakdown  - Assess and monitor skin integrity  - Assess and monitor nutrition and hydration status  - Monitor labs   - Assess for incontinence   - Turn and reposition patient  - Assist with mobility/ambulation  - Relieve pressure over bony prominences  - Avoid friction and shearing  - Provide appropriate hygiene as needed including keeping skin clean and dry  - Evaluate need for skin moisturizer/barrier cream  - Collaborate with interdisciplinary team   - Patient/family teaching  - Consider wound care consult   Outcome: Progressing

## 2022-10-20 NOTE — UTILIZATION REVIEW
Initial Clinical Review    Admission: Date/Time/Statement:   Admission Orders (From admission, onward)     Ordered        10/19/22 1846  INPATIENT ADMISSION  Once                      Orders Placed This Encounter   Procedures   • INPATIENT ADMISSION     Standing Status:   Standing     Number of Occurrences:   1     Order Specific Question:   Level of Care     Answer:   Med Surg [16]     Order Specific Question:   Estimated length of stay     Answer:   More than 2 Midnights     Order Specific Question:   Certification     Answer:   I certify that inpatient services are medically necessary for this patient for a duration of greater than two midnights  See H&P and MD Progress Notes for additional information about the patient's course of treatment  ED Arrival Information     Expected   -    Arrival   10/19/2022 14:48    Acuity   Urgent            Means of arrival   Ambulance    Escorted by   [address] [place], [place] [address]ist    Admission type   Emergency            Arrival complaint   ams             Chief Complaint   Patient presents with   • Weakness - Generalized     Patient arrives via ALS  States PD did a welfare check and found patient on the floor  Patient states she has increasing generalized weakness for about two weeks  Hasn't been taking her meds or eating/drinking  C/o constipation x several weeks and abdominal pain x 1 week  Initial Presentation:  69 yof to ER from home via EMS, found on floor after rolling of couch  approx 2 hrs  Reports generalized weakness, decreased appetite  Hx Sjogren syndrome, chronic joint pain, hyperlipidemia, hypothyroid, orthostatic hypotension  Presents hypothermic, tachycardic, tachypneic, hypotensive & hypoxic with dry mucous membranes, cachectic, sacral wound per image below  Admission work-up showing pulmonary nodules on imaging, leukocytosis, hypernatremia, hypokalemia, +troponin, elevated procalcitonin, lactic acid     Admitted to inpatient status for dehydration  Started on IVABT & IVF, cultures pending  Date: 10/20/22   Day 2:   IVABT per ID continued for SIRS vs sepsis  Cultures pending, follow  Cr trending downward, continue gentle hydration  Electrolytes improving, renal following/mgt  Per renal: REECE, hypernatremia  Due to rapid improvement in sodium level changing IV fluid to normal saline at 75 mL/hour, continue to monitor renal function, avoid hypotension-goal is 152 by 4 pm today  Changed to NS at 75 mL/hour ml/hr  Monitor BMP q 6 hours  Goal by 4:00 a m  Tomorrow would be around sodium 145 mEq/liter    Per ID: SIRS vs sepsis POA  Consider reactive secondary to multiple metabolic derangements and volume depletion, in addition to probable malignancy  Consider urinary source given reported urinary frequency    Continue IVABT, follow cultures, daily wound care    ED Triage Vitals   Temperature Pulse Respirations Blood Pressure SpO2   10/19/22 1519 10/19/22 1507 10/19/22 1507 10/19/22 1455 10/19/22 1507   (!) 95 6 °F (35 3 °C) (!) 115 (!) 25 91/52 (!) 82 %      Temp Source Heart Rate Source Patient Position - Orthostatic VS BP Location FiO2 (%)   10/19/22 1519 10/19/22 1700 10/19/22 1507 10/19/22 1507 --   Tympanic Monitor Sitting Right arm       Pain Score       10/19/22 1507       6          Wt Readings from Last 1 Encounters:   10/19/22 45 kg (99 lb 3 3 oz)     Additional Vital Signs:   10/20/22 08:51:45 97 3 °F (36 3 °C) Abnormal  105 15 94/59 71 -- -- Lying   10/20/22 03:36:51 -- 96 18 122/76 91 97 % -- --   10/20/22 02:17:53 -- 99 -- 117/76 90 93 % -- --   10/20/22 00:18:57 -- 95 -- 124/83 97 97 % -- --   10/19/22 2310 -- 102 -- 94/68  -- -- -- Lying   BP: Hospitalist aware at 10/19/22 2310   10/19/22 22:49:02 97 3 °F (36 3 °C) Abnormal  51 Abnormal  18 96/47 Abnormal  63 Abnormal  96 % -- Lying   10/19/22 2130 -- 98 22 102/59 77 100 % None (Room air) Lying   10/19/22 2031 -- 94 20 110/56 80 100 % None (Room air) Sitting 10/19/22 1725 -- 92 23 Abnormal  128/63 90 98 % -- --   10/19/22 1715 -- 90 21 114/59 80 99 % -- --   10/19/22 1705 -- 92 23 Abnormal  115/57 80 98 % -- --   10/19/22 1700 -- 91 22 112/56 -- 99 % None (Room air) Lying   10/19/22 1658 -- 100 -- 103/53 76 98 % -- --   10/19/22 1655 -- 190 Abnormal  27 Abnormal  111/51 74 98 % -- --   10/19/22 1651 -- 188 Abnormal  26 Abnormal  116/56 80 97 % -- --   10/19/22 1647 -- 190 Abnormal  26 Abnormal  121/62 87 97 % -- --   10/19/22 1645 -- 192 Abnormal  22 129/70 91 96 % -- --     Pertinent Labs/Diagnostic Test Results:   CT head without contrast   Final Result  (10/19 1645)      1  No acute intracranial abnormality   2  Microangiopathic changes and progression of cerebral volume loss      XR chest 1 view portable   Final Result  (10/20 0180)      There is a 4 2 x 5 5 cm right upper lobe paramediastinal nodule, and numerous additional smaller bilateral pulmonary nodules highly suspicious for malignancy  Both primary lung cancer and metastatic disease can have this appearance  Chest CT would be helpful for further evaluation  10/19  Ekg=  Normal sinus rhythm  Nonspecific ST and T wave abnormality  Prolonged QT  Abnormal ECG  When compared with ECG of 19-OCT-2022 16:47, (unconfirmed)  Vent  rate has decreased BY  96 BPM  Right bundle branch block is no longer Present  Confirmed by Mariella Payne ([id number]) on 10/20/2022 7:54:36 AM    Ekg=  Supraventricular tachycardia  Right bundle branch block  T wave abnormality, consider lateral ischemia  Abnormal ECG  When compared with ECG of 19-OCT-2022 15:37,  Vent   rate has increased BY  75 BPM  Right bundle branch block is now Present  Confirmed by Mariella Payne ([id number]) on 10/20/2022 7:54:12 AM    Ekg=  Sinus tachycardia  Biatrial enlargement  Prolonged QT  Abnormal ECG  When compared with ECG of 12-NOV-2017 15:06,  ST now depressed in Inferior leads  ST now depressed in Anterolateral leads  Confirmed by Mariella Payne ([id number]) on 10/19/2022 3:51:24 PM    Results from last 7 days   Lab Units 10/19/22  1540   SARS-COV-2  Negative     Results from last 7 days   Lab Units 10/20/22  0629 10/19/22  1540   WBC Thousand/uL 30 63* 18 48*   HEMOGLOBIN g/dL 12 2 13 7   HEMATOCRIT % 43 1 48 4*   PLATELETS Thousands/uL 369 546*   NEUTROS ABS Thousands/µL 28 50* 14 77*     Results from last 7 days   Lab Units 10/20/22  0629 10/20/22  0037 10/19/22  1548 10/19/22  1540   SODIUM mmol/L 153* 155*  --  160*   POTASSIUM mmol/L 4 0 3 1*  --  3 0*   CHLORIDE mmol/L 116* 117*  --  116*   CO2 mmol/L 25 24  --  25   ANION GAP mmol/L 12 14*  --  19*   BUN mg/dL 64* 67*  --  61*   CREATININE mg/dL 1 34* 1 50*  --  1 68*   EGFR ml/min/1 73sq m 38 33  --  29   CALCIUM mg/dL 8 1* 8 0*  --  9 5   MAGNESIUM mg/dL 1 9  --  2 5  --      Results from last 7 days   Lab Units 10/19/22  1540   AST U/L 29   ALT U/L 12   ALK PHOS U/L 195*   TOTAL PROTEIN g/dL 8 2   ALBUMIN g/dL 3 5   TOTAL BILIRUBIN mg/dL 0 73     Results from last 7 days   Lab Units 10/20/22  0750   POC GLUCOSE mg/dl 222*     Results from last 7 days   Lab Units 10/20/22  0629 10/20/22  0037 10/19/22  1540   GLUCOSE RANDOM mg/dL 252* 212* 163*     Results from last 7 days   Lab Units 10/19/22  1540   HEMOGLOBIN A1C % 5 5   EAG mg/dl 111     Results from last 7 days   Lab Units 10/20/22  0629 10/19/22  1548   CK TOTAL U/L 98 118     Results from last 7 days   Lab Units 10/19/22  2005 10/19/22  1813 10/19/22  1548   HS TNI 0HR ng/L  --   --  109*   HS TNI 2HR ng/L  --  83*  --    HSTNI D2 ng/L  --  -26  --    HS TNI 4HR ng/L 78*  --   --    HSTNI D4 ng/L -31  --   --      Results from last 7 days   Lab Units 10/20/22  0629   TSH 3RD GENERATON uIU/mL 11 897*     Results from last 7 days   Lab Units 10/20/22  0629 10/19/22  1540   PROCALCITONIN ng/ml 0 53* 0 28*     Results from last 7 days   Lab Units 10/20/22  0400 10/20/22  0037 10/19/22  2225 10/19/22  2005 10/19/22  1813 10/19/22  1548   LACTIC ACID mmol/L 1 7 3  1* 3 5* 4 0* 3 0* 5 3*     Results from last 7 days   Lab Units 10/19/22  1900   CLARITY UA  Cloudy   COLOR UA  Yellow   SPEC GRAV UA  1 020   PH UA  6 0   GLUCOSE UA mg/dl Negative   KETONES UA mg/dl 15 (1+)*   BLOOD UA  Negative   PROTEIN UA mg/dl Trace*   NITRITE UA  Negative   BILIRUBIN UA  Small*   UROBILINOGEN UA E U /dl 1 0   LEUKOCYTES UA  Trace*   WBC UA /hpf 1-2   RBC UA /hpf None Seen   BACTERIA UA /hpf Moderate*   EPITHELIAL CELLS WET PREP /hpf Occasional     Results from last 7 days   Lab Units 10/19/22  1548 10/19/22  1540   BLOOD CULTURE  Received in Microbiology Lab  Culture in Progress  Received in Microbiology Lab  Culture in Progress       ED Treatment:   Medication Administration from 10/19/2022 1448 to 10/19/2022 2237       Date/Time Order Dose Route Action     10/19/2022 1604 sodium chloride 0 9 % bolus 1,000 mL 1,000 mL Intravenous New Bag     10/19/2022 1626 cefepime (MAXIPIME) IVPB (premix in dextrose) 2,000 mg 50 mL 2,000 mg Intravenous New Bag     10/19/2022 1704 sodium chloride 0 9 % bolus 500 mL 500 mL Intravenous New Bag     10/19/2022 1653 diltiazem (CARDIZEM) injection 10 mg 10 mg Intravenous Given     10/19/2022 2038 potassium chloride 20 mEq IVPB (premix) 20 mEq Intravenous New Bag        Past Medical History:   Diagnosis Date   • Disease of thyroid gland    • Hypercholesteremia    • Hypertension      Present on Admission:  • Acquired hypothyroidism  • Dyslipidemia  • Orthostatic hypotension  • Sjogren's syndrome (HCC)    Admitting Diagnosis: Dehydration [E86 0]  Hypernatremia [E87 0]  SVT (supraventricular tachycardia) (HCC) [I47 1]  Weakness [R53 1]  Acute kidney injury ([facility] [address] ) [N17 9]  Sacral wound [S31 000A]  Age/Sex: 69 y o  female  Admission Orders:  Telemetry  Consult renal  Pt/ot eval & tx  scd  Consult surgery  Consult oncology  Orthostatic BP  accuchecks  Wound care:   Site Other (specify)   Explanatory Comment: buttocks   Cleanse Hibiclens   Wound Care Optifoam   Open to air: No     Scheduled Medications:  atorvastatin, 40 mg, Oral, Daily With Dinner  cefazolin, 1,000 mg, Intravenous, Q8H  cholecalciferol, 1,000 Units, Oral, Daily  docusate sodium, 100 mg, Oral, BID  DULoxetine, 30 mg, Oral, Daily  folic acid, 1 mg, Oral, Daily  heparin (porcine), 5,000 Units, Subcutaneous, Q8H MASTER  insulin lispro, 1-5 Units, Subcutaneous, TID AC  insulin lispro, 1-5 Units, Subcutaneous, HS  levothyroxine, 50 mcg, Oral, Early Morning  metoprolol tartrate, 12 5 mg, Oral, Q12H MASTER  oxybutynin, 5 mg, Oral, Daily  pantoprazole, 40 mg, Oral, Early Morning  saccharomyces boulardii, 250 mg, Oral, BID    Continuous IV Infusions:  sodium chloride, 75 mL/hr, Intravenous, Continuous    PRN Meds:  acetaminophen, 650 mg, Oral, Q6H PRN  HYDROmorphone, 0 2 mg, Intravenous, Q6H PRN  ondansetron, 4 mg, Intravenous, Q6H PRN  traMADol, 25 mg, Oral, Q6H PRN    Network Utilization Review Department  ATTENTION: Please call with any questions or concerns to [phone] and carefully listen to the prompts so that you are directed to the right person  All voicemails are confidential   Romy Chavez all requests for admission clinical reviews, approved or denied determinations and any other requests to dedicated fax number below belonging to the campus where the patient is receiving treatment   List of dedicated fax numbers for the Facilities:  [address] [place] [address] DENIALS (Administrative/Medical Necessity) [phone]   [address] [place] [address] (Maternity/NICU/Pediatrics) [phone]   [address] [phone]   [facility] [address] [phone]   [address] [address] [facility] [place] [place] [address] Jani [address] [phone]     [address] [place] [phone]   [address] [phone]

## 2022-10-20 NOTE — ASSESSMENT & PLAN NOTE
Developed SVT in ED, heart rate 190  Converted to sinus rhythm after receiving Cardizem 10 mg IV  · Telemetry  · Optimize electrolytes  Potassium 3 0, Mag 2 5  Replete potassium    · Start low-dose metoprolol

## 2022-10-20 NOTE — ASSESSMENT & PLAN NOTE
Continue Synthroid  Reports not taking medications at home    · Elevated TSH, normal free T4   · Continue Synthroid at current dose  · Follow-up thyroid profile as output

## 2022-10-20 NOTE — ASSESSMENT & PLAN NOTE
Patient presents with generalized weakness  Neighbor called police as they saw her trash can outside for a few days  Police found her on the floor  Patient reports rolling off the couch and was on the floor for about 2 hours  Patient denies hitting head  Reports lower abdominal pain since 5/2022  Denies any acute pain  Patient lives [place] Rab[place] - in-law normally takes her shopping and she was away for about 16 days  Patient reports nothing to eat at home and was hungry  · Blood work showed -  Sodium 160, potassium 3 0, AG 19, acute kidney injury, WBC 18, UA shows ketones, lactic acid elevated, procalcitonin 0 28  · Aggressive IV hydration    · Trend lactic acid  · Repeat BMP with LA  · Consult nephrology

## 2022-10-20 NOTE — CONSULTS
Kayode Yo  1945    HEMATOLOGY/ONCOLOGY CONSULTATION REPORT    DISCUSSION/SUMMARY:    52-year-old female admitted with progressive weakness and fatigue  Patient is being treated for SIRS versus sepsis - ID on board  Patient also has a sacral decubitus - wound care is in process  Patient with acute kidney injury - being followed by Nephrology  Recent scans have demonstrated BL pulmonary nodules  Patient also has T11 and L2 osteoblastic lesions  The obvious concern is malignancy  No contraindication from hospitalist, renal, ID, patient can go for in-patient CT scan of the chest   Patient smoked for approximately 20 years but discontinued in 1989  When patient is better and more stable, she can be evaluated for biopsy  Pathology results will determine further workup and treatment options  That being said, Mrs Brennan's performance status at this time is not very good at all  Respectfully, patient does not seem to have a good understanding of her present situation (need to clarify patient's mental status 2 weeks ago, 1 month ago, 6 months ago etc)  The sacral decubitus is also (obviously) evidence of patient's declining performance status at home  Treatment options for a malignancy may be limited  There were a number of people in the room when I interviewed the patient today  If not already performed documented, patient should have a complete breast exam     Will follow with you  Please do not hesitate to contact me if you have any questions or need additional information  Thank you for this consult     _________________________________________________________________________________    Chief Complaint   Patient presents with   • Weakness - Generalized     Patient arrives via [facility]  States PD did a welfare check and found patient on the floor  Patient states she has increasing generalized weakness for about two weeks  Hasn't been taking her meds or eating/drinking    C/o constipation x several weeks and abdominal pain x 1 week  History of Present Illness:  71-year-old female admitted on October 20, 2020 with progressive weakness and fatigue  Patient has a history of Sjogren's syndrome, chronic joint pain, hyperlipidemia, hypothyroidism, orthostatic hypotension  Patient apparently was found in her house on the floor  Patient was found in bed in no apparent distress  Mrs Washington Kerns appeared frail  Patient was able to answer simple questions but did not seem to have a firm grasp on her present medical situation  Patient suffered a number of episodes of coffee-ground emesis on October 20, 2022  Patient was scheduled for endoscopy - presently on hold (NOS)    Patient was not aware of the right paramediastinal 5 cm mass/nodule or the bilateral pulmonary nodules  Review of Systems   Constitutional: Positive for activity change, appetite change, fatigue and unexpected weight change  HENT: Negative  Eyes: Negative  Respiratory: Negative  Cardiovascular: Negative  Gastrointestinal: Negative  Endocrine: Negative  Genitourinary: Negative  Musculoskeletal: Negative  Skin: Negative  Allergic/Immunologic: Negative  Neurological: Positive for weakness  Hematological: Negative  Psychiatric/Behavioral: Negative  All other systems reviewed and are negative      Patient Active Problem List   Diagnosis   • Onychomycosis   • Peripheral sensory neuropathy   • Radiculopathy of lumbar region   • Metatarsalgia of both feet   • Dyslipidemia   • Acquired hypothyroidism   • Sjogren's syndrome ([address] )   • Orthostatic hypotension   • Recurrent syncope   • Hypertension   • Hypercholesteremia   • Disease of thyroid gland   • Dehydration   • Acute kidney injury ([address] )   • SIRS (systemic inflammatory response syndrome) (HCC)   • Sacral wound   • Metastatic cancer (HCC)   • SVT (supraventricular tachycardia) (HCC)   • Generalized weakness   • Chronic pain   • Elevated glucose Past Medical History:   Diagnosis Date   • Disease of thyroid gland    • Hypercholesteremia    • Hypertension      Past Surgical History:   Procedure Laterality Date   • APPENDECTOMY     • BREAST BIOPSY Right     benign   • CHOLECYSTECTOMY     • COLON SURGERY      ruptured bowel   • COLOSTOMY      and closure 6 months later   • HYSTERECTOMY  2002    total   • KNEE ARTHROSCOPY Bilateral     right x1 left x2   • AK COLSC FLX W/RMVL OF TUMOR POLYP LESION SNARE TQ N/A 5/3/2016    Procedure: COLONOSCOPY ;  Surgeon: Mateusz Bailey MD;  Location: [facility] GI LAB;   Service: Gastroenterology   • TONSILECTOMY AND ADNOIDECTOMY     • TYMPANOPLASTY Right      Family History   Problem Relation Age of Onset   • Breast cancer Paternal Aunt 80     Social History     Socioeconomic History   • Marital status: Single     Spouse name: Not on file   • Number of children: Not on file   • Years of education: Not on file   • Highest education level: Not on file   Occupational History   • Not on file   Tobacco Use   • Smoking status: Former Smoker     Packs/day: 1 00     Years: 20 00     Pack years: 20 00     Quit date: 46     Years since quittin 8   • Smokeless tobacco: Never Used   Substance and Sexual Activity   • Alcohol use: Not Currently   • Drug use: No   • Sexual activity: Not on file   Other Topics Concern   • Not on file   Social History Narrative   • Not on file     Social Determinants of Health     Financial Resource Strain: Not on file   Food Insecurity: Not on file   Transportation Needs: Not on file   Physical Activity: Not on file   Stress: Not on file   Social Connections: Not on file   Intimate Partner Violence: Not on file   Housing Stability: Not on file       Current Facility-Administered Medications:   •  acetaminophen (TYLENOL) tablet 650 mg, 650 mg, Oral, Q6H PRN, KIKO Jovel  •  atorvastatin (LIPITOR) tablet 40 mg, 40 mg, Oral, Daily With Dinner, KIKO Jovel  •  ceFAZolin (ANCEF) IVPB (premix in dextrose) 1,000 mg 50 mL, 1,000 mg, Intravenous, Q8H, KIKO Jovel, Last Rate: 100 mL/hr at 10/20/22 0611, 1,000 mg at 10/20/22 6888  •  cholecalciferol (VITAMIN D3) tablet 1,000 Units, 1,000 Units, Oral, Daily, KIKO Jovel, 1,000 Units at 10/20/22 5787  •  docusate sodium (COLACE) capsule 100 mg, 100 mg, Oral, BID, KIKO Jovel, 100 mg at 10/20/22 9939  •  DULoxetine (CYMBALTA) delayed release capsule 30 mg, 30 mg, Oral, Daily, KIKO Jovel, 30 mg at 50/57/09 1516  •  folic acid (FOLVITE) tablet 1 mg, 1 mg, Oral, Daily, KIKO Jovel, 1 mg at 10/20/22 2681  •  heparin (porcine) subcutaneous injection 5,000 Units, 5,000 Units, Subcutaneous, Q8H [address], 5,000 Units at 10/20/22 0611 **AND** [CANCELED] Platelet count, , , Once, KIKO Jovel  •  HYDROmorphone (DILAUDID) injection 0 2 mg, 0 2 mg, Intravenous, Q6H PRN, KIKO Jovel  •  insulin lispro (HumaLOG) 100 units/mL subcutaneous injection 1-5 Units, 1-5 Units, Subcutaneous, TID AC, 1 Units at 10/20/22 1142 **AND** Fingerstick Glucose (POCT), , , TID AC, KIKO Jovel  •  insulin lispro (HumaLOG) 100 units/mL subcutaneous injection 1-5 Units, 1-5 Units, Subcutaneous, HS, KIKO Jovel  •  levothyroxine tablet 50 mcg, 50 mcg, Oral, Early Morning, KIKO Jovel, 50 mcg at 10/20/22 3081  •  metoprolol tartrate (LOPRESSOR) partial tablet 12 5 mg, 12 5 mg, Oral, Q12H MASTER, KIKO Jovel  •  ondansetron (ZOFRAN) injection 4 mg, 4 mg, Intravenous, Q6H PRN, Mackenzie Garrison, GUILLERMONP, 4 mg at 10/20/22 0212  •  oxybutynin (DITROPAN-XL) 24 hr tablet 5 mg, 5 mg, Oral, Daily, Mackenzie Garrison, KIKO, 5 mg at 10/20/22 7521  •  pantoprazole (PROTONIX) EC tablet 40 mg, 40 mg, Oral, Early Morning, Mackenzie Garrison, GUILLERMONP, 40 mg at 10/20/22 9176  •  saccharomyces boulardii (FLORASTOR) capsule 250 mg, 250 mg, Oral, BID, Mackenzie Yañezrimanuela, CRNP, 250 mg at 10/20/22 2325  •  sodium chloride 0 9 % infusion, 75 mL/hr, Intravenous, Continuous, Sara Donnelly MD, Last Rate: 75 mL/hr at 10/20/22 1008, 75 mL/hr at 10/20/22 1008  •  traMADol (ULTRAM) tablet 25 mg, 25 mg, Oral, Q6H PRN, KIKO Jovel    Allergies   Allergen Reactions   • Penicillins Rash       Vitals:    10/20/22 1115   BP: 106/77   Pulse: (!) 108   Resp:    Temp:    SpO2: 91%     Physical Exam  HENT:      Head: Normocephalic and atraumatic  Right Ear: External ear normal       Left Ear: External ear normal       Mouth/Throat:      Comments: Oral mucosa pale pink, dry  Eyes:      Comments: Conjunctiva pale, anicteric   Cardiovascular:      Rate and Rhythm: Normal rate and regular rhythm  Heart sounds: Normal heart sounds  Pulmonary:      Comments: Fair entry bilaterally, scattered bilateral rhonchi  Abdominal:      General: Bowel sounds are normal       Palpations: Abdomen is soft  Comments: Nontender, no guarding   Musculoskeletal:      Cervical back: Normal range of motion and neck supple  Skin:     General: Skin is warm  Comments: Warm, pale, scattered purpura, no ecchymoses, no petechial rash     Sacral decubitus deferred  Extremities:  0-1 +bilateral lower extremity edema, no cords, pulses are decreased  Lymphatics:  No adenopathy in the neck, supraclavicular region, axilla bilaterally    Labs    10/21/2022 BUN = 75 creatinine = 1 22 GFR = 43    10/20/2022 WBC = 30 6 hemoglobin = 12 2 hematocrit = 43 1 MCV = 79 RDW = 20 platelet = 564 neutrophil = 90% bands = 1% lymphocytes = 5% monocyte = 4%    Imaging    10/19/2022 CT head without contrast    1  No acute intracranial abnormality  2  Microangiopathic changes and progression of cerebral volume loss    10/19/2022 chest x-ray one view portable    There is a 4 2 x 5 5 cm right upper lobe paramediastinal nodule, and numerous additional smaller bilateral pulmonary nodules highly suspicious for malignancy  Both primary lung cancer and metastatic disease can have this appearance    Chest CT would be helpful for further evaluation  09/22/2022 CT scan abdomen without contrast    1  Large hiatal hernia  2   Numerous lung nodules, concerning for metastasis  3   Sclerosis of T11 and L2 vertebral bodies, 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

## 2022-10-20 NOTE — PLAN OF CARE
Problem: Potential for Falls  Goal: Patient will remain free of falls  Description: INTERVENTIONS:  - Educate patient/family on patient safety including physical limitations  - Instruct patient to call for assistance with activity   - Consult OT/PT to assist with strengthening/mobility   - Keep Call bell within reach  - Keep bed low and locked with side rails adjusted as appropriate  - Keep care items and personal belongings within reach  - Initiate and maintain comfort rounds  - Make Fall Risk Sign visible to staff  - Offer Toileting every 2 Hours, in advance of need  - Initiate/Maintain bed alarm  - Obtain necessary fall risk management equipment  - Apply yellow socks and bracelet for high fall risk patients  - Consider moving patient to room near nurses station  Outcome: Progressing     Problem: Nutrition/Hydration-ADULT  Goal: Nutrient/Hydration intake appropriate for improving, restoring or maintaining nutritional needs  Description: Monitor and assess patient's nutrition/hydration status for malnutrition  Collaborate with interdisciplinary team and initiate plan and interventions as ordered  Monitor patient's weight and dietary intake as ordered or per policy  Utilize nutrition screening tool and intervene as necessary  Determine patient's food preferences and provide high-protein, high-caloric foods as appropriate       INTERVENTIONS:  - Monitor oral intake, urinary output, labs, and treatment plans  - Assess nutrition and hydration status and recommend course of action  - Evaluate amount of meals eaten  - Allow adequate time for meals  - Recommend/ encourage appropriate diets, oral nutritional supplements, and vitamin/mineral supplements  - Order, calculate, and assess calorie counts as needed  - Assess need for intravenous fluids  - Provide specific nutrition/hydration education as appropriate  - Include patient/family/caregiver in decisions related to nutrition  Outcome: Progressing     Problem: MOBILITY - ADULT  Goal: Maintain or return to baseline ADL function  Description: INTERVENTIONS:  -  Assess patient's ability to carry out ADLs; assess patient's baseline for ADL function and identify physical deficits which impact ability to perform ADLs (bathing, care of mouth/teeth, toileting, grooming, dressing, etc )  - Assess/evaluate cause of self-care deficits   - Assess range of motion  - Assess patient's mobility; develop plan if impaired  - Assess patient's need for assistive devices and provide as appropriate  - Encourage maximum independence but intervene and supervise when necessary  - Involve family in performance of ADLs  - Assess for home care needs following discharge   - Consider OT consult to assist with ADL evaluation and planning for discharge  - Provide patient education as appropriate  Outcome: Progressing  Goal: Maintains/Returns to pre admission functional level  Description: INTERVENTIONS:  - Perform BMAT or MOVE assessment daily    - Set and communicate daily mobility goal to care team and patient/family/caregiver  - Collaborate with rehabilitation services on mobility goals if consulted  - Perform Range of Motion 3 times a day  - Reposition patient every 2 hours    - Dangle patient 3 times a day  - Out of bed for toileting  - Record patient progress and toleration of activity level   Outcome: Progressing     Problem: INFECTION - ADULT  Goal: Absence or prevention of progression during hospitalization  Description: INTERVENTIONS:  - Assess and monitor for signs and symptoms of infection  - Monitor lab/diagnostic results  - Monitor all insertion sites, i e  indwelling lines, tubes, and drains  - Administer medications as ordered  - Instruct and encourage patient and family to use good hand hygiene technique  Outcome: Progressing  Goal: Absence of fever/infection during neutropenic period  Description: INTERVENTIONS:  - Monitor WBC    Outcome: Progressing     Problem: CARDIOVASCULAR - ADULT  Goal: Maintains optimal cardiac output and hemodynamic stability  Description: INTERVENTIONS:  - Monitor I/O, vital signs and rhythm  - Monitor for S/S and trends of decreased cardiac output  - Administer ordered vasoactive medications to optimize hemodynamic stability  - Assess quality of pulses, skin color and temperature  - Assess for signs of decreased coronary artery perfusion  - Instruct patient to report change in severity of symptoms  Outcome: Progressing  Goal: Absence of cardiac dysrhythmias or at baseline rhythm  Description: INTERVENTIONS:  - Continuous cardiac monitoring, vital signs, obtain 12 lead EKG if ordered  - Administer antiarrhythmic and heart rate control medications as ordered  - Monitor electrolytes and administer replacement therapy as ordered  Outcome: Progressing     Problem: METABOLIC, FLUID AND ELECTROLYTES - ADULT  Goal: Electrolytes maintained within normal limits  Description: INTERVENTIONS:  - Monitor labs and assess patient for signs and symptoms of electrolyte imbalances  - Administer electrolyte replacement as ordered  - Monitor response to electrolyte replacements, including repeat lab results as appropriate  - Instruct patient on fluid and nutrition as appropriate  Outcome: Progressing

## 2022-10-20 NOTE — ASSESSMENT & PLAN NOTE
Baseline creatinine appears to be around 0 7-0 8 in past year    · Creatinine today 1 68  · Likely prerenal   · IV hydration  · Avoid nephrotoxin and hypotension  · Repeat lab in the morning

## 2022-10-20 NOTE — CONSULTS
Consultation - Infectious Disease   Kimberly Poe 69 y o  female MRN: [id number]  Unit/Bed#: 2 [place] [address] Encounter: [id number]      IMPRESSION & RECOMMENDATIONS:   Impression/Recommendations:  1  SIRS versus severe sepsis, POA  Tachycardia, tachypnea, leukocytosis, elevated lactic acid  No fevers  Consider reactive secondary to multiple metabolic derangements and volume depletion, in addition to probable malignancy  Consider urinary source given reported urinary frequency  UA showed bacteria but no pyuria  Negative CXR for infection  Negative COVID-19  No overt evidence of sacral SSTI  No history of MDROs  WBC count remains elevated today but otherwise patient remains afebrile with improving hemodynamics and lactic acid     -continue IV cefazolin for now pending cultures  -follow up pending blood cultures, urine culture  -follow temperatures and hemodynamics  -recheck CBC in a m   -supportive care    2  Sacral DTI  No overt evidence of acute infection  -wound care evaluation  -daily local wound care  -frequent repositioning  -serial exams    3  Acute kidney injury  Likely due to volume depletion from poor oral intake/hypotension  Creatinine is coming down with IVFs  Nephrology input noted     -dose adjust antibiotics based on renal function as indicated  -repeat BMP in a m   -nephrology follow-up ongoing    4  Progressive weight loss, anorexia secondary to probable metastatic malignancy  Recent outpatient abdominal CT showed numerous lung nodules and sclerosis of T11 and L2 vertebral bodies concerning for metastasis  Patient has scheduled follow-up with Oncology as outpatient     -follow-up oncology recommendations    5  Sjogren syndrome  On weekly methotrexate  Follows with [facility]ology at [facility]  6  Elevated TSH/hypothyroidism  Recently off meds  Management per primary service  7  Penicillin allergy  With reported history of rash    Tolerating cefazolin without difficulty  Antibiotics:  Cefazolin 1  Cefepime x1    I discussed above plan with Dr Romi Khan from Internal Medicine Service  I personally reviewed prior outpatient medical records  Thank you for this consultation  We will follow along with you  HISTORY OF PRESENT ILLNESS:  Reason for Consult: SIRS    HPI: Rod Xavier is a 69 y o  female with iron deficiency anemia, hypothyroidism, Sjogren syndrome who has been following with PCP recently due to concern for progressive weakness, fatigue, anorexia, weight loss  She underwent outpatient abdominal CT on 09/22/2022 showing numerous lung nodules concerning for metastasis as well as sclerosis of T11 and L2 vertebral bodies 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  She was due to follow up with Oncology as outpatient  However on 10/19, patient presented to ER after she was found on the floor by police  Had reportedly been down for about 2 hours  No loss of consciousness  Neighbor had called the police as they had not seen her in the past few days  Patient has been recently living by herself as her sister-in-law was away and recently developed a sacral DTI  On presentation, temperature was 95 6° with tachycardia, tachypnea, WBC count of 18, creatinine of 1 7, lactic acid of 5 3  CT head and chest x-ray showed no acute infectious findings, but chest x-ray did show right upper lobe nodule with numerous other smaller nodules concerning for malignancy  UA showed moderate bacteria, no pyuria  Patient received dose of cefepime in the ED  Antibiotic was changed to cefazolin in the setting of SIRS versus possible sepsis  Patient currently feels weak and tired  Denies focal pain, shortness of breath, cough, URI symptoms  States she was having urinary frequency at home but no dysuria, pelvic or flank pain  REVIEW OF SYSTEMS:  A complete system-based review of systems is otherwise negative      PAST MEDICAL HISTORY:  Past Medical History:   Diagnosis Date • Disease of thyroid gland    • Hypercholesteremia    • Hypertension      Past Surgical History:   Procedure Laterality Date   • APPENDECTOMY     • BREAST BIOPSY Right     benign   • CHOLECYSTECTOMY     • COLON SURGERY      ruptured bowel   • COLOSTOMY      and closure 6 months later   • HYSTERECTOMY      total   • KNEE ARTHROSCOPY Bilateral     right x1 left x2   • MT COLSC FLX W/RMVL OF TUMOR POLYP LESION SNARE TQ N/A 5/3/2016    Procedure: COLONOSCOPY ;  Surgeon: Margarita Knowles MD;  Location: [place] [address] GI LAB; Service: Gastroenterology   • TONSILECTOMY AND ADNOIDECTOMY     • TYMPANOPLASTY Right        FAMILY HISTORY:  Non-contributory    SOCIAL HISTORY:  Social History     Substance and Sexual Activity   Alcohol Use Not Currently     Social History     Substance and Sexual Activity   Drug Use No     Social History     Tobacco Use   Smoking Status Former Smoker   • Packs/day: 1 00   • Years:    • Pack years:    • Quit date:    • Years since quittin 8   Smokeless Tobacco Never Used       ALLERGIES:  Allergies   Allergen Reactions   • Penicillins Rash       MEDICATIONS:  All current active medications have been reviewed  PHYSICAL EXAM:  Vitals:  Temp:  [95 6 °F (35 3 °C)-97 3 °F (36 3 °C)] 97 3 °F (36 3 °C)  HR:  [] 103  Resp:  [15-28] 15  BP: ()/(43-83) 94/59  SpO2:  [82 %-100 %] 94 %  Temp (24hrs), Av 7 °F (35 9 °C), Min:95 6 °F (35 3 °C), Max:97 3 °F (36 3 °C)  Current: Temperature: (!) 97 3 °F (36 3 °C)     Physical Exam:  General:  Elderly, frail and debilitated, nontoxic  Eyes:  Conjunctive clear with no hemorrhages or effusions  Oropharynx:  No ulcers, no lesions  Neck:  Supple, no lymphadenopathy  Lungs:  Nonlabored respirations  Abdomen:  Soft, non-tender, non-distended  Extremities:  No peripheral cyanosis, clubbing, or edema  Skin:  Multiple small sacral DTIs with no apparent cellulitis, fluctuance, drainage    Neurological:  Moves all four extremities spontaneously, no meningismus    LABS, IMAGING, & OTHER STUDIES:  Lab Results:  I have personally reviewed pertinent labs  Results from last 7 days   Lab Units 10/20/22  0629 10/20/22  0037 10/19/22  1540   POTASSIUM mmol/L 4 0 3 1* 3 0*   CHLORIDE mmol/L 116* 117* 116*   CO2 mmol/L 25 24 25   BUN mg/dL 64* 67* 61*   CREATININE mg/dL 1 34* 1 50* 1 68*   EGFR ml/min/1 73sq m 38 33 29   CALCIUM mg/dL 8 1* 8 0* 9 5   AST U/L  --   --  29   ALT U/L  --   --  12   ALK PHOS U/L  --   --  195*     Results from last 7 days   Lab Units 10/20/22  0629 10/19/22  1540   WBC Thousand/uL 30 63* 18 48*   HEMOGLOBIN g/dL 12 2 13 7   PLATELETS Thousands/uL 369 546*     Results from last 7 days   Lab Units 10/19/22  1548 10/19/22  1540   BLOOD CULTURE  Received in Microbiology Lab  Culture in Progress  Received in Microbiology Lab  Culture in Progress  Imaging Studies:   I have personally reviewed pertinent imaging study reports and images in PACS  CT head shows no acute intracranial abnormality  Chest x-ray shows 4 2 x 5 5 cm right upper lobe paramediastinal nodule, and numerous additional smaller bilateral pulmonary nodules highly suspicious for malignancy  EKG, Pathology, and Other Studies:   I have personally reviewed pertinent reports

## 2022-10-20 NOTE — ASSESSMENT & PLAN NOTE
Developed SVT in ED, heart rate 190  Converted to sinus rhythm after receiving Cardizem 10 mg IV  · Telemetry  · Optimize electrolytes  Replete potassium    · Start low-dose metoprolol

## 2022-10-21 ENCOUNTER — APPOINTMENT (INPATIENT)
Dept: RADIOLOGY | Facility: HOSPITAL | Age: 77
End: 2022-10-21

## 2022-10-21 PROBLEM — E43 SEVERE PROTEIN-CALORIE MALNUTRITION (HCC): Status: ACTIVE | Noted: 2022-10-21

## 2022-10-21 LAB
ABO GROUP BLD: NORMAL
ABO GROUP BLD: NORMAL
ANION GAP SERPL CALCULATED.3IONS-SCNC: 12 MMOL/L (ref 4–13)
ANION GAP SERPL CALCULATED.3IONS-SCNC: 14 MMOL/L (ref 4–13)
ANION GAP SERPL CALCULATED.3IONS-SCNC: 16 MMOL/L (ref 4–13)
BLD GP AB SCN SERPL QL: NEGATIVE
BUN SERPL-MCNC: 59 MG/DL (ref 5–25)
BUN SERPL-MCNC: 69 MG/DL (ref 5–25)
BUN SERPL-MCNC: 75 MG/DL (ref 5–25)
CALCIUM SERPL-MCNC: 7.6 MG/DL (ref 8.3–10.1)
CALCIUM SERPL-MCNC: 7.8 MG/DL (ref 8.3–10.1)
CALCIUM SERPL-MCNC: 7.9 MG/DL (ref 8.3–10.1)
CHLORIDE SERPL-SCNC: 113 MMOL/L (ref 96–108)
CHLORIDE SERPL-SCNC: 116 MMOL/L (ref 96–108)
CHLORIDE SERPL-SCNC: 118 MMOL/L (ref 96–108)
CO2 SERPL-SCNC: 18 MMOL/L (ref 21–32)
CO2 SERPL-SCNC: 20 MMOL/L (ref 21–32)
CO2 SERPL-SCNC: 22 MMOL/L (ref 21–32)
CREAT SERPL-MCNC: 1.09 MG/DL (ref 0.6–1.3)
CREAT SERPL-MCNC: 1.16 MG/DL (ref 0.6–1.3)
CREAT SERPL-MCNC: 1.22 MG/DL (ref 0.6–1.3)
GFR SERPL CREATININE-BSD FRML MDRD: 43 ML/MIN/1.73SQ M
GFR SERPL CREATININE-BSD FRML MDRD: 45 ML/MIN/1.73SQ M
GFR SERPL CREATININE-BSD FRML MDRD: 49 ML/MIN/1.73SQ M
GLUCOSE SERPL-MCNC: 116 MG/DL (ref 65–140)
GLUCOSE SERPL-MCNC: 120 MG/DL (ref 65–140)
GLUCOSE SERPL-MCNC: 138 MG/DL (ref 65–140)
GLUCOSE SERPL-MCNC: 142 MG/DL (ref 65–140)
GLUCOSE SERPL-MCNC: 149 MG/DL (ref 65–140)
GLUCOSE SERPL-MCNC: 162 MG/DL (ref 65–140)
GLUCOSE SERPL-MCNC: 172 MG/DL (ref 65–140)
HGB BLD-MCNC: 7.2 G/DL (ref 11.5–15.4)
HGB BLD-MCNC: 8.7 G/DL (ref 11.5–15.4)
HGB BLD-MCNC: 9.1 G/DL (ref 11.5–15.4)
POTASSIUM SERPL-SCNC: 2.9 MMOL/L (ref 3.5–5.3)
POTASSIUM SERPL-SCNC: 3.8 MMOL/L (ref 3.5–5.3)
POTASSIUM SERPL-SCNC: 3.9 MMOL/L (ref 3.5–5.3)
RH BLD: POSITIVE
RH BLD: POSITIVE
SODIUM SERPL-SCNC: 147 MMOL/L (ref 135–147)
SODIUM SERPL-SCNC: 150 MMOL/L (ref 135–147)
SODIUM SERPL-SCNC: 152 MMOL/L (ref 135–147)
SPECIMEN EXPIRATION DATE: NORMAL

## 2022-10-21 RX ORDER — SODIUM CHLORIDE, SODIUM GLUCONATE, SODIUM ACETATE, POTASSIUM CHLORIDE, MAGNESIUM CHLORIDE, SODIUM PHOSPHATE, DIBASIC, AND POTASSIUM PHOSPHATE .53; .5; .37; .037; .03; .012; .00082 G/100ML; G/100ML; G/100ML; G/100ML; G/100ML; G/100ML; G/100ML
500 INJECTION, SOLUTION INTRAVENOUS ONCE
Status: COMPLETED | OUTPATIENT
Start: 2022-10-21 | End: 2022-10-21

## 2022-10-21 RX ORDER — ALBUMIN (HUMAN) 12.5 G/50ML
25 SOLUTION INTRAVENOUS ONCE
Status: COMPLETED | OUTPATIENT
Start: 2022-10-21 | End: 2022-10-22

## 2022-10-21 RX ORDER — POTASSIUM CHLORIDE 29.8 MG/ML
40 INJECTION INTRAVENOUS ONCE
Status: DISCONTINUED | OUTPATIENT
Start: 2022-10-21 | End: 2022-10-21

## 2022-10-21 RX ORDER — MINERAL OIL 100 G/100G
1 OIL RECTAL ONCE
Status: COMPLETED | OUTPATIENT
Start: 2022-10-21 | End: 2022-10-21

## 2022-10-21 RX ORDER — POTASSIUM CHLORIDE 14.9 MG/ML
20 INJECTION INTRAVENOUS
Status: COMPLETED | OUTPATIENT
Start: 2022-10-21 | End: 2022-10-22

## 2022-10-21 RX ORDER — DEXTROSE MONOHYDRATE 50 MG/ML
50 INJECTION, SOLUTION INTRAVENOUS CONTINUOUS
Status: DISCONTINUED | OUTPATIENT
Start: 2022-10-21 | End: 2022-10-23

## 2022-10-21 RX ADMIN — INSULIN LISPRO 1 UNITS: 100 INJECTION, SOLUTION INTRAVENOUS; SUBCUTANEOUS at 17:01

## 2022-10-21 RX ADMIN — MINERAL OIL 1 ENEMA: 100 ENEMA RECTAL at 13:55

## 2022-10-21 RX ADMIN — ATORVASTATIN CALCIUM 40 MG: 40 TABLET, FILM COATED ORAL at 16:59

## 2022-10-21 RX ADMIN — ALBUMIN (HUMAN) 25 G: 0.25 INJECTION, SOLUTION INTRAVENOUS at 10:41

## 2022-10-21 RX ADMIN — SODIUM CHLORIDE, SODIUM GLUCONATE, SODIUM ACETATE, POTASSIUM CHLORIDE, MAGNESIUM CHLORIDE, SODIUM PHOSPHATE, DIBASIC, AND POTASSIUM PHOSPHATE 500 ML: .53; .5; .37; .037; .03; .012; .00082 INJECTION, SOLUTION INTRAVENOUS at 11:04

## 2022-10-21 RX ADMIN — Medication 1000 UNITS: at 08:41

## 2022-10-21 RX ADMIN — Medication 250 MG: at 17:32

## 2022-10-21 RX ADMIN — DOCUSATE SODIUM 100 MG: 100 CAPSULE, LIQUID FILLED ORAL at 08:42

## 2022-10-21 RX ADMIN — SODIUM CHLORIDE 50 ML/HR: 0.9 INJECTION, SOLUTION INTRAVENOUS at 06:01

## 2022-10-21 RX ADMIN — CEFAZOLIN SODIUM 1000 MG: 1 SOLUTION INTRAVENOUS at 06:10

## 2022-10-21 RX ADMIN — INSULIN LISPRO 1 UNITS: 100 INJECTION, SOLUTION INTRAVENOUS; SUBCUTANEOUS at 08:04

## 2022-10-21 RX ADMIN — POTASSIUM CHLORIDE 20 MEQ: 14.9 INJECTION, SOLUTION INTRAVENOUS at 23:32

## 2022-10-21 RX ADMIN — FOLIC ACID 1 MG: 1 TABLET ORAL at 08:41

## 2022-10-21 RX ADMIN — CEFAZOLIN SODIUM 1000 MG: 1 SOLUTION INTRAVENOUS at 14:35

## 2022-10-21 RX ADMIN — Medication 12.5 MG: at 08:42

## 2022-10-21 RX ADMIN — LEVOTHYROXINE SODIUM 75 MCG: 75 TABLET ORAL at 06:10

## 2022-10-21 RX ADMIN — OXYBUTYNIN 5 MG: 5 TABLET, FILM COATED, EXTENDED RELEASE ORAL at 08:42

## 2022-10-21 RX ADMIN — CEFAZOLIN SODIUM 1000 MG: 1 SOLUTION INTRAVENOUS at 23:43

## 2022-10-21 RX ADMIN — SODIUM CHLORIDE 8 MG/HR: 9 INJECTION, SOLUTION INTRAVENOUS at 21:11

## 2022-10-21 RX ADMIN — POTASSIUM CHLORIDE 20 MEQ: 14.9 INJECTION, SOLUTION INTRAVENOUS at 21:08

## 2022-10-21 RX ADMIN — Medication 250 MG: at 08:41

## 2022-10-21 RX ADMIN — DEXTROSE 100 ML/HR: 5 SOLUTION INTRAVENOUS at 08:19

## 2022-10-21 RX ADMIN — SODIUM CHLORIDE 8 MG/HR: 9 INJECTION, SOLUTION INTRAVENOUS at 06:01

## 2022-10-21 RX ADMIN — DOCUSATE SODIUM 100 MG: 100 CAPSULE, LIQUID FILLED ORAL at 17:32

## 2022-10-21 RX ADMIN — CEFAZOLIN SODIUM 1000 MG: 1 SOLUTION INTRAVENOUS at 00:05

## 2022-10-21 RX ADMIN — DULOXETINE HYDROCHLORIDE 30 MG: 30 CAPSULE, DELAYED RELEASE ORAL at 08:42

## 2022-10-21 NOTE — PROGRESS NOTES
[facility] Internal Medicine Progress Note  Patient: Mary Ford 69 y o  female   MRN: [id number]  PCP: Edwin Dewitt MD  Unit/Bed#: 2 [place] [address] Encounter: [id number]  Date Of Visit: 10/21/22    Problem List:    Principal Problem:    SIRS (systemic inflammatory response syndrome) ([facility] )  Active Problems:    Metastatic cancer ([facility] )    Hypernatremia    Coffee ground emesis    Orthostatic hypotension    Acute kidney injury ([facility] )    SVT (supraventricular tachycardia) (HCC)    Pulmonary nodules    Dyslipidemia    Acquired hypothyroidism    Sjogren's syndrome (HCC)    Dehydration    Sacral wound    Generalized weakness    Chronic pain    Elevated glucose    Severe protein-calorie malnutrition ([facility] )      Assessment & Plan:    Coffee ground emesis  Assessment & Plan  Patient noted to have 2 episodes of coffee-ground emesis on 09/21, associated with tachycardia and hypotension setting of volume depletion   Hemoglobin downtrending, partly dilutional but BUN is uptrending  No further evidence of bleeding or melena   Prior CT scan with large hiatal hernia   Abdominal exam is relatively benign except mild tenderness in the left side   · Continue  · IV Protonix drip  · GI evaluation endoscopy   · Monitor H&H, transfuse as needed  · Follow-up CT chest abdomen pelvis           Hypernatremia  Assessment & Plan  Presented with sodium level of 160 secondary to decrease free water intake  Seen by Nephrology, input appreciated  Patient initially treated with IV NS and was subsequently transitioned to D5 half NS to prevent rapid correction  Sodium level 152   · Now transitioned to D5 water   · Follow-up periodic BMP  · Monitor intake output   · Monitor p o  intake    Metastatic cancer [facility])  Assessment & Plan  Patient reports intermittent lower abdominal pain since May of this year  Reports constipation at home  Reports weight loss since spring this year    · 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  · Patient was seen by PCP on 9/27, referred to Oncology as outpatient  No history of cancer  History of fibroid , status post hysterectomy and benign breast cyst   Oncology evaluation  · Will get CT chest abdomen pelvis to further evaluate  · Patient will need biopsy to establish diagnosis     * SIRS (systemic inflammatory response syndrome) ([facility])  Assessment & Plan  SIRS versus sepsis, POA, as evidenced by leukocytosis, tachycardia, hypothermia  Lactic acidosis  Patient is afebrile  · UA shows trace leukocytes, normal white count  SARS-CoV-2 PCR negative  · CXR- shows diffuse pulmonary nodules, no evidence infection to me, pending final read  · Lactic acid 5 3 initially, repeat trending down  Likely multifactorial secondary to dehydration   · Procalcitonin 0 28/ BCx- pending/ urine culture- pending  ID follow ing, input appreciated   No definitive skin and soft tissue infection  Consider urinary source   Possible related to metastatic disease versus GI bleed  · Initially received cefepime, now on cefazolin  · Follow-up blood cultures-negative so far  · Follow-up urine culture  · Follow-up CT chest abdomen pelvis      Pulmonary nodules  Assessment & Plan  · CXR:  2 x 5 5 cm right upper lobe paramediastinal nodule, and numerous additional smaller bilateral pulmonary nodules highly suspicious for malignancy  Both primary lung cancer and metastatic disease can have this appearance  · Follow-up CT      SVT (supraventricular tachycardia) ([facility])  Assessment & Plan  Developed SVT in ED, heart rate 190  Converted to sinus rhythm after receiving Cardizem 10 mg IV  · Telemetry  · Optimize electrolytes  · Started on low-dose metoprolol     Acute kidney injury [facility])  Assessment & Plan  Baseline creatinine 0 7-0 8 in past year    Creatinine 1 68 on admission  UA without significant abnormality  Prior CT scan without any hydronephrosis  Likely prerenal in setting of dehydration and hypotension  Nephrology following, input appreciated  Improving with IV fluids  · Continue IV fluid   · Monitor intake output   · Avoid nephrotoxin and hypotension  · Follow-up BMP    Orthostatic hypotension  Assessment & Plan  Noted history  Severe protein-calorie malnutrition ([address] )  Assessment & Plan  Malnutrition Findings:   Adult Malnutrition type: Chronic illness  Adult Degree of Malnutrition: Other severe protein calorie malnutrition  Malnutrition Characteristics: Fat loss, Muscle loss, Weight loss                  360 Statement: Severe protein calorie malnutrion of chronic illness related to increased energy needs as evidenced by 12% weight loss in 1 month, hollow orbits, protruding clavicles, depression at the temples  Awaiting treatment plan    BMI Findings:  Adult BMI Classifications: Underweight < 18 5        Body mass index is 16 51 kg/m²  Elevated glucose  Assessment & Plan  Glucose improving,  · Check A1c- 5 5%, SSI    Chronic pain  Assessment & Plan  Chronic arthralgia in lower back, left hip, knees, legs  Likely due to OA and Sjogren's syndrome  Patient follows Rheumatology in [facility] as outpatient  On methotrexate, Cymbalta, Voltaren, tramadol p r n  At home  Denies pain at present  · Continue Cymbalta,   · Hold methotrexate for now in view of sirs versus sepsis  · Hold Voltaren, tramadol   · Tylenol as needed    Generalized weakness  Assessment & Plan  Likely multifactorial secondary to dehydration, metastatic cancer  cane at baseline  Reports history of orthostatic hypotension  · CK elevated, gentle IVF  · Treat underlying causes  · PT OT eval treat  · Fall precautions    Sacral wound  Assessment & Plan  Developed in 2 weeks since sister-in-law is away    Diffuse sacral DTI on exam with surrounding redness, no active drainage, scattered black eschar on exam Hard to exclude cellulitis  · Seen by surgery, input appreciated, continue wound care as per wound care    Dehydration  Assessment & Plan  Patient presents with generalized weakness  Neighbor called police as they saw her trash can outside for a few days  Police found her on the floor  Patient reports rolling off the couch and was on the floor for about 2 hours  Patient denies hitting head  Reports lower abdominal pain since 5/2022  Denies any acute pain  [place] - in-law normally takes her shopping and she was away for about 16 days  Patient reported nothing to eat at home and was hungry  · Improving with IV fluids        Sjogren's syndrome [facility])  Assessment & Plan  Patient is on methotrexate 12 5 mg p o  Weekly  Follows rheumatologist in [facility]  · Will hold methotrexate for now  · Resume on discharge  · Natural tears prn for dry eye  Acquired hypothyroidism  Assessment & Plan  Continue Synthroid  Reports not taking medications at home  · Elevated TSH, normal free T4   · Continue Synthroid      Dyslipidemia  Assessment & Plan  Continue statin    Elevated troponin-resolved as of 10/20/2022  Assessment & Plan  Troponin 109-83-78> wnl  · Initial EKG showed sinus tach, rate 115, inferior lateral ST depressions, prolonged QT   · Patient denies chest pain  · Likely type 2 MI secondary to # 1 and SIRS versus Sepsis  · Troponin downtrending  · Telemetry      Extensive discussion with patient and sister-in-law after CT scan results  Images reviewed concerning for extensive metastatic disease  Workup and treatment options were discussed especially in current situation with patient's functional status and poor nutrition  The verbalized understanding, patient would like to proceed with biopsy after stabilization of hemoglobin/bleeding and metabolic status  Goals of care were discussed  Advanced directives were discussed    POLST was discussed        VTE Pharmacologic Prophylaxis:   Moderate Risk (Score 3-4) - Pharmacological DVT Prophylaxis Contraindicated  Sequential Compression Devices Ordered  Patient Centered Rounds: I performed bedside rounds with nursing staff today  Discussions with Specialists or Other Care Team Provider:  GI, Oncology, wound care, ID    Education and Discussions with Family / Patient: Updated  (Sister-in-law, [facility]) at bedside  Time Spent for Care: 45 minutes  More than 50% of total time spent on counseling and coordination of care as described above  Current Length of Stay: 2 day(s)  Current Patient Status: Inpatient   Certification Statement: The patient will continue to require additional inpatient hospital stay due to Electrolytes abnormality, GI bleeding  Discharge Plan: Anticipate discharge in >72 hrs to discharge location to be determined pending rehab evaluations  Code Status: Level 1 - Full Code    Subjective:   Blood pressure remains soft   No further coffee-ground emesis   Patient denies any pain    Objective:     Vitals:   Temp (24hrs), Av 9 °F (36 1 °C), Min:96 6 °F (35 9 °C), Max:97 7 °F (36 5 °C)    Temp:  [96 6 °F (35 9 °C)-97 7 °F (36 5 °C)] 97 1 °F (36 2 °C)  HR:  [] 108  Resp:  [17-23] 17  BP: ()/(53-77) 97/62  SpO2:  [90 %-98 %] 93 % on room air  Body mass index is 16 51 kg/m²  Input and Output Summary (last 24 hours): Intake/Output Summary (Last 24 hours) at 10/21/2022 0905  Last data filed at 10/21/2022 0610  Gross per 24 hour   Intake --   Output 200 ml   Net -200 ml       Physical Exam:   Physical Exam  Constitutional:       General: She is not in acute distress  Appearance: She is ill-appearing  Comments: Cachectic   HENT:      Head: Normocephalic and atraumatic  Mouth/Throat:      Mouth: Mucous membranes are dry  Cardiovascular:      Rate and Rhythm: Normal rate  Pulmonary:      Effort: Pulmonary effort is normal  No respiratory distress  Breath sounds: Normal breath sounds   No wheezing or rales  Comments: Diminished bilaterally  Abdominal:      General: Bowel sounds are normal  There is no distension  Palpations: Abdomen is soft  Tenderness: There is no abdominal tenderness  There is no guarding or rebound  Musculoskeletal:      Right lower leg: No edema  Left lower leg: No edema  Skin:     General: Skin is warm and dry  Coloration: Skin is pale  Findings: No rash  Neurological:      General: No focal deficit present  Mental Status: She is alert  Additional Data:     Labs:  Results from last 7 days   Lab Units 10/21/22  0730 10/21/22  0043 10/20/22  1758 10/20/22  0629   WBC Thousand/uL  --   --   --  30 63*   HEMOGLOBIN g/dL 8 7*   < > 10 9* 12 2   HEMATOCRIT %  --   --  38 2 43 1   PLATELETS Thousands/uL  --   --   --  369   NEUTROS PCT %  --   --   --  90*   LYMPHS PCT %  --   --   --  5*   MONOS PCT %  --   --   --  4   EOS PCT %  --   --   --  0    < > = values in this interval not displayed  Results from last 7 days   Lab Units 10/21/22  0043 10/20/22  0037 10/19/22  1540   SODIUM mmol/L 152*   < > 160*   POTASSIUM mmol/L 3 8   < > 3 0*   CHLORIDE mmol/L 118*   < > 116*   CO2 mmol/L 20*   < > 25   BUN mg/dL 75*   < > 61*   CREATININE mg/dL 1 22   < > 1 68*   ANION GAP mmol/L 14*   < > 19*   CALCIUM mg/dL 7 9*   < > 9 5   ALBUMIN g/dL  --   --  3 5   TOTAL BILIRUBIN mg/dL  --   --  0 73   ALK PHOS U/L  --   --  195*   ALT U/L  --   --  12   AST U/L  --   --  29   GLUCOSE RANDOM mg/dL 138   < > 163*    < > = values in this interval not displayed           Results from last 7 days   Lab Units 10/21/22  0723 10/20/22  2127 10/20/22  1558 10/20/22  1123 10/20/22  0750   POC GLUCOSE mg/dl 172* 139 171* 183* 222*     Results from last 7 days   Lab Units 10/19/22  1540   HEMOGLOBIN A1C % 5 5     Results from last 7 days   Lab Units 10/20/22  0629 10/20/22  0400 10/20/22  0037 10/19/22  2225 10/19/22  2005 10/19/22  1548 10/19/22  1540   LACTIC ACID mmol/L  --  1 7 3 1* 3 5* 4 0*   < >  --    PROCALCITONIN ng/ml 0 53*  --   --   --   --   --  0 28*    < > = values in this interval not displayed  Lines/Drains:  Invasive Devices  Report    Peripheral Intravenous Line  Duration           Peripheral IV 10/19/22 Left Antecubital 1 day    Peripheral IV 10/19/22 Proximal;Right;Ventral (anterior) Forearm 1 day          Drain  Duration           External Urinary Catheter <1 day                  Telemetry:  Telemetry Orders (From admission, onward)             48 Hour Telemetry Monitoring  Continuous x 48 hours        Expiring   References:    Telemetry Guidelines   Question:  Reason for 48 Hour Telemetry  Answer:  Arrhythmias Requiring Medical Therapy (eg  SVT, Vtach/fib, Bradycardia, Uncontrolled A-fib)                 Telemetry Reviewed: Normal Sinus Rhythm  Indication for Continued Telemetry Use: Metabolic/electrolyte disturbance with high probability of dysrhythmia             Imaging: Reviewed radiology reports from this admission including: chest xray, abdominal/pelvic CT and CT head    Recent Cultures (last 7 days):   Results from last 7 days   Lab Units 10/19/22  1900 10/19/22  1548 10/19/22  1540   BLOOD CULTURE   --  No Growth at 24 hrs  No Growth at 24 hrs     URINE CULTURE  Culture too young- will reincubate  --   --        Last 24 Hours Medication List:   Current Facility-Administered Medications   Medication Dose Route Frequency Provider Last Rate   • acetaminophen  650 mg Oral Q6H PRN KIKO Jovel     • atorvastatin  40 mg Oral Daily With KIKO Hernandez     • cefazolin  1,000 mg Intravenous Q8H KIKO Jovel 1,000 mg (10/21/22 0610)   • cholecalciferol  1,000 Units Oral Daily KIKO Jovel     • dextrose  100 mL/hr Intravenous Continuous Brannon Freeman  mL/hr (10/21/22 9182)   • docusate sodium  100 mg Oral BID KIKO Jovel     • DULoxetine  30 mg Oral Daily KIKO Jovel     • folic acid  1 mg Oral Daily KIKO Jovel     • HYDROmorphone  0 2 mg Intravenous Q6H PRN KIKO Jovel     • insulin lispro  1-5 Units Subcutaneous TID AC KIKO Jovel     • insulin lispro  1-5 Units Subcutaneous HS KIKO Jovel     • levothyroxine  75 mcg Oral Early Morning Chirag Sanchez MD     • metoprolol tartrate  12 5 mg Oral Q12H [address] KIKO Jovel     • ondansetron  4 mg Intravenous Q6H PRN KIKO Jvoel     • oxybutynin  5 mg Oral Daily KIKO Jovel     • pantoprozole (PROTONIX) infusion (Continuous)  8 mg/hr Intravenous Continuous Sánchez Hernandez MD 8 mg/hr (10/21/22 0601)   • saccharomyces boulardii  250 mg Oral BID KIKO Jovel          Today, Patient Was Seen By: Sánchez Hernandez    ** Please Note: "This note has been constructed using a voice recognition system  Therefore there may be syntax, spelling, and/or grammatical errors   Please call if you have any questions  "**

## 2022-10-21 NOTE — PROGRESS NOTES
NEPHROLOGY PROGRESS NOTE   Yessenia Asencio 69 y o  female MRN: [id number]  Unit/Bed#: 2 [place] [address] Encounter: [id number]    ASSESSMENT & PLAN:  69 y o  female with history of Sjogren syndrome, chronic joint pain, hyperlipidemia hypothyroidism who was admitted to [facility] after presenting with complain of generalized weakness  Patient was found down on the floor after neighbour called police as the saw trash can outside for few days   Nephrology consulted for hypernatremia and REECE  Acute kidney injury, POA  -Baseline creatinine:  0 7-0 8  As per Care everywhere in August 22 creatinine was 0 7  -Admission creatinine:1 68 mg/dl  - Work up:   · UA with microscopy:no rbc and just 1-2 wbc   · Imaging:CT abd no hydro  -Etiology:prerenal / volume depletion from poor oral intake/ hypotension with BP in 90's  -Hospital Course: improving to cr 1 22  -Plan:   · Renal function improving to creatinine 1 22 mg/dL with IV hydration  Sodium level now trending up with IV normal saline, so switching IV fluid to D5 water, patient is currently NPO which is also contributing to hypernatremia  Monitor sodium level  · Avoid nephrotoxins and dose all medications per EGFR  · Avoid hypotension                                              Hypernatremia, POA  -admission sodium 160  -likely due to decreased free water intake and volume depletion  -initially treated with normal saline bolus followed by D5 half NS and then due to rapid improvement in sodium level was switched to normal saline on 10/20  Since then sodium level not improving much, last sodium level was 152   Goal sodium level by tomorrow would be around 145 mEq/liter, changing IV fluid to D5 water at 100 mL/hour and will monitor BMP q 6 hours    Low bicarbonate with elevated chloride likely due to IV normal saline, continue to monitor with changing IV fluid to D5 water    Anemia:  Hemoglobin slightly trending down to 8 7, continue to monitor per primary team     Lactic acidosis likely due to hypotension and volume depletion improved status post IV fluid  Last lactic level was 1 7     Elevated TSH/hypothyroidism, TSH level was 11 8, management per primary team   Patient was recently not taking her home medications     Leukocytosis:  Follow-up cultures-blood culture negative for last 24 hours  On antibiotic per primary team    Also ID on board     Suspected metastatic cancer  -CT abdomen pelvis suggestive of numerous lung nodules concern for metastasis  Also sclerosis of T11 and L2 vertebral bodies 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  -workup and management per primary team, noted plan for Oncology consult     History of Sjogren syndrome on methotrexate as outpatient      SUBJECTIVE:  No new complaints  No chest pain or SOB  OBJECTIVE:  Current Weight: Weight - Scale: 45 kg (99 lb 3 3 oz)  Vitals:    10/20/22 2314   BP: 97/62   Pulse: (!) 108   Resp: 17   Temp: (!) 97 1 °F (36 2 °C)   SpO2: 93%       Intake/Output Summary (Last 24 hours) at 10/21/2022 2669  Last data filed at 10/21/2022 0610  Gross per 24 hour   Intake --   Output 200 ml   Net -200 ml       Physical Exam  General:  Ill looking, awake  Eyes: Conjunctivae pink,  Sclera anicteric  ENT: lips and mucous membranes dry   Neck: supple   Chest: Clear to Auscultation both lungs,  no crackles, ronchus or wheezing  CVS: S1 & S2 present, normal rate, regular rhythm, no murmur    Abdomen: soft, non-tender, non-distended, Bowel sounds normoactive  Extremities: no edema of  legs  Skin: no rash  Neuro: awake, alert, oriented x 3   Psych: Mood and affect appropriate     Medications:    Current Facility-Administered Medications:   •  acetaminophen (TYLENOL) tablet 650 mg, 650 mg, Oral, Q6H PRN, KIKO Jovel  •  atorvastatin (LIPITOR) tablet 40 mg, 40 mg, Oral, Daily With Dinner, KIKO Jovel, 40 mg at 10/20/22 1630  •  ceFAZolin (ANCEF) IVPB (premix in dextrose) 1,000 mg 50 mL, 1,000 mg, Intravenous, Q8H, KIKO Jovel, Last Rate: 100 mL/hr at 10/21/22 0610, 1,000 mg at 10/21/22 0610  •  cholecalciferol (VITAMIN D3) tablet 1,000 Units, 1,000 Units, Oral, Daily, KIKO Jovel, 1,000 Units at 10/21/22 0841  •  dextrose 5 % infusion, 100 mL/hr, Intravenous, Continuous, Brannon Freeman MD, Last Rate: 100 mL/hr at 10/21/22 0819, 100 mL/hr at 10/21/22 0819  •  docusate sodium (COLACE) capsule 100 mg, 100 mg, Oral, BID, KIKO Jovel, 100 mg at 10/21/22 1542  •  DULoxetine (CYMBALTA) delayed release capsule 30 mg, 30 mg, Oral, Daily, KIKO Jovel, 30 mg at 34/63/55 9866  •  folic acid (FOLVITE) tablet 1 mg, 1 mg, Oral, Daily, KIKO Jovel, 1 mg at 10/21/22 0841  •  HYDROmorphone (DILAUDID) injection 0 2 mg, 0 2 mg, Intravenous, Q6H PRN, KIKO Jovel  •  insulin lispro (HumaLOG) 100 units/mL subcutaneous injection 1-5 Units, 1-5 Units, Subcutaneous, TID AC, 1 Units at 10/21/22 0804 **AND** Fingerstick Glucose (POCT), , , TID AC, KIKO Jovel  •  insulin lispro (HumaLOG) 100 units/mL subcutaneous injection 1-5 Units, 1-5 Units, Subcutaneous, HS, KIKO Jovel  •  levothyroxine tablet 75 mcg, 75 mcg, Oral, Early Morning, Ernesto Mart MD, 75 mcg at 10/21/22 0610  •  metoprolol tartrate (LOPRESSOR) partial tablet 12 5 mg, 12 5 mg, Oral, Q12H [address], KIKO Jovel, 12 5 mg at 10/21/22 0842  •  ondansetron (ZOFRAN) injection 4 mg, 4 mg, Intravenous, Q6H PRN, KIKO Jovel, 4 mg at 10/20/22 0212  •  oxybutynin (DITROPAN-XL) 24 hr tablet 5 mg, 5 mg, Oral, Daily, KIKO Jovel, 5 mg at 10/21/22 3778  •  [COMPLETED] pantoprazole (PROTONIX) 80 mg in sodium chloride 0 9 % 100 mL IVPB, 80 mg, Intravenous, Once, Last Rate: 200 mL/hr at 10/20/22 1806, 80 mg at 10/20/22 1806 **AND** pantoprazole (PROTONIX) 80 mg in sodium chloride 0 9 % 100 mL infusion, 8 mg/hr, Intravenous, Continuous, Sumeet Spears MD, Last Rate: 10 mL/hr at 10/21/22 0601, 8 mg/hr at 10/21/22 0601  •  saccharomyces boulardii (FLORASTOR) capsule 250 mg, 250 mg, Oral, BID, Mackenzie GarrisonKIKO, 250 mg at 10/21/22 0841    Invasive Devices:        Lab Results:   Results from last 7 days   Lab Units 10/21/22  0730 10/21/22  0043 10/20/22  1758 10/20/22  1158 10/20/22  0629 10/20/22  0037 10/19/22  1548 10/19/22  1540   WBC Thousand/uL  --   --   --   --  30 63*  --   --  18 48*   HEMOGLOBIN g/dL 8 7* 9 1* 10 9*  --  12 2  --   --  13 7   HEMATOCRIT %  --   --  38 2  --  43 1  --   --  48 4*   PLATELETS Thousands/uL  --   --   --   --  369  --   --  546*   POTASSIUM mmol/L  --  3 8 3 8 4 3 4 0   < >  --  3 0*   CHLORIDE mmol/L  --  118* 117* 114* 116*   < >  --  116*   CO2 mmol/L  --  20* 24 21 25   < >  --  25   BUN mg/dL  --  75* 70* 66* 64*   < >  --  61*   CREATININE mg/dL  --  1 22 1 34* 1 38* 1 34*   < >  --  1 68*   CALCIUM mg/dL  --  7 9* 8 0* 8 4 8 1*   < >  --  9 5   MAGNESIUM mg/dL  --   --   --   --  1 9  --  2 5  --    ALK PHOS U/L  --   --   --   --   --   --   --  195*   ALT U/L  --   --   --   --   --   --   --  12   AST U/L  --   --   --   --   --   --   --  29    < > = values in this interval not displayed  Previous work up:         Portions of the record may have been created with voice recognition software  Occasional wrong word or "sound a like" substitutions may have occurred due to the inherent limitations of voice recognition software  Read the chart carefully and recognize, using context, where substitutions have occurred  If you have any questions, please contact the dictating provider

## 2022-10-21 NOTE — ASSESSMENT & PLAN NOTE
Developed in 2 weeks since sister-in-law was away  Diffuse sacral DTI on exam with surrounding redness, no active drainage, scattered black eschar on exam Hard to exclude cellulitis    · Seen by surgery, input appreciated, continue wound care as per wound care  · Continue wound care, offloading with P 500  · Nutrition support

## 2022-10-21 NOTE — PHYSICAL THERAPY NOTE
PHYSICAL THERAPY EVALUATION/TREATMENT       10/21/22 0925   PT Last Visit   PT Visit Date 10/21/22   Note Type   Note type Evaluation   Pain Assessment   Pain Assessment Tool 0-10   Pain Score No Pain   Hospital Pain Intervention(s) Rest;Repositioned   Multiple Pain Sites No   Restrictions/Precautions   Weight Bearing Precautions Per Order No   Other Precautions Bed Alarm; Chair Alarm; Fall Risk;Pain   Home Living   Type of [address] One level;Stairs to enter with rails  ([address])   Home Equipment Cane   Prior Function   Level of [place] Independent with ADLs; Independent with functional mobility; Needs assistance with IADLS  (Per chart review patient's sister would intermittently come to the house to assist)   Lives With Alone   Receives Help From Family;Friend(s)   IADLs Independent with driving; Independent with meal prep   Falls in the last 6 months 1 to 4   Comments "I didn't really fall I just slid out of the bed " When asked if she was unable to get herself up she reports "I didn't try too hard"   General   Additional Pertinent History Pt is a 69year-old female who was admitted to the hospital on 10/19/22 from wellness check - found on floor     Family/Caregiver Present No   Cognition   Overall Cognitive Status WFL   Orientation Level Oriented to person;Oriented to place;Oriented to time   Subjective   Subjective "i'm okay this morning"   RLE Assessment   RLE Assessment WFL   LLE Assessment   LLE Assessment WFL   Bed Mobility   Supine to Sit 3  Moderate assistance   Additional items Assist x 1;Verbal cues   Sit to Supine 3  Moderate assistance   Additional items Assist x 1;Verbal cues   Additional Comments Pt with reports of lightheadedness in sitting - BP assessed 73/42 (MAP 51 mmHg)   Transfers   Sit to Stand Unable to assess   Balance   Static Sitting Poor   Dynamic Sitting Poor -   Static Standing Zero   Activity Tolerance   Activity Tolerance Treatment limited secondary to medical complications (Comment)  (Pt with lightheadedness in sitting x 2 attempts)   Nurse Made Aware yes SELENE Gonzalez present to manually assess pt's BP   Assessment   Prognosis Good   Problem List Decreased strength;Decreased range of motion;Decreased endurance; Impaired balance;Decreased mobility; Decreased cognition; Impaired judgement;Decreased safety awareness;Pain   Assessment Patient seen for Physical Therapy evaluation  Patient admitted with SIRS (systemic inflammatory response syndrome) ([facility] [address] )  Comorbidities affecting patient's physical performance include: REECE, cancer, cardiac disease, chronic pain and hypothyroid  Personal factors affecting patient at time of initial evaluation include: lives in 1 story house, ambulating with assistive device, stairs to enter home, inability to ambulate household distances, positive fall history, inability to perform ADLS, inability to perform IADLS  and inability to live alone  Prior to admission, patient was independent with functional mobility with cane, independent with ADLS, requiring assist for IADLS, living alone in 1 story home with 2 steps to enter and ambulating household distance  Please find objective findings from Physical Therapy assessment regarding body systems outlined above with impairments and limitations including weakness, decreased ROM, impaired balance, decreased endurance, impaired coordination, gait deviations, decreased activity tolerance, decreased functional mobility tolerance and fall risk  The Barthel Index was used as a functional outcome tool presenting with a score of Barthel Index Score: 30 today indicating marked limitations of functional mobility and ADLS  Patient's clinical presentation is currently unstable/unpredictable as seen in patient's presentation of vital sign response, increased fall risk and decreased endurance   Pt would benefit from continued Physical Therapy treatment to address deficits as defined above and maximize level of functional mobility  As demonstrated by objective findings, the assigned level of complexity for this evaluation is high  The patient's [facility] Basic Mobility Inpatient Short Form Raw Score is 8  A Raw score of less than or equal to 16 suggests the patient may benefit from discharge to post-acute rehabilitation services  Please also refer to the recommendation of the Physical Therapist for safe discharge planning  Goals   Patient Goals to get better   STG Expiration Date 10/28/22   Short Term Goal #1 Pt will perform bed mobility with Min A ; Pt will tolerate sitting x 2 minutes with Fair sitting balance   Short Term Goal #2 Pt will ambulate x 50 feet with RWwith Min A   LTG Expiration Date 11/04/22   Long Term Goal #1 Pt will perform bed mobility with Supervision ; Pt will tolerate sitting at edge of bed x 5 minutes with fair sitting balance   Long Term Goal #2 Pt will negotiate x 150 feet with RW with Min A   Plan   Treatment/Interventions ADL retraining;Functional transfer training;LE strengthening/ROM; Therapeutic exercise; Endurance training;Bed mobility;Gait training;Spoke to nursing;OT   PT Frequency Other (Comment)  (5x/week)   Recommendation   PT Discharge Recommendation Post acute rehabilitation services   Additional Comments Limited by dizziness today - will continue to assess pending clinical course   [facility] Basic Mobility Inpatient   Turning in Bed Without Bedrails 2   Lying on Back to Sitting on Edge of Flat Bed 2   Moving Bed to Chair 1   Standing Up From Chair 1   Walk in Room 1   Climb 3-5 Stairs 1   Basic Mobility Inpatient Raw Score 8   Turning Head Towards Sound 3   Follow Simple Instructions 3   Low Function Basic Mobility Raw Score 14   Low Function Basic Mobility Standardized Score 22 01   Highest Level Of Mobility   JH-HLM Goal 3: Sit at edge of bed   JH-HLM Achieved 3: Sit at edge of bed   Barthel Index   Feeding 5   Bathing 0   Grooming Score 0   Dressing Score 5   Bladder Score 5   Bowels Score 5   Toilet Use Score 5   Transfers (Bed/Chair) Score 5   Mobility (Level Surface) Score 0   Stairs Score 0   Barthel Index Score 30   Additional Treatment Session   Start Time 0915   End Time 5573   Treatment Assessment S: "I still feel dizzy right now it's getting worse  O/A: Pt agreeable to PT session this AM  pt able to perform supine > sit with Moderate A  Once sitting x second attempt patient continues to report lightheadedness - BP assessed in sitting 74/32 - returned to supine with mild improvement in symptoms - RN Jaimie Orlando present  P: Pt will benefit from skilled PT to address deficits to maximize IND for safe d/c   Equipment Use none   End of Consult   Patient Position at End of Consult Supine;Bed/Chair alarm activated; All needs within reach   [facility] Insurance Number  Valeria Felix [id number]

## 2022-10-21 NOTE — ASSESSMENT & PLAN NOTE
· CXR:  2 x 5 5 cm right upper lobe paramediastinal nodule, and numerous additional smaller bilateral pulmonary nodules highly suspicious for malignancy  Both primary lung cancer and metastatic disease can have this appearance    · Refer to CT scan finding under metastatic cancer  · Biopsy confirming adenocarcinoma with lung primary

## 2022-10-21 NOTE — TREATMENT PLAN
Renal on call    Na rising to goal 153  Therefore, lower NS to 50 cc/hr from 75 cc/hr   Changes made in chart

## 2022-10-21 NOTE — ASSESSMENT & PLAN NOTE
Likely multifactorial secondary to dehydration, metastatic cancer  cane at baseline  · Treat underlying causes    · Nutrition support and supportive care  · PT/ OT Post acute rehabilitation service  · Fall precautions

## 2022-10-21 NOTE — ASSESSMENT & PLAN NOTE
Baseline creatinine 0 7-0 8 in past year  Creatinine 1 68 on admission, now 0 51  UA without significant abnormality    Prior CT scan without any hydronephrosis  Likely prerenal in setting of dehydration and hypotension  Nephrology following, input appreciated  Improved with IV fluids to baseline  · Monitor intake output   · Avoid nephrotoxin and hypotension  · Follow-up BMP

## 2022-10-21 NOTE — ASSESSMENT & PLAN NOTE
Chronic arthralgia in lower back, left hip, knees, legs  Likely due to OA and Sjogren's syndrome  Patient follows Rheumatology in [facility] as outpatient  On methotrexate, Cymbalta, Voltaren, tramadol p r n  At home  Denies pain at present, not requiring p r n   Pain medications  · Continue Cymbalta,   · Holding methotrexate  · Discontinue Voltaren, tramadol   · Tylenol as/if needed

## 2022-10-21 NOTE — ASSESSMENT & PLAN NOTE
Concurrently asymptomatic, Patient is on methotrexate 12 5 mg p o  Weekly  Follows rheumatologist in [facility]  · Will hold methotrexate for now  · Reconsider while outpatient on discharge  · Natural tears prn for dry eye

## 2022-10-21 NOTE — PLAN OF CARE
Problem: Potential for Falls  Goal: Patient will remain free of falls  Description: INTERVENTIONS:  - Educate patient/family on patient safety including physical limitations  - Instruct patient to call for assistance with activity   - Consult OT/PT to assist with strengthening/mobility   - Keep Call bell within reach  - Keep bed low and locked with side rails adjusted as appropriate  - Keep care items and personal belongings within reach  - Initiate and maintain comfort rounds  - Make Fall Risk Sign visible to staff  - Offer Toileting every  Hours, in advance of need  - Initiate/Maintain alarm  - Obtain necessary fall risk management equipment:   - Apply yellow socks and bracelet for high fall risk patients  - Consider moving patient to room near nurses station  Outcome: Progressing     Problem: Nutrition/Hydration-ADULT  Goal: Nutrient/Hydration intake appropriate for improving, restoring or maintaining nutritional needs  Description: Monitor and assess patient's nutrition/hydration status for malnutrition  Collaborate with interdisciplinary team and initiate plan and interventions as ordered  Monitor patient's weight and dietary intake as ordered or per policy  Utilize nutrition screening tool and intervene as necessary  Determine patient's food preferences and provide high-protein, high-caloric foods as appropriate       INTERVENTIONS:  - Monitor oral intake, urinary output, labs, and treatment plans  - Assess nutrition and hydration status and recommend course of action  - Evaluate amount of meals eaten  - Assist patient with eating if necessary   - Allow adequate time for meals  - Recommend/ encourage appropriate diets, oral nutritional supplements, and vitamin/mineral supplements  - Order, calculate, and assess calorie counts as needed  - Recommend, monitor, and adjust tube feedings and TPN/PPN based on assessed needs  - Assess need for intravenous fluids  - Provide specific nutrition/hydration education as appropriate  - Include patient/family/caregiver in decisions related to nutrition  Outcome: Progressing     Problem: MOBILITY - ADULT  Goal: Maintain or return to baseline ADL function  Description: INTERVENTIONS:  -  Assess patient's ability to carry out ADLs; assess patient's baseline for ADL function and identify physical deficits which impact ability to perform ADLs (bathing, care of mouth/teeth, toileting, grooming, dressing, etc )  - Assess/evaluate cause of self-care deficits   - Assess range of motion  - Assess patient's mobility; develop plan if impaired  - Assess patient's need for assistive devices and provide as appropriate  - Encourage maximum independence but intervene and supervise when necessary  - Involve family in performance of ADLs  - Assess for home care needs following discharge   - Consider OT consult to assist with ADL evaluation and planning for discharge  - Provide patient education as appropriate  Outcome: Progressing  Goal: Maintains/Returns to pre admission functional level  Description: INTERVENTIONS:  - Perform BMAT or MOVE assessment daily    - Set and communicate daily mobility goal to care team and patient/family/caregiver  - Collaborate with rehabilitation services on mobility goals if consulted  - Perform Range of Motion  times a day  - Reposition patient every  hours    - Dangle patient  times a day  - Stand patient  times a day  - Ambulate patient  times a day  - Out of bed to chair  times a day   - Out of bed for meals times a day  - Out of bed for toileting  - Record patient progress and toleration of activity level   Outcome: Progressing     Problem: INFECTION - ADULT  Goal: Absence or prevention of progression during hospitalization  Description: INTERVENTIONS:  - Assess and monitor for signs and symptoms of infection  - Monitor lab/diagnostic results  - Monitor all insertion sites, i e  indwelling lines, tubes, and drains  - Monitor endotracheal if appropriate and nasal secretions for changes in amount and color  - [place] appropriate cooling/warming therapies per order  - Administer medications as ordered  - Instruct and encourage patient and family to use good hand hygiene technique  - Identify and instruct in appropriate isolation precautions for identified infection/condition  Outcome: Progressing  Goal: Absence of fever/infection during neutropenic period  Description: INTERVENTIONS:  - Monitor WBC    Outcome: Progressing     Problem: CARDIOVASCULAR - ADULT  Goal: Maintains optimal cardiac output and hemodynamic stability  Description: INTERVENTIONS:  - Monitor I/O, vital signs and rhythm  - Monitor for S/S and trends of decreased cardiac output  - Administer and titrate ordered vasoactive medications to optimize hemodynamic stability  - Assess quality of pulses, skin color and temperature  - Assess for signs of decreased coronary artery perfusion  - Instruct patient to report change in severity of symptoms  Outcome: Progressing  Goal: Absence of cardiac dysrhythmias or at baseline rhythm  Description: INTERVENTIONS:  - Continuous cardiac monitoring, vital signs, obtain 12 lead EKG if ordered  - Administer antiarrhythmic and heart rate control medications as ordered  - Monitor electrolytes and administer replacement therapy as ordered  Outcome: Progressing     Problem: METABOLIC, FLUID AND ELECTROLYTES - ADULT  Goal: Electrolytes maintained within normal limits  Description: INTERVENTIONS:  - Monitor labs and assess patient for signs and symptoms of electrolyte imbalances  - Administer electrolyte replacement as ordered  - Monitor response to electrolyte replacements, including repeat lab results as appropriate  - Instruct patient on fluid and nutrition as appropriate  Outcome: Progressing     Problem: Prexisting or High Potential for Compromised Skin Integrity  Goal: Skin integrity is maintained or improved  Description: INTERVENTIONS:  - Identify patients at risk for skin breakdown  - Assess and monitor skin integrity  - Assess and monitor nutrition and hydration status  - Monitor labs   - Assess for incontinence   - Turn and reposition patient  - Assist with mobility/ambulation  - Relieve pressure over bony prominences  - Avoid friction and shearing  - Provide appropriate hygiene as needed including keeping skin clean and dry  - Evaluate need for skin moisturizer/barrier cream  - Collaborate with interdisciplinary team   - Patient/family teaching  - Consider wound care consult   Outcome: Progressing

## 2022-10-21 NOTE — ASSESSMENT & PLAN NOTE
Developed SVT in ED, heart rate 190  Converted to sinus rhythm after receiving Cardizem 10 mg IV    Likely secondary to acute illness and dehydration  · Telemetry-no further episode, discontinued  · Started on low-dose metoprolol , continue as tolerated with holding parameters

## 2022-10-21 NOTE — CONSULTS
Consultation -[facility]   Shobha Bateman 69 y o  female MRN: [id number]    Unit/Bed#: [address] Encounter: [id number]      Physician Requesting Consult: Dr Margrette Snellen    Reason for Consult / Principal Problem:  Coffee-ground emesis      HPI:  This is a 69 y o  female with PMH of Sjogren syndrome, chronic joint pain, hyperlipidemia, hypothyroid , orthostatic hypotension who presents with generalized weakness  Neighbor called police as they saw her trash can outside for a few days  Police found her on the floor  Patient reports rolling off the couch and was on the floor for about 2 hours  Patient denies hitting head  Reports lower abdominal pain since 5/2022  Denies any acute pain  Patient reported nothing to eat at home and was hungry  Reports constipation at home  Reports weight loss since spring this year  Patient had coffee-ground emesis is last evening  Patient had CT of abdomen last month but none on this admission-CT noted to have large hiatal hernia, numerous lung nodules concerning for metastasis and sclerosis of T11 and L2 vertebral bodies 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  Patient had colonoscopy done in 2016 which showed Status post partial sigmoid resection history of polyps all of which are unknown to patient's she had 3 polyps removed, diverticulosis  Hemoglobin has trended down to 8 7 this morning, was 13 7 on admission which was likely hemoconcentrated due to the dehydration  Patient with hypernatremia, being followed by Renal   Patient also noted to have elevated BUN today of 75  Patient is NPO and on a Protonix drip  Ct scan today was done of chest abdomen and pelvis-  Lobulated right upper lobe paramediastinal mass measuring 6 0 x 4 0 x 8 5 cm with extension into the superior mediastinum, supraclavicular correlation and extension to and invasion of right upper lobe bronchial branch     Multiple scattered subcentimeter metastatic lung nodules predominantly in upper lobes bilaterally  Moderate right and small left effusion  Stool impaction in the colon  Mid transverse colon wall thickening possibly due to stool impaction  Findings most likely represent primary right lung malignancy with lung and bone metastatic process  Nurse reports there is brown stool in the rectum which appears impacted  Patient was given enema and stool appears to be dark in color  Patient denied any significant abdominal pain or nausea and vomiting  Allergies:    Allergies   Allergen Reactions   • Penicillins Rash       Medications:  Current Facility-Administered Medications:   •  acetaminophen (TYLENOL) tablet 650 mg, 650 mg, Oral, Q6H PRN, KIKO Jovel  •  atorvastatin (LIPITOR) tablet 40 mg, 40 mg, Oral, Daily With Dinner, KIKO Jovel, 40 mg at 10/20/22 1630  •  ceFAZolin (ANCEF) IVPB (premix in dextrose) 1,000 mg 50 mL, 1,000 mg, Intravenous, Q8H, KIKO Jovel, Last Rate: 100 mL/hr at 10/21/22 0610, 1,000 mg at 10/21/22 0610  •  cholecalciferol (VITAMIN D3) tablet 1,000 Units, 1,000 Units, Oral, Daily, KIKO Jovel, 1,000 Units at 10/20/22 0853  •  dextrose 5 % infusion, 100 mL/hr, Intravenous, Continuous, Theta Handler, MD, Last Rate: 100 mL/hr at 10/21/22 0819, 100 mL/hr at 10/21/22 0819  •  docusate sodium (COLACE) capsule 100 mg, 100 mg, Oral, BID, KIKO Jovel, 100 mg at 10/20/22 1599  •  DULoxetine (CYMBALTA) delayed release capsule 30 mg, 30 mg, Oral, Daily, KIKO Jovel, 30 mg at 47/58/66 1405  •  folic acid (FOLVITE) tablet 1 mg, 1 mg, Oral, Daily, KIKO Jovel, 1 mg at 10/20/22 5172  •  HYDROmorphone (DILAUDID) injection 0 2 mg, 0 2 mg, Intravenous, Q6H PRN, KIKO Jovel  •  insulin lispro (HumaLOG) 100 units/mL subcutaneous injection 1-5 Units, 1-5 Units, Subcutaneous, TID AC, 1 Units at 10/21/22 0804 **AND** Fingerstick Glucose (POCT), , , TID HAN, KIKO Jovel  •  insulin lispro (HumaLOG) 100 units/mL subcutaneous injection 1-5 Units, 1-5 Units, Subcutaneous, HS, Mackenzie Garrison, GUILLERMONP  •  levothyroxine tablet 75 mcg, 75 mcg, Oral, Early Morning, Chirag Sanchez MD, 75 mcg at 10/21/22 0610  •  metoprolol tartrate (LOPRESSOR) partial tablet 12 5 mg, 12 5 mg, Oral, Q12H MASTER, GUILLERMO JovelNP  •  ondansetron (ZOFRAN) injection 4 mg, 4 mg, Intravenous, Q6H PRN, Mackenzie Garrison, CRNP, 4 mg at 10/20/22 0212  •  oxybutynin (DITROPAN-XL) 24 hr tablet 5 mg, 5 mg, Oral, Daily, KIKO Jovel, 5 mg at 10/20/22 6353  •  [COMPLETED] pantoprazole (PROTONIX) 80 mg in sodium chloride 0 9 % 100 mL IVPB, 80 mg, Intravenous, Once, Last Rate: 200 mL/hr at 10/20/22 1806, 80 mg at 10/20/22 1806 **AND** pantoprazole (PROTONIX) 80 mg in sodium chloride 0 9 % 100 mL infusion, 8 mg/hr, Intravenous, Continuous, Sánchez Hernandez MD, Last Rate: 10 mL/hr at 10/21/22 0601, 8 mg/hr at 10/21/22 0601  •  saccharomyces boulardii (FLORASTOR) capsule 250 mg, 250 mg, Oral, BID, Mackenzie Garrison, CRNP, 250 mg at 10/20/22 1006    Past Medical history:  Past Medical History:   Diagnosis Date   • Disease of thyroid gland    • Hypercholesteremia    • Hypertension        Past Surgical History:   Past Surgical History:   Procedure Laterality Date   • APPENDECTOMY     • BREAST BIOPSY Right     benign   • CHOLECYSTECTOMY     • COLON SURGERY      ruptured bowel   • COLOSTOMY  2009    and closure 6 months later   • HYSTERECTOMY  2002    total   • KNEE ARTHROSCOPY Bilateral     right x1 left x2   • TX COLSC FLX W/RMVL OF TUMOR POLYP LESION SNARE TQ N/A 5/3/2016    Procedure: COLONOSCOPY ;  Surgeon: Alicja Forbes MD;  Location: [place] [address] [facility] LAB;   Service: Gastroenterology   • TONSILECTOMY AND ADNOIDECTOMY     • TYMPANOPLASTY Right        Family History:   Family History   Problem Relation Age of Onset   • Breast cancer Paternal Aunt 80       Social history:   Social History     Socioeconomic History   • Marital status: Single     Spouse name: Not on file   • Number of children: Not on file   • Years of education: Not on file   • Highest education level: Not on file   Occupational History   • Not on file   Tobacco Use   • Smoking status: Former Smoker     Packs/day: 1 00     Years: 20 00     Pack years: 20 00     Quit date: 46     Years since quittin 8   • Smokeless tobacco: Never Used   Substance and Sexual Activity   • Alcohol use: Not Currently   • Drug use: No   • Sexual activity: Not on file   Other Topics Concern   • Not on file   Social History Narrative   • Not on file     Social Determinants of Health     Financial Resource Strain: Not on file   Food Insecurity: Not on file   Transportation Needs: Not on file   Physical Activity: Not on file   Stress: Not on file   Social Connections: Not on file   Intimate Partner Violence: Not on file   Housing Stability: Not on file       Review of Systems: All other systems were reviewed and were negative, otherwise please refer to HPI    Physical Exam: BP 97/62 (BP Location: Right arm)   Pulse (!) 108   Temp (!) 97 1 °F (36 2 °C) (Axillary)   Resp 17   Ht 5' 5" (1 651 m)   Wt 45 kg (99 lb 3 3 oz)   SpO2 93%   BMI 16 51 kg/m²     General Appearance:    Alert, cooperative, no distress, appears stated age   Head:    Normocephalic, without obvious abnormality, atraumatic   Eyes:    No scleral icterus           Mouth:  Mucosa moist   Neck:   Supple, symmetrical, trachea midline, no thyromegaly       Lungs:     Clear to auscultation bilaterally, respirations unlabored       Heart:    Regular rate and rhythm, S1 and S2 normal, no murmur, rub   or gallop     Abdomen:     Soft, mildly distended, positive bowel sounds, nontender, no rebound rigidity guarding   Genitalia:   deferred   Rectal:   deferred   Extremities:   Extremities normal,no cyanosis or edema       Skin:   Skin color, texture, turgor normal, no rashes or lesions       Neurologic:   Grossly intact, no focal deficit           Lab Results:   Recent Results (from the past 24 hour(s))   Fingerstick Glucose (POCT)    Collection Time: 10/20/22 11:23 AM   Result Value Ref Range    POC Glucose 183 (H) 65 - 140 mg/dl   Basic metabolic panel    Collection Time: 10/20/22 11:58 AM   Result Value Ref Range    Sodium 150 (H) 135 - 147 mmol/L    Potassium 4 3 3 5 - 5 3 mmol/L    Chloride 114 (H) 96 - 108 mmol/L    CO2 21 21 - 32 mmol/L    ANION GAP 15 (H) 4 - 13 mmol/L    BUN 66 (H) 5 - 25 mg/dL    Creatinine 1 38 (H) 0 60 - 1 30 mg/dL    Glucose 187 (H) 65 - 140 mg/dL    Calcium 8 4 8 3 - 10 1 mg/dL    eGFR 37 ml/min/1 73sq m   Fingerstick Glucose (POCT)    Collection Time: 10/20/22  3:58 PM   Result Value Ref Range    POC Glucose 171 (H) 65 - 140 mg/dl   Basic metabolic panel    Collection Time: 10/20/22  5:58 PM   Result Value Ref Range    Sodium 153 (H) 135 - 147 mmol/L    Potassium 3 8 3 5 - 5 3 mmol/L    Chloride 117 (H) 96 - 108 mmol/L    CO2 24 21 - 32 mmol/L    ANION GAP 12 4 - 13 mmol/L    BUN 70 (H) 5 - 25 mg/dL    Creatinine 1 34 (H) 0 60 - 1 30 mg/dL    Glucose 150 (H) 65 - 140 mg/dL    Calcium 8 0 (L) 8 3 - 10 1 mg/dL    eGFR 38 ml/min/1 73sq m   Hemoglobin and hematocrit, blood    Collection Time: 10/20/22  5:58 PM   Result Value Ref Range    Hemoglobin 10 9 (L) 11 5 - 15 4 g/dL    Hematocrit 38 2 34 8 - 46 1 %   Fingerstick Glucose (POCT)    Collection Time: 10/20/22  9:27 PM   Result Value Ref Range    POC Glucose 139 65 - 140 mg/dl   Basic metabolic panel    Collection Time: 10/21/22 12:43 AM   Result Value Ref Range    Sodium 152 (H) 135 - 147 mmol/L    Potassium 3 8 3 5 - 5 3 mmol/L    Chloride 118 (H) 96 - 108 mmol/L    CO2 20 (L) 21 - 32 mmol/L    ANION GAP 14 (H) 4 - 13 mmol/L    BUN 75 (H) 5 - 25 mg/dL    Creatinine 1 22 0 60 - 1 30 mg/dL    Glucose 138 65 - 140 mg/dL    Calcium 7 9 (L) 8 3 - 10 1 mg/dL    eGFR 43 ml/min/1 73sq m   Serial Hemoglobin Q6hrs    Collection Time: 10/21/22 12:43 AM   Result Value Ref Range    Hemoglobin 9 1 (L) 11 5 - 15 4 g/dL   Type and screen    Collection Time: 10/21/22 12:43 AM   Result Value Ref Range    ABO Grouping B     Rh Factor Positive     Antibody Screen Negative     Specimen Expiration Date 20221024    Fingerstick Glucose (POCT)    Collection Time: 10/21/22  7:23 AM   Result Value Ref Range    POC Glucose 172 (H) 65 - 140 mg/dl   Serial Hemoglobin Q6hrs    Collection Time: 10/21/22  7:30 AM   Result Value Ref Range    Hemoglobin 8 7 (L) 11 5 - 15 4 g/dL       Imaging Studies: CT abdomen wo contrast    Result Date: 9/25/2022  Narrative: CT - ABDOMEN WITHOUT IV CONTRAST INDICATION:   R10 9: Unspecified abdominal pain  "Left-sided  abdominal/flank pain, history of colonic perforation, colostomy" COMPARISON:  CT dated 7/21/2009 TECHNIQUE: CT examination of the abdomen was performed without intravenous contrast  Axial, sagittal, and coronal 2D reformatted images were created from the source data and submitted for interpretation  Radiation dose length product (DLP) for this visit:  258 63 mGy-cm   This examination, like all CT scans performed in the [facility], was performed utilizing techniques to minimize radiation dose exposure, including the use of iterative  reconstruction and automated exposure control  Enteric contrast was administered  FINDINGS: ABDOMEN The lack of intravenous contrast limits evaluation of certain processes, especially infectious, inflammatory and neoplastic processes  LOWER CHEST:  There are multiple new lung nodules, concerning for metastasis  For example, there is a 1 3 cm nodule at the right lung base (series 2, image #8)  There is a 1 cm left lung base nodule (image #14)  LIVER/BILIARY TREE:  Unremarkable on noncontrast CT  GALLBLADDER:  Gallbladder is surgically absent  SPLEEN:  Unremarkable  PANCREAS: Unremarkable  ADRENAL GLANDS:  Unremarkable  KIDNEYS/URETERS:  No hydronephrosis  VISUALIZED STOMACH AND BOWEL:  There is interval worsening of hiatal hernia which is large in size  VISUALIZED ABDOMINAL CAVITY:  No pathologically enlarged periportal, mesenteric or upper retroperitoneal lymph nodes  No ascites  VISUALIZED BODY WALL:  Unremarkable  VESSELS:  Atherosclerotic changes are present  No evidence of aneurysm  OSSEOUS STRUCTURES:  Sclerosis of T11 and L2 vertebral bodies, 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  Impression: 1  Large hiatal hernia  2   Numerous lung nodules, concerning for metastasis  3   Sclerosis of T11 and L2 vertebral bodies, 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  The study was marked in EPIC for significant notification  Workstation performed: [id number]     XR chest 1 view portable    Result Date: 10/20/2022  Narrative: CHEST INDICATION:   weakness  COMPARISON:  Chest x-ray from 11/12/2017  Abdomen and pelvic CT from 9/22/2022  EXAM PERFORMED/VIEWS:  XR CHEST PORTABLE FINDINGS: Large hiatal hernia  There is a 4 2 x 5 5 cm right upper lobe paramediastinal nodule, and numerous additional smaller bilateral pulmonary nodules highly suspicious for malignancy  Both primary lung cancer and metastatic disease can have this appearance  No pleural effusion  No pneumothorax  No pneumothorax or pleural effusion  Osseous structures appear within normal limits for patient age  (Please see recent abdomen and pelvic CT, where T11 sclerotic bone metastasis is visible )     Impression: There is a 4 2 x 5 5 cm right upper lobe paramediastinal nodule, and numerous additional smaller bilateral pulmonary nodules highly suspicious for malignancy  Both primary lung cancer and metastatic disease can have this appearance  Chest CT would be helpful for further evaluation  Workstation performed: [id number]     CT head without contrast    Result Date: 10/19/2022  Narrative: CT BRAIN - WITHOUT CONTRAST INDICATION:   Weakness and possible fall  Found on ground    COMPARISON:  11/3/2017 TECHNIQUE:  CT examination of the brain was performed    In addition to axial images, sagittal and coronal 2D reformatted images were created and submitted for interpretation  Radiation dose length product (DLP) for this visit:  933 96 mGy-cm   This examination, like all CT scans performed in the [facility], was performed utilizing techniques to minimize radiation dose exposure, including the use of iterative  reconstruction and automated exposure control  IMAGE QUALITY:  Diagnostic  FINDINGS: PARENCHYMA: Decreased attenuation is noted in periventricular and subcortical white matter demonstrating an appearance that is statistically most likely to represent moderate microangiopathic change  No CT signs of acute infarction  No intracranial mass, mass effect or midline shift  No acute parenchymal hemorrhage  VENTRICLES AND EXTRA-AXIAL SPACES:  Ventricles and extra-axial CSF spaces are prominent commensurate with the degree of volume loss, which has increased since the prior study VISUALIZED ORBITS AND PARANASAL SINUSES:  No acute finding  Changes of chronic right mastoiditis with partial loss of air cells and bony sclerosis but unchanged from 2017 CALVARIUM AND EXTRACRANIAL SOFT TISSUES:  Normal      Impression: 1  No acute intracranial abnormality 2  Microangiopathic changes and progression of cerebral volume loss Workstation performed: [id number]       Assessment/Plan-  Coffee-ground emesis-  31-year-old female who presents with poor p o  intake with weakness and fatigue with potential fall  Noted to have electrolyte abnormalities including hypernatremia and is being followed by Nephrology  Recent CT showed metastatic cancer of unknown origin    Patient did have some coffee-grounds in her mouth yesterday but then had moderate amount of coffee-ground emesis last night ,  -continue Protonix drip  -patient does have large hiatal hernia on imaging recently and could have Ruel lesions or invasion of cancer into esophagus  -Hemoglobin has trended down since admission to 8 7, BUN remains elevated today at 75 and creatinine has improved  -reached out to anesthesia to discuss sedation for procedure due to electrolyte abnormality, only able to do if emergent due to seizure risk  -CT scan showing a large lobulated right upper lobe paramediastinal mass with multiple scattered subcentimeter lung nodules and stool impaction, findings most likely represent primary lung malignancy with bone Mets  -no overt bleeding has been noted today by staff, will continue to follow H&H, consider EGD on Monday but can be done on a more emergent basis over weekend if evidence of active GI hemorrhage or hemodynamic instability  -was given enema due to fecal impaction  -continue to trend BUN, slightly improved to 69  -will keep NPO after midnight but can have clear liquid diet today    Thank you for the consultation  Case will be discussed with Dr Vladislav Covarrubias

## 2022-10-21 NOTE — CASE MANAGEMENT
Case Management Assessment & Discharge Planning Note    Patient name Felipe Patel  [address] [address] MRN [id number]  : 1945 Date 10/21/2022       Current Admission Date: 10/19/2022  Current Admission Diagnosis:SIRS (systemic inflammatory response syndrome) [facility])   Patient Active Problem List    Diagnosis Date Noted   • Severe protein-calorie malnutrition ([address] ) 10/21/2022   • Chronic pain 10/20/2022   • Elevated glucose 10/20/2022   • Protein-calorie malnutrition ([address] ) 10/20/2022   • Pulmonary nodules 10/20/2022   • Hypertension 10/19/2022   • Hypercholesteremia 10/19/2022   • Disease of thyroid gland 10/19/2022   • Dehydration 10/19/2022   • Acute kidney injury ([address] ) 10/19/2022   • SIRS (systemic inflammatory response syndrome) ([facility] [address] ) 10/19/2022   • Sacral wound 10/19/2022   • Metastatic cancer ([address] ) 10/19/2022   • SVT (supraventricular tachycardia) ([address] ) 10/19/2022   • Generalized weakness 10/19/2022   • Dyslipidemia 2019   • Acquired hypothyroidism 2019   • Sjogren's syndrome ([address] ) 2019   • Orthostatic hypotension 2019   • Recurrent syncope 2019   • Onychomycosis 2019   • Peripheral sensory neuropathy 2019   • Radiculopathy of lumbar region 2019   • Metatarsalgia of both feet 2019      LOS (days): 2  Geometric Mean LOS (GMLOS) (days): 3 50  Days to GMLOS:1 6     OBJECTIVE:    Risk of Unplanned Readmission Score: 12 79     Current admission status: Inpatient    Preferred Pharmacy:   [address] [facility] [address]  [address] [place] [address]  Phone: [phone] Fax: [phone]    Primary Care Provider: Jimmie Caballero MD    Primary Insurance: [facility] [address] [facility]  Secondary Insurance:     ASSESSMENT:  [address] [facility] [address] - Sister In-Law   Primary Phone: [phone] (Mobile)  Home Phone: [phone]               Advance Directives  Does patient have a [address] [facility] [address] [facility]?: Yes  Does patient have Advance Directives?: Yes  Advance Directives: Power of  for health care  Primary Contact: Nayan Donald    Readmission Root Cause  30 Day Readmission: No    Patient Information  Admitted from[de-identified] Home  Mental Status: Other (Comment) (lethargic)  During Assessment patient was accompanied by: Sister  Assessment information provided by[de-identified] Sister  Primary Caregiver: Self  Support Systems: Family members, [address] [place] [address] of Residence: [address] [place] [address] do you live in?: [place]  Type of Current Residence: Other (Comment) (private residence)  Living Arrangements: Lives Alone    Activities of Daily Living Prior to Admission  Functional Status: Independent  Completes ADLs independently?: Yes  Ambulates independently?: Yes  Does patient currently have [facility]?: No    Patient Information Continued  Income Source: Pension/nursing home  Does patient have prescription coverage?: Yes  Does patient receive dialysis treatments?: No      DISCHARGE DETAILS:    Discharge planning discussed with[de-identified] SisterGraham of Choice: Yes  Comments - Freedom of Choice: SW met with pt's sister to assess needs and discuss plans  Dr Belinda Braden was also able to join to discuss pt's medical issues with sister  Per sister pt lives alone with her two dogs  Sister shared that pt is a hoarder so her home is in very bad condition  Chantal is involved in community  Dr Belinda Braden explained that at this time medical workup to determine extent of pt's cancer and treatment to improve pt's overall status is ongoing  At this time returning home is not anticipated  Offered assistance with skilled nursing facility placement and possibly hospice care depending on pt's prognosis  SW provided sister with contact information for future reference  SW offered ongoing support and assistance with planning as needed    CM contacted family/caregiver?: Yes  Were Treatment Team discharge recommendations reviewed with patient/caregiver?: Yes  Did patient/caregiver verbalize understanding of patient care needs?: Yes  Were patient/caregiver advised of the risks associated with not following Treatment Team discharge recommendations?: Yes    Contacts  Patient Contacts: Raghu Larsen  Relationship to Patient[de-identified] Family (sister)  Contact Method: In Person  Reason/Outcome: Discharge [address]         Is the patient interested in [facility] at discharge?: No    DME Referral Provided  Referral made for DME?: No    Other Referral/Resources/Interventions Provided:  Interventions: SNF, Short Term Rehab, Hospice  Referral Comments: SW will continue to follow to monitor needs and assist with planning once appropriate level of care is determined      Treatment Team Recommendation:  (pending)  Discharge Destination Plan[de-identified]  (pending)

## 2022-10-21 NOTE — ASSESSMENT & PLAN NOTE
OB Troponin [id number]> wnl  · Initial EKG showed sinus tach, rate 115, inferior lateral ST depressions, prolonged QT   · Patient denies chest pain  · Likely type 2 MI secondary to # 1 and SIRS versus Sepsis  · Troponin downtrending    · Telemetry

## 2022-10-21 NOTE — ASSESSMENT & PLAN NOTE
SIRS versus Sepsis, POA, as evidenced by leukocytosis, tachycardia, hypothermia  Lactic acidosis  Patient is afebrile  · UA shows trace leukocytes, normal white count  SARS-CoV-2 PCR negative  · CXR- shows diffuse pulmonary nodules, no evidence infection   · Lactic acid trended down  Likely multifactorial secondary to dehydration v metastatic disease versus GI bleed   · CT chest abdomen pelvis without any evidence of infection  · Blood Cx NGTD5d  · Urine culture negative  Initially received cefepime, subsequently transitioned to cefazolin pending culture results  Seen by infectious disease, no evidence of infection was noted  Antibiotics were discontinued    Sepsis ruled out

## 2022-10-21 NOTE — ASSESSMENT & PLAN NOTE
Malnutrition Findings:   Adult Malnutrition type: Chronic illness  Adult Degree of Malnutrition: Other severe protein calorie malnutrition  Malnutrition Characteristics: Fat loss, Muscle loss, Weight loss    360 Statement: Severe protein calorie malnutrion of chronic illness related to increased energy needs as evidenced by 12% weight loss in 1 month, hollow orbits, protruding clavicles, depression at the temples  Awaiting treatment plan    BMI Findings:  Adult BMI Classifications: Underweight < 18 5     Body mass index is 16 51 kg/m²       Continue nutrition supplementation

## 2022-10-21 NOTE — MALNUTRITION/BMI
This medical record reflects one or more clinical indicators suggestive of malnutrition  Malnutrition Findings:   Adult Malnutrition type: Chronic illness  Adult Degree of Malnutrition: Other severe protein calorie malnutrition  Malnutrition Characteristics: Fat loss, Muscle loss, Weight loss    360 Statement: Severe protein calorie malnutrion of chronic illness related to increased energy needs as evidenced by 12% weight loss in 1 month, hollow orbits, protruding clavicles, depression at the temples  Awaiting treatment plan    BMI Findings:  Adult BMI Classifications: Underweight < 18 5        Body mass index is 16 51 kg/m²  See Nutrition note dated 10/20/2022 for additional details  Completed nutrition assessment is viewable in the nutrition documentation

## 2022-10-21 NOTE — DISCHARGE INSTR - OTHER ORDERS
Plan:   Skin care plans:    1-Cleanse sacrobuttock wound with foaming cleanser & pat dry  Apply Triad paste in a thin layer & small amount to areas with slough  Cover with Allevyn sacral bordered foam dresssing & change every other day and as needed for soilage/dislodgement  2-Calazime to perineal area 3x/day and as needed for soilage/dislodgement  3-Hydraguard to bilateral heels 2x/day and as needed  4-Heel protectors to bilateral heels at all times in bed  If patient refuses, must float heels on 2 pillows so heels not in contact with mattress or pillows to offload pressure  5-Pressure redistribution cushion when out of bed to chair  Must limit sitting to 1-2 hrs maximum due to sacrobuttock wound - then lay down in bed turning side to side every hour for at least 2 hrs  6-Turn/reposition every 2 hrs or when medically stable for pressure re-distribution on skin    7-Moisturize skin daily with skin nourishing cream

## 2022-10-21 NOTE — PROGRESS NOTES
Progress Note - Infectious Disease   Rod Xavier 69 y o  female MRN: [id number]  Unit/Bed#: 2 [place] [address] Encounter: [id number]      IMPRESSION & RECOMMENDATIONS:   Impression/Recommendations:  1  SIRS versus severe sepsis, POA  Tachycardia, tachypnea, leukocytosis, elevated lactic acid  No fevers  Consider reactive secondary to multiple metabolic derangements and volume depletion, in addition to probable malignancy  Also now #5 is likely contributing  Consider urinary source given reported urinary frequency however no pyuria on UA  Negative CXR, CT C/A/P for infection  Negative COVID-19  No overt evidence of sacral SSTI  Prelim blood cultures are negative  No history of MDROs  WBC count remains elevated but otherwise patient remains afebrile with improving hemodynamics and lactic acid      -continue IV cefazolin   -follow up blood cultures, urine culture  -follow temperatures and hemodynamics  -recheck CBC in a m   -if infectious workup remains negative, can stop antibiotic over the weekend      2  Sacral DTI  Superficial with no overt evidence of acute infection  Surgery input appreciated with no indication for surgical debridement at this time      -wound care evaluation  -daily local wound care  -frequent repositioning  -serial exams     3  Acute kidney injury  Likely due to volume depletion from poor oral intake/hypotension  Creatinine continues to improve with IVFs  Nephrology input noted      -dose adjust antibiotics based on renal function as indicated  -repeat BMP in a m   -nephrology follow-up ongoing     4  Progressive weight loss, anorexia secondary to metastatic malignancy  Recent outpatient abdominal CT showed numerous lung nodules and sclerosis of T11 and L2 vertebral bodies concerning for metastasis  Patient had scheduled follow-up with Oncology as outpatient   CT now shows right upper lobe paramediastinal mass with extension into superior mediastinum, right upper lobe bronchial branch      -follow-up oncology recommendations    5  Acute anemia  With reported coffee-ground emesis  Hemoglobin has trended down since admission     -trend hemoglobin  -GI evaluation     5  Sjogren syndrome  On weekly methotrexate  Follows [facility]ology at [facility]      6  Elevated TSH/hypothyroidism  Recently off meds  Management per primary service      7  Penicillin allergy  With reported history of rash  Tolerating cefazolin without difficulty      Antibiotics:  Cefazolin 2  Cefepime x1     I discussed above plan with Dr Saul Mccallum from Internal Medicine Service  Will plan to formally re-evaluate patient on 10/24  Please call us with any new questions in the interim  Subjective:  Patient with reported coffee-ground emesis overnight  Remains afebrile  No hypoxia  Remains very weak  Sacral DTIs were evaluated by surgery with no plans for debridement  Objective:  Vitals:  Temp:  [96 6 °F (35 9 °C)-97 7 °F (36 5 °C)] 97 3 °F (36 3 °C)  HR:  [] 93  Resp:  [16-23] 16  BP: ()/(42-91) 98/57  SpO2:  [90 %-98 %] 97 %  Temp (24hrs), Av °F (36 1 °C), Min:96 6 °F (35 9 °C), Max:97 7 °F (36 5 °C)  Current: Temperature: (!) 97 3 °F (36 3 °C)    Physical Exam:   General:  No acute distress, elderly and frail, debilitated  HEENT:  Atraumatic normocephalic  Psychiatric:  No acute psychosis  Pulmonary:  Normal respiratory excursion without accessory muscle use  Abdomen:  Soft, nontender  Extremities:  No edema or cyanosis  Skin:  No diffuse rashes  Recent media images of sacral ulcers were personally reviewed  Neuro: Moves all extremities spontaneously    Lab Results:  I have personally reviewed pertinent labs    Results from last 7 days   Lab Units 10/21/22  0043 10/20/22  1758 10/20/22  1158 10/20/22  0037 10/19/22  1540   POTASSIUM mmol/L 3 8 3 8 4 3   < > 3 0*   CHLORIDE mmol/L 118* 117* 114*   < > 116*   CO2 mmol/L 20* 24 21   < > 25   BUN mg/dL 75* 70* 66*   < > 61*   CREATININE mg/dL 1  22 1 34* 1 38*   < > 1 68*   EGFR ml/min/1 73sq m 43 38 37   < > 29   CALCIUM mg/dL 7 9* 8 0* 8 4   < > 9 5   AST U/L  --   --   --   --  29   ALT U/L  --   --   --   --  12   ALK PHOS U/L  --   --   --   --  195*    < > = values in this interval not displayed  Results from last 7 days   Lab Units 10/21/22  0730 10/21/22  0043 10/20/22  1758 10/20/22  0629 10/19/22  1540   WBC Thousand/uL  --   --   --  30 63* 18 48*   HEMOGLOBIN g/dL 8 7* 9 1* 10 9* 12 2 13 7   PLATELETS Thousands/uL  --   --   --  369 546*     Results from last 7 days   Lab Units 10/19/22  1900 10/19/22  1548 10/19/22  1540   BLOOD CULTURE   --  No Growth at 24 hrs  No Growth at 24 hrs  URINE CULTURE  Culture too young- will reincubate  --   --        Imaging Studies:   I have personally reviewed pertinent imaging study reports and images in PACS  CT chest/abdomen/pelvis shows lobulated right upper lobe paramediastinal mass measuring 6 x 4 x 8 5 cm with extension into superior mediastinum, super clavicular and extension to in invasion of right upper lobe bronchial branch  Multiple scattered metastatic lung nodules in bilateral upper lobes  Moderate right and small left effusions  Stool impaction  EKG, Pathology, and Other Studies:   I have personally reviewed pertinent reports

## 2022-10-21 NOTE — ASSESSMENT & PLAN NOTE
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  Patient was seen by PCP on 9/27, referred to Oncology as outpatient  No history of cancer  History of fibroid , status post hysterectomy and benign breast cyst   CT chest abdomen pelvis 10/21-noted to have lobulated right upper lobe paramediastinal mass 6 x 4 x 8 5 cm with extension into the superior mediastinum, supraclavicular correlation and extension to and invasion of right upper lobe bronchial branch   Multiple scattered subcentimeter metastatic lung nodules noted in upper lobe bilaterally  Moderate right and small left effusion  MRI brain - with evidence of at least 10 intracranial supratentorial and infratentorial metastatic lesion without any evidence of midline shift or intracranial hemorrhage  Patient was also noted to have evidence of cerebellar lesions  At least 10 intracranial supratentorial and infratentorial from metastatic lesions, as described above   No midline shift or acute intracranial hemorrhage  Status post IR guided right iliac bone biopsy-Metastatic adenocarcinoma, consistent with lung primary  · Oncology following, input appreciated  · Overall poor prognosis given extensive metastatic disease and poor functional and nutritional status  · Extensive ongoing goals of care discussion during hospitalization  Reviewed findings with extensive metastatic disease and overall prognosis given poor functional status  POLST completed, advanced directives were updated to  DNR/DNI  · Discussed regarding potential rehabilitation, expectation and overall prognosis  Discussed Oncology recommendation that patient currently not candidate for any palliative chemotherapy given functional status    After discussion, patient and sister-in-law agreed with consideration of hospice if patient fails to improve with functional and nutrition status at rehab

## 2022-10-21 NOTE — QUICK NOTE
QUICK NOTE - Deterioration Index  Hossein Sharma 69 y o  female MRN: [id number]  Unit/Bed#: 2 [place] [address] Encounter: [id number]    Date Paged: 10/20/22  Time Paged: 1800  Room #: 2Sout 0  Arrival Time: 8  Deterioration index score at time of page: 58 06  %  Spoke with Primary RN from primary team  Need to escalate level of care: no     PROBLEMS resulting in high DI score:   Contribution Factor Value   22% Sodium abnormal (153 mmol/L)   24 Age 69years old   19% Supplemental oxygen Nasal cannula   16% Neurological exam Obtunded   7% WBC count abnormal (30 63 Thousand/uL)   7% Cardiac rhythm Sinus tachycardia   4% Respiratory rate 18         PLAN:    • Patient is verbally reported to be tired, but wakes easily to voice  Further investigation of the chart also has the patient documented as being AAO x4    • Primary RN Del  states she will now fully assess the patient and enter documentation as such, however denies current concerns  • Staff aware they may call me with any questions, concerns or updates  Please contact critical care via [employer] with any questions or concerns       Vitals:   Vitals:    10/20/22 1700 10/20/22 1710 10/20/22 1924 10/20/22 2013   BP: 92/53 98/69 106/70 105/69   BP Location:   Left arm Left arm   Pulse: (!) 117 (!) 114 (!) 112 63   Resp:   (!) 23 18   Temp:   (!) 96 8 °F (36 °C) (!) 96 6 °F (35 9 °C)   TempSrc:   Axillary Axillary   SpO2: 90% 92% 96% 93%   Weight:       Height:           Respiratory:  SpO2: SpO2: 93 %, SpO2 Activity: SpO2 Activity: At Rest, SpO2 Device: O2 Device: Nasal cannula  Nasal Cannula O2 Flow Rate (L/min): 1 L/min    Temperature: Temp (24hrs), Av 1 °F (36 2 °C), Min:96 6 °F (35 9 °C), Max:97 7 °F (36 5 °C)  Current: Temperature: (!) 96 6 °F (35 9 °C)    Labs:   Results from last 7 days   Lab Units 10/20/22  1758 10/20/22  0629 10/19/22  1540   WBC Thousand/uL  --  30 63* 18 48*   HEMOGLOBIN g/dL 10 9* 12 2 13 7   HEMATOCRIT % 38 2 43 1 48 4* PLATELETS Thousands/uL  --  369 546*   NEUTROS PCT %  --  90* 80*   MONOS PCT %  --  4 5     Results from last 7 days   Lab Units 10/20/22  1758 10/20/22  1158 10/20/22  0629 10/20/22  0037 10/19/22  1540   SODIUM mmol/L 153* 150* 153*   < > 160*   POTASSIUM mmol/L 3 8 4 3 4 0   < > 3 0*   CHLORIDE mmol/L 117* 114* 116*   < > 116*   CO2 mmol/L 24 21 25   < > 25   BUN mg/dL 70* 66* 64*   < > 61*   CREATININE mg/dL 1 34* 1 38* 1 34*   < > 1 68*   CALCIUM mg/dL 8 0* 8 4 8 1*   < > 9 5   ALK PHOS U/L  --   --   --   --  195*   ALT U/L  --   --   --   --  12   AST U/L  --   --   --   --  29    < > = values in this interval not displayed       Results from last 7 days   Lab Units 10/20/22  0629 10/19/22  1548   MAGNESIUM mg/dL 1 9 2 5     Results from last 7 days   Lab Units 10/20/22  0400 10/20/22  0037 10/19/22  2225 10/19/22  2005 10/19/22  1813   LACTIC ACID mmol/L 1 7 3 1* 3 5* 4 0* 3 0*         Results from last 7 days   Lab Units 10/20/22  0629 10/19/22  1540   PROCALCITONIN ng/ml 0 53* 0 28*       Code Status: Level 1 - Full Code

## 2022-10-21 NOTE — WOUND OSTOMY CARE
Progress Note - Wound   Obed Mari 69 y o  female MRN: [id number]  Unit/Bed#: 2 [place] [address] Encounter: [id number]      Assessment: This is a 69year old female patient admitted on 10/19/22 with SIRS  She has a history of Sjogren's syndrome, hypothyroidism and metastatic lung disease  She was awake, alert & oriented x 3 and appears extremely weak  She is dependent upon nursing staff for all of her care and is incontinent of urine  The perineal area did not appear to have open areas at time of wound assessment  Open wounds in this area would most likely be caused by moisture  The patient was about to have a bowel movement at time of this assessment so this wound RN placed her on a bed pan  The wound care [facility] and dressings left at bedside  The patient is repositioned in bed with assist of 1 person  Plan:   Skin care plans:  1-Cleanse sacrobuttock wound with foaming cleanser & pat dry  Apply Triad paste in a thin layer & small amount to areas with slough  Cover with Allevyn sacral bordered foam dresssing & change every other day and prn soilage/dislodgement  2-Calazime to perineal area TID and PRN soilage/dislodgement  3-Hydraguard to bilateral heels BID and PRN  4-Heel protectors to bilateral heels at all times in bed  If patient refuses, must float heels on 2 pillows so heels not in contact with mattress or pillows to offload pressure  5-Ehob cushion when out of bed  6-Turn/reposition q2h or when medically stable for pressure re-distribution on skin    7-Moisturize skin daily with skin nourishing cream      Wound 10/19/22 Pressure Injury Buttocks Bilateral (Active)   Wound Image   10/21/22 1035   Wound Description Light purple 10/21/22 1035   Pressure Injury Stage DTPI 10/21/22 1035   Padmini-wound Assessment Brown 10/21/22 1035   Wound Length (cm) 11 cm 10/21/22 1035   Wound Width (cm) 5 cm 10/21/22 1035   Wound Depth (cm) 0 1 cm 10/21/22 1035   Wound Surface Area (cm^2) 55 cm^2 10/21/22 1035   Wound Volume (cm^3) 5 5 cm^3 10/21/22 1035   Calculated Wound Volume (cm^3) 5 5 cm^3 10/21/22 1035   Drainage Amount Small 10/21/22 1035   Drainage Description Serosanguineous 10/21/22 1035   Treatments Cleansed 10/21/22 1035   Dressing Foam, Silicon (eg  Allevyn, etc) 10/21/22 1035   Wound packed? No 10/21/22 1035   Dressing Changed New 10/21/22 1035   Dressing Status Clean;Dry; Intact 10/21/22 1035     Discussed assessment findings, and plan of care/recommendations with Dr Kel Chau, [address] [place] [address]  Wound care will follow along with patient throughout admission, please call or tiger text with questions and concerns  Recommendations written as orders    Jones Cadena RN, BSN, [employer]

## 2022-10-21 NOTE — ASSESSMENT & PLAN NOTE
Patient presents with generalized weakness  Neighbor called police as they saw her trash can outside for a few days  Police found her on the floor  Patient reports rolling off the couch and was on the floor for about 2 hours  Patient denies hitting head  Reports lower abdominal pain since 5/2022  Denies any acute pain  Patient lives [place] - in-law normally takes her shopping and she was away for about 16 days  Patient reported nothing to eat at home and was hungry    · Improving with IV fluids

## 2022-10-22 PROBLEM — E87.8 ELECTROLYTE ABNORMALITY: Status: ACTIVE | Noted: 2022-10-22

## 2022-10-22 PROBLEM — K56.41 FECAL IMPACTION (HCC): Status: ACTIVE | Noted: 2022-10-22

## 2022-10-22 PROBLEM — E87.0 HYPERNATREMIA: Status: ACTIVE | Noted: 2022-10-22

## 2022-10-22 PROBLEM — D64.9 ANEMIA: Status: ACTIVE | Noted: 2022-10-22

## 2022-10-22 PROBLEM — K92.0 COFFEE GROUND EMESIS: Status: ACTIVE | Noted: 2022-10-22

## 2022-10-22 LAB
ALBUMIN SERPL BCP-MCNC: 2.2 G/DL (ref 3.5–5)
ALP SERPL-CCNC: 218 U/L (ref 46–116)
ALT SERPL W P-5'-P-CCNC: <6 U/L (ref 12–78)
ANION GAP SERPL CALCULATED.3IONS-SCNC: 11 MMOL/L (ref 4–13)
ANION GAP SERPL CALCULATED.3IONS-SCNC: 12 MMOL/L (ref 4–13)
ANION GAP SERPL CALCULATED.3IONS-SCNC: 8 MMOL/L (ref 4–13)
AST SERPL W P-5'-P-CCNC: 37 U/L (ref 5–45)
BILIRUB SERPL-MCNC: 0.35 MG/DL (ref 0.2–1)
BUN SERPL-MCNC: 39 MG/DL (ref 5–25)
BUN SERPL-MCNC: 44 MG/DL (ref 5–25)
BUN SERPL-MCNC: 50 MG/DL (ref 5–25)
CALCIUM ALBUM COR SERPL-MCNC: 9 MG/DL (ref 8.3–10.1)
CALCIUM SERPL-MCNC: 7.3 MG/DL (ref 8.3–10.1)
CALCIUM SERPL-MCNC: 7.4 MG/DL (ref 8.3–10.1)
CALCIUM SERPL-MCNC: 7.6 MG/DL (ref 8.3–10.1)
CHLORIDE SERPL-SCNC: 110 MMOL/L (ref 96–108)
CHLORIDE SERPL-SCNC: 111 MMOL/L (ref 96–108)
CHLORIDE SERPL-SCNC: 112 MMOL/L (ref 96–108)
CO2 SERPL-SCNC: 21 MMOL/L (ref 21–32)
CO2 SERPL-SCNC: 22 MMOL/L (ref 21–32)
CO2 SERPL-SCNC: 23 MMOL/L (ref 21–32)
CREAT SERPL-MCNC: 0.8 MG/DL (ref 0.6–1.3)
CREAT SERPL-MCNC: 0.95 MG/DL (ref 0.6–1.3)
CREAT SERPL-MCNC: 1 MG/DL (ref 0.6–1.3)
ERYTHROCYTE [DISTWIDTH] IN BLOOD BY AUTOMATED COUNT: 18.9 % (ref 11.6–15.1)
FERRITIN SERPL-MCNC: 260 NG/ML (ref 8–388)
GFR SERPL CREATININE-BSD FRML MDRD: 54 ML/MIN/1.73SQ M
GFR SERPL CREATININE-BSD FRML MDRD: 58 ML/MIN/1.73SQ M
GFR SERPL CREATININE-BSD FRML MDRD: 71 ML/MIN/1.73SQ M
GLUCOSE SERPL-MCNC: 118 MG/DL (ref 65–140)
GLUCOSE SERPL-MCNC: 126 MG/DL (ref 65–140)
GLUCOSE SERPL-MCNC: 127 MG/DL (ref 65–140)
GLUCOSE SERPL-MCNC: 128 MG/DL (ref 65–140)
GLUCOSE SERPL-MCNC: 135 MG/DL (ref 65–140)
GLUCOSE SERPL-MCNC: 141 MG/DL (ref 65–140)
GLUCOSE SERPL-MCNC: 176 MG/DL (ref 65–140)
HCT VFR BLD AUTO: 24.6 % (ref 34.8–46.1)
HGB BLD-MCNC: 6.8 G/DL (ref 11.5–15.4)
HGB BLD-MCNC: 7 G/DL (ref 11.5–15.4)
HGB BLD-MCNC: 7 G/DL (ref 11.5–15.4)
HGB BLD-MCNC: 8.3 G/DL (ref 11.5–15.4)
IRON SATN MFR SERPL: 11 % (ref 15–50)
IRON SERPL-MCNC: 17 UG/DL (ref 50–170)
MAGNESIUM SERPL-MCNC: 1.4 MG/DL (ref 1.6–2.6)
MCH RBC QN AUTO: 22.2 PG (ref 26.8–34.3)
MCHC RBC AUTO-ENTMCNC: 28.5 G/DL (ref 31.4–37.4)
MCV RBC AUTO: 78 FL (ref 82–98)
PHOSPHATE SERPL-MCNC: 1.8 MG/DL (ref 2.3–4.1)
PLATELET # BLD AUTO: 124 THOUSANDS/UL (ref 149–390)
PMV BLD AUTO: 10.8 FL (ref 8.9–12.7)
POTASSIUM SERPL-SCNC: 3.1 MMOL/L (ref 3.5–5.3)
POTASSIUM SERPL-SCNC: 3.2 MMOL/L (ref 3.5–5.3)
POTASSIUM SERPL-SCNC: 3.7 MMOL/L (ref 3.5–5.3)
PROT SERPL-MCNC: 5 G/DL (ref 6.4–8.4)
RBC # BLD AUTO: 3.16 MILLION/UL (ref 3.81–5.12)
SODIUM SERPL-SCNC: 143 MMOL/L (ref 135–147)
SODIUM SERPL-SCNC: 143 MMOL/L (ref 135–147)
SODIUM SERPL-SCNC: 144 MMOL/L (ref 135–147)
TIBC SERPL-MCNC: 152 UG/DL (ref 250–450)
WBC # BLD AUTO: 20.39 THOUSAND/UL (ref 4.31–10.16)

## 2022-10-22 RX ORDER — POLYETHYLENE GLYCOL 3350 17 G/17G
17 POWDER, FOR SOLUTION ORAL DAILY
Status: DISCONTINUED | OUTPATIENT
Start: 2022-10-22 | End: 2022-11-02 | Stop reason: HOSPADM

## 2022-10-22 RX ORDER — POTASSIUM CHLORIDE 29.8 MG/ML
40 INJECTION INTRAVENOUS ONCE
Status: DISCONTINUED | OUTPATIENT
Start: 2022-10-22 | End: 2022-10-22 | Stop reason: CLARIF

## 2022-10-22 RX ORDER — POTASSIUM CHLORIDE 14.9 MG/ML
20 INJECTION INTRAVENOUS ONCE
Status: COMPLETED | OUTPATIENT
Start: 2022-10-22 | End: 2022-10-22

## 2022-10-22 RX ORDER — POTASSIUM CHLORIDE 14.9 MG/ML
20 INJECTION INTRAVENOUS ONCE
Status: DISCONTINUED | OUTPATIENT
Start: 2022-10-22 | End: 2022-10-22

## 2022-10-22 RX ORDER — ACETAMINOPHEN 325 MG/1
650 TABLET ORAL ONCE
Status: DISCONTINUED | OUTPATIENT
Start: 2022-10-22 | End: 2022-10-22

## 2022-10-22 RX ORDER — ACETAMINOPHEN 325 MG/1
650 TABLET ORAL ONCE
Status: COMPLETED | OUTPATIENT
Start: 2022-10-22 | End: 2022-10-22

## 2022-10-22 RX ORDER — POTASSIUM CHLORIDE 20 MEQ/1
20 TABLET, EXTENDED RELEASE ORAL ONCE
Status: DISCONTINUED | OUTPATIENT
Start: 2022-10-22 | End: 2022-10-22

## 2022-10-22 RX ORDER — DIPHENHYDRAMINE HCL 25 MG
12.5 TABLET ORAL ONCE
Status: COMPLETED | OUTPATIENT
Start: 2022-10-22 | End: 2022-10-22

## 2022-10-22 RX ORDER — MAGNESIUM SULFATE HEPTAHYDRATE 40 MG/ML
4 INJECTION, SOLUTION INTRAVENOUS ONCE
Status: COMPLETED | OUTPATIENT
Start: 2022-10-22 | End: 2022-10-22

## 2022-10-22 RX ORDER — DIPHENHYDRAMINE HCL 25 MG
12.5 TABLET ORAL ONCE
Status: DISCONTINUED | OUTPATIENT
Start: 2022-10-22 | End: 2022-10-22

## 2022-10-22 RX ADMIN — DIPHENHYDRAMINE HYDROCHLORIDE 12.5 MG: 25 TABLET ORAL at 13:38

## 2022-10-22 RX ADMIN — DOCUSATE SODIUM 100 MG: 100 CAPSULE, LIQUID FILLED ORAL at 18:27

## 2022-10-22 RX ADMIN — DEXTROSE 50 ML/HR: 5 SOLUTION INTRAVENOUS at 02:12

## 2022-10-22 RX ADMIN — CEFAZOLIN SODIUM 1000 MG: 1 SOLUTION INTRAVENOUS at 13:37

## 2022-10-22 RX ADMIN — DULOXETINE HYDROCHLORIDE 30 MG: 30 CAPSULE, DELAYED RELEASE ORAL at 08:57

## 2022-10-22 RX ADMIN — SODIUM CHLORIDE 8 MG/HR: 9 INJECTION, SOLUTION INTRAVENOUS at 20:38

## 2022-10-22 RX ADMIN — POTASSIUM CHLORIDE 20 MEQ: 14.9 INJECTION, SOLUTION INTRAVENOUS at 20:29

## 2022-10-22 RX ADMIN — POTASSIUM PHOSPHATE, MONOBASIC AND POTASSIUM PHOSPHATE, DIBASIC 12 MMOL: 224; 236 INJECTION, SOLUTION, CONCENTRATE INTRAVENOUS at 17:29

## 2022-10-22 RX ADMIN — CEFAZOLIN SODIUM 1000 MG: 1 SOLUTION INTRAVENOUS at 06:22

## 2022-10-22 RX ADMIN — MAGNESIUM SULFATE HEPTAHYDRATE 4 G: 40 INJECTION, SOLUTION INTRAVENOUS at 12:50

## 2022-10-22 RX ADMIN — DOCUSATE SODIUM 100 MG: 100 CAPSULE, LIQUID FILLED ORAL at 08:57

## 2022-10-22 RX ADMIN — OXYBUTYNIN 5 MG: 5 TABLET, FILM COATED, EXTENDED RELEASE ORAL at 08:57

## 2022-10-22 RX ADMIN — Medication 1000 UNITS: at 08:57

## 2022-10-22 RX ADMIN — FOLIC ACID 1 MG: 1 TABLET ORAL at 08:57

## 2022-10-22 RX ADMIN — LEVOTHYROXINE SODIUM 75 MCG: 75 TABLET ORAL at 06:22

## 2022-10-22 RX ADMIN — CEFAZOLIN SODIUM 1000 MG: 1 SOLUTION INTRAVENOUS at 22:31

## 2022-10-22 RX ADMIN — ATORVASTATIN CALCIUM 40 MG: 40 TABLET, FILM COATED ORAL at 18:27

## 2022-10-22 RX ADMIN — Medication 250 MG: at 08:57

## 2022-10-22 RX ADMIN — SODIUM CHLORIDE 8 MG/HR: 9 INJECTION, SOLUTION INTRAVENOUS at 07:31

## 2022-10-22 RX ADMIN — POTASSIUM CHLORIDE 20 MEQ: 14.9 INJECTION, SOLUTION INTRAVENOUS at 10:31

## 2022-10-22 RX ADMIN — Medication 250 MG: at 18:26

## 2022-10-22 RX ADMIN — POLYETHYLENE GLYCOL 3350 17 G: 17 POWDER, FOR SOLUTION ORAL at 18:27

## 2022-10-22 RX ADMIN — ACETAMINOPHEN 650 MG: 325 TABLET, FILM COATED ORAL at 13:37

## 2022-10-22 NOTE — ASSESSMENT & PLAN NOTE
Presented with sodium level of 160 secondary to decrease free water intake  Seen by Nephrology, input appreciated  Improved appropriately with hypotonic fluids  · Tolerating diet well with good appetite  · Monitor BMP  · Monitor intake output Xerosis Aggressive Treatment: I recommended application of Cetaphil or CeraVe numerous times a day going to bed to all dry areas. I also prescribed a topical steroid for twice daily use.

## 2022-10-22 NOTE — PROGRESS NOTES
[facility] Internal Medicine Progress Note  Patient: Dionna Tubbs 69 y o  female   MRN: [id number]  PCP: Maxx Peña MD  Unit/Bed#: [address] [place] [address] Encounter: [id number]  Date Of Visit: 10/22/22    Problem List:    Principal Problem:    SIRS (systemic inflammatory response syndrome) ([facility] [address] )  Active Problems:    Metastatic cancer ([facility] [address] )    Coffee ground emesis    Orthostatic hypotension    Acute kidney injury ([facility] [address] )    SVT (supraventricular tachycardia) (HCC)    Pulmonary nodules    Hypernatremia    Dyslipidemia    Acquired hypothyroidism    Sjogren's syndrome (HCC)    Dehydration    Sacral wound    Generalized weakness    Chronic pain    Elevated glucose    Severe protein-calorie malnutrition (HCC)    Fecal impaction (HCC)    Anemia    Electrolyte abnormality      Assessment & Plan:    Coffee ground emesis  Assessment & Plan  Patient noted to have 2 episodes of coffee-ground emesis on 09/21, associated with tachycardia and hypotension setting of volume depletion   Hemoglobin noted to be downtrending, partly dilutional   No further evidence of bleeding or melena   Prior CT scan with large hiatal hernia   CT chest abdomen pelvis during current hospitalization without any acute abnormality, occult bleeding but noted to have hiatal hernia and fecal impaction  Hemoglobin continues to downtrend  · Continue IV Protonix drip  · Transfuse 1 PRBC  · GI evaluation appreciated, endoscopy plan tentatively on 10/24 but may require earlier if recurrent bleeding  · Monitor H&H, transfuse as needed           Metastatic cancer [facility])  Assessment & Plan  Patient reports intermittent lower abdominal pain since May of this year  Reports constipation at home  Reports weight loss since spring this year  · 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    · Patient was seen by PCP on 9/27, referred to Oncology as outpatient  No history of cancer  History of fibroid , status post hysterectomy and benign breast cyst   · CT chest abdomen pelvis 10/21-noted to have lobulated right upper lobe paramediastinal mass 6 x 4 x 8 5 cm with extension into the superior mediastinum, supraclavicular correlation and extension to and invasion of right upper lobe bronchial branch   Multiple scattered subcentimeter metastatic lung nodules noted in upper lobe bilaterally  Moderate right and small left effusion  Seen by Oncology, input appreciated  · Discussed with patient and POA regarding finding concerning for malignancy  Discussed that biopsy will be needed to confirm diagnosis  They are considering biopsy but not clear if they would like to proceed with any treatment currently  · Discussed with Pulmonary, recommended IR guided biopsy     * SIRS (systemic inflammatory response syndrome) ([facility] [address] )  Assessment & Plan  SIRS versus Sepsis, POA, as evidenced by leukocytosis, tachycardia, hypothermia  Lactic acidosis  Patient is afebrile  · UA shows trace leukocytes, normal white count  SARS-CoV-2 PCR negative  · CXR- shows diffuse pulmonary nodules, no evidence infection   · Lactic acid 5 3 initially, repeat trended down  Likely multifactorial secondary to dehydration   · CT chest abdomen pelvis without any evidence of infection  · Blood cultures are negative so far  ID follow ing, input appreciated   No definitive skin and soft tissue infection  Consider urinary source   Possible related to metastatic disease versus GI bleed  · Initially received cefepime, now on cefazolin    · Follow-up blood cultures-negative so far  · Follow-up pending urine culture  · Discontinue antibiotics if culture remains negative      Hypernatremia  Assessment & Plan  Presented with sodium level of 160 secondary to decrease free water intake  Seen by Nephrology, input appreciated  Improved appropriately with hypotonic fluids  · Continue D5 water at 50 cc/hour until tolerating p o  Well  · Monitor BMP  · Monitor intake output   · Monitor p o  intake    Pulmonary nodules  Assessment & Plan  · CXR:  2 x 5 5 cm right upper lobe paramediastinal nodule, and numerous additional smaller bilateral pulmonary nodules highly suspicious for malignancy  Both primary lung cancer and metastatic disease can have this appearance  · Refer to CT scan finding under metastatic cancer      SVT (supraventricular tachycardia) (HCC)  Assessment & Plan  Developed SVT in ED, heart rate 190  Converted to sinus rhythm after receiving Cardizem 10 mg IV  · Telemetry-no further episode, will discontinue  · Optimize electrolytes  · Started on low-dose metoprolol     Acute kidney injury [facility])  Assessment & Plan  Baseline creatinine 0 7-0 8 in past year  Creatinine 1 68 on admission  UA without significant abnormality  Prior CT scan without any hydronephrosis  Likely prerenal in setting of dehydration and hypotension  Nephrology following, input appreciated  Improving with IV fluids to 0 8  · Continue maintenance IV fluid   · Monitor intake output   · Avoid nephrotoxin and hypotension  · Follow-up BMP    Orthostatic hypotension  Assessment & Plan  Noted history  Fecal impaction [facility])  Assessment & Plan  Status post partial improvement after enema, 10/21  Will start Colace and MiraLax     Severe protein-calorie malnutrition ([facility] [address] )  Assessment & Plan  Malnutrition Findings:   Adult Malnutrition type: Chronic illness  Adult Degree of Malnutrition: Other severe protein calorie malnutrition  Malnutrition Characteristics: Fat loss, Muscle loss, Weight loss                  360 Statement: Severe protein calorie malnutrion of chronic illness related to increased energy needs as evidenced by 12% weight loss in 1 month, hollow orbits, protruding clavicles, depression at the temples    Awaiting treatment plan    BMI Findings:  Adult BMI Classifications: Underweight < 18 5        Body mass index is 16 51 kg/m²  Elevated glucose  Assessment & Plan  Glucose improving,  · Check A1c- 5 5%, SSI    Chronic pain  Assessment & Plan  Chronic arthralgia in lower back, left hip, knees, legs  Likely due to OA and Sjogren's syndrome  Patient follows Rheumatology in [facility] as outpatient  On methotrexate, Cymbalta, Voltaren, tramadol p r n  At home  Denies pain at present  · Continue Cymbalta,   · Hold methotrexate for now in view of sirs versus sepsis  · Hold Voltaren, tramadol   · Tylenol as needed    Generalized weakness  Assessment & Plan  Likely multifactorial secondary to dehydration, metastatic cancer  cane at baseline  · Treat underlying causes  · Nutrition support  · PT OT eval treat  · Fall precautions    Sacral wound  Assessment & Plan  Developed in 2 weeks since sister-in-law is away  Diffuse sacral DTI on exam with surrounding redness, no active drainage, scattered black eschar on exam Hard to exclude cellulitis  · Seen by surgery, input appreciated, continue wound care as per wound care    Dehydration  Assessment & Plan  Patient presents with generalized weakness  Neighbor called police as they saw her trash can outside for a few days  Police found her on the floor  Patient reports rolling off the couch and was on the floor for about 2 hours  Patient denies hitting head  Reports lower abdominal pain since 5/2022  Denies any acute pain  Patient lives [place] - in-law normally takes her shopping and she was away for about 16 days  Patient reported nothing to eat at home and was hungry  · Improving with IV fluids        Sjogren's syndrome [facility])  Assessment & Plan  Patient is on methotrexate 12 5 mg p o  Weekly  Follows rheumatologist in [facility]  · Will hold methotrexate for now  · Resume on discharge  · Natural tears prn for dry eye  Acquired hypothyroidism  Assessment & Plan  Continue Synthroid  Reports not taking medications at home    · Elevated TSH, normal free T4   · Continue Synthroid      Dyslipidemia  Assessment & Plan  Continue statin    Electrolyte abnormality  Assessment & Plan  Hypokalemia, hypophosphatemia, hypomagnesemia  · Replete and monitor    Anemia  Assessment & Plan  Hemoglobin 13 7 grams/deciliter on admission, likely hemoconcentrated  Hemoglobin was 9 4 grams/deciliter on August 2022  Microcytic  Noted to have coffee-ground emesis during hospitalization  Previous B12 low normal  Hemoglobin gradually declined to 7 grams/deciliter today,  · Will get iron studies  · Replete B12  · Monitor H&H, will transfuse if continues to decline or hemodynamic instability    Elevated troponin-resolved as of 10/20/2022  Assessment & Plan  Troponin [id number]> wnl  · Initial EKG showed sinus tach, rate 115, inferior lateral ST depressions, prolonged QT   · Patient denies chest pain  · Likely type 2 MI secondary to # 1 and SIRS versus Sepsis  · Troponin downtrending  · Telemetry            VTE Pharmacologic Prophylaxis:   Moderate Risk (Score 3-4) - Pharmacological DVT Prophylaxis Contraindicated  Sequential Compression Devices Ordered  Patient Centered Rounds: I performed bedside rounds with nursing staff today  Discussions with Specialists or Other Care Team Provider:  yes    Education and Discussions with Family / Patient: Yes  Time Spent for Care: 45 minutes  More than 50% of total time spent on counseling and coordination of care as described above  Current Length of Stay: 3 day(s)  Current Patient Status: Inpatient   Certification Statement: The patient will continue to require additional inpatient hospital stay due to Electrolytes abnormality, GI bleeding  Discharge Plan: Anticipate discharge in >72 hrs to discharge location to be determined pending rehab evaluations      Code Status: Level 1 - Full Code    Subjective:   Denies any pain  Denies any chest pain or shortness of breath  No evidence of overt bleeding    Remains afebrile  Blood pressure is somewhat better today    Hemoglobin continues to drift downwards      Objective:     Vitals:   Temp (24hrs), Av 8 °F (36 °C), Min:94 3 °F (34 6 °C), Max:97 7 °F (36 5 °C)    Temp:  [94 3 °F (34 6 °C)-97 7 °F (36 5 °C)] 97 7 °F (36 5 °C)  HR:  [75-93] 88  Resp:  [16-18] 18  BP: ()/(42-67) 107/63  SpO2:  [96 %-98 %] 97 % on room air  Body mass index is 16 51 kg/m²  Input and Output Summary (last 24 hours): Intake/Output Summary (Last 24 hours) at 10/22/2022 0902  Last data filed at 10/22/2022 0601  Gross per 24 hour   Intake 1295 ml   Output 840 ml   Net 455 ml       Physical Exam:   Physical Exam  Constitutional:       General: She is not in acute distress  Appearance: She is ill-appearing  Comments: Frail, cachectic   HENT:      Head: Normocephalic and atraumatic  Mouth/Throat:      Mouth: Mucous membranes are dry  Cardiovascular:      Rate and Rhythm: Normal rate  Pulmonary:      Effort: Pulmonary effort is normal  No respiratory distress  Breath sounds: Normal breath sounds  No wheezing or rales  Comments: Diminished bilaterally  Abdominal:      General: Bowel sounds are normal  There is no distension  Palpations: Abdomen is soft  Tenderness: There is no abdominal tenderness  There is no guarding or rebound  Skin:     General: Skin is warm and dry  Coloration: Skin is pale  Findings: Lesion (Sacral ulcers) present  No rash  Neurological:      General: No focal deficit present  Mental Status: She is alert  Mental status is at baseline           Additional Data:     Labs:  Results from last 7 days   Lab Units 10/22/22  0633 10/20/22  1758 10/20/22  0629   WBC Thousand/uL 20 39*  --  30 63*   HEMOGLOBIN g/dL 7 0*   < > 12 2   HEMATOCRIT % 24 6*   < > 43 1   PLATELETS Thousands/uL 124*  --  369   NEUTROS PCT %  --   --  90*   LYMPHS PCT %  --   --  5*   MONOS PCT %  --   --  4   EOS PCT %  --   --  0    < > = values in this interval not displayed  Results from last 7 days   Lab Units 10/22/22  0633   SODIUM mmol/L 144   POTASSIUM mmol/L 3 2*   CHLORIDE mmol/L 110*   CO2 mmol/L 22   BUN mg/dL 44*   CREATININE mg/dL 0 95   ANION GAP mmol/L 12   CALCIUM mg/dL 7 6*   ALBUMIN g/dL 2 2*   TOTAL BILIRUBIN mg/dL 0 35   ALK PHOS U/L 218*   ALT U/L <6*   AST U/L 37   GLUCOSE RANDOM mg/dL 135         Results from last 7 days   Lab Units 10/22/22  0710 10/21/22  2041 10/21/22  1657 10/21/22  1107 10/21/22  0723 10/20/22  2127 10/20/22  1558 10/20/22  1123 10/20/22  0750   POC GLUCOSE mg/dl 176* 149* 162* 120 172* 139 171* 183* 222*     Results from last 7 days   Lab Units 10/19/22  1540   HEMOGLOBIN A1C % 5 5     Results from last 7 days   Lab Units 10/20/22  0629 10/20/22  0400 10/20/22  0037 10/19/22  2225 10/19/22  2005 10/19/22  1548 10/19/22  1540   LACTIC ACID mmol/L  --  1 7 3 1* 3 5* 4 0*   < >  --    PROCALCITONIN ng/ml 0 53*  --   --   --   --   --  0 28*    < > = values in this interval not displayed  Lines/Drains:  Invasive Devices  Report    Peripheral Intravenous Line  Duration           Peripheral IV 10/19/22 Left Antecubital 2 days    Long-Dwell Peripheral IV (Midline) 41/39/55 Right Basilic <1 day          Drain  Duration           External Urinary Catheter 1 day                  Telemetry:  Telemetry Orders (From admission, onward)             48 Hour Telemetry Monitoring  Continuous x 48 hours        Expiring   References:    Telemetry Guidelines   Question:  Reason for 48 Hour Telemetry  Answer:  Arrhythmias Requiring Medical Therapy (eg  SVT, Vtach/fib, Bradycardia, Uncontrolled A-fib)                 Telemetry Reviewed: Normal Sinus Rhythm  Indication for Continued Telemetry Use: No indication for continued use  Will discontinue                Imaging: Reviewed radiology reports from this admission including: chest xray, abdominal/pelvic CT and CT head    Recent Cultures (last 7 days):   Results from last 7 days   Lab Units 10/19/22  1900 10/19/22  1548 10/19/22  1540   BLOOD CULTURE   --  No Growth at 48 hrs  No Growth at 48 hrs  URINE CULTURE  Culture too young- will reincubate  --   --        Last 24 Hours Medication List:   Current Facility-Administered Medications   Medication Dose Route Frequency Provider Last Rate   • acetaminophen  650 mg Oral Q6H PRN KIKO Jovel     • acetaminophen  650 mg Oral Once Barrie Bhardwaj MD     • atorvastatin  40 mg Oral Daily With KIKO Hernandez     • cefazolin  1,000 mg Intravenous Q8H KIKO Jovel 1,000 mg (10/22/22 9581)   • cholecalciferol  1,000 Units Oral Daily KIKO Jovel     • dextrose  50 mL/hr Intravenous Continuous Ludivina Martini MD 50 mL/hr (10/22/22 2403)   • diphenhydrAMINE  12 5 mg Oral Once Barrie Bhardwaj MD     • docusate sodium  100 mg Oral BID KIKO Jovel     • DULoxetine  30 mg Oral Daily KIKO Jovel     • folic acid  1 mg Oral Daily KIKO Jovel     • HYDROmorphone  0 2 mg Intravenous Q6H PRN KIKO Jovel     • insulin lispro  1-5 Units Subcutaneous TID AC KIKO Jovel     • insulin lispro  1-5 Units Subcutaneous HS KIKO Jovel     • levothyroxine  75 mcg Oral Early Morning Ankur Lebron MD     • metoprolol tartrate  12 5 mg Oral Q12H [address] KIKO Jovel     • ondansetron  4 mg Intravenous Q6H PRN KIKO Jovel     • oxybutynin  5 mg Oral Daily KIKO Jovel     • pantoprozole (PROTONIX) infusion (Continuous)  8 mg/hr Intravenous Continuous Barrie Bhardwaj MD 8 mg/hr (10/22/22 4473)   • potassium chloride  20 mEq Oral Once Cynthia Warren PA-C     • saccharomyces boulardii  250 mg Oral BID KIKO Jovel          Today, Patient Was Seen By: Barrie Bhardwaj    ** Please Note: "This note has been constructed using a voice recognition system  Therefore there may be syntax, spelling, and/or grammatical errors   Please call if you have any questions  "**

## 2022-10-22 NOTE — ASSESSMENT & PLAN NOTE
Hemoglobin decline during hospitalization, likely multifactorial secondary to GI bleed, dilutional, chronic illness, mal nutrition  Noted to have coffee-ground emesis during hospitalization  B12 normal  Hemoglobin 6 8 grams/deciliter, status post 1 PRBC on 10/22, subsequently remained relatively stable  Iron studies-normal ferritin, low TIBC  Hemoglobin currently relatively stable, no further evidence of overt bleeding    · Continue to monitor H&H and signs and symptoms of bleeding  · Transfusion as needed

## 2022-10-22 NOTE — CONSULTS
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 as well as likely metastasis into the bone into the ribs and vertebrae and also bilateral pleural effusions right greater than left    Would need tissue for diagnosis  Patient is not clear whether she really wants to get any treatment, also given the advanced staging as well as her functional status currently she wants to talk to her family she states her sister-in-law the who is the POA who makes decisions for her, currently out of town, and make the decision if need to get biopsy  She also needs to be stabilized from the hematemesis , for likely EGD on Monday  Also patient has been seen by medical oncology Dr Riky Ulloa  Discussed with Dr Nomi Rojas when stable and the patient agrees for the biopsy, would get an IR guided biopsy for one  of the pleural based lung nodules         History of Present Illness   Physician Requesting Consult: Sal Rico MD  Reason for Consult / Principal Problem:  Lung nodules  Hx and PE limited by:  None  HPI: Taniya Tijerina is a 69y o  year old female who presents with prior smoking history, she states she smoked for about 20-25 years but she quit approximately 40 years ago, she used to work in a [employer] house in the past   Currently living alone has her sister-in-law who is the only family around she states and the POA who makes decisions for her, and she states that she is currently not in town, she has not followed up with any pulmonologist before she does not have carry any diagnosis of COPD she states that she has been having some cough for a few months now, no hemoptysis but did not seek any medical attention for that she states  Not able to describe whether she really how much shortness of breath and if it has worsened be given multiple other medical problems she was found to in the on the floor in the house and brought into the hospital, hypernatremic and also having coffee-ground hematemesis being followed up with GI as well and a plan for EGD on Monday  CT of the chest abdomen and pelvis was done found to have a lung mass with multiple lung nodules bilaterally for which Pulmonary has been consulted  Consults    Review of Systems   Constitutional: Positive for fatigue  HENT: Negative  Eyes: Negative  Respiratory: Positive for cough and shortness of breath  Cardiovascular: Negative  Gastrointestinal: Positive for vomiting  Endocrine: Negative  Genitourinary: Negative  Musculoskeletal: Negative  Allergic/Immunologic: Negative  Neurological: Negative  Hematological: Negative  Psychiatric/Behavioral: Negative  Historical Information   Past Medical History:   Diagnosis Date   • Disease of thyroid gland    • Hypercholesteremia    • Hypertension      Past Surgical History:   Procedure Laterality Date   • APPENDECTOMY     • BREAST BIOPSY Right     benign   • CHOLECYSTECTOMY     • COLON SURGERY      ruptured bowel   • COLOSTOMY  2009    and closure 6 months later   • HYSTERECTOMY  2002    total   • KNEE ARTHROSCOPY Bilateral     right x1 left x2   • OR COLSC FLX W/RMVL OF TUMOR POLYP LESION SNARE TQ N/A 5/3/2016    Procedure: COLONOSCOPY ;  Surgeon: Jose Montague MD;  Location: [facility] GI [facility];   Service: Gastroenterology   • TONSILECTOMY AND ADNOIDECTOMY     • TYMPANOPLASTY Right      Social History   Social History     Substance and Sexual Activity   Alcohol Use Not Currently     Social History     Substance and Sexual Activity   Drug Use No     E-Cigarette/Vaping     E-Cigarette/Vaping Substances     Social History     Tobacco Use   Smoking Status Former Smoker   • Packs/day: 1 00   • Years: 20 00   • Pack years: 20 00   • Quit date:    • Years since quittin 8   Smokeless Tobacco Never Used     Occupational History:  Worked in a [employer] in the past     Family History: non-contributory    Meds/Allergies   current meds:   Current Facility-Administered Medications   Medication Dose Route Frequency   • acetaminophen (TYLENOL) tablet 650 mg  650 mg Oral Q6H PRN   • acetaminophen (TYLENOL) tablet 650 mg  650 mg Oral Once   • atorvastatin (LIPITOR) tablet 40 mg  40 mg Oral Daily With Dinner   • ceFAZolin (ANCEF) IVPB (premix in dextrose) 1,000 mg 50 mL  1,000 mg Intravenous Q8H   • cholecalciferol (VITAMIN D3) tablet 1,000 Units  1,000 Units Oral Daily   • dextrose 5 % infusion  50 mL/hr Intravenous Continuous   • diphenhydrAMINE (BENADRYL) tablet 12 5 mg  12 5 mg Oral Once   • docusate sodium (COLACE) capsule 100 mg  100 mg Oral BID   • DULoxetine (CYMBALTA) delayed release capsule 30 mg  30 mg Oral Daily   • folic acid (FOLVITE) tablet 1 mg  1 mg Oral Daily   • HYDROmorphone (DILAUDID) injection 0 2 mg  0 2 mg Intravenous Q6H PRN   • insulin lispro (HumaLOG) 100 units/mL subcutaneous injection 1-5 Units  1-5 Units Subcutaneous TID AC   • insulin lispro (HumaLOG) 100 units/mL subcutaneous injection 1-5 Units  1-5 Units Subcutaneous HS   • levothyroxine tablet 75 mcg  75 mcg Oral Early Morning   • magnesium sulfate 4 g/100 mL IVPB (premix) 4 g  4 g Intravenous Once   • metoprolol tartrate (LOPRESSOR) partial tablet 12 5 mg  12 5 mg Oral Q12H [address]   • ondansetron (ZOFRAN) injection 4 mg  4 mg Intravenous Q6H PRN   • oxybutynin (DITROPAN-XL) 24 hr tablet 5 mg  5 mg Oral Daily   • pantoprazole (PROTONIX) 80 mg in sodium chloride 0 9 % 100 mL infusion  8 mg/hr Intravenous Continuous   • potassium chloride 20 mEq IVPB (premix)  20 mEq Intravenous Once    Followed by   • potassium chloride 20 mEq IVPB (premix)  20 mEq Intravenous Once   • potassium phosphates 12 mmol in sodium chloride 0 9 % 250 mL infusion  12 mmol Intravenous Once   • saccharomyces boulardii (FLORASTOR) capsule 250 mg  250 mg Oral BID       Allergies   Allergen Reactions   • Penicillins Rash       Objective   Vitals: Blood pressure 106/57, pulse 98, temperature 97 5 °F (36 4 °C), temperature source Oral, resp  rate 17, height 5' 5" (1 651 m), weight 45 kg (99 lb 3 3 oz), SpO2 97 %  ,Body mass index is 16 51 kg/m²  Intake/Output Summary (Last 24 hours) at 10/22/2022 1210  Last data filed at 10/22/2022 0601  Gross per 24 hour   Intake 1295 ml   Output 840 ml   Net 455 ml     Invasive Devices  Report    Peripheral Intravenous Line  Duration           Peripheral IV 10/19/22 Left Antecubital 2 days    Long-Dwell Peripheral IV (Midline) 19/84/24 Right Basilic <1 day          Drain  Duration           External Urinary Catheter 1 day                Physical Exam  Ill looking , currently in bed in no acute respiratory distress  Eyes anicteric sclera, conjunctivae is clear  ENT nares is patent, no evidence of any discharge  Oral , tongue is dry  Neck no cervical lymphadenopathy no JVD  Lungs diminished breath sounds bilaterally no rhonchi  Heart first and second heart sounds are heard no murmur or gallop is heard  Abdomen soft nontender  Extremities right arm  edematous bilateral pedal edema  Skin no evidence of any rash  CNS awake and alert currently, oriented x3  Lab Results:   CBC:   Lab Results   Component Value Date    WBC 20 39 (H) 10/22/2022    HGB 6 8 (LL) 10/22/2022    HCT 24 6 (L) 10/22/2022    MCV 78 (L) 10/22/2022     (L) 10/22/2022    MCH 22 2 (L) 10/22/2022    MCHC 28 5 (L) 10/22/2022    RDW 18 9 (H) 10/22/2022    MPV 10 8 10/22/2022     Imaging Studies: I have personally reviewed pertinent films in PACS  EKG, Pathology, and Other Studies: I have personally reviewed pertinent reports      VTE Prophylaxis: Sequential compression device (Venodyne)     Code Status: Level 1 - Full Code  Advance Directive and Living Will:      Power of :    POLST:

## 2022-10-22 NOTE — PLAN OF CARE
Problem: Nutrition/Hydration-ADULT  Goal: Nutrient/Hydration intake appropriate for improving, restoring or maintaining nutritional needs  Description: Monitor and assess patient's nutrition/hydration status for malnutrition  Collaborate with interdisciplinary team and initiate plan and interventions as ordered  Monitor patient's weight and dietary intake as ordered or per policy  Utilize nutrition screening tool and intervene as necessary  Determine patient's food preferences and provide high-protein, high-caloric foods as appropriate       INTERVENTIONS:  - Monitor oral intake, urinary output, labs, and treatment plans  - Assess nutrition and hydration status and recommend course of action  - Evaluate amount of meals eaten  - Assist patient with eating if necessary   - Allow adequate time for meals  - Recommend/ encourage appropriate diets, oral nutritional supplements, and vitamin/mineral supplements  - Order, calculate, and assess calorie counts as needed  - Recommend, monitor, and adjust tube feedings and TPN/PPN based on assessed needs  - Assess need for intravenous fluids  - Provide specific nutrition/hydration education as appropriate  - Include patient/family/caregiver in decisions related to nutrition  Outcome: Progressing     Problem: INFECTION - ADULT  Goal: Absence or prevention of progression during hospitalization  Description: INTERVENTIONS:  - Assess and monitor for signs and symptoms of infection  - Monitor lab/diagnostic results  - Monitor all insertion sites, i e  indwelling lines, tubes, and drains  - Monitor endotracheal if appropriate and nasal secretions for changes in amount and color  - [place] appropriate cooling/warming therapies per order  - Administer medications as ordered  - Instruct and encourage patient and family to use good hand hygiene technique  - Identify and instruct in appropriate isolation precautions for identified infection/condition  Outcome: Progressing  Goal: Absence of fever/infection during neutropenic period  Description: INTERVENTIONS:  - Monitor WBC    Outcome: Progressing     Problem: CARDIOVASCULAR - ADULT  Goal: Maintains optimal cardiac output and hemodynamic stability  Description: INTERVENTIONS:  - Monitor I/O, vital signs and rhythm  - Monitor for S/S and trends of decreased cardiac output  - Administer and titrate ordered vasoactive medications to optimize hemodynamic stability  - Assess quality of pulses, skin color and temperature  - Assess for signs of decreased coronary artery perfusion  - Instruct patient to report change in severity of symptoms  Outcome: Progressing  Goal: Absence of cardiac dysrhythmias or at baseline rhythm  Description: INTERVENTIONS:  - Continuous cardiac monitoring, vital signs, obtain 12 lead EKG if ordered  - Administer antiarrhythmic and heart rate control medications as ordered  - Monitor electrolytes and administer replacement therapy as ordered  Outcome: Progressing     Problem: METABOLIC, FLUID AND ELECTROLYTES - ADULT  Goal: Electrolytes maintained within normal limits  Description: INTERVENTIONS:  - Monitor labs and assess patient for signs and symptoms of electrolyte imbalances  - Administer electrolyte replacement as ordered  - Monitor response to electrolyte replacements, including repeat lab results as appropriate  - Instruct patient on fluid and nutrition as appropriate  Outcome: Progressing     Problem: Prexisting or High Potential for Compromised Skin Integrity  Goal: Skin integrity is maintained or improved  Description: INTERVENTIONS:  - Identify patients at risk for skin breakdown  - Assess and monitor skin integrity  - Assess and monitor nutrition and hydration status  - Monitor labs   - Assess for incontinence   - Turn and reposition patient  - Assist with mobility/ambulation  - Relieve pressure over bony prominences  - Avoid friction and shearing  - Provide appropriate hygiene as needed including keeping skin clean and dry  - Evaluate need for skin moisturizer/barrier cream  - Collaborate with interdisciplinary team   - Patient/family teaching  - Consider wound care consult   Outcome: Progressing

## 2022-10-22 NOTE — ASSESSMENT & PLAN NOTE
Patient noted to have several episodes of coffee-ground emesis since admission,  CT chest abdomen pelvis w/o occult bleeding but, hiatal hernia and fecal impaction  Initially treated with IV Protonix drip>continue Protonix b i d  EGD-ulcerative esophagitis, hiatal hernia, mild erythema in antrum  Normal duodenum  · GI recs-Await biopsy results  · -Continue Protonix 40 mg daily, liquid Carafate 1 g q i d    · Recommend soft, non ulcer genic diet  · Monitor H&H, transfuse as needed

## 2022-10-22 NOTE — PROGRESS NOTES
NEPHROLOGY PROGRESS NOTE   Brenda Shaver 69 y o  female MRN: [id number]  Unit/Bed#: 2 [place] [address] Encounter: [id number]    ASSESSMENT & PLAN:  69 y o  female with history of Sjogren syndrome, chronic joint pain, hyperlipidemia hypothyroidism who was admitted to [facility] after presenting with complain of generalized weakness  Patient was found down on the floor after neighbour called police as the saw trash can outside for few days   Nephrology consulted for hypernatremia and REECE  Acute kidney injury, POA  -Baseline creatinine:  0 7-0 8  As per Care everywhere in August 22 creatinine was 0 7  -Admission creatinine:1 68 mg/dl  - Work up:   · UA with microscopy:no rbc and just 1-2 wbc   · Imaging:CT abd no hydro  -Etiology:prerenal / volume depletion from poor oral intake/ hypotension with BP in 90's  -Hospital Course: improving to cr 0 80  -Plan:   · Renal function improving to creatinine 0 80 mg/dL with IV hydration  Sodium level improving to 143 meq/L   D5 water was decreased to 50 mL/hour last night after sodium level dropped  Would continue D5 water at 50 mL/hour for now  May consider stopping if sodium level drops below 140 or if she has good oral intake  Currently she on NPO  · Avoid nephrotoxins and dose all medications per EGFR  · Avoid hypotension                                              Hypernatremia, POA  -admission sodium 160  -likely due to decreased free water intake and volume depletion  -resolved with hypotonic fluids  Currently on D5 water at 50 mL/hour    Low bicarbonate with elevated chloride likely due to IV normal saline, continue to monitor with changing IV fluid to D5 water  Bicarb level now stable at 21, continue to monitor      Anemia:  Hemoglobin slightly trending down to 6 8, noted plan for PRBC, continue to monitor per primary team   Seen by GI and planning for EGD in next few days      Lactic acidosis likely due to hypotension and volume depletion improved status post IV fluid  Last lactic level was 1 7     Elevated TSH/hypothyroidism, TSH level was 11 8, management per primary team   Patient was recently not taking her home medications     Leukocytosis:  Follow-up cultures-blood culture negative for last 24 hours  On antibiotic per primary team    Also ID on board     Suspected metastatic cancer  -CT abdomen pelvis suggestive of numerous lung nodules concern for metastasis  Also sclerosis of T11 and L2 vertebral bodies 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  -workup and management per primary team, noted plan for Oncology consult     History of Sjogren syndrome on methotrexate as outpatient    Discussed with primary team   Discussed with patient's cousin at bedside  Nothing further to add from a renal standpoint  Will sign off  Feel free to call us back for any questions or concerns  SUBJECTIVE:  No SOB or vomiting or chest pain     OBJECTIVE:  Current Weight: Weight - Scale: 45 kg (99 lb 3 3 oz)  Vitals:    10/22/22 0903   BP: 106/57   Pulse: 98   Resp: 17   Temp: 97 5 °F (36 4 °C)   SpO2: 97%       Intake/Output Summary (Last 24 hours) at 10/22/2022 1328  Last data filed at 10/22/2022 0601  Gross per 24 hour   Intake 1295 ml   Output 700 ml   Net 595 ml       Physical Exam  General:  Ill looking, awake  Eyes: Conjunctivae pink,  Sclera anicteric  ENT: lips and mucous membranes moist  Neck: supple   Chest: Clear to Auscultation both lungs,  no crackles, ronchus or wheezing  CVS: S1 & S2 present, normal rate, regular rhythm, no murmur    Abdomen: soft, non-tender, non-distended, Bowel sounds normoactive  Extremities: no edema of  legs  Skin: no rash  Neuro: awake, alert, oriented x 3   Psych: Mood and affect appropriate     Medications:    Current Facility-Administered Medications:   •  acetaminophen (TYLENOL) tablet 650 mg, 650 mg, Oral, Q6H PRN, KIKO Jovel  •  acetaminophen (TYLENOL) tablet 650 mg, 650 mg, Oral, Once, Elesa Cooks, MD  • atorvastatin (LIPITOR) tablet 40 mg, 40 mg, Oral, Daily With Dinner, KIKO Jovel, 40 mg at 10/21/22 1659  •  ceFAZolin (ANCEF) IVPB (premix in dextrose) 1,000 mg 50 mL, 1,000 mg, Intravenous, Q8H, KIKO Jovel, Stopped at 10/22/22 7762  •  cholecalciferol (VITAMIN D3) tablet 1,000 Units, 1,000 Units, Oral, Daily, KIKO Jovel, 1,000 Units at 10/22/22 0857  •  dextrose 5 % infusion, 50 mL/hr, Intravenous, Continuous, Vaishnavi Suárez MD, Last Rate: 50 mL/hr at 10/22/22 0212, 50 mL/hr at 10/22/22 0212  •  diphenhydrAMINE (BENADRYL) tablet 12 5 mg, 12 5 mg, Oral, Once, Xenia Connell MD  •  docusate sodium (COLACE) capsule 100 mg, 100 mg, Oral, BID, KIKO Jovel, 100 mg at 10/22/22 0857  •  DULoxetine (CYMBALTA) delayed release capsule 30 mg, 30 mg, Oral, Daily, KIKO Jovel, 30 mg at 53/92/44 0791  •  folic acid (FOLVITE) tablet 1 mg, 1 mg, Oral, Daily, KIKO Jovel, 1 mg at 10/22/22 0857  •  HYDROmorphone (DILAUDID) injection 0 2 mg, 0 2 mg, Intravenous, Q6H PRN, KIKO Jovel  •  insulin lispro (HumaLOG) 100 units/mL subcutaneous injection 1-5 Units, 1-5 Units, Subcutaneous, TID AC, 1 Units at 10/21/22 1701 **AND** Fingerstick Glucose (POCT), , , TID AC, KIKO Jovel  •  insulin lispro (HumaLOG) 100 units/mL subcutaneous injection 1-5 Units, 1-5 Units, Subcutaneous, HS, KIKO Jovel  •  levothyroxine tablet 75 mcg, 75 mcg, Oral, Early Morning, Kareem Root MD, 75 mcg at 10/22/22 6143  •  magnesium sulfate 4 g/100 mL IVPB (premix) 4 g, 4 g, Intravenous, Once, Xenia Connell MD, 4 g at 10/22/22 1250  •  metoprolol tartrate (LOPRESSOR) partial tablet 12 5 mg, 12 5 mg, Oral, Q12H [address], KIKO Jovel, 12 5 mg at 10/21/22 0842  •  ondansetron (ZOFRAN) injection 4 mg, 4 mg, Intravenous, Q6H PRN, KIKO Jovel, 4 mg at 10/20/22 0212  •  oxybutynin (DITROPAN-XL) 24 hr tablet 5 mg, 5 mg, Oral, Daily, KIKO Jovel, 5 mg at 10/22/22 0857  •  [COMPLETED] pantoprazole (PROTONIX) 80 mg in sodium chloride 0 9 % 100 mL IVPB, 80 mg, Intravenous, Once, Last Rate: 200 mL/hr at 10/20/22 1806, 80 mg at 10/20/22 1806 **AND** pantoprazole (PROTONIX) 80 mg in sodium chloride 0 9 % 100 mL infusion, 8 mg/hr, Intravenous, Continuous, Aurora Nunez MD, Last Rate: 10 mL/hr at 10/22/22 0731, 8 mg/hr at 10/22/22 0731  •  [COMPLETED] potassium chloride 20 mEq IVPB (premix), 20 mEq, Intravenous, Once, Stopped at 10/22/22 1250 **FOLLOWED BY** potassium chloride 20 mEq IVPB (premix), 20 mEq, Intravenous, Once, Aurora Nunez MD  •  potassium phosphates 12 mmol in sodium chloride 0 9 % 250 mL infusion, 12 mmol, Intravenous, Once, Aurora Nunez MD  •  saccharomyces boulardii (FLORASTOR) capsule 250 mg, 250 mg, Oral, BID, KIKO Jovel, 250 mg at 10/22/22 0857    Invasive Devices:        Lab Results:   Results from last 7 days   Lab Units 10/22/22  1211 10/22/22  0633 10/22/22  0043 10/21/22  0043 10/20/22  1758 10/20/22  1158 10/20/22  0629 10/20/22  0037 10/19/22  1548 10/19/22  1540   WBC Thousand/uL  --  20 39*  --   --   --   --  30 63*  --   --  18 48*   HEMOGLOBIN g/dL 6 8* 7 0* 7 0*   < > 10 9*  --  12 2  --   --  13 7   HEMATOCRIT %  --  24 6*  --   --  38 2  --  43 1  --   --  48 4*   PLATELETS Thousands/uL  --  124*  --   --   --   --  369  --   --  546*   POTASSIUM mmol/L 3 7 3 2* 3 1*   < > 3 8   < > 4 0   < >  --  3 0*   CHLORIDE mmol/L 111* 110* 112*   < > 117*   < > 116*   < >  --  116*   CO2 mmol/L 21 22 23   < > 24   < > 25   < >  --  25   BUN mg/dL 39* 44* 50*   < > 70*   < > 64*   < >  --  61*   CREATININE mg/dL 0 80 0 95 1 00   < > 1 34*   < > 1 34*   < >  --  1 68*   CALCIUM mg/dL 7 3* 7 6* 7 4*   < > 8 0*   < > 8 1*   < >  --  9 5   MAGNESIUM mg/dL  --  1 4*  --   --   --   --  1 9  --  2 5  --    PHOSPHORUS mg/dL  --  1 8*  --   --   --   --   --   --   --   --    ALK PHOS U/L  --  218*  --   --   --   --   --   --   --  195*   ALT U/L  --  <6*  --   -- --   --   --   --   --  12   AST U/L  --  37  --   --   --   --   --   --   --  29    < > = values in this interval not displayed  Previous work up:         Portions of the record may have been created with voice recognition software  Occasional wrong word or "sound a like" substitutions may have occurred due to the inherent limitations of voice recognition software  Read the chart carefully and recognize, using context, where substitutions have occurred  If you have any questions, please contact the dictating provider

## 2022-10-22 NOTE — ACP (ADVANCE CARE PLANNING)
Serious Illness Conversation    1  What is your understanding now of where you are with your illness? Prognostic Understanding: appropriate understanding of prognosis  Patient and sister in law demonstrated reasonable understanding     2  How much information about what is likely to be ahead with your illness would you like to have? Information: patient wants to be fully informed     3  What did you (clinician) communicate to the patient? Prognostic Communication: Function - I hope that this is not the case, but I’m worried that this may be as strong as you will feel, and things are likely to get more difficult  4  If your health situation worsens, what are your most important goals? Patient reported that she needs more time to absorb all the information to make further decisions     5  What are the biggest fears and worries about the future and your health? 6  What abilities are so critical to your life that you cannot imagine living without them? 7  What gives you strength as you think about the future with your illness? 8  If you become sicker, how much are you willing to go through for the possibility of gaining more time? Be in the hospital: Yes    Be in the ICU: Yes Live in a nursing home: Yes   Patient reported that she would like to think about over the weekend regarding advanced directives  POLST discussed  9  How much does your proxy and family know about your priorities and wishes?   Discussion Discussion: extensive discussion with family about goals and wishes        I have spent 40 minutes speaking with my patient on advanced care planning today or during this visit     Advanced directives

## 2022-10-22 NOTE — PROGRESS NOTES
Progress Note - [facility]  Consuelo Benítez 69 y o  female MRN: [id number]    Unit/Bed#: [address] [place] [address] Encounter: [id number]      Assessment/Plan:  Progressive anemia  Report of coffee-ground emesis yesterday but no further signs of bleeding per patient and nursing staff  Hemoglobin has drifted down from 12 on admission to 7 currently  Currently hemodynamically stable but few periods of hypotension yesterday and overnight     -Maintain large-bore IV access  -Continue IV Protonix  -Consider transfusion to maintain hemoglobin greater than 7  -Will plan elective EGD in the next few days if remains stable or more urgently if signs of active bleeding    Subjective:   Patient without complaints currently  No abdominal pain  No further vomiting  Received an enema and has been moving her bowels  No reported melena or hematochezia though patient not clear on what her stool looks like  Objective:     Vitals: Blood pressure 106/57, pulse 98, temperature 97 5 °F (36 4 °C), temperature source Oral, resp  rate 17, height 5' 5" (1 651 m), weight 45 kg (99 lb 3 3 oz), SpO2 97 %  ,Body mass index is 16 51 kg/m²  Intake/Output Summary (Last 24 hours) at 10/22/2022 1015  Last data filed at 10/22/2022 0601  Gross per 24 hour   Intake 1295 ml   Output 840 ml   Net 455 ml       Physical Exam: Physical Exam  Vitals and nursing note reviewed  Constitutional:       General: She is not in acute distress  Appearance: She is not ill-appearing  HENT:      Head: Normocephalic and atraumatic  Eyes:      General: No scleral icterus  Pulmonary:      Effort: Pulmonary effort is normal  No respiratory distress  Abdominal:      General: Abdomen is flat  There is no distension  Palpations: Abdomen is soft  Tenderness: There is no abdominal tenderness  There is no guarding or rebound  Skin:     General: Skin is warm and dry  Coloration: Skin is not cyanotic  Findings: No erythema     Neurological:      General: No focal deficit present  Mental Status: She is alert and oriented to person, place, and time     Psychiatric:         Mood and Affect: Mood normal          Behavior: Behavior normal          Invasive Devices  Report    Peripheral Intravenous Line  Duration           Peripheral IV 10/19/22 Left Antecubital 2 days    Long-Dwell Peripheral IV (Midline) 78/42/38 Right Basilic <1 day          Drain  Duration           External Urinary Catheter 1 day                            Current Facility-Administered Medications:   •  acetaminophen (TYLENOL) tablet 650 mg, 650 mg, Oral, Q6H PRN, KIKO Jovel  •  acetaminophen (TYLENOL) tablet 650 mg, 650 mg, Oral, Once, Leta Gomez MD  •  atorvastatin (LIPITOR) tablet 40 mg, 40 mg, Oral, Daily With Dinner, KIKO Jovel, 40 mg at 10/21/22 1659  •  ceFAZolin (ANCEF) IVPB (premix in dextrose) 1,000 mg 50 mL, 1,000 mg, Intravenous, Q8H, KIKO Jovel, Stopped at 10/22/22 4660  •  cholecalciferol (VITAMIN D3) tablet 1,000 Units, 1,000 Units, Oral, Daily, KIKO Jovel, 1,000 Units at 10/22/22 0857  •  dextrose 5 % infusion, 50 mL/hr, Intravenous, Continuous, Tony Carrington MD, Last Rate: 50 mL/hr at 10/22/22 0212, 50 mL/hr at 10/22/22 0212  •  diphenhydrAMINE (BENADRYL) tablet 12 5 mg, 12 5 mg, Oral, Once, Leta Gomez MD  •  docusate sodium (COLACE) capsule 100 mg, 100 mg, Oral, BID, KIKO Jovel, 100 mg at 10/22/22 0857  •  DULoxetine (CYMBALTA) delayed release capsule 30 mg, 30 mg, Oral, Daily, KIKO Jovel, 30 mg at 14/04/28 4544  •  folic acid (FOLVITE) tablet 1 mg, 1 mg, Oral, Daily, KIKO Jovel, 1 mg at 10/22/22 0857  •  HYDROmorphone (DILAUDID) injection 0 2 mg, 0 2 mg, Intravenous, Q6H PRN, KIKO Jovel  •  insulin lispro (HumaLOG) 100 units/mL subcutaneous injection 1-5 Units, 1-5 Units, Subcutaneous, TID AC, 1 Units at 10/21/22 1701 **AND** Fingerstick Glucose (POCT), , , TID AC, KIKO Jovel  •  insulin lispro (HumaLOG) 100 units/mL subcutaneous injection 1-5 Units, 1-5 Units, Subcutaneous, HS, KIKO Jovel  •  levothyroxine tablet 75 mcg, 75 mcg, Oral, Early Morning, Ankur Lebron MD, 75 mcg at 10/22/22 1349  •  magnesium sulfate 4 g/100 mL IVPB (premix) 4 g, 4 g, Intravenous, Once, Barrie Bhardwaj MD  •  metoprolol tartrate (LOPRESSOR) partial tablet 12 5 mg, 12 5 mg, Oral, Q12H [address], KIKO Jovel, 12 5 mg at 10/21/22 0842  •  ondansetron (ZOFRAN) injection 4 mg, 4 mg, Intravenous, Q6H PRN, KIKO Jovel, 4 mg at 10/20/22 0212  •  oxybutynin (DITROPAN-XL) 24 hr tablet 5 mg, 5 mg, Oral, Daily, KIKO Jovel, 5 mg at 10/22/22 0857  •  [COMPLETED] pantoprazole (PROTONIX) 80 mg in sodium chloride 0 9 % 100 mL IVPB, 80 mg, Intravenous, Once, Last Rate: 200 mL/hr at 10/20/22 1806, 80 mg at 10/20/22 1806 **AND** pantoprazole (PROTONIX) 80 mg in sodium chloride 0 9 % 100 mL infusion, 8 mg/hr, Intravenous, Continuous, Barrie Bhardwaj MD, Last Rate: 10 mL/hr at 10/22/22 0731, 8 mg/hr at 10/22/22 0731  •  potassium chloride 20 mEq IVPB (premix), 20 mEq, Intravenous, Once **FOLLOWED BY** potassium chloride 20 mEq IVPB (premix), 20 mEq, Intravenous, Once, Barrie Bhardwaj MD  •  potassium phosphates 12 mmol in sodium chloride 0 9 % 250 mL infusion, 12 mmol, Intravenous, Once, Barrie Bhardwaj MD  •  saccharomyces boulardii (FLORASTOR) capsule 250 mg, 250 mg, Oral, BID, KIKO Jovel, 250 mg at 10/22/22 3451    Lab Results:   Recent Results (from the past 24 hour(s))   Fingerstick Glucose (POCT)    Collection Time: 10/21/22 11:07 AM   Result Value Ref Range    POC Glucose 120 65 - 140 mg/dl   Basic metabolic panel    Collection Time: 10/21/22  1:09 PM   Result Value Ref Range    Sodium 150 (H) 135 - 147 mmol/L    Potassium 3 9 3 5 - 5 3 mmol/L    Chloride 116 (H) 96 - 108 mmol/L    CO2 18 (L) 21 - 32 mmol/L    ANION GAP 16 (H) 4 - 13 mmol/L    BUN 69 (H) 5 - 25 mg/dL    Creatinine 1 09 0 60 - 1 30 mg/dL Glucose 116 65 - 140 mg/dL    Calcium 7 8 (L) 8 3 - 10 1 mg/dL    eGFR 49 ml/min/1 73sq m   Fingerstick Glucose (POCT)    Collection Time: 10/21/22  4:57 PM   Result Value Ref Range    POC Glucose 162 (H) 65 - 140 mg/dl   ABORh Recheck - Contact Blood Bank Prior to Collection    Collection Time: 10/21/22  6:09 PM   Result Value Ref Range    ABO Grouping B     Rh Factor Positive    Serial Hemoglobin Q6hrs    Collection Time: 10/21/22  6:09 PM   Result Value Ref Range    Hemoglobin 7 2 (L) 11 5 - 15 4 g/dL   Basic metabolic panel    Collection Time: 10/21/22  6:09 PM   Result Value Ref Range    Sodium 147 135 - 147 mmol/L    Potassium 2 9 (L) 3 5 - 5 3 mmol/L    Chloride 113 (H) 96 - 108 mmol/L    CO2 22 21 - 32 mmol/L    ANION GAP 12 4 - 13 mmol/L    BUN 59 (H) 5 - 25 mg/dL    Creatinine 1 16 0 60 - 1 30 mg/dL    Glucose 142 (H) 65 - 140 mg/dL    Calcium 7 6 (L) 8 3 - 10 1 mg/dL    eGFR 45 ml/min/1 73sq m   Fingerstick Glucose (POCT)    Collection Time: 10/21/22  8:41 PM   Result Value Ref Range    POC Glucose 149 (H) 65 - 140 mg/dl   Serial Hemoglobin Q6hrs    Collection Time: 10/22/22 12:43 AM   Result Value Ref Range    Hemoglobin 7 0 (L) 11 5 - 15 4 g/dL   Basic metabolic panel    Collection Time: 10/22/22 12:43 AM   Result Value Ref Range    Sodium 143 135 - 147 mmol/L    Potassium 3 1 (L) 3 5 - 5 3 mmol/L    Chloride 112 (H) 96 - 108 mmol/L    CO2 23 21 - 32 mmol/L    ANION GAP 8 4 - 13 mmol/L    BUN 50 (H) 5 - 25 mg/dL    Creatinine 1 00 0 60 - 1 30 mg/dL    Glucose 126 65 - 140 mg/dL    Calcium 7 4 (L) 8 3 - 10 1 mg/dL    eGFR 54 ml/min/1 73sq m   CBC    Collection Time: 10/22/22  6:33 AM   Result Value Ref Range    WBC 20 39 (H) 4 31 - 10 16 Thousand/uL    RBC 3 16 (L) 3 81 - 5 12 Million/uL    Hemoglobin 7 0 (L) 11 5 - 15 4 g/dL    Hematocrit 24 6 (L) 34 8 - 46 1 %    MCV 78 (L) 82 - 98 fL    MCH 22 2 (L) 26 8 - 34 3 pg    MCHC 28 5 (L) 31 4 - 37 4 g/dL    RDW 18 9 (H) 11 6 - 15 1 %    Platelets 940 (L) 149 - 390 Thousands/uL    MPV 10 8 8 9 - 12 7 fL   Comprehensive metabolic panel    Collection Time: 10/22/22  6:33 AM   Result Value Ref Range    Sodium 144 135 - 147 mmol/L    Potassium 3 2 (L) 3 5 - 5 3 mmol/L    Chloride 110 (H) 96 - 108 mmol/L    CO2 22 21 - 32 mmol/L    ANION GAP 12 4 - 13 mmol/L    BUN 44 (H) 5 - 25 mg/dL    Creatinine 0 95 0 60 - 1 30 mg/dL    Glucose 135 65 - 140 mg/dL    Calcium 7 6 (L) 8 3 - 10 1 mg/dL    Corrected Calcium 9 0 8 3 - 10 1 mg/dL    AST 37 5 - 45 U/L    ALT <6 (L) 12 - 78 U/L    Alkaline Phosphatase 218 (H) 46 - 116 U/L    Total Protein 5 0 (L) 6 4 - 8 4 g/dL    Albumin 2 2 (L) 3 5 - 5 0 g/dL    Total Bilirubin 0 35 0 20 - 1 00 mg/dL    eGFR 58 ml/min/1 73sq m   Magnesium    Collection Time: 10/22/22  6:33 AM   Result Value Ref Range    Magnesium 1 4 (L) 1 6 - 2 6 mg/dL   Phosphorus    Collection Time: 10/22/22  6:33 AM   Result Value Ref Range    Phosphorus 1 8 (L) 2 3 - 4 1 mg/dL   Fingerstick Glucose (POCT)    Collection Time: 10/22/22  7:10 AM   Result Value Ref Range    POC Glucose 176 (H) 65 - 140 mg/dl             Imaging Studies: CT abdomen wo contrast    Result Date: 9/25/2022  Narrative: CT - ABDOMEN WITHOUT IV CONTRAST INDICATION:   R10 9: Unspecified abdominal pain  "Left-sided  abdominal/flank pain, history of colonic perforation, colostomy" COMPARISON:  CT dated 7/21/2009 TECHNIQUE: CT examination of the abdomen was performed without intravenous contrast  Axial, sagittal, and coronal 2D reformatted images were created from the source data and submitted for interpretation  Radiation dose length product (DLP) for this visit:  258 63 mGy-cm   This examination, like all CT scans performed in the [facility], was performed utilizing techniques to minimize radiation dose exposure, including the use of iterative  reconstruction and automated exposure control  Enteric contrast was administered   FINDINGS: ABDOMEN The lack of intravenous contrast limits evaluation of certain processes, especially infectious, inflammatory and neoplastic processes  LOWER CHEST:  There are multiple new lung nodules, concerning for metastasis  For example, there is a 1 3 cm nodule at the right lung base (series 2, image #8)  There is a 1 cm left lung base nodule (image #14)  LIVER/BILIARY TREE:  Unremarkable on noncontrast CT  GALLBLADDER:  Gallbladder is surgically absent  SPLEEN:  Unremarkable  PANCREAS: Unremarkable  ADRENAL GLANDS:  Unremarkable  KIDNEYS/URETERS:  No hydronephrosis  VISUALIZED STOMACH AND BOWEL:  There is interval worsening of hiatal hernia which is large in size  VISUALIZED ABDOMINAL CAVITY:  No pathologically enlarged periportal, mesenteric or upper retroperitoneal lymph nodes  No ascites  VISUALIZED BODY WALL:  Unremarkable  VESSELS:  Atherosclerotic changes are present  No evidence of aneurysm  OSSEOUS STRUCTURES:  Sclerosis of T11 and L2 vertebral bodies, 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  Impression: 1  Large hiatal hernia  2   Numerous lung nodules, concerning for metastasis  3   Sclerosis of T11 and L2 vertebral bodies, 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  The study was marked in EPIC for significant notification  Workstation performed: [id number]     CT chest abdomen pelvis wo contrast    Result Date: 10/21/2022  Narrative: CT CHEST, ABDOMEN AND PELVIS WITHOUT IV CONTRAST INDICATION:   Sepsis Metastatic cancer, sepsis, anemia  COMPARISON:  Compared with CT abdomen pelvis 9/22/2022 TECHNIQUE: CT examination of the chest, abdomen and pelvis was performed without intravenous contrast  Axial, sagittal, and coronal 2D reformatted images were created from the source data and submitted for interpretation  Radiation dose length product (DLP) for this visit:  300 74 mGy-cm     This examination, like all CT scans performed in the [facility], was performed utilizing techniques to minimize radiation dose exposure, including the use of iterative  reconstruction and automated exposure control  Enteric contrast was administered  FINDINGS: CHEST LUNGS:  Lobulated right upper lobe paramediastinal mass measuring 6 x 4 x 8 5 cm with extension to and involvement of the anterior right upper lobe bronchial branches     Remaining lung parenchyma demonstrates multiple metastatic subcentimeter lung nodules predominantly in the upper lobe  There is no tracheal or endobronchial lesion  PLEURA:  Small left and moderate right pleural effusion is new  Pleural-based nodules bilaterally more on the right of metastasis  HEART/GREAT VESSELS: Heart is unremarkable for patient's age  No thoracic aortic aneurysm  MEDIASTINUM AND LADONNA:  Moderate hiatal hernia  Soft tissue nodule in the cardiophrenic region anteriorly measuring 1 4 cm compared to 1 2 cm  Right upper lobe mass extends into the superior mediastinal right paratracheal region  CHEST WALL AND LOWER NECK: Right upper lobe mass extends into the supraclavicular region behind the jugular vein  Subcentimeter axillary lymph nodes bilaterally  ABDOMEN LIVER/BILIARY TREE:  Unremarkable  GALLBLADDER:  Gallbladder is surgically absent  SPLEEN:  Unremarkable  PANCREAS:  Unremarkable  ADRENAL GLANDS:  Unremarkable  KIDNEYS/URETERS:  Unremarkable  No hydronephrosis  STOMACH AND BOWEL:  Stool impaction in the colon   Mid transverse colon wall thickening possibly secondary to stool impaction APPENDIX:  No findings to suggest appendicitis  ABDOMINOPELVIC CAVITY:  No ascites  No pneumoperitoneum  No lymphadenopathy  VESSELS:  Unremarkable for patient's age  PELVIS REPRODUCTIVE ORGANS:  Unremarkable for patient's age  URINARY BLADDER:  Unremarkable  ABDOMINAL WALL/INGUINAL REGIONS:  Unremarkable  OSSEOUS STRUCTURES:  Sclerotic metastatic lesions in the bones, most predominant in the right posterior 6th rib, posterior elements of T5, T6 and vertebral body involvement of T11, L2    Sclerotic metastatic involvement of the right acetabulum, right anterior iliac bone and left posterior iliac bone and  left superior acetabulum        Impression: 1  Lobulated right upper lobe paramediastinal mass measuring 6 0 x 4 0 x 8 5 cm with extension into the superior mediastinum, supraclavicular correlation and extension to and invasion of right upper lobe bronchial branch  2   Multiple scattered subcentimeter metastatic lung nodules predominantly in upper lobes bilaterally  3  Moderate right and small left effusion  4   Stool impaction in the colon  Mid transverse colon wall thickening possibly due to stool impaction  Findings most likely represent primary right lung malignancy with lung and bone metastatic process  Workstation performed: [id number]     XR chest 1 view portable    Result Date: 10/20/2022  Narrative: CHEST INDICATION:   weakness  COMPARISON:  Chest x-ray from 11/12/2017  Abdomen and pelvic CT from 9/22/2022  EXAM PERFORMED/VIEWS:  XR CHEST PORTABLE FINDINGS: Large hiatal hernia  There is a 4 2 x 5 5 cm right upper lobe paramediastinal nodule, and numerous additional smaller bilateral pulmonary nodules highly suspicious for malignancy  Both primary lung cancer and metastatic disease can have this appearance  No pleural effusion  No pneumothorax  No pneumothorax or pleural effusion  Osseous structures appear within normal limits for patient age  (Please see recent abdomen and pelvic CT, where T11 sclerotic bone metastasis is visible )     Impression: There is a 4 2 x 5 5 cm right upper lobe paramediastinal nodule, and numerous additional smaller bilateral pulmonary nodules highly suspicious for malignancy  Both primary lung cancer and metastatic disease can have this appearance  Chest CT would be helpful for further evaluation  Workstation performed: [id number]     CT head without contrast    Result Date: 10/19/2022  Narrative: CT BRAIN - WITHOUT CONTRAST INDICATION:   Weakness and possible fall  Found on ground  Mary Trujillo COMPARISON:  11/3/2017 TECHNIQUE:  CT examination of the brain was performed  In addition to axial images, sagittal and coronal 2D reformatted images were created and submitted for interpretation  Radiation dose length product (DLP) for this visit:  933 96 mGy-cm   This examination, like all CT scans performed in the [facility], was performed utilizing techniques to minimize radiation dose exposure, including the use of iterative  reconstruction and automated exposure control  IMAGE QUALITY:  Diagnostic  FINDINGS: PARENCHYMA: Decreased attenuation is noted in periventricular and subcortical white matter demonstrating an appearance that is statistically most likely to represent moderate microangiopathic change  No CT signs of acute infarction  No intracranial mass, mass effect or midline shift  No acute parenchymal hemorrhage  VENTRICLES AND EXTRA-AXIAL SPACES:  Ventricles and extra-axial CSF spaces are prominent commensurate with the degree of volume loss, which has increased since the prior study VISUALIZED ORBITS AND PARANASAL SINUSES:  No acute finding  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

## 2022-10-22 NOTE — ASSESSMENT & PLAN NOTE
Status post partial improvement after enema, 10/21  Moving bowels with brown stool  · Continue Colace, PRN MiraLax   · Enema p r n

## 2022-10-22 NOTE — PLAN OF CARE
Problem: Potential for Falls  Goal: Patient will remain free of falls  Description: INTERVENTIONS:  - Educate patient/family on patient safety including physical limitations  - Instruct patient to call for assistance with activity   - Consult OT/PT to assist with strengthening/mobility   - Keep Call bell within reach  - Keep bed low and locked with side rails adjusted as appropriate  - Keep care items and personal belongings within reach  - Initiate and maintain comfort rounds  - Make Fall Risk Sign visible to staff  - Offer Toileting every 2 Hours, in advance of need  - Initiate/Maintain bed alarm  - Obtain necessary fall risk management equipment: alarm  - Apply yellow socks and bracelet for high fall risk patients  - Consider moving patient to room near nurses station  Outcome: Progressing     Problem: Nutrition/Hydration-ADULT  Goal: Nutrient/Hydration intake appropriate for improving, restoring or maintaining nutritional needs  Description: Monitor and assess patient's nutrition/hydration status for malnutrition  Collaborate with interdisciplinary team and initiate plan and interventions as ordered  Monitor patient's weight and dietary intake as ordered or per policy  Utilize nutrition screening tool and intervene as necessary  Determine patient's food preferences and provide high-protein, high-caloric foods as appropriate       INTERVENTIONS:  - Monitor oral intake, urinary output, labs, and treatment plans  - Assess nutrition and hydration status and recommend course of action  - Evaluate amount of meals eaten  - Assist patient with eating if necessary   - Allow adequate time for meals  - Recommend/ encourage appropriate diets, oral nutritional supplements, and vitamin/mineral supplements  - Order, calculate, and assess calorie counts as needed  - Recommend, monitor, and adjust tube feedings and TPN/PPN based on assessed needs  - Assess need for intravenous fluids  - Provide specific nutrition/hydration education as appropriate  - Include patient/family/caregiver in decisions related to nutrition  Outcome: Progressing     Problem: MOBILITY - ADULT  Goal: Maintain or return to baseline ADL function  Description: INTERVENTIONS:  -  Assess patient's ability to carry out ADLs; assess patient's baseline for ADL function and identify physical deficits which impact ability to perform ADLs (bathing, care of mouth/teeth, toileting, grooming, dressing, etc )  - Assess/evaluate cause of self-care deficits   - Assess range of motion  - Assess patient's mobility; develop plan if impaired  - Assess patient's need for assistive devices and provide as appropriate  - Encourage maximum independence but intervene and supervise when necessary  - Involve family in performance of ADLs  - Assess for home care needs following discharge   - Consider OT consult to assist with ADL evaluation and planning for discharge  - Provide patient education as appropriate  Outcome: Progressing  Goal: Maintains/Returns to pre admission functional level  Description: INTERVENTIONS:  - Perform BMAT or MOVE assessment daily    - Set and communicate daily mobility goal to care team and patient/family/caregiver  - Collaborate with rehabilitation services on mobility goals if consulted  - Perform Range of Motion 5 times a day  - Reposition patient every 2 hours    - Dangle patient 1 times a day  - Stand patient 1 times a day  - Ambulate patient 1 times a day  - Out of bed to chair 1 times a day   - Out of bed for meals 1 times a day  - Out of bed for toileting  - Record patient progress and toleration of activity level   Outcome: Progressing     Problem: INFECTION - ADULT  Goal: Absence or prevention of progression during hospitalization  Description: INTERVENTIONS:  - Assess and monitor for signs and symptoms of infection  - Monitor lab/diagnostic results  - Monitor all insertion sites, i e  indwelling lines, tubes, and drains  - Monitor endotracheal if appropriate and nasal secretions for changes in amount and color  - [place] appropriate cooling/warming therapies per order  - Administer medications as ordered  - Instruct and encourage patient and family to use good hand hygiene technique  - Identify and instruct in appropriate isolation precautions for identified infection/condition  Outcome: Progressing  Goal: Absence of fever/infection during neutropenic period  Description: INTERVENTIONS:  - Monitor WBC    Outcome: Progressing     Problem: CARDIOVASCULAR - ADULT  Goal: Maintains optimal cardiac output and hemodynamic stability  Description: INTERVENTIONS:  - Monitor I/O, vital signs and rhythm  - Monitor for S/S and trends of decreased cardiac output  - Administer and titrate ordered vasoactive medications to optimize hemodynamic stability  - Assess quality of pulses, skin color and temperature  - Assess for signs of decreased coronary artery perfusion  - Instruct patient to report change in severity of symptoms  Outcome: Progressing  Goal: Absence of cardiac dysrhythmias or at baseline rhythm  Description: INTERVENTIONS:  - Continuous cardiac monitoring, vital signs, obtain 12 lead EKG if ordered  - Administer antiarrhythmic and heart rate control medications as ordered  - Monitor electrolytes and administer replacement therapy as ordered  Outcome: Progressing     Problem: METABOLIC, FLUID AND ELECTROLYTES - ADULT  Goal: Electrolytes maintained within normal limits  Description: INTERVENTIONS:  - Monitor labs and assess patient for signs and symptoms of electrolyte imbalances  - Administer electrolyte replacement as ordered  - Monitor response to electrolyte replacements, including repeat lab results as appropriate  - Instruct patient on fluid and nutrition as appropriate  Outcome: Progressing     Problem: Prexisting or High Potential for Compromised Skin Integrity  Goal: Skin integrity is maintained or improved  Description: INTERVENTIONS:  - Identify patients at risk for skin breakdown  - Assess and monitor skin integrity  - Assess and monitor nutrition and hydration status  - Monitor labs   - Assess for incontinence   - Turn and reposition patient  - Assist with mobility/ambulation  - Relieve pressure over bony prominences  - Avoid friction and shearing  - Provide appropriate hygiene as needed including keeping skin clean and dry  - Evaluate need for skin moisturizer/barrier cream  - Collaborate with interdisciplinary team   - Patient/family teaching  - Consider wound care consult   Outcome: Progressing

## 2022-10-23 PROBLEM — E87.0 HYPERNATREMIA: Status: RESOLVED | Noted: 2022-10-22 | Resolved: 2022-10-23

## 2022-10-23 PROBLEM — M79.89 SWELLING OF RIGHT UPPER EXTREMITY: Status: ACTIVE | Noted: 2022-10-23

## 2022-10-23 PROBLEM — E86.0 DEHYDRATION: Status: RESOLVED | Noted: 2022-10-19 | Resolved: 2022-10-23

## 2022-10-23 PROBLEM — I47.1 SVT (SUPRAVENTRICULAR TACHYCARDIA) (HCC): Status: RESOLVED | Noted: 2022-10-19 | Resolved: 2022-10-23

## 2022-10-23 LAB
ABO GROUP BLD BPU: NORMAL
ANION GAP SERPL CALCULATED.3IONS-SCNC: 11 MMOL/L (ref 4–13)
BACTERIA UR CULT: NORMAL
BPU ID: NORMAL
BUN SERPL-MCNC: 29 MG/DL (ref 5–25)
CALCIUM SERPL-MCNC: 7.2 MG/DL (ref 8.3–10.1)
CHLORIDE SERPL-SCNC: 109 MMOL/L (ref 96–108)
CO2 SERPL-SCNC: 21 MMOL/L (ref 21–32)
CREAT SERPL-MCNC: 0.76 MG/DL (ref 0.6–1.3)
CROSSMATCH: NORMAL
ERYTHROCYTE [DISTWIDTH] IN BLOOD BY AUTOMATED COUNT: 18.9 % (ref 11.6–15.1)
GFR SERPL CREATININE-BSD FRML MDRD: 76 ML/MIN/1.73SQ M
GLUCOSE SERPL-MCNC: 109 MG/DL (ref 65–140)
GLUCOSE SERPL-MCNC: 110 MG/DL (ref 65–140)
GLUCOSE SERPL-MCNC: 117 MG/DL (ref 65–140)
GLUCOSE SERPL-MCNC: 125 MG/DL (ref 65–140)
GLUCOSE SERPL-MCNC: 126 MG/DL (ref 65–140)
HCT VFR BLD AUTO: 26.9 % (ref 34.8–46.1)
HGB BLD-MCNC: 8.1 G/DL (ref 11.5–15.4)
HGB BLD-MCNC: 9 G/DL (ref 11.5–15.4)
MAGNESIUM SERPL-MCNC: 2.5 MG/DL (ref 1.6–2.6)
MCH RBC QN AUTO: 23.7 PG (ref 26.8–34.3)
MCHC RBC AUTO-ENTMCNC: 30.1 G/DL (ref 31.4–37.4)
MCV RBC AUTO: 79 FL (ref 82–98)
PHOSPHATE SERPL-MCNC: 2.2 MG/DL (ref 2.3–4.1)
PLATELET # BLD AUTO: 105 THOUSANDS/UL (ref 149–390)
PMV BLD AUTO: 12.9 FL (ref 8.9–12.7)
POTASSIUM SERPL-SCNC: 4 MMOL/L (ref 3.5–5.3)
RBC # BLD AUTO: 3.42 MILLION/UL (ref 3.81–5.12)
SODIUM SERPL-SCNC: 141 MMOL/L (ref 135–147)
UNIT DISPENSE STATUS: NORMAL
UNIT PRODUCT CODE: NORMAL
UNIT PRODUCT VOLUME: 350 ML
UNIT RH: NORMAL
VIT B12 SERPL-MCNC: 378 PG/ML (ref 100–900)
WBC # BLD AUTO: 14.46 THOUSAND/UL (ref 4.31–10.16)

## 2022-10-23 RX ORDER — DEXTROSE MONOHYDRATE 50 MG/ML
50 INJECTION, SOLUTION INTRAVENOUS CONTINUOUS
Status: DISCONTINUED | OUTPATIENT
Start: 2022-10-23 | End: 2022-10-23

## 2022-10-23 RX ORDER — CYANOCOBALAMIN 1000 UG/ML
1000 INJECTION, SOLUTION INTRAMUSCULAR; SUBCUTANEOUS ONCE
Status: COMPLETED | OUTPATIENT
Start: 2022-10-23 | End: 2022-10-23

## 2022-10-23 RX ORDER — CALCIUM CARBONATE 200(500)MG
500 TABLET,CHEWABLE ORAL DAILY PRN
Status: DISCONTINUED | OUTPATIENT
Start: 2022-10-23 | End: 2022-11-02 | Stop reason: HOSPADM

## 2022-10-23 RX ORDER — DEXTROSE MONOHYDRATE 50 MG/ML
50 INJECTION, SOLUTION INTRAVENOUS CONTINUOUS
Status: DISCONTINUED | OUTPATIENT
Start: 2022-10-24 | End: 2022-10-25

## 2022-10-23 RX ORDER — PANTOPRAZOLE SODIUM 40 MG/10ML
40 INJECTION, POWDER, LYOPHILIZED, FOR SOLUTION INTRAVENOUS EVERY 12 HOURS SCHEDULED
Status: DISCONTINUED | OUTPATIENT
Start: 2022-10-23 | End: 2022-11-01

## 2022-10-23 RX ADMIN — FOLIC ACID 1 MG: 1 TABLET ORAL at 08:37

## 2022-10-23 RX ADMIN — DEXTROSE 50 ML/HR: 5 SOLUTION INTRAVENOUS at 09:48

## 2022-10-23 RX ADMIN — POTASSIUM PHOSPHATE, MONOBASIC AND POTASSIUM PHOSPHATE, DIBASIC 9 MMOL: 224; 236 INJECTION, SOLUTION, CONCENTRATE INTRAVENOUS at 10:43

## 2022-10-23 RX ADMIN — DEXTROSE 50 ML/HR: 5 SOLUTION INTRAVENOUS at 00:46

## 2022-10-23 RX ADMIN — OXYBUTYNIN 5 MG: 5 TABLET, FILM COATED, EXTENDED RELEASE ORAL at 08:37

## 2022-10-23 RX ADMIN — CYANOCOBALAMIN 1000 MCG: 1000 INJECTION, SOLUTION INTRAMUSCULAR; SUBCUTANEOUS at 16:57

## 2022-10-23 RX ADMIN — Medication 250 MG: at 08:37

## 2022-10-23 RX ADMIN — Medication 250 MG: at 17:58

## 2022-10-23 RX ADMIN — DOCUSATE SODIUM 100 MG: 100 CAPSULE, LIQUID FILLED ORAL at 17:58

## 2022-10-23 RX ADMIN — DULOXETINE HYDROCHLORIDE 30 MG: 30 CAPSULE, DELAYED RELEASE ORAL at 08:37

## 2022-10-23 RX ADMIN — Medication 1000 UNITS: at 08:37

## 2022-10-23 RX ADMIN — POLYETHYLENE GLYCOL 3350 17 G: 17 POWDER, FOR SOLUTION ORAL at 08:37

## 2022-10-23 RX ADMIN — LEVOTHYROXINE SODIUM 75 MCG: 75 TABLET ORAL at 05:01

## 2022-10-23 RX ADMIN — ATORVASTATIN CALCIUM 40 MG: 40 TABLET, FILM COATED ORAL at 17:58

## 2022-10-23 RX ADMIN — DEXTROSE 50 ML/HR: 5 SOLUTION INTRAVENOUS at 23:31

## 2022-10-23 RX ADMIN — PANTOPRAZOLE SODIUM 40 MG: 40 INJECTION, POWDER, FOR SOLUTION INTRAVENOUS at 21:50

## 2022-10-23 RX ADMIN — SODIUM CHLORIDE 8 MG/HR: 9 INJECTION, SOLUTION INTRAVENOUS at 08:45

## 2022-10-23 RX ADMIN — DOCUSATE SODIUM 100 MG: 100 CAPSULE, LIQUID FILLED ORAL at 08:37

## 2022-10-23 RX ADMIN — CEFAZOLIN SODIUM 1000 MG: 1 SOLUTION INTRAVENOUS at 05:01

## 2022-10-23 NOTE — PLAN OF CARE
Problem: INFECTION - ADULT  Goal: Absence or prevention of progression during hospitalization  Description: INTERVENTIONS:  - Assess and monitor for signs and symptoms of infection  - Monitor lab/diagnostic results  - Monitor all insertion sites, i e  indwelling lines, tubes, and drains  - Monitor endotracheal if appropriate and nasal secretions for changes in amount and color  - [place] appropriate cooling/warming therapies per order  - Administer medications as ordered  - Instruct and encourage patient and family to use good hand hygiene technique  - Identify and instruct in appropriate isolation precautions for identified infection/condition  Outcome: Progressing  Goal: Absence of fever/infection during neutropenic period  Description: INTERVENTIONS:  - Monitor WBC    Outcome: Progressing     Problem: CARDIOVASCULAR - ADULT  Goal: Maintains optimal cardiac output and hemodynamic stability  Description: INTERVENTIONS:  - Monitor I/O, vital signs and rhythm  - Monitor for S/S and trends of decreased cardiac output  - Administer and titrate ordered vasoactive medications to optimize hemodynamic stability  - Assess quality of pulses, skin color and temperature  - Assess for signs of decreased coronary artery perfusion  - Instruct patient to report change in severity of symptoms  Outcome: Progressing  Goal: Absence of cardiac dysrhythmias or at baseline rhythm  Description: INTERVENTIONS:  - Continuous cardiac monitoring, vital signs, obtain 12 lead EKG if ordered  - Administer antiarrhythmic and heart rate control medications as ordered  - Monitor electrolytes and administer replacement therapy as ordered  Outcome: Progressing     Problem: METABOLIC, FLUID AND ELECTROLYTES - ADULT  Goal: Electrolytes maintained within normal limits  Description: INTERVENTIONS:  - Monitor labs and assess patient for signs and symptoms of electrolyte imbalances  - Administer electrolyte replacement as ordered  - Monitor response to electrolyte replacements, including repeat lab results as appropriate  - Instruct patient on fluid and nutrition as appropriate  Outcome: Progressing     Problem: Prexisting or High Potential for Compromised Skin Integrity  Goal: Skin integrity is maintained or improved  Description: INTERVENTIONS:  - Identify patients at risk for skin breakdown  - Assess and monitor skin integrity  - Assess and monitor nutrition and hydration status  - Monitor labs   - Assess for incontinence   - Turn and reposition patient  - Assist with mobility/ambulation  - Relieve pressure over bony prominences  - Avoid friction and shearing  - Provide appropriate hygiene as needed including keeping skin clean and dry  - Evaluate need for skin moisturizer/barrier cream  - Collaborate with interdisciplinary team   - Patient/family teaching  - Consider wound care consult   Outcome: Progressing

## 2022-10-23 NOTE — PROGRESS NOTES
[facility] Progress Note  Patient: Mary Ford 69 y o  female   MRN: [id number]  PCP: Edwin Dewitt MD  Unit/Bed#: 2 [place] [address] Encounter: [id number]  Date Of Visit: 10/23/22    Problem List:    Principal Problem:    Metastatic [facility] ([facility] )  Active Problems:    Coffee ground emesis    Orthostatic hypotension    Acute kidney injury ([facility] )    SIRS (systemic inflammatory response syndrome) ([place] [address] )    Anemia    Fecal impaction ([place] [address] )    Dyslipidemia    Acquired hypothyroidism    Sjogren's syndrome ([facility]  )    Sacral wound    Generalized weakness    Chronic pain    Elevated glucose    Pulmonary nodules    Severe protein-calorie malnutrition (HCC)    Electrolyte abnormality    Swelling of right upper extremity      Assessment & Plan:    Coffee ground emesis  Assessment & Plan  Patient noted to have 2 episodes of coffee-ground emesis on 09/21, associated with tachycardia and hypotension setting of volume depletion   Hemoglobin noted to be downtrending, partly dilutional   No further evidence of bleeding or melena   Prior CT scan with large hiatal hernia   CT chest abdomen pelvis during current hospitalization without any acute abnormality, occult bleeding but noted to have hiatal hernia and fecal impaction  Hemoglobin continued to decline to 6 8 grams/deciliter, status post 1 PRBC on 10/22  Hemoglobin appropriately improved, no evidence of overt bleeding  · Continue IV Protonix drip  · GI evaluation appreciated, endoscopy plan tentatively on 10/24 but may require earlier if recurrent bleeding  · Monitor H&H, transfuse as needed  · NPO after midnight    * Metastatic cancer [facility])  Assessment & Plan  Patient reports intermittent lower abdominal pain since May of this year  Reports constipation at home  Reports weight loss since spring this year    · 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  · Patient was seen by PCP on 9/27, referred to Oncology as outpatient  No history of cancer  History of fibroid , status post hysterectomy and benign breast cyst   · CT chest abdomen pelvis 10/21-noted to have lobulated right upper lobe paramediastinal mass 6 x 4 x 8 5 cm with extension into the superior mediastinum, supraclavicular correlation and extension to and invasion of right upper lobe bronchial branch   Multiple scattered subcentimeter metastatic lung nodules noted in upper lobe bilaterally  Moderate right and small left effusion  Seen by Oncology, input appreciated  · Discussed with patient and POA regarding finding concerning for malignancy  Discussed that biopsy will be needed to confirm diagnosis  They are considering biopsy but not clear if they would like to proceed with any treatment currently  · Discussed with Pulmonary, recommended IR guided biopsy awaiting patient/POA decision today for biopsy    Fecal impaction [facility])  Assessment & Plan  Status post partial improvement after enema, 10/21  Continue Colace and MiraLax   Enema p r n  Anemia  Assessment & Plan  Hemoglobin 13 7 grams/deciliter on admission, likely hemoconcentrated  Hemoglobin was 9 4 grams/deciliter on August 2022  Microcytic  Noted to have coffee-ground emesis during hospitalization  Previous B12 low normal  Iron studies-normal ferritin, low TIBC  Hemoglobin gradually declined to 6 8 grams/deciliter in setting of GI bleed and IV fluids  · Monitor H&H  · B12 replacement x1  · Monitor H&H, will transfuse if continues to decline or hemodynamic instability    SIRS (systemic inflammatory response syndrome) (HCC)  Assessment & Plan  SIRS versus Sepsis, POA, as evidenced by leukocytosis, tachycardia, hypothermia  Lactic acidosis  Patient is afebrile  · UA shows trace leukocytes, normal white count    SARS-CoV-2 PCR negative  · CXR- shows diffuse pulmonary nodules, no evidence infection   · Lactic acid 5 3 initially, repeat trended down  Likely multifactorial secondary to dehydration   · CT chest abdomen pelvis without any evidence of infection  · Blood cultures are negative so far  · Urine culture negative  ID follow ing, input appreciated   No definitive skin and soft tissue infection  Patient denies any urinary symptoms  Possible related to metastatic disease versus GI bleed  Remains afebrile  Persistent leukocytosis possible related to malignancy  · Initially received cefepime, subsequently transitioned to cefazolin pending cultures  · Follow-up blood cultures-negative so far  · Will discontinue antibiotics as cultures are negative      Acute kidney injury [facility])  Assessment & Plan  Baseline creatinine 0 7-0 8 in past year  Creatinine 1 68 on admission  UA without significant abnormality  Prior CT scan without any hydronephrosis  Likely prerenal in setting of dehydration and hypotension  Nephrology following, input appreciated  Improving with IV fluids to baseline  · Will discontinue IV fluid and monitor p o  Intake  · Monitor intake output   · Avoid nephrotoxin and hypotension  · Follow-up BMP    Orthostatic hypotension  Assessment & Plan  Noted history      Hypernatremia-resolved as of 10/23/2022  Assessment & Plan  Presented with sodium level of 160 secondary to decrease free water intake  Seen by Nephrology, input appreciated  Improved appropriately with hypotonic fluids  · Tolerating clear liquid diet  · Monitor BMP  · Monitor intake output   · Monitor p o  intake  · Hold D5 water, will resume D5 water maintenance if remains NPO    Swelling of right upper extremity  Assessment & Plan  Noted right upper extremity swelling, 10/23 without any tenderness or erythema  Likely dependent  · Elevate extremity as tolerated  · Will get venous Doppler to rule out DVT    Electrolyte abnormality  Assessment & Plan  Hypokalemia, hypophosphatemia, hypomagnesemia  · Replete and monitor    Severe protein-calorie malnutrition ([facility] )  Assessment & Plan  Malnutrition Findings:   Adult Malnutrition type: Chronic illness  Adult Degree of Malnutrition: Other severe protein calorie malnutrition  Malnutrition Characteristics: Fat loss, Muscle loss, Weight loss                  360 Statement: Severe protein calorie malnutrion of chronic illness related to increased energy needs as evidenced by 12% weight loss in 1 month, hollow orbits, protruding clavicles, depression at the temples  Awaiting treatment plan    BMI Findings:  Adult BMI Classifications: Underweight < 18 5        Body mass index is 16 51 kg/m²  Pulmonary nodules  Assessment & Plan  · CXR:  2 x 5 5 cm right upper lobe paramediastinal nodule, and numerous additional smaller bilateral pulmonary nodules highly suspicious for malignancy  Both primary lung cancer and metastatic disease can have this appearance  · Refer to CT scan finding under metastatic cancer      Elevated glucose  Assessment & Plan  Glucose improving,  · Check A1c- 5 5%, SSI    Chronic pain  Assessment & Plan  Chronic arthralgia in lower back, left hip, knees, legs  Likely due to OA and Sjogren's syndrome  Patient follows Rheumatology in [facility] as outpatient  On methotrexate, Cymbalta, Voltaren, tramadol p r n  At home  Denies pain at present  · Continue Cymbalta,   · Hold methotrexate for now in view of sirs versus sepsis  · Hold Voltaren, tramadol   · Tylenol as needed    Generalized weakness  Assessment & Plan  Likely multifactorial secondary to dehydration, metastatic cancer  cane at baseline  · Treat underlying causes  · Nutrition support  · PT OT eval treat  · Fall precautions    Sacral wound  Assessment & Plan  Developed in 2 weeks since sister-in-law is away  Diffuse sacral DTI on exam with surrounding redness, no active drainage, scattered black eschar on exam Hard to exclude cellulitis    · Seen by surgery, input appreciated, continue wound care as per wound care    Sjogren's syndrome [facility])  Assessment & Plan  Patient is on methotrexate 12 5 mg p o  Weekly  Follows rheumatologist in [facility]  · Will hold methotrexate for now  · Resume on discharge  · Natural tears prn for dry eye  Acquired hypothyroidism  Assessment & Plan  Continue Synthroid  Reports not taking medications at home  · Elevated TSH, normal free T4   · Continue Synthroid      Dyslipidemia  Assessment & Plan  Continue statin    SVT (supraventricular tachycardia) (HCC)-resolved as of 10/23/2022  Assessment & Plan  Developed SVT in ED, heart rate 190  Converted to sinus rhythm after receiving Cardizem 10 mg IV  · Telemetry-no further episode, discontinued  · Optimize electrolytes  · Started on low-dose metoprolol     Dehydration-resolved as of 10/23/2022  Assessment & Plan  Patient presents with generalized weakness  Neighbor called police as they saw her trash can outside for a few days  Police found her on the floor  Patient reports rolling off the couch and was on the floor for about 2 hours  Patient denies hitting head  Reports lower abdominal pain since 5/2022  Denies any acute pain  Patient lives [place] - in-law normally takes her shopping and she was away for about 16 days  Patient reported nothing to eat at home and was hungry  · Improving with IV fluids        Elevated troponin-resolved as of 10/20/2022  Assessment & Plan  Troponin 109-83-78> wnl  · Initial EKG showed sinus tach, rate 115, inferior lateral ST depressions, prolonged QT   · Patient denies chest pain  · Likely type 2 MI secondary to # 1 and SIRS versus Sepsis  · Troponin downtrending  · Telemetry            VTE Pharmacologic Prophylaxis:   Moderate Risk (Score 3-4) - Pharmacological DVT Prophylaxis Contraindicated  Sequential Compression Devices Ordered  Patient Centered Rounds: I performed bedside rounds with nursing staff today    Discussions with Specialists or Other Care Team Provider: yes    Education and Discussions with Family / Patient: Updated  (Sister-in-law) at bedside  Time Spent for Care: 45 minutes  More than 50% of total time spent on counseling and coordination of care as described above  Current Length of Stay: 4 day(s)  Current Patient Status: Inpatient   Certification Statement: The patient will continue to require additional inpatient hospital stay due to Electrolytes abnormality, GI bleeding  Discharge Plan: Anticipate discharge in >72 hrs to discharge location to be determined pending rehab evaluations  Code Status: Level 1 - Full Code    Subjective:   Comfortably lying in bed  Does not offer any new complaints  Denies any chest pain shortness a pressure abdominal pain  No bowel movement or bleeding  Right extremity swelling noted without any pain erythema    Patient reported that she is going to talk with her sister-in-law today regarding biopsy      Objective:     Vitals:   Temp (24hrs), Av 4 °F (36 3 °C), Min:97 3 °F (36 3 °C), Max:97 6 °F (36 4 °C)    Temp:  [97 3 °F (36 3 °C)-97 6 °F (36 4 °C)] 97 4 °F (36 3 °C)  HR:  [86-98] 88  Resp:  [16-18] 16  BP: ()/(55-65) 103/65  SpO2:  [94 %-98 %] 95 % on room air  Body mass index is 16 51 kg/m²  Input and Output Summary (last 24 hours): Intake/Output Summary (Last 24 hours) at 10/23/2022 0855  Last data filed at 10/23/2022 0501  Gross per 24 hour   Intake 440 ml   Output 300 ml   Net 140 ml       Physical Exam:   Physical Exam  Constitutional:       General: She is not in acute distress  Appearance: She is ill-appearing  Comments: Frail, cachectic   HENT:      Head: Normocephalic and atraumatic  Cardiovascular:      Rate and Rhythm: Normal rate  Pulmonary:      Effort: Pulmonary effort is normal  No respiratory distress  Breath sounds: Normal breath sounds  No wheezing or rales        Comments: Diminished bilaterally  Abdominal:      General: Bowel sounds are normal  There is no distension  Palpations: Abdomen is soft  Tenderness: There is no abdominal tenderness  There is no guarding or rebound  Musculoskeletal:         General: Swelling (Right upper extremity) present  Skin:     General: Skin is warm and dry  Findings: Lesion (Sacral) present  No rash  Neurological:      Mental Status: She is alert  Mental status is at baseline  Additional Data:     Labs:  Results from last 7 days   Lab Units 10/23/22  0509 10/20/22  1758 10/20/22  0629   WBC Thousand/uL 14 46*   < > 30 63*   HEMOGLOBIN g/dL 8 1*   < > 12 2   HEMATOCRIT % 26 9*   < > 43 1   PLATELETS Thousands/uL 105*   < > 369   NEUTROS PCT %  --   --  90*   LYMPHS PCT %  --   --  5*   MONOS PCT %  --   --  4   EOS PCT %  --   --  0    < > = values in this interval not displayed  Results from last 7 days   Lab Units 10/23/22  0509 10/22/22  1211 10/22/22  0633   SODIUM mmol/L 141   < > 144   POTASSIUM mmol/L 4 0   < > 3 2*   CHLORIDE mmol/L 109*   < > 110*   CO2 mmol/L 21   < > 22   BUN mg/dL 29*   < > 44*   CREATININE mg/dL 0 76   < > 0 95   ANION GAP mmol/L 11   < > 12   CALCIUM mg/dL 7 2*   < > 7 6*   ALBUMIN g/dL  --   --  2 2*   TOTAL BILIRUBIN mg/dL  --   --  0 35   ALK PHOS U/L  --   --  218*   ALT U/L  --   --  <6*   AST U/L  --   --  37   GLUCOSE RANDOM mg/dL 110   < > 135    < > = values in this interval not displayed           Results from last 7 days   Lab Units 10/23/22  0720 10/22/22  2118 10/22/22  1558 10/22/22  1119 10/22/22  0710 10/21/22  2041 10/21/22  1657 10/21/22  1107 10/21/22  0723 10/20/22  2127 10/20/22  1558 10/20/22  1123   POC GLUCOSE mg/dl 117 118 141* 128 176* 149* 162* 120 172* 139 171* 183*     Results from last 7 days   Lab Units 10/19/22  1540   HEMOGLOBIN A1C % 5 5     Results from last 7 days   Lab Units 10/20/22  0629 10/20/22  0400 10/20/22  0037 10/19/22  2225 10/19/22  2005 10/19/22  1548 10/19/22  1540   LACTIC ACID mmol/L  --  1 7 3 1* 3 5* 4 0*   < >  -- PROCALCITONIN ng/ml 0 53*  --   --   --   --   --  0 28*    < > = values in this interval not displayed  Lines/Drains:  Invasive Devices  Report    Peripheral Intravenous Line  Duration           Peripheral IV 10/19/22 Left Antecubital 3 days    Long-Dwell Peripheral IV (Midline) 21/95/95 Right Basilic 1 day          Drain  Duration           External Urinary Catheter 2 days                         Imaging: Reviewed radiology reports from this admission including: chest xray, abdominal/pelvic CT and CT head    Recent Cultures (last 7 days):   Results from last 7 days   Lab Units 10/19/22  1900 10/19/22  1548 10/19/22  1540   BLOOD CULTURE   --  No Growth at 72 hrs  No Growth at 72 hrs     URINE CULTURE  Culture too young- will reincubate  --   --        Last 24 Hours Medication List:   Current Facility-Administered Medications   Medication Dose Route Frequency Provider Last Rate   • acetaminophen  650 mg Oral Q6H PRN KIKO Jovel     • atorvastatin  40 mg Oral Daily With KIKO Hernandez     • calcium carbonate  500 mg Oral Daily PRN KIKO Menjivar     • cefazolin  1,000 mg Intravenous Q8H KIKO Jovel 1,000 mg (10/23/22 0501)   • cholecalciferol  1,000 Units Oral Daily KIKO Jovel     • dextrose  50 mL/hr Intravenous Continuous Kingsley Hubbard MD 50 mL/hr (10/23/22 0046)   • docusate sodium  100 mg Oral BID KIKO Jovel     • DULoxetine  30 mg Oral Daily KIKO Jovel     • folic acid  1 mg Oral Daily KIKO Jovel     • HYDROmorphone  0 2 mg Intravenous Q6H PRN KIKO Jovel     • insulin lispro  1-5 Units Subcutaneous TID AC KIKO Jovel     • insulin lispro  1-5 Units Subcutaneous HS KIKO Jovel     • levothyroxine  75 mcg Oral Early Morning Stacy Luz MD     • metoprolol tartrate  12 5 mg Oral Q12H [address] KIKO Jovel     • ondansetron  4 mg Intravenous Q6H PRN KIKO Jovel     • oxybutynin  5 mg Oral Daily Mackenzie Ricarda West     • pantoprozole (PROTONIX) infusion (Continuous)  8 mg/hr Intravenous Continuous Cal Rosa MD 8 mg/hr (10/23/22 9336)   • polyethylene glycol  17 g Oral Daily Cal Rosa MD     • saccharomyces boulardii  250 mg Oral BID KIKO Jovel          Today, Patient Was Seen By: Cal Rosa    ** Please Note: "This note has been constructed using a voice recognition system  Therefore there may be syntax, spelling, and/or grammatical errors   Please call if you have any questions  "**

## 2022-10-23 NOTE — ASSESSMENT & PLAN NOTE
Improving,   Noted right upper extremity swelling, 10/23 without any tenderness or erythema  · Elevate extremity as tolerated  · Venous Doppler positive for multiple vessel DVT  · Restarted heparin, monitor for bleeding transition to [address] for 3 months  · PT as tolerated

## 2022-10-23 NOTE — PROGRESS NOTES
Progress Note - [facility] MARY Sharma 69 y o  female MRN: [id number]    Unit/Bed#: 2 [address] 203 Encounter: [id number]      Assessment/Plan:  Hematemesis  Hemodynamically stable  Hemoglobin stable status post transfusion 1 unit packed red blood cells  Will plan EGD for further evaluation tomorrow    Subjective:   Denies abdominal pain  No further signs of active bleeding    Objective:     Vitals: Blood pressure 103/65, pulse 88, temperature (!) 97 4 °F (36 3 °C), temperature source Axillary, resp  rate 16, height 5' 5" (1 651 m), weight 45 kg (99 lb 3 3 oz), SpO2 95 %  ,Body mass index is 16 51 kg/m²  Intake/Output Summary (Last 24 hours) at 10/23/2022 1232  Last data filed at 10/23/2022 0501  Gross per 24 hour   Intake 350 ml   Output 300 ml   Net 50 ml       Physical Exam: Physical Exam  Vitals and nursing note reviewed  Constitutional:       General: She is not in acute distress  Appearance: She is not ill-appearing  HENT:      Head: Normocephalic and atraumatic  Eyes:      General: No scleral icterus  Extraocular Movements: Extraocular movements intact  Cardiovascular:      Rate and Rhythm: Normal rate  Pulmonary:      Effort: Pulmonary effort is normal  No respiratory distress  Abdominal:      General: There is no distension  Palpations: Abdomen is soft  Skin:     General: Skin is warm and dry  Coloration: Skin is not cyanotic  Findings: No erythema  Neurological:      General: No focal deficit present  Mental Status: She is alert and oriented to person, place, and time  Psychiatric:         Mood and Affect: Mood normal          Behavior: Behavior normal          Invasive Devices  Report    Peripheral Intravenous Line  Duration           Long-Dwell Peripheral IV (Midline) 66/44/89 Right Basilic 1 day    Peripheral IV 10/23/22 Dorsal (posterior); Left Hand <1 day          Drain  Duration           External Urinary Catheter 2 days Current Facility-Administered Medications:   •  acetaminophen (TYLENOL) tablet 650 mg, 650 mg, Oral, Q6H PRN, KIKO Jovel  •  atorvastatin (LIPITOR) tablet 40 mg, 40 mg, Oral, Daily With Dinner, KIKO Jovel, 40 mg at 10/22/22 1827  •  calcium carbonate (TUMS) chewable tablet 500 mg, 500 mg, Oral, Daily PRN, KIKO Nolan  •  ceFAZolin (ANCEF) IVPB (premix in dextrose) 1,000 mg 50 mL, 1,000 mg, Intravenous, Q8H, KIKO Jovel, Last Rate: 100 mL/hr at 10/23/22 0501, 1,000 mg at 10/23/22 0501  •  cholecalciferol (VITAMIN D3) tablet 1,000 Units, 1,000 Units, Oral, Daily, KIKO Jovel, 1,000 Units at 10/23/22 0837  •  [START ON 10/24/2022] dextrose 5 % infusion, 50 mL/hr, Intravenous, Continuous, Megan Guerrero MD  •  docusate sodium (COLACE) capsule 100 mg, 100 mg, Oral, BID, KIKO Jovel, 100 mg at 10/23/22 3653  •  DULoxetine (CYMBALTA) delayed release capsule 30 mg, 30 mg, Oral, Daily, KIKO Jovel, 30 mg at 60/11/40 5256  •  folic acid (FOLVITE) tablet 1 mg, 1 mg, Oral, Daily, KIKO Jovel, 1 mg at 10/23/22 1645  •  HYDROmorphone (DILAUDID) injection 0 2 mg, 0 2 mg, Intravenous, Q6H PRN, KIKO Jovel  •  insulin lispro (HumaLOG) 100 units/mL subcutaneous injection 1-5 Units, 1-5 Units, Subcutaneous, TID AC, 1 Units at 10/21/22 1701 **AND** Fingerstick Glucose (POCT), , , TID AC, KIKO Jovel  •  insulin lispro (HumaLOG) 100 units/mL subcutaneous injection 1-5 Units, 1-5 Units, Subcutaneous, HS, KIKO Jovel  •  levothyroxine tablet 75 mcg, 75 mcg, Oral, Early Morning, Joshua Galicia MD, 75 mcg at 10/23/22 0501  •  metoprolol tartrate (LOPRESSOR) partial tablet 12 5 mg, 12 5 mg, Oral, Q12H [facility], KIKO Jovel, 12 5 mg at 10/21/22 0842  •  ondansetron (ZOFRAN) injection 4 mg, 4 mg, Intravenous, Q6H PRN, KIKO Jovel, 4 mg at 10/20/22 0212  •  oxybutynin (DITROPAN-XL) 24 hr tablet 5 mg, 5 mg, Oral, Daily, KIKO Jovel, 5 mg at 10/23/22 0837  •  [COMPLETED] pantoprazole (PROTONIX) 80 mg in sodium chloride 0 9 % 100 mL IVPB, 80 mg, Intravenous, Once, Last Rate: 200 mL/hr at 10/20/22 1806, 80 mg at 10/20/22 1806 **AND** pantoprazole (PROTONIX) 80 mg in sodium chloride 0 9 % 100 mL infusion, 8 mg/hr, Intravenous, Continuous, Chapin Salgado MD, Last Rate: 10 mL/hr at 10/23/22 0845, 8 mg/hr at 10/23/22 0845  •  polyethylene glycol (MIRALAX) packet 17 g, 17 g, Oral, Daily, Chapin Salgado MD, 17 g at 10/23/22 0837  •  potassium phosphate 9 mmol in sodium chloride 0 9 % 100 mL Infusion, 9 mmol, Intravenous, Once, Chapin Salgado MD, Last Rate: 50 mL/hr at 10/23/22 1043, 9 mmol at 10/23/22 1043  •  saccharomyces boulardii (FLORASTOR) capsule 250 mg, 250 mg, Oral, BID, KIKO Jovel, 250 mg at 10/23/22 8941    Lab Results:   Recent Results (from the past 24 hour(s))   Fingerstick Glucose (POCT)    Collection Time: 10/22/22  3:58 PM   Result Value Ref Range    POC Glucose 141 (H) 65 - 140 mg/dl   Serial Hemoglobin Q6hrs    Collection Time: 10/22/22  8:21 PM   Result Value Ref Range    Hemoglobin 8 3 (L) 11 5 - 15 4 g/dL   Fingerstick Glucose (POCT)    Collection Time: 10/22/22  9:18 PM   Result Value Ref Range    POC Glucose 118 65 - 140 mg/dl   Basic metabolic panel    Collection Time: 10/23/22  5:09 AM   Result Value Ref Range    Sodium 141 135 - 147 mmol/L    Potassium 4 0 3 5 - 5 3 mmol/L    Chloride 109 (H) 96 - 108 mmol/L    CO2 21 21 - 32 mmol/L    ANION GAP 11 4 - 13 mmol/L    BUN 29 (H) 5 - 25 mg/dL    Creatinine 0 76 0 60 - 1 30 mg/dL    Glucose 110 65 - 140 mg/dL    Calcium 7 2 (L) 8 3 - 10 1 mg/dL    eGFR 76 ml/min/1 73sq m   CBC    Collection Time: 10/23/22  5:09 AM   Result Value Ref Range    WBC 14 46 (H) 4 31 - 10 16 Thousand/uL    RBC 3 42 (L) 3 81 - 5 12 Million/uL    Hemoglobin 8 1 (L) 11 5 - 15 4 g/dL    Hematocrit 26 9 (L) 34 8 - 46 1 %    MCV 79 (L) 82 - 98 fL    MCH 23 7 (L) 26 8 - 34 3 pg    MCHC 30 1 (L) 31 4 - 37 4 g/dL    RDW 18 9 (H) 11 6 - 15 1 %    Platelets 414 (L) 227 - 390 Thousands/uL    MPV 12 9 (H) 8 9 - 12 7 fL   Magnesium    Collection Time: 10/23/22  5:09 AM   Result Value Ref Range    Magnesium 2 5 1 6 - 2 6 mg/dL   Phosphorus    Collection Time: 10/23/22  5:09 AM   Result Value Ref Range    Phosphorus 2 2 (L) 2 3 - 4 1 mg/dL   Prepare Leukoreduced RBC: 1 Units, Irradiated    Collection Time: 10/23/22  6:15 AM   Result Value Ref Range    Unit Product Code J9977N49     Unit Number [id number]     Unit ABO B     Unit [facility] POS     Crossmatch Compatible     Unit Dispense Status Presumed Trans     Unit Product Volume 350 mL   Fingerstick Glucose (POCT)    Collection Time: 10/23/22  7:20 AM   Result Value Ref Range    POC Glucose 117 65 - 140 mg/dl   Fingerstick Glucose (POCT)    Collection Time: 10/23/22 11:20 AM   Result Value Ref Range    POC Glucose 109 65 - 140 mg/dl             Imaging Studies: CT chest abdomen pelvis wo contrast    Result Date: 10/21/2022  Narrative: CT CHEST, ABDOMEN AND PELVIS WITHOUT IV CONTRAST INDICATION:   Sepsis Metastatic cancer, sepsis, anemia  COMPARISON:  Compared with CT abdomen pelvis 9/22/2022 TECHNIQUE: CT examination of the chest, abdomen and pelvis was performed without intravenous contrast  Axial, sagittal, and coronal 2D reformatted images were created from the source data and submitted for interpretation  Radiation dose length product (DLP) for this visit:  300 74 mGy-cm   This examination, like all CT scans performed in the [facility], was performed utilizing techniques to minimize radiation dose exposure, including the use of iterative  reconstruction and automated exposure control  Enteric contrast was administered  FINDINGS: CHEST LUNGS:  Lobulated right upper lobe paramediastinal mass measuring 6 x 4 x 8 5 cm with extension to and involvement of the anterior right upper lobe bronchial branches     Remaining lung parenchyma demonstrates multiple metastatic subcentimeter lung nodules predominantly in the upper lobe  There is no tracheal or endobronchial lesion  PLEURA:  Small left and moderate right pleural effusion is new  Pleural-based nodules bilaterally more on the right of metastasis  HEART/GREAT VESSELS: Heart is unremarkable for patient's age  No thoracic aortic aneurysm  MEDIASTINUM AND LADONNA:  Moderate hiatal hernia  Soft tissue nodule in the cardiophrenic region anteriorly measuring 1 4 cm compared to 1 2 cm  Right upper lobe mass extends into the superior mediastinal right paratracheal region  CHEST WALL AND LOWER NECK: Right upper lobe mass extends into the supraclavicular region behind the jugular vein  Subcentimeter axillary lymph nodes bilaterally  ABDOMEN LIVER/BILIARY TREE:  Unremarkable  GALLBLADDER:  Gallbladder is surgically absent  SPLEEN:  Unremarkable  PANCREAS:  Unremarkable  ADRENAL GLANDS:  Unremarkable  KIDNEYS/URETERS:  Unremarkable  No hydronephrosis  STOMACH AND BOWEL:  Stool impaction in the colon   Mid transverse colon wall thickening possibly secondary to stool impaction APPENDIX:  No findings to suggest appendicitis  ABDOMINOPELVIC CAVITY:  No ascites  No pneumoperitoneum  No lymphadenopathy  VESSELS:  Unremarkable for patient's age  PELVIS REPRODUCTIVE ORGANS:  Unremarkable for patient's age  URINARY BLADDER:  Unremarkable  ABDOMINAL WALL/INGUINAL REGIONS:  Unremarkable  OSSEOUS STRUCTURES:  Sclerotic metastatic lesions in the bones, most predominant in the right posterior 6th rib, posterior elements of T5, T6 and vertebral body involvement of T11, L2  Sclerotic metastatic involvement of the right acetabulum, right anterior iliac bone and left posterior iliac bone and  left superior acetabulum        Impression: 1    Lobulated right upper lobe paramediastinal mass measuring 6 0 x 4 0 x 8 5 cm with extension into the superior mediastinum, supraclavicular correlation and extension to and invasion of right upper lobe bronchial branch  2   Multiple scattered subcentimeter metastatic lung nodules predominantly in upper lobes bilaterally  3  Moderate right and small left effusion  4   Stool impaction in the colon  Mid transverse colon wall thickening possibly due to stool impaction  Findings most likely represent primary right lung malignancy with lung and bone metastatic process  Workstation performed: [id number]     XR chest 1 view portable    Result Date: 10/20/2022  Narrative: CHEST INDICATION:   weakness  COMPARISON:  Chest x-ray from 11/12/2017  Abdomen and pelvic CT from 9/22/2022  EXAM PERFORMED/VIEWS:  XR CHEST PORTABLE FINDINGS: Large hiatal hernia  There is a 4 2 x 5 5 cm right upper lobe paramediastinal nodule, and numerous additional smaller bilateral pulmonary nodules highly suspicious for malignancy  Both primary lung cancer and metastatic disease can have this appearance  No pleural effusion  No pneumothorax  No pneumothorax or pleural effusion  Osseous structures appear within normal limits for patient age  (Please see recent abdomen and pelvic CT, where T11 sclerotic bone metastasis is visible )     Impression: There is a 4 2 x 5 5 cm right upper lobe paramediastinal nodule, and numerous additional smaller bilateral pulmonary nodules highly suspicious for malignancy  Both primary lung cancer and metastatic disease can have this appearance  Chest CT would be helpful for further evaluation  Workstation performed: [id number]     CT head without contrast    Result Date: 10/19/2022  Narrative: CT BRAIN - WITHOUT CONTRAST INDICATION:   Weakness and possible fall  Found on ground    COMPARISON:  11/3/2017 TECHNIQUE:  CT examination of the brain was performed  In addition to axial images, sagittal and coronal 2D reformatted images were created and submitted for interpretation  Radiation dose length product (DLP) for this visit:  933 96 mGy-cm     This examination, like all CT scans performed in the [facility], was performed utilizing techniques to minimize radiation dose exposure, including the use of iterative  reconstruction and automated exposure control  IMAGE QUALITY:  Diagnostic  FINDINGS: PARENCHYMA: Decreased attenuation is noted in periventricular and subcortical white matter demonstrating an appearance that is statistically most likely to represent moderate microangiopathic change  No CT signs of acute infarction  No intracranial mass, mass effect or midline shift  No acute parenchymal hemorrhage  VENTRICLES AND EXTRA-AXIAL SPACES:  Ventricles and extra-axial CSF spaces are prominent commensurate with the degree of volume loss, which has increased since the prior study VISUALIZED ORBITS AND PARANASAL SINUSES:  No acute finding  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

## 2022-10-23 NOTE — PLAN OF CARE
Problem: Potential for Falls  Goal: Patient will remain free of falls  Description: INTERVENTIONS:  - Educate patient/family on patient safety including physical limitations  - Instruct patient to call for assistance with activity   - Consult OT/PT to assist with strengthening/mobility   - Keep Call bell within reach  - Keep bed low and locked with side rails adjusted as appropriate  - Keep care items and personal belongings within reach  - Initiate and maintain comfort rounds  - Make Fall Risk Sign visible to staff  - Offer Toileting every 2 Hours, in advance of need  - Initiate/Maintain bed alarm- Apply yellow socks and bracelet for high fall risk patients  - Consider moving patient to room near nurses station  Outcome: Progressing     Problem: Nutrition/Hydration-ADULT  Goal: Nutrient/Hydration intake appropriate for improving, restoring or maintaining nutritional needs  Description: Monitor and assess patient's nutrition/hydration status for malnutrition  Collaborate with interdisciplinary team and initiate plan and interventions as ordered  Monitor patient's weight and dietary intake as ordered or per policy  Utilize nutrition screening tool and intervene as necessary  Determine patient's food preferences and provide high-protein, high-caloric foods as appropriate       INTERVENTIONS:  - Monitor oral intake, urinary output, labs, and treatment plans  - Assess nutrition and hydration status and recommend course of action  - Evaluate amount of meals eaten  - Assist patient with eating if necessary   - Allow adequate time for meals  - Recommend/ encourage appropriate diets, oral nutritional supplements, and vitamin/mineral supplements  - Order, calculate, and assess calorie counts as needed  - Recommend, monitor, and adjust tube feedings and TPN/PPN based on assessed needs  - Assess need for intravenous fluids  - Provide specific nutrition/hydration education as appropriate  - Include patient/family/caregiver in decisions related to nutrition  Outcome: Progressing     Problem: MOBILITY - ADULT  Goal: Maintain or return to baseline ADL function  Description: INTERVENTIONS:  -  Assess patient's ability to carry out ADLs; assess patient's baseline for ADL function and identify physical deficits which impact ability to perform ADLs (bathing, care of mouth/teeth, toileting, grooming, dressing, etc )  - Assess/evaluate cause of self-care deficits   - Assess range of motion  - Assess patient's mobility; develop plan if impaired  - Assess patient's need for assistive devices and provide as appropriate  - Encourage maximum independence but intervene and supervise when necessary  - Involve family in performance of ADLs  - Assess for home care needs following discharge   - Consider OT consult to assist with ADL evaluation and planning for discharge  - Provide patient education as appropriate  Outcome: Progressing  Goal: Maintains/Returns to pre admission functional level  Description: INTERVENTIONS:  - Perform BMAT or MOVE assessment daily    - Set and communicate daily mobility goal to care team and patient/family/caregiver  - Collaborate with rehabilitation services on mobility goals if consulted  - Perform Range of Motion 5 times a day  - Reposition patient every 2 hours    - Dangle patient 3 times a day  - Stand patient 3 times a day  - Ambulate patient 3 times a day  - Out of bed to chair 3 times a day   - Out of bed for meals 3 times a day  - Out of bed for toileting  - Record patient progress and toleration of activity level   Outcome: Progressing     Problem: INFECTION - ADULT  Goal: Absence or prevention of progression during hospitalization  Description: INTERVENTIONS:  - Assess and monitor for signs and symptoms of infection  - Monitor lab/diagnostic results  - Monitor all insertion sites, i e  indwelling lines, tubes, and drains  - Monitor endotracheal if appropriate and nasal secretions for changes in amount and color  - [place] appropriate cooling/warming therapies per order  - Administer medications as ordered  - Instruct and encourage patient and family to use good hand hygiene technique  - Identify and instruct in appropriate isolation precautions for identified infection/condition  Outcome: Progressing  Goal: Absence of fever/infection during neutropenic period  Description: INTERVENTIONS:  - Monitor WBC    Outcome: Progressing     Problem: CARDIOVASCULAR - ADULT  Goal: Maintains optimal cardiac output and hemodynamic stability  Description: INTERVENTIONS:  - Monitor I/O, vital signs and rhythm  - Monitor for S/S and trends of decreased cardiac output  - Administer and titrate ordered vasoactive medications to optimize hemodynamic stability  - Assess quality of pulses, skin color and temperature  - Assess for signs of decreased coronary artery perfusion  - Instruct patient to report change in severity of symptoms  Outcome: Progressing  Goal: Absence of cardiac dysrhythmias or at baseline rhythm  Description: INTERVENTIONS:  - Continuous cardiac monitoring, vital signs, obtain 12 lead EKG if ordered  - Administer antiarrhythmic and heart rate control medications as ordered  - Monitor electrolytes and administer replacement therapy as ordered  Outcome: Progressing     Problem: METABOLIC, FLUID AND ELECTROLYTES - ADULT  Goal: Electrolytes maintained within normal limits  Description: INTERVENTIONS:  - Monitor labs and assess patient for signs and symptoms of electrolyte imbalances  - Administer electrolyte replacement as ordered  - Monitor response to electrolyte replacements, including repeat lab results as appropriate  - Instruct patient on fluid and nutrition as appropriate  Outcome: Progressing     Problem: Prexisting or High Potential for Compromised Skin Integrity  Goal: Skin integrity is maintained or improved  Description: INTERVENTIONS:  - Identify patients at risk for skin breakdown  - Assess and monitor skin integrity  - Assess and monitor nutrition and hydration status  - Monitor labs   - Assess for incontinence   - Turn and reposition patient  - Assist with mobility/ambulation  - Relieve pressure over bony prominences  - Avoid friction and shearing  - Provide appropriate hygiene as needed including keeping skin clean and dry  - Evaluate need for skin moisturizer/barrier cream  - Collaborate with interdisciplinary team   - Patient/family teaching  - Consider wound care consult   Outcome: Progressing

## 2022-10-24 ENCOUNTER — APPOINTMENT (OUTPATIENT)
Dept: PERIOP | Facility: HOSPITAL | Age: 77
End: 2022-10-24
Attending: INTERNAL MEDICINE

## 2022-10-24 ENCOUNTER — APPOINTMENT (INPATIENT)
Dept: RADIOLOGY | Facility: HOSPITAL | Age: 77
End: 2022-10-24

## 2022-10-24 ENCOUNTER — ANESTHESIA (INPATIENT)
Dept: PERIOP | Facility: HOSPITAL | Age: 77
End: 2022-10-24
Payer: COMMERCIAL

## 2022-10-24 ENCOUNTER — ANESTHESIA EVENT (INPATIENT)
Dept: PERIOP | Facility: HOSPITAL | Age: 77
End: 2022-10-24
Payer: COMMERCIAL

## 2022-10-24 LAB
ANION GAP SERPL CALCULATED.3IONS-SCNC: 11 MMOL/L (ref 4–13)
BUN SERPL-MCNC: 17 MG/DL (ref 5–25)
CALCIUM SERPL-MCNC: 7.1 MG/DL (ref 8.3–10.1)
CHLORIDE SERPL-SCNC: 105 MMOL/L (ref 96–108)
CHOLEST SERPL-MCNC: 89 MG/DL
CO2 SERPL-SCNC: 21 MMOL/L (ref 21–32)
CREAT SERPL-MCNC: 0.7 MG/DL (ref 0.6–1.3)
ERYTHROCYTE [DISTWIDTH] IN BLOOD BY AUTOMATED COUNT: 20.1 % (ref 11.6–15.1)
GFR SERPL CREATININE-BSD FRML MDRD: 84 ML/MIN/1.73SQ M
GLUCOSE SERPL-MCNC: 104 MG/DL (ref 65–140)
GLUCOSE SERPL-MCNC: 117 MG/DL (ref 65–140)
GLUCOSE SERPL-MCNC: 118 MG/DL (ref 65–140)
GLUCOSE SERPL-MCNC: 120 MG/DL (ref 65–140)
GLUCOSE SERPL-MCNC: 90 MG/DL (ref 65–140)
HCT VFR BLD AUTO: 27 % (ref 34.8–46.1)
HDLC SERPL-MCNC: 21 MG/DL
HGB BLD-MCNC: 8 G/DL (ref 11.5–15.4)
LDLC SERPL CALC-MCNC: 47 MG/DL (ref 0–100)
MAGNESIUM SERPL-MCNC: 1.7 MG/DL (ref 1.6–2.6)
MCH RBC QN AUTO: 23.7 PG (ref 26.8–34.3)
MCHC RBC AUTO-ENTMCNC: 29.6 G/DL (ref 31.4–37.4)
MCV RBC AUTO: 80 FL (ref 82–98)
PHOSPHATE SERPL-MCNC: 2.2 MG/DL (ref 2.3–4.1)
PLATELET # BLD AUTO: 102 THOUSANDS/UL (ref 149–390)
PMV BLD AUTO: 10.8 FL (ref 8.9–12.7)
POTASSIUM SERPL-SCNC: 3.7 MMOL/L (ref 3.5–5.3)
RBC # BLD AUTO: 3.38 MILLION/UL (ref 3.81–5.12)
SODIUM SERPL-SCNC: 137 MMOL/L (ref 135–147)
TRIGL SERPL-MCNC: 107 MG/DL
WBC # BLD AUTO: 13.08 THOUSAND/UL (ref 4.31–10.16)

## 2022-10-24 PROCEDURE — 0DB68ZX EXCISION OF STOMACH, VIA NATURAL OR ARTIFICIAL OPENING ENDOSCOPIC, DIAGNOSTIC: ICD-10-PCS | Performed by: INTERNAL MEDICINE

## 2022-10-24 PROCEDURE — 0DB38ZX EXCISION OF LOWER ESOPHAGUS, VIA NATURAL OR ARTIFICIAL OPENING ENDOSCOPIC, DIAGNOSTIC: ICD-10-PCS | Performed by: INTERNAL MEDICINE

## 2022-10-24 RX ORDER — SUCRALFATE 1 G/1
1 TABLET ORAL
Status: DISCONTINUED | OUTPATIENT
Start: 2022-10-24 | End: 2022-11-02 | Stop reason: HOSPADM

## 2022-10-24 RX ORDER — PROPOFOL 10 MG/ML
INJECTION, EMULSION INTRAVENOUS AS NEEDED
Status: DISCONTINUED | OUTPATIENT
Start: 2022-10-24 | End: 2022-10-24

## 2022-10-24 RX ORDER — LIDOCAINE HYDROCHLORIDE 10 MG/ML
INJECTION, SOLUTION EPIDURAL; INFILTRATION; INTRACAUDAL; PERINEURAL AS NEEDED
Status: DISCONTINUED | OUTPATIENT
Start: 2022-10-24 | End: 2022-10-24

## 2022-10-24 RX ORDER — PHENYLEPHRINE HYDROCHLORIDE 10 MG/ML
INJECTION INTRAVENOUS AS NEEDED
Status: DISCONTINUED | OUTPATIENT
Start: 2022-10-24 | End: 2022-10-24

## 2022-10-24 RX ORDER — SODIUM CHLORIDE, SODIUM LACTATE, POTASSIUM CHLORIDE, CALCIUM CHLORIDE 600; 310; 30; 20 MG/100ML; MG/100ML; MG/100ML; MG/100ML
INJECTION, SOLUTION INTRAVENOUS CONTINUOUS PRN
Status: DISCONTINUED | OUTPATIENT
Start: 2022-10-24 | End: 2022-10-24

## 2022-10-24 RX ORDER — ONDANSETRON 2 MG/ML
4 INJECTION INTRAMUSCULAR; INTRAVENOUS ONCE AS NEEDED
Status: CANCELLED | OUTPATIENT
Start: 2022-10-24

## 2022-10-24 RX ADMIN — ONDANSETRON 4 MG: 2 INJECTION INTRAMUSCULAR; INTRAVENOUS at 01:08

## 2022-10-24 RX ADMIN — PROPOFOL 50 MG: 10 INJECTION, EMULSION INTRAVENOUS at 14:51

## 2022-10-24 RX ADMIN — DEXTROSE 50 ML/HR: 5 SOLUTION INTRAVENOUS at 22:20

## 2022-10-24 RX ADMIN — LIDOCAINE HYDROCHLORIDE 50 MG: 10 INJECTION, SOLUTION EPIDURAL; INFILTRATION; INTRACAUDAL; PERINEURAL at 14:51

## 2022-10-24 RX ADMIN — SODIUM CHLORIDE, SODIUM LACTATE, POTASSIUM CHLORIDE, AND CALCIUM CHLORIDE: .6; .31; .03; .02 INJECTION, SOLUTION INTRAVENOUS at 14:47

## 2022-10-24 RX ADMIN — PHENYLEPHRINE HYDROCHLORIDE 100 MCG: 10 INJECTION INTRAVENOUS at 14:52

## 2022-10-24 RX ADMIN — DOCUSATE SODIUM 100 MG: 100 CAPSULE, LIQUID FILLED ORAL at 17:43

## 2022-10-24 RX ADMIN — SUCRALFATE 1 G: 1 TABLET ORAL at 17:43

## 2022-10-24 RX ADMIN — PHENYLEPHRINE HYDROCHLORIDE 200 MCG: 10 INJECTION INTRAVENOUS at 14:57

## 2022-10-24 RX ADMIN — Medication 1000 UNITS: at 16:11

## 2022-10-24 RX ADMIN — PANTOPRAZOLE SODIUM 40 MG: 40 INJECTION, POWDER, FOR SOLUTION INTRAVENOUS at 08:30

## 2022-10-24 RX ADMIN — PROPOFOL 50 MG: 10 INJECTION, EMULSION INTRAVENOUS at 14:52

## 2022-10-24 RX ADMIN — LEVOTHYROXINE SODIUM 75 MCG: 75 TABLET ORAL at 05:35

## 2022-10-24 RX ADMIN — Medication 250 MG: at 17:43

## 2022-10-24 RX ADMIN — OXYBUTYNIN 5 MG: 5 TABLET, FILM COATED, EXTENDED RELEASE ORAL at 16:12

## 2022-10-24 RX ADMIN — POTASSIUM PHOSPHATE, MONOBASIC AND POTASSIUM PHOSPHATE, DIBASIC 9 MMOL: 224; 236 INJECTION, SOLUTION, CONCENTRATE INTRAVENOUS at 10:23

## 2022-10-24 RX ADMIN — ATORVASTATIN CALCIUM 40 MG: 40 TABLET, FILM COATED ORAL at 16:11

## 2022-10-24 RX ADMIN — GADOBUTROL 10 ML: 604.72 INJECTION INTRAVENOUS at 13:46

## 2022-10-24 RX ADMIN — SUCRALFATE 1 G: 1 TABLET ORAL at 22:16

## 2022-10-24 RX ADMIN — PANTOPRAZOLE SODIUM 40 MG: 40 INJECTION, POWDER, FOR SOLUTION INTRAVENOUS at 22:16

## 2022-10-24 RX ADMIN — FOLIC ACID 1 MG: 1 TABLET ORAL at 16:12

## 2022-10-24 RX ADMIN — DULOXETINE HYDROCHLORIDE 30 MG: 30 CAPSULE, DELAYED RELEASE ORAL at 16:11

## 2022-10-24 NOTE — QUICK NOTE
Discussed case with Interventional Pulmonology and Interventional Radiology  The patient appears to be a good candidate for a biopsy of a metastatic osseous lesion which will both diagnose and stage with providing sufficient core tissue  IR consult placed  This lesion may be able to be accessed bronchoscopically and a biopsy of the primary mass can be arranged if metastasis biopsies are negative or inconclusive  Thoracentesis can also be performed but we cannot get enough tissue for molecular testing from those specimens typically so do not favor doing that at this time  Planning to chart check from here but no further input otherwise

## 2022-10-24 NOTE — OCCUPATIONAL THERAPY NOTE
OT EVALUATION       10/24/22 1011   Note Type   Note type Evaluation   Pain Assessment   Pain Assessment Tool 0-10   Pain Score No Pain   Restrictions/Precautions   Other Precautions Chair Alarm; Bed Alarm;Pain; Fall Risk   Home Living   Type of [address] One level  (2 [address])   Bathroom Shower/Tub Tub/shower unit   Home Equipment [place]   Prior Function   Level of [place] Independent with ADLs; Independent with functional mobility; Needs assistance with IADLS   Lives With Alone   Receives Help From Family   IADLs Independent with driving; Independent with meal prep   Falls in the last 6 months 1 to 4   ADL   Eating Assistance 5  Supervision/Setup   Grooming Assistance 4  Minimal Assistance   UB Bathing Assistance 3  Moderate Assistance   LB Bathing Assistance 2  Maximal Assistance   UB Dressing Assistance 3  Moderate Assistance   LB Dressing Assistance 2  Maximal Assistance   Toileting Assistance  2  Maximal Assistance   Bed Mobility   Supine to Sit 2  Maximal assistance   Sit to Supine 2  Maximal assistance   Additional Comments pt with dizziness upon sitting requesting to lay back down   Transfers   Sit to Stand Unable to assess   Balance   Static Sitting Poor   Dynamic Sitting Poor   Activity Tolerance   Activity Tolerance Patient limited by fatigue  (dizziness/lightheaded with sitting)   RUE Assessment   RUE Assessment WFL   LUE Assessment   LUE Assessment WFL   Cognition   Overall Cognitive Status WFL   Arousal/Participation Cooperative   Attention Within functional limits   Orientation Level Oriented X4   Following Commands Follows one step commands with increased time or repetition   Assessment   Limitation Decreased ADL status; Decreased UE strength;Decreased Safe judgement during ADL;Decreased endurance;Decreased self-care trans;Decreased high-level ADLs  (decreased balance and mobility)   Prognosis Good   Assessment Patient evaluated by Occupational Therapy    Patient admitted with Metastatic cancer ([facility] [address] )  The patients occupational profile, medical and therapy history includes a extensive additional review of physical, cognitive, or psychosocial history related to current functional performance  Comorbidities affecting functional mobility and ADLS include: hypertension  Prior to admission, patient was independent with functional mobility with cane, independent with ADLS and requiring assist for IADLS  The evaluation identifies the following performance deficits: weakness, impaired balance, decreased endurance, increased fall risk, new onset of impairment of functional mobility, decreased ADLS, decreased IADLS, decreased activity tolerance, decreased safety awareness, impaired judgement and decreased strength, that result in activity limitations and/or participation restrictions  This evaluation requires clinical decision making of high complexity, because the patient presents with comorbidites that affect occupational performance and required significant modification of tasks or assistance with consideration of multiple treatment options  The Barthel Index was used as a functional outcome tool presenting with a score of Barthel Index Score: 15, indicating marked limitations of functional mobility and ADLS  The patient's raw score on the -PAC Daily Activity inpatient short form is 11, standardized score is 29 04, less than 39 4  Patients at this level are likely to benefit from DC to post-acute rehabilitation services  Please refer to the recommendation of the Occupational Therapist for safe DC planning  Patient will benefit from skilled Occupational Therapy services to address above deficits and facilitate a safe return to prior level of function  Goals   Patient Goals to get stronger   STG Time Frame   (1-7 days)   Short Term Goal  Goals established to promote Patient Goals: to get stronger:  Eating: supervision; Grooming: supervision seated;  Bathing: mod assist; Upper Body Dressing mod assist; Lower Body Dressing: mod assist; Toileting: mod assist; Patient will increase standing tolerance to 2 minutes during ADL task to decrease assistance level and decrease fall risk; Patient will increase bed mobility to mod assist in preparation for ADLS and transfers; Patient will increase functional mobility to and from bathroom with rolling walker with mod assist to increase performance with ADLS and to use a toilet; Patient will tolerate 10 minutes of UE ROM/strengthening to increase general activity tolerance and performance in ADLS/IADLS; Patient will improve functional activity tolerance to 10 minutes of sustained functional tasks to increase participation in basic self-care and decrease assistance level;   Patient will increase dynamic sitting balance to poor+ to improve the ability to sit at edge of bed or on a chair for ADLS;  Patient will increase dynamic standing balance to poor+ to improve postural stability and decrease fall risk during standing ADLS and transfers  LTG Time Frame   (8-14 days)   Long Term Goal Eating: independent; Grooming: independent seated; Bathing: min assist; Upper Body Dressing min assist; Lower Body Dressing: min assist; Toileting: min assist; Patient will increase standing tolerance to 4 minutes during ADL task to decrease assistance level and decrease fall risk; Patient will increase bed mobility to min assist in preparation for ADLS and transfers;  Patient will increase functional mobility to and from bathroom with rolling walker with min assist to increase performance with ADLS and to use a toilet; Patient will tolerate 20 minutes of UE ROM/strengthening to increase general activity tolerance and performance in ADLS/IADLS; Patient will improve functional activity tolerance to 20 minutes of sustained functional tasks to increase participation in basic self-care and decrease assistance level;   Patient will increase dynamic sitting balance to fair- to improve the ability to sit at edge of bed or on a chair for ADLS;  Patient will increase dynamic standing balance to fair- to improve postural stability and decrease fall risk during standing ADLS and transfers  Pt will score >/= 15/24 on AM-PAC Daily Activity Inpatient scale to promote safe independence with ADLs and functional mobility; Pt will score >/= 45/100 on Barthel Index in order to decrease caregiver assistance needed and increase ability to perform ADLs and functional mobility  Functional Transfer Goals   Pt Will Perform All Functional Transfers   (STG mod assist LTG min assist)   Plan   Treatment Interventions ADL retraining;Functional transfer training;UE strengthening/ROM; Endurance training;Patient/family training;Equipment evaluation/education; Activityengagement; Compensatory technique education   Goal Expiration Date 11/07/22   OT Frequency 3-5x/wk   Recommendation   OT Discharge Recommendation Post acute rehabilitation services   AM-PAC Daily Activity Inpatient   Lower Body Dressing 1   Bathing 1   Toileting 1   Upper Body Dressing 2   Grooming 3   Eating 3   Daily Activity Raw Score 11   Daily Activity Standardized Score (Calc for Raw Score >=11) 29 04   AM-PAC Applied Cognition Inpatient   Following a Speech/Presentation 4   Understanding Ordinary Conversation 4   Taking Medications 4   Remembering Where Things Are Placed or Put Away 4   Remembering List of 4-5 Errands 4   Taking Care of Complicated Tasks 4   Applied Cognition Raw Score 24   Applied Cognition Standardized Score 62 21   Barthel Index   Feeding 5   Bathing 0   Grooming Score 0   Dressing Score 0   Bladder Score 5   Bowels Score 5   Toilet Use Score 0   Transfers (Bed/Chair) Score 0   Mobility (Level Surface) Score 0   Stairs Score 0   Barthel Index Score 15   Licensure   [place] License Number  Trevor Polo [address] OTR/L [id number]

## 2022-10-24 NOTE — PLAN OF CARE
Problem: Potential for Falls  Goal: Patient will remain free of falls  Description: INTERVENTIONS:  - Educate patient/family on patient safety including physical limitations  - Instruct patient to call for assistance with activity   - Consult OT/PT to assist with strengthening/mobility   - Keep Call bell within reach  - Keep bed low and locked with side rails adjusted as appropriate  - Keep care items and personal belongings within reach  - Initiate and maintain comfort rounds  - Make Fall Risk Sign visible to staff  - Offer Toileting every 2 Hours, in advance of need  - Initiate/Maintain bed alarm- Apply yellow socks and bracelet for high fall risk patients  - Consider moving patient to room near nurses station  Outcome: Progressing     Problem: Nutrition/Hydration-ADULT  Goal: Nutrient/Hydration intake appropriate for improving, restoring or maintaining nutritional needs  Description: Monitor and assess patient's nutrition/hydration status for malnutrition  Collaborate with interdisciplinary team and initiate plan and interventions as ordered  Monitor patient's weight and dietary intake as ordered or per policy  Utilize nutrition screening tool and intervene as necessary  Determine patient's food preferences and provide high-protein, high-caloric foods as appropriate       INTERVENTIONS:  - Monitor oral intake, urinary output, labs, and treatment plans  - Assess nutrition and hydration status and recommend course of action  - Evaluate amount of meals eaten  - Assist patient with eating if necessary   - Allow adequate time for meals  - Recommend/ encourage appropriate diets, oral nutritional supplements, and vitamin/mineral supplements  - Order, calculate, and assess calorie counts as needed  - Recommend, monitor, and adjust tube feedings and TPN/PPN based on assessed needs  - Assess need for intravenous fluids  - Provide specific nutrition/hydration education as appropriate  - Include patient/family/caregiver in decisions related to nutrition  Outcome: Progressing     Problem: MOBILITY - ADULT  Goal: Maintain or return to baseline ADL function  Description: INTERVENTIONS:  -  Assess patient's ability to carry out ADLs; assess patient's baseline for ADL function and identify physical deficits which impact ability to perform ADLs (bathing, care of mouth/teeth, toileting, grooming, dressing, etc )  - Assess/evaluate cause of self-care deficits   - Assess range of motion  - Assess patient's mobility; develop plan if impaired  - Assess patient's need for assistive devices and provide as appropriate  - Encourage maximum independence but intervene and supervise when necessary  - Involve family in performance of ADLs  - Assess for home care needs following discharge   - Consider OT consult to assist with ADL evaluation and planning for discharge  - Provide patient education as appropriate  Outcome: Progressing  Goal: Maintains/Returns to pre admission functional level  Description: INTERVENTIONS:  - Perform BMAT or MOVE assessment daily    - Set and communicate daily mobility goal to care team and patient/family/caregiver  - Collaborate with rehabilitation services on mobility goals if consulted  - Perform Range of Motion 3 times a day  - Reposition patient every 3 hours    - Dangle patient 3 times a day  - Stand patient 3 times a day  - Ambulate patient 3 times a day  - Out of bed to chair 3 times a day   - Out of bed for meals 3 times a day  - Out of bed for toileting  - Record patient progress and toleration of activity level   Outcome: Progressing     Problem: INFECTION - ADULT  Goal: Absence or prevention of progression during hospitalization  Description: INTERVENTIONS:  - Assess and monitor for signs and symptoms of infection  - Monitor lab/diagnostic results  - Monitor all insertion sites, i e  indwelling lines, tubes, and drains  - Monitor endotracheal if appropriate and nasal secretions for changes in amount and color  - [place] appropriate cooling/warming therapies per order  - Administer medications as ordered  - Instruct and encourage patient and family to use good hand hygiene technique  - Identify and instruct in appropriate isolation precautions for identified infection/condition  Outcome: Progressing  Goal: Absence of fever/infection during neutropenic period  Description: INTERVENTIONS:  - Monitor WBC    Outcome: Progressing     Problem: CARDIOVASCULAR - ADULT  Goal: Maintains optimal cardiac output and hemodynamic stability  Description: INTERVENTIONS:  - Monitor I/O, vital signs and rhythm  - Monitor for S/S and trends of decreased cardiac output  - Administer and titrate ordered vasoactive medications to optimize hemodynamic stability  - Assess quality of pulses, skin color and temperature  - Assess for signs of decreased coronary artery perfusion  - Instruct patient to report change in severity of symptoms  Outcome: Progressing  Goal: Absence of cardiac dysrhythmias or at baseline rhythm  Description: INTERVENTIONS:  - Continuous cardiac monitoring, vital signs, obtain 12 lead EKG if ordered  - Administer antiarrhythmic and heart rate control medications as ordered  - Monitor electrolytes and administer replacement therapy as ordered  Outcome: Progressing     Problem: METABOLIC, FLUID AND ELECTROLYTES - ADULT  Goal: Electrolytes maintained within normal limits  Description: INTERVENTIONS:  - Monitor labs and assess patient for signs and symptoms of electrolyte imbalances  - Administer electrolyte replacement as ordered  - Monitor response to electrolyte replacements, including repeat lab results as appropriate  - Instruct patient on fluid and nutrition as appropriate  Outcome: Progressing     Problem: Prexisting or High Potential for Compromised Skin Integrity  Goal: Skin integrity is maintained or improved  Description: INTERVENTIONS:  - Identify patients at risk for skin breakdown  - Assess and monitor skin integrity  - Assess and monitor nutrition and hydration status  - Monitor labs   - Assess for incontinence   - Turn and reposition patient  - Assist with mobility/ambulation  - Relieve pressure over bony prominences  - Avoid friction and shearing  - Provide appropriate hygiene as needed including keeping skin clean and dry  - Evaluate need for skin moisturizer/barrier cream  - Collaborate with interdisciplinary team   - Patient/family teaching  - Consider wound care consult   Outcome: Progressing

## 2022-10-24 NOTE — ANESTHESIA POSTPROCEDURE EVALUATION
Post-Op Assessment Note    CV Status:  Stable  Pain Score: 0    Pain management: adequate     Mental Status:  Sleepy   Hydration Status:  Stable   PONV Controlled:  None   Airway Patency:  Patent   Two or more mitigation strategies used for obstructive sleep apnea   Post Op Vitals Reviewed: Yes      Staff: CRNA         No complications documented      BP   144/67   Temp      Pulse 107   Resp 16   SpO2 99

## 2022-10-24 NOTE — CONSULTS
Interventional Radiology  Consultation 10/24/2022     Consults  Brenda Shaver   1945   [id number]      Assessment/Plan:  69year old female with large right lung mass and multiple additional bilateral pulmonary nodules  Sclerotic bone lesions also concerning for metastatic disease  IR has been consulted for bone lesions biopsy  Multiple bone lesions appear to be amenable for biopsy  Will plan for CT guided bone biopsy, timing to be decided pending IR schedule  Medical Problems             Problem List     * (Principal) Metastatic cancer ([facility] [address] )    Onychomycosis    Peripheral sensory neuropathy    Radiculopathy of lumbar region    Metatarsalgia of both feet    Dyslipidemia    Acquired hypothyroidism    Sjogren's syndrome (HCC)    Orthostatic hypotension    Recurrent syncope    Hypertension    Hypercholesteremia    Disease of thyroid gland    Acute kidney injury ([facility] [address] )    SIRS (systemic inflammatory response syndrome) (HCC)    Sacral wound    Generalized weakness    Chronic pain    Elevated glucose    Pulmonary nodules    Severe protein-calorie malnutrition (HCC)    Malnutrition Findings:   Adult Malnutrition type: Chronic illness  Adult Degree of Malnutrition: Other severe protein calorie malnutrition  Malnutrition Characteristics: Fat loss, Muscle loss, Weight loss                  360 Statement: Severe protein calorie malnutrion of chronic illness related to increased energy needs as evidenced by 12% weight loss in 1 month, hollow orbits, protruding clavicles, depression at the temples  Awaiting treatment plan    BMI Findings:  Adult BMI Classifications: Underweight < 18 5        Body mass index is 16 51 kg/m²  Coffee ground emesis    Anemia    Electrolyte abnormality    Fecal impaction (HCC)    Swelling of right upper extremity                  Subjective:     Patient ID: Brenda Shaver is a 69 y o  female      History of Present Illness  69year old female with large right lung mass and multiple additional bilateral pulmonary nodules  Sclerotic bone lesions also concerning for metastatic disease  IR has been consulted for bone lesions biopsy  Multiple bone lesions appear to be amenable for biopsy  Review of Systems  as above    Past Medical History:   Diagnosis Date   • Disease of thyroid gland    • Hypercholesteremia    • Hypertension         Past Surgical History:   Procedure Laterality Date   • APPENDECTOMY     • BREAST BIOPSY Right     benign   • CHOLECYSTECTOMY     • COLON SURGERY      ruptured bowel   • COLOSTOMY      and closure 6 months later   • HYSTERECTOMY  2002    total   • KNEE ARTHROSCOPY Bilateral     right x1 left x2   • IN COLSC FLX W/RMVL OF TUMOR POLYP LESION SNARE TQ N/A 5/3/2016    Procedure: COLONOSCOPY ;  Surgeon: Kyree Arciniega MD;  Location: [place] [address] [facility];   Service: Gastroenterology   • TONSILECTOMY AND ADNOIDECTOMY     • TYMPANOPLASTY Right         Social History     Tobacco Use   Smoking Status Former Smoker   • Packs/day:  00   • Years: 20    • Pack years:    • Quit date:    • Years since quittin 8   Smokeless Tobacco Never Used        Social History     Substance and Sexual Activity   Alcohol Use Not Currently        Social History     Substance and Sexual Activity   Drug Use No        Allergies   Allergen Reactions   • Penicillins Rash       Current Facility-Administered Medications   Medication Dose Route Frequency Provider Last Rate Last Admin   • acetaminophen (TYLENOL) tablet 650 mg  650 mg Oral Q6H PRN KIKO Jovel       • atorvastatin (LIPITOR) tablet 40 mg  40 mg Oral Daily With KIKO Hernandez   40 mg at 10/23/22 1758   • calcium carbonate (TUMS) chewable tablet 500 mg  500 mg Oral Daily PRN KIKO Marsh       • cholecalciferol (VITAMIN D3) tablet 1,000 Units  1,000 Units Oral Daily KIKO Jovel   1,000 Units at 10/23/22 0837   • dextrose 5 % infusion  50 mL/hr Intravenous Continuous Alicia Navarro MD 50 mL/hr at 10/23/22 2331 50 mL/hr at 10/23/22 2331   • docusate sodium (COLACE) capsule 100 mg  100 mg Oral BID KIKO Jovel   100 mg at 10/23/22 1758   • DULoxetine (CYMBALTA) delayed release capsule 30 mg  30 mg Oral Daily KIKO Jovel   30 mg at 35/25/03 6809   • folic acid (FOLVITE) tablet 1 mg  1 mg Oral Daily KIKO Jovel   1 mg at 10/23/22 0006   • HYDROmorphone (DILAUDID) injection 0 2 mg  0 2 mg Intravenous Q6H PRN KIKO Jovel       • insulin lispro (HumaLOG) 100 units/mL subcutaneous injection 1-5 Units  1-5 Units Subcutaneous TID AC KIKO Jovel   1 Units at 10/21/22 1701   • insulin lispro (HumaLOG) 100 units/mL subcutaneous injection 1-5 Units  1-5 Units Subcutaneous HS KIKO Jovel       • levothyroxine tablet 75 mcg  75 mcg Oral Early Morning Jamee Goodwin MD   75 mcg at 10/24/22 0535   • metoprolol tartrate (LOPRESSOR) partial tablet 12 5 mg  12 5 mg Oral Q12H [facility] KIKO Jovel   12 5 mg at 10/21/22 0842   • ondansetron (ZOFRAN) injection 4 mg  4 mg Intravenous Q6H PRN KIKO Jovel   4 mg at 10/24/22 0108   • oxybutynin (DITROPAN-XL) 24 hr tablet 5 mg  5 mg Oral Daily KIKO Jovel   5 mg at 10/23/22 5201   • pantoprazole (PROTONIX) injection 40 mg  40 mg Intravenous Q12H [facility] Paul Up MD   40 mg at 10/24/22 0830   • polyethylene glycol (MIRALAX) packet 17 g  17 g Oral Daily Paul Up MD   17 g at 10/23/22 0837   • saccharomyces boulardii (FLORASTOR) capsule 250 mg  250 mg Oral BID KIKO Jovel   250 mg at 10/23/22 1758          Objective:    Vitals:    10/23/22 2313 10/24/22 0523 10/24/22 0826 10/24/22 1307   BP: 93/60 95/58 95/58 92/55   BP Location:       Pulse: 91 88 92 93   Resp: 18      Temp: 97 5 °F (36 4 °C)  (!) 97 3 °F (36 3 °C) 97 6 °F (36 4 °C)   TempSrc:       SpO2: 97% 95% 94% 94%   Weight:       Height:            Physical Exam  Not performed    No results found for: BNP   Lab Results   Component Value Date WBC 13 08 (H) 10/24/2022    HGB 8 0 (L) 10/24/2022    HCT 27 0 (L) 10/24/2022    MCV 80 (L) 10/24/2022     (L) 10/24/2022     Lab Results   Component Value Date    INR 1 02 11/03/2017    PROTIME 10 7 11/03/2017     Lab Results   Component Value Date    PTT 24 11/03/2017         I have personally reviewed pertinent imaging and laboratory results  Code Status: Level 1 - Full Code  Advance Directive and Living Will: Yes    Power of :    POLST:      IR has been consulted to evaluate the patient, determine the appropriate procedure, and whether or not a procedure can and should be performed  Thank you for allowing me to participate in the care of Rachel Amin  Please don't hesitate to call, text, email, or TigerText with any questions  This text is generated with voice recognition software  There may be translation, syntax,  or grammatical errors  If you have any questions, please contact the dictating provider

## 2022-10-24 NOTE — PHYSICAL THERAPY NOTE
Attemped PT treatment however pt was off the floor for testing  Will follow    Josiane Magallanes PT  [id number]     10/24/22 1442   Note Type   Note Type Cancelled Session   Cancel Reasons Patient off floor/test

## 2022-10-24 NOTE — ANESTHESIA PREPROCEDURE EVALUATION
Procedure:  EGD    Relevant Problems   ANESTHESIA (within normal limits)      CARDIO   (+) Hypercholesteremia   (+) Hypertension      ENDO   (+) Acquired hypothyroidism      GI/HEPATIC   (+) Coffee ground emesis   (+) Fecal impaction (HCC)      /RENAL   (+) Acute kidney injury (HCC)      HEMATOLOGY   (+) Anemia      NEURO/PSYCH   (+) Chronic pain        Physical Exam    Airway    Mallampati score: II  TM Distance: >3 FB  Neck ROM: full     Dental   Comment: Denies loose ,     Cardiovascular  Rhythm: regular, Rate: normal,     Pulmonary  Breath sounds clear to auscultation,     Other Findings        Anesthesia Plan  ASA Score- 3     Anesthesia Type- IV sedation with anesthesia with ASA Monitors  Additional Monitors:   Airway Plan:           Plan Factors-    Chart reviewed  EKG reviewed  Existing labs reviewed  Patient summary reviewed  Patient is not a current smoker  Induction-     Postoperative Plan-     Informed Consent- Anesthetic plan and risks discussed with patient  I personally reviewed this patient with the CRNA  Discussed and agreed on the Anesthesia Plan with the CRNA  Melo Del Valle

## 2022-10-24 NOTE — UTILIZATION REVIEW
Continued Stay Review    Date: 10/24/22                          Current Patient Class: inpatient  Current Level of Care: med surg  HPI:77 y o  female initially admitted on 10/19/22     Assessment/Plan:   Metastatic cancer with large right lung mass and multiple additional bilateral pulmonary nodules  Sclerotic bone lesions also concerning for metastatic disease  IR consulted for bone lesions bx  [place] vomitus overnight, yellow brown stool after enema noted  Scheduled for EGD today  IV PPI continued  Hgb drop to 8, monitor  Vital Signs:   10/24/22 13:07:42 97 6 °F (36 4 °C) 93 -- 92/55 67 94 % -- --   10/24/22 08:26:30 97 3 °F (36 3 °C) Abnormal  92 -- 95/58 70 94 % -- --   10/24/22 05:23:44 -- 88 -- 95/58 70 95 % -- --     Pertinent Labs/Diagnostic Results:   10/21  CT chest abdomen pelvis wo contrast  large right lung mass and multiple additional bilateral pulmonary nodules  Sclerotic bone lesions also concerning for metastatic disease  Results from last 7 days   Lab Units 10/19/22  1540   SARS-COV-2  Negative     Results from last 7 days   Lab Units 10/24/22  0533 10/23/22  1825 10/23/22  0509 10/22/22  2021 10/22/22  1211 10/22/22  7472 10/21/22  0043 10/20/22  1758 10/20/22  0629 10/19/22  1540   WBC Thousand/uL 13 08*  --  14 46*  --   --  20 39*  --   --  30 63* 18 48*   HEMOGLOBIN g/dL 8 0* 9 0* 8 1* 8 3* 6 8* 7 0*   < > 10 9* 12 2 13 7   HEMATOCRIT % 27 0*  --  26 9*  --   --  24 6*  --  38 2 43 1 48 4*   PLATELETS Thousands/uL 102*  --  105*  --   --  124*  --   --  369 546*   NEUTROS ABS Thousands/µL  --   --   --   --   --   --   --   --  28 50* 14 77*    < > = values in this interval not displayed       Results from last 7 days   Lab Units 10/24/22  0533 10/23/22  0509 10/22/22  1211 10/22/22  5953 10/22/22  0043 10/20/22  1158 10/20/22  0629 10/20/22  0037 10/19/22  1548   SODIUM mmol/L 137 141 143 144 143   < > 153*   < >  --    POTASSIUM mmol/L 3 7 4 0 3 7 3 2* 3 1*   < > 4 0   < >  -- CHLORIDE mmol/L 105 109* 111* 110* 112*   < > 116*   < >  --    CO2 mmol/L 21 21 21 22 23   < > 25   < >  --    ANION GAP mmol/L 11 11 11 12 8   < > 12   < >  --    BUN mg/dL 17 29* 39* 44* 50*   < > 64*   < >  --    CREATININE mg/dL 0 70 0 76 0 80 0 95 1 00   < > 1 34*   < >  --    EGFR ml/min/1 73sq m 84 76 71 58 54   < > 38   < >  --    CALCIUM mg/dL 7 1* 7 2* 7 3* 7 6* 7 4*   < > 8 1*   < >  --    MAGNESIUM mg/dL 1 7 2 5  --  1 4*  --   --  1 9  --  2 5   PHOSPHORUS mg/dL 2 2* 2 2*  --  1 8*  --   --   --   --   --     < > = values in this interval not displayed       Results from last 7 days   Lab Units 10/22/22  0633 10/19/22  1540   AST U/L 37 29   ALT U/L <6* 12   ALK PHOS U/L 218* 195*   TOTAL PROTEIN g/dL 5 0* 8 2   ALBUMIN g/dL 2 2* 3 5   TOTAL BILIRUBIN mg/dL 0 35 0 73     Results from last 7 days   Lab Units 10/24/22  1137 10/24/22  0719 10/23/22  2054 10/23/22  1549 10/23/22  1120 10/23/22  0720 10/22/22  2118 10/22/22  1558 10/22/22  1119 10/22/22  0710 10/21/22  2041 10/21/22  1657   POC GLUCOSE mg/dl 104 117 125 126 109 117 118 141* 128 176* 149* 162*     Results from last 7 days   Lab Units 10/24/22  0533 10/23/22  0509 10/22/22  1211 10/22/22  5976 10/22/22  0043 10/21/22  1809 10/21/22  1309 10/21/22  0043 10/20/22  1758 10/20/22  1158 10/20/22  0629 10/20/22  0037   GLUCOSE RANDOM mg/dL 120 110 127 135 126 142* 116 138 150* 187* 252* 212*     Results from last 7 days   Lab Units 10/19/22  1540   HEMOGLOBIN A1C % 5 5   EAG mg/dl 111     Results from last 7 days   Lab Units 10/20/22  0629 10/19/22  1548   CK TOTAL U/L 98 118     Results from last 7 days   Lab Units 10/19/22  2005 10/19/22  1813 10/19/22  1548   HS TNI 0HR ng/L  --   --  109*   HS TNI 2HR ng/L  --  83*  --    HSTNI D2 ng/L  --  -26  --    HS TNI 4HR ng/L 78*  --   --    HSTNI D4 ng/L -31  --   --      Results from last 7 days   Lab Units 10/20/22  0629   TSH 3RD GENERATON uIU/mL 11 897*     Results from last 7 days   Lab Units 10/20/22  0629 10/19/22  1540   PROCALCITONIN ng/ml 0 53* 0 28*     Results from last 7 days   Lab Units 10/20/22  0400 10/20/22  0037 10/19/22  2225 10/19/22  2005 10/19/22  1813   LACTIC ACID mmol/L 1 7 3 1* 3 5* 4 0* 3 0*     Results from last 7 days   Lab Units 10/22/22  0633   FERRITIN ng/mL 260     Results from last 7 days   Lab Units 10/19/22  1900   CLARITY UA  Cloudy   COLOR UA  Yellow   SPEC GRAV UA  1 020   PH UA  6 0   GLUCOSE UA mg/dl Negative   KETONES UA mg/dl 15 (1+)*   BLOOD UA  Negative   PROTEIN UA mg/dl Trace*   NITRITE UA  Negative   BILIRUBIN UA  Small*   UROBILINOGEN UA E U /dl 1 0   LEUKOCYTES UA  Trace*   WBC UA /hpf 1-2   RBC UA /hpf None Seen   BACTERIA UA /hpf Moderate*   EPITHELIAL CELLS WET PREP /hpf Occasional     Results from last 7 days   Lab Units 10/19/22  1900 10/19/22  1548 10/19/22  1540   BLOOD CULTURE   --  No Growth After 4 Days  No Growth After 4 Days     URINE CULTURE  <10,000 cfu/ml   --   --      Medications:   Scheduled Medications:  atorvastatin, 40 mg, Oral, Daily With Dinner  cholecalciferol, 1,000 Units, Oral, Daily  docusate sodium, 100 mg, Oral, BID  DULoxetine, 30 mg, Oral, Daily  folic acid, 1 mg, Oral, Daily  insulin lispro, 1-5 Units, Subcutaneous, TID AC  insulin lispro, 1-5 Units, Subcutaneous, HS  levothyroxine, 75 mcg, Oral, Early Morning  metoprolol tartrate, 12 5 mg, Oral, Q12H MASTER  oxybutynin, 5 mg, Oral, Daily  pantoprazole, 40 mg, Intravenous, Q12H MASTER  polyethylene glycol, 17 g, Oral, Daily  potassium phosphate 9 mmol in sodium chloride 0 9 % 100 mL Infusion, Once  saccharomyces boulardii, 250 mg, Oral, BID    Continuous IV Infusions:  dextrose, 50 mL/hr, Intravenous, Continuous    PRN Meds:  acetaminophen, 650 mg, Oral, Q6H PRN  calcium carbonate, 500 mg, Oral, Daily PRN  Gadobutrol, 10 mL, Intravenous, Once in imaging  HYDROmorphone, 0 2 mg, Intravenous, Q6H PRN  ondansetron, 4 mg, Intravenous, Q6H PRN, 10/24 x1    Discharge Plan: [place]    Network Utilization Review Department  ATTENTION: Please call with any questions or concerns to [phone] and carefully listen to the prompts so that you are directed to the right person  All voicemails are confidential   [place] [place] all requests for admission clinical reviews, approved or denied determinations and any other requests to dedicated fax number below belonging to the campus where the patient is receiving treatment   List of dedicated fax numbers for the Facilities:  [address] DENIALS (Administrative/Medical Necessity) [phone]   [address] (Maternity/NICU/Pediatrics) [phone]   916 Cassi Romano [phone]   [facility] [phone]   [address] [address] [facility] [phone]   155 [facility] [place] [facility] 134 815 [facility] [phone]

## 2022-10-24 NOTE — PROGRESS NOTES
[facility] Internal Medicine Progress Note  Patient: Carol Colindres 69 y o  female   MRN: [id number]  PCP: Edwin Meyers MD  Unit/Bed#: [address] [place] [address] Encounter: [id number]  Date Of Visit: 10/24/22    Problem List:    Principal Problem:    Metastatic cancer ([facility] )  Active Problems:    Coffee ground emesis    Orthostatic hypotension    Acute kidney injury ([facility] )    SIRS (systemic inflammatory response syndrome) ([place] [address] )    Anemia    Fecal impaction ([place] [address] )    Dyslipidemia    Acquired hypothyroidism    Sjogren's syndrome ([facility]  )    Sacral wound    Generalized weakness    Chronic pain    Elevated glucose    Pulmonary nodules    Severe protein-calorie malnutrition (HCC)    Electrolyte abnormality    Swelling of right upper extremity      Assessment & Plan:    Coffee ground emesis  Assessment & Plan  Patient noted to have 2 episodes of coffee-ground emesis on 09/21, associated with tachycardia and hypotension setting of volume depletion   Hemoglobin noted to be downtrending, partly dilutional   No further evidence of bleeding or melena   Prior CT scan with large hiatal hernia   CT chest abdomen pelvis during current hospitalization without any acute abnormality, occult bleeding but noted to have hiatal hernia and fecal impaction  Hemoglobin continued to decline to 6 8 grams/deciliter, status post 1 PRBC on 10/22  Hemoglobin appropriately improved, no evidence of overt bleeding  Hemoglobin remains relatively stable  · Initially treated with IV Protonix drip, now will continue Protonix b i d   · GI evaluation appreciated, for EGD today  · Monitor H&H, transfuse as needed  · Follow-up EGD findings    * Metastatic cancer [facility])  Assessment & Plan  Patient reports intermittent lower abdominal pain since May of this year  Reports constipation at home  Reports weight loss since spring this year    · 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  · Patient was seen by PCP on 9/27, referred to Oncology as outpatient  No history of cancer  History of fibroid , status post hysterectomy and benign breast cyst   · CT chest abdomen pelvis 10/21-noted to have lobulated right upper lobe paramediastinal mass 6 x 4 x 8 5 cm with extension into the superior mediastinum, supraclavicular correlation and extension to and invasion of right upper lobe bronchial branch   Multiple scattered subcentimeter metastatic lung nodules noted in upper lobe bilaterally  Moderate right and small left effusion  Seen by Oncology, input appreciated  · Discussed with patient and POA regarding finding concerning for malignancy  Discussed that biopsy will be needed to confirm diagnosis  They are considering biopsy but not clear if they would like to proceed with any treatment currently  · Discussed with Pulmonary, recommended IR guided biopsy  Patient Jong Lewis is agreeable  · IR consulted for biopsy    Fecal impaction [facility])  Assessment & Plan  Status post partial improvement after enema, 10/21  · Moving bowels currently  · Continue Colace and MiraLax   · Enema p r n  Anemia  Assessment & Plan  Hemoglobin 13 7 grams/deciliter on admission, likely hemoconcentrated  Hemoglobin was 9 4 grams/deciliter on August 2022  Microcytic  Noted to have coffee-ground emesis during hospitalization  Previous B12 low normal  Iron studies-normal ferritin, low TIBC  Hemoglobin gradually declined to 6 8 grams/deciliter in setting of GI bleed and IV fluids  · Monitor H&H  · B12 replacement x1  · Monitor H&H, will transfuse if continues to decline or hemodynamic instability    SIRS (systemic inflammatory response syndrome) (HCC)  Assessment & Plan  SIRS versus Sepsis, POA, as evidenced by leukocytosis, tachycardia, hypothermia  Lactic acidosis  Patient is afebrile  · UA shows trace leukocytes, normal white count    SARS-CoV-2 PCR negative  · CXR- shows diffuse pulmonary nodules, no evidence infection   · Lactic acid 5 3 initially, repeat trended down  Likely multifactorial secondary to dehydration   · CT chest abdomen pelvis without any evidence of infection  · Blood cultures are negative so far  · Urine culture negative  ID follow ing, input appreciated   No definitive skin and soft tissue infection  Patient denies any urinary symptoms  Possible related to metastatic disease versus GI bleed  Remains afebrile  Persistent leukocytosis possible related to malignancy  · Initially received cefepime, subsequently transitioned to cefazolin pending cultures  · Follow-up blood cultures-negative so far  · Will discontinue antibiotics as cultures are negative      Acute kidney injury [facility])  Assessment & Plan  Baseline creatinine 0 7-0 8 in past year  Creatinine 1 68 on admission  UA without significant abnormality  Prior CT scan without any hydronephrosis  Likely prerenal in setting of dehydration and hypotension  Nephrology following, input appreciated  Improving with IV fluids to baseline  · Will discontinue IV fluid post EGD  · Monitor intake output   · Avoid nephrotoxin and hypotension  · Follow-up BMP    Orthostatic hypotension  Assessment & Plan  Noted history      Hypernatremia-resolved as of 10/23/2022  Assessment & Plan  Presented with sodium level of 160 secondary to decrease free water intake  Seen by Nephrology, input appreciated  Improved appropriately with hypotonic fluids  · Tolerating clear liquid diet  · Monitor BMP  · Monitor intake output   · Monitor p o  intake  · Hold D5 water, will resume D5 water maintenance if remains NPO    Swelling of right upper extremity  Assessment & Plan  Noted right upper extremity swelling, 10/23 without any tenderness or erythema  Likely dependent  · Elevate extremity as tolerated  · Follow-up venous Doppler to rule out DVT    Electrolyte abnormality  Assessment & Plan  Hypokalemia, hypophosphatemia, hypomagnesemia  · Replete and monitor    Severe protein-calorie malnutrition ([address] )  Assessment & Plan  Malnutrition Findings:   Adult Malnutrition type: Chronic illness  Adult Degree of Malnutrition: Other severe protein calorie malnutrition  Malnutrition Characteristics: Fat loss, Muscle loss, Weight loss                  360 Statement: Severe protein calorie malnutrion of chronic illness related to increased energy needs as evidenced by 12% weight loss in 1 month, hollow orbits, protruding clavicles, depression at the temples  Awaiting treatment plan    BMI Findings:  Adult BMI Classifications: Underweight < 18 5        Body mass index is 16 51 kg/m²  Pulmonary nodules  Assessment & Plan  · CXR:  2 x 5 5 cm right upper lobe paramediastinal nodule, and numerous additional smaller bilateral pulmonary nodules highly suspicious for malignancy  Both primary lung cancer and metastatic disease can have this appearance  · Refer to CT scan finding under metastatic cancer      Elevated glucose  Assessment & Plan  Glucose improving,  · Check A1c- 5 5%, SSI    Chronic pain  Assessment & Plan  Chronic arthralgia in lower back, left hip, knees, legs  Likely due to OA and Sjogren's syndrome  Patient follows Rheumatology in [facility] as outpatient  On methotrexate, Cymbalta, Voltaren, tramadol p r n  At home  Denies pain at present  · Continue Cymbalta,   · Hold methotrexate for now in view of sirs versus sepsis  · Hold Voltaren, tramadol   · Tylenol as needed    Generalized weakness  Assessment & Plan  Likely multifactorial secondary to dehydration, metastatic cancer  cane at baseline  · Treat underlying causes  · Nutrition support  · PT OT eval treat  · Fall precautions    Sacral wound  Assessment & Plan  Developed in 2 weeks since sister-in-law is away  Diffuse sacral DTI on exam with surrounding redness, no active drainage, scattered black eschar on exam Hard to exclude cellulitis    · Seen by surgery, input appreciated, continue wound care as per wound care    Sjogren's syndrome [facility])  Assessment & Plan  Patient is on methotrexate 12 5 mg p o  Weekly  Follows rheumatologist in [facility]  · Will hold methotrexate for now  · Resume on discharge  · Natural tears prn for dry eye  Acquired hypothyroidism  Assessment & Plan  Continue Synthroid  Reports not taking medications at home  · Elevated TSH, normal free T4   · Continue Synthroid      Dyslipidemia  Assessment & Plan  Continue statin    SVT (supraventricular tachycardia) (HCC)-resolved as of 10/23/2022  Assessment & Plan  Developed SVT in ED, heart rate 190  Converted to sinus rhythm after receiving Cardizem 10 mg IV  · Telemetry-no further episode, discontinued  · Optimize electrolytes  · Started on low-dose metoprolol     Dehydration-resolved as of 10/23/2022  Assessment & Plan  Patient presents with generalized weakness  Neighbor called police as they saw her trash can outside for a few days  Police found her on the floor  Patient reports rolling off the couch and was on the floor for about 2 hours  Patient denies hitting head  Reports lower abdominal pain since 5/2022  Denies any acute pain  Patient lives [place] - in-law normally takes her shopping and she was away for about 16 days  Patient reported nothing to eat at home and was hungry  · Improving with IV fluids        Elevated troponin-resolved as of 10/20/2022  Assessment & Plan  Troponin 109-83-78> wnl  · Initial EKG showed sinus tach, rate 115, inferior lateral ST depressions, prolonged QT   · Patient denies chest pain  · Likely type 2 MI secondary to # 1 and SIRS versus Sepsis  · Troponin downtrending  · Telemetry            VTE Pharmacologic Prophylaxis: VTE Score: 2 Moderate Risk (Score 3-4) - Pharmacological DVT Prophylaxis Contraindicated  Sequential Compression Devices Ordered      Patient Centered Rounds: I performed bedside rounds with nursing staff today   Discussions with Specialists or Other Care Team Provider:  yes    Education and Discussions with Family / Patient: Updated  (Sister-in-law) at bedside  Time Spent for Care: 45 minutes  More than 50% of total time spent on counseling and coordination of care as described above  Current Length of Stay: 5 day(s)  Current Patient Status: Inpatient   Certification Statement: The patient will continue to require additional inpatient hospital stay due to Electrolytes abnormality, GI bleeding  Discharge Plan: Anticipate discharge in >72 hrs to discharge location to be determined pending rehab evaluations  Code Status: Level 1 - Full Code    Subjective:   Episode of emesis with tan colored vomitus overnight  Large bowel movement after enema with yellowish brown stool    Comfortably lying in bed  Feels well  Reported that she had chest discomfort after vomiting  Denies any shortness of breath or abdominal pain      Objective:     Vitals:   Temp (24hrs), Av 7 °F (36 5 °C), Min:97 4 °F (36 3 °C), Max:98 2 °F (36 8 °C)    Temp:  [97 4 °F (36 3 °C)-98 2 °F (36 8 °C)] 97 5 °F (36 4 °C)  HR:  [88-94] 88  Resp:  [16-18] 18  BP: ()/(58-77) 95/58  SpO2:  [95 %-97 %] 95 % on room air  Body mass index is 16 51 kg/m²  Input and Output Summary (last 24 hours): Intake/Output Summary (Last 24 hours) at 10/24/2022 0650  Last data filed at 10/24/2022 0548  Gross per 24 hour   Intake 560 ml   Output 700 ml   Net -140 ml       Physical Exam:   Physical Exam  Constitutional:       General: She is not in acute distress  Appearance: She is ill-appearing  Comments: Cachectic, frail   HENT:      Head: Normocephalic and atraumatic  Cardiovascular:      Rate and Rhythm: Normal rate  Pulmonary:      Effort: Pulmonary effort is normal  No respiratory distress  Breath sounds: Normal breath sounds  No wheezing or rales        Comments: Diminished bilaterally  Abdominal:      General: Bowel sounds are normal  There is no distension  Palpations: Abdomen is soft  Tenderness: There is no abdominal tenderness  There is no guarding or rebound  Musculoskeletal:      Comments: Right upper extremity swelling   Skin:     General: Skin is warm and dry  Findings: Lesion (Sacral) present  No rash  Neurological:      Mental Status: She is alert  Mental status is at baseline  Additional Data:     Labs:  Results from last 7 days   Lab Units 10/24/22  0533 10/20/22  1758 10/20/22  0629   WBC Thousand/uL 13 08*   < > 30 63*   HEMOGLOBIN g/dL 8 0*   < > 12 2   HEMATOCRIT % 27 0*   < > 43 1   PLATELETS Thousands/uL 102*   < > 369   NEUTROS PCT %  --   --  90*   LYMPHS PCT %  --   --  5*   MONOS PCT %  --   --  4   EOS PCT %  --   --  0    < > = values in this interval not displayed  Results from last 7 days   Lab Units 10/24/22  0533 10/22/22  1211 10/22/22  0633   SODIUM mmol/L 137   < > 144   POTASSIUM mmol/L 3 7   < > 3 2*   CHLORIDE mmol/L 105   < > 110*   CO2 mmol/L 21   < > 22   BUN mg/dL 17   < > 44*   CREATININE mg/dL 0 70   < > 0 95   ANION GAP mmol/L 11   < > 12   CALCIUM mg/dL 7 1*   < > 7 6*   ALBUMIN g/dL  --   --  2 2*   TOTAL BILIRUBIN mg/dL  --   --  0 35   ALK PHOS U/L  --   --  218*   ALT U/L  --   --  <6*   AST U/L  --   --  37   GLUCOSE RANDOM mg/dL 120   < > 135    < > = values in this interval not displayed           Results from last 7 days   Lab Units 10/23/22  2054 10/23/22  1549 10/23/22  1120 10/23/22  0720 10/22/22  2118 10/22/22  1558 10/22/22  1119 10/22/22  0710 10/21/22  2041 10/21/22  1657 10/21/22  1107 10/21/22  0723   POC GLUCOSE mg/dl 125 126 109 117 118 141* 128 176* 149* 162* 120 172*     Results from last 7 days   Lab Units 10/19/22  1540   HEMOGLOBIN A1C % 5 5     Results from last 7 days   Lab Units 10/20/22  0629 10/20/22  0400 10/20/22  0037 10/19/22  2225 10/19/22  2005 10/19/22  1548 10/19/22  1540   LACTIC ACID mmol/L  --  1 7 3 1* 3 5* 4 0*   < >  --    PROCALCITONIN ng/ml 0 53*  --   --   --   --   --  0 28*    < > = values in this interval not displayed  Lines/Drains:  Invasive Devices  Report    Peripheral Intravenous Line  Duration           Long-Dwell Peripheral IV (Midline) 02/01/24 Right Basilic 2 days    Peripheral IV 10/23/22 Dorsal (posterior); Left Hand <1 day          Drain  Duration           External Urinary Catheter 3 days                         Imaging: Reviewed radiology reports from this admission including: chest xray, abdominal/pelvic CT and CT head    Recent Cultures (last 7 days):   Results from last 7 days   Lab Units 10/19/22  1900 10/19/22  1548 10/19/22  1540   BLOOD CULTURE   --  No Growth After 4 Days  No Growth After 4 Days     URINE CULTURE  <10,000 cfu/ml   --   --        Last 24 Hours Medication List:   Current Facility-Administered Medications   Medication Dose Route Frequency Provider Last Rate   • acetaminophen  650 mg Oral Q6H PRN KIKO Jovel     • atorvastatin  40 mg Oral Daily With KIKO Hernandez     • calcium carbonate  500 mg Oral Daily PRN KIKO Stroud     • cholecalciferol  1,000 Units Oral Daily KIKO Jovel     • dextrose  50 mL/hr Intravenous Continuous Abdi Huerta MD 50 mL/hr (10/23/22 3831)   • docusate sodium  100 mg Oral BID KIKO Jovel     • DULoxetine  30 mg Oral Daily KIKO Jovel     • folic acid  1 mg Oral Daily KIKO Jovel     • HYDROmorphone  0 2 mg Intravenous Q6H PRN KIKO Jovel     • insulin lispro  1-5 Units Subcutaneous TID AC KIKO Jovel     • insulin lispro  1-5 Units Subcutaneous HS KIKO Jovel     • levothyroxine  75 mcg Oral Early Morning Iram Lemus MD     • metoprolol tartrate  12 5 mg Oral Q12H [facility] KIKO Jovel     • ondansetron  4 mg Intravenous Q6H PRN KIKO Jovel     • oxybutynin  5 mg Oral Daily KIKO Jovel     • pantoprazole  40 mg Intravenous Q12H [address], MD     • polyethylene glycol  17 g Oral Daily Sánchez Hernandez MD     • saccharomyces boulardii  250 mg Oral BID KIKO Jovel          Today, Patient Was Seen By: Sánchez Hernandez    ** Please Note: "This note has been constructed using a voice recognition system  Therefore there may be syntax, spelling, and/or grammatical errors   Please call if you have any questions  "**

## 2022-10-24 NOTE — CASE MANAGEMENT
Case Management Discharge Planning Note    Patient name John Murrell  Location [address] 0 MRN [id number]  : 1945 Date 10/24/2022       Current Admission Date: 10/19/2022  Current Admission Diagnosis:Metastatic cancer [facility])   Patient Active Problem List    Diagnosis Date Noted   • Swelling of right upper extremity 10/23/2022   • Coffee ground emesis 10/22/2022   • Anemia 10/22/2022   • Electrolyte abnormality 10/22/2022   • Fecal impaction ([address] ) 10/22/2022   • Severe protein-calorie malnutrition ([address] ) 10/21/2022   • Chronic pain 10/20/2022   • Elevated glucose 10/20/2022   • Pulmonary nodules 10/20/2022   • Hypertension 10/19/2022   • Hypercholesteremia 10/19/2022   • Disease of thyroid gland 10/19/2022   • Acute kidney injury ([address] ) 10/19/2022   • SIRS (systemic inflammatory response syndrome) ([address] ) 10/19/2022   • Sacral wound 10/19/2022   • Metastatic cancer ([address] ) 10/19/2022   • Generalized weakness 10/19/2022   • Dyslipidemia 2019   • Acquired hypothyroidism 2019   • Sjogren's syndrome ([address] ) 2019   • Orthostatic hypotension 2019   • Recurrent syncope 2019   • Onychomycosis 2019   • Peripheral sensory neuropathy 2019   • Radiculopathy of lumbar region 2019   • Metatarsalgia of both feet 2019      LOS (days): 5  Geometric Mean LOS (GMLOS) (days): 3 50  Days to GMLOS:-1 5     OBJECTIVE:  Risk of Unplanned Readmission Score: 14 24     Current admission status: Inpatient   Preferred Pharmacy:   [address] [place] [address]  Phone: [phone] Fax: [phone]    Primary Care Provider: Luis Noland MD    Primary Insurance: [facility]  Secondary Insurance:     DISCHARGE DETAILS:    Discharge planning discussed with[de-identified] Patient and sister, Maple Oppenheim of Choice: Yes  Comments - Freedom of Choice: SW following to monitor needs and assist with planning  Anna Crespo during visit with pt and sister  Dr Mo Crespo provided medical update and information on upcoming tests  SW discussed options for discharge including STR  Both pt and sister are requesting STR placement  SW offered to make blanket referrals to determine availability and to share list of available facilities with pt and sister so they consider options  Pt and sister are in agreement with referral plan  SW offered ongoing support and assistance with planning  Will follow  CM contacted family/caregiver?: Yes  Were Treatment Team discharge recommendations reviewed with patient/caregiver?: Yes  Did patient/caregiver verbalize understanding of patient care needs?: N/A- going to facility  Were patient/caregiver advised of the risks associated with not following Treatment Team discharge recommendations?: Yes    Contacts  Patient Contacts: Alexis Mendez  Relationship to Patient[de-identified] Family  Contact Method: In Person  Reason/Outcome: Discharge [address]         Is the patient interested in [facility] at discharge?: No    DME Referral Provided  Referral made for DME?: No    Other Referral/Resources/Interventions Provided:  Interventions: Short Term Rehab  Referral Comments: [facility] referrals made  Rehab authorization request will need to be submitted once accepting facility is determined      Treatment Team Recommendation: Short Term Rehab  Discharge Destination Plan[de-identified] Short Term Rehab  Transport at Discharge : BLS Ambulance

## 2022-10-24 NOTE — PLAN OF CARE
Problem: Potential for Falls  Goal: Patient will remain free of falls  Description: INTERVENTIONS:  - Educate patient/family on patient safety including physical limitations  - Instruct patient to call for assistance with activity   - Consult OT/PT to assist with strengthening/mobility   - Keep Call bell within reach  - Keep bed low and locked with side rails adjusted as appropriate  - Keep care items and personal belongings within reach  - Initiate and maintain comfort rounds  - Make Fall Risk Sign visible to staff  - Offer Toileting every 2 Hours, in advance of need  - Initiate/Maintain bed alarm- Apply yellow socks and bracelet for high fall risk patients  - Consider moving patient to room near nurses station  Outcome: Progressing     Problem: MOBILITY - ADULT  Goal: Maintain or return to baseline ADL function  Description: INTERVENTIONS:  -  Assess patient's ability to carry out ADLs; assess patient's baseline for ADL function and identify physical deficits which impact ability to perform ADLs (bathing, care of mouth/teeth, toileting, grooming, dressing, etc )  - Assess/evaluate cause of self-care deficits   - Assess range of motion  - Assess patient's mobility; develop plan if impaired  - Assess patient's need for assistive devices and provide as appropriate  - Encourage maximum independence but intervene and supervise when necessary  - Involve family in performance of ADLs  - Assess for home care needs following discharge   - Consider OT consult to assist with ADL evaluation and planning for discharge  - Provide patient education as appropriate  Outcome: Progressing     Problem: Prexisting or High Potential for Compromised Skin Integrity  Goal: Skin integrity is maintained or improved  Description: INTERVENTIONS:  - Identify patients at risk for skin breakdown  - Assess and monitor skin integrity  - Assess and monitor nutrition and hydration status  - Monitor labs   - Assess for incontinence   - Turn and reposition patient  - Assist with mobility/ambulation  - Relieve pressure over bony prominences  - Avoid friction and shearing  - Provide appropriate hygiene as needed including keeping skin clean and dry  - Evaluate need for skin moisturizer/barrier cream  - Collaborate with interdisciplinary team   - Patient/family teaching  - Consider wound care consult   Outcome: Progressing

## 2022-10-24 NOTE — PROGRESS NOTES
Progress Note - Infectious Disease   Shobha Bateman 69 y o  female MRN: [id number]  Unit/Bed#: [address] [place] [address] Encounter: [id number]      IMPRESSION & RECOMMENDATIONS:   Impression/Recommendations:  1   SIRS versus severe sepsis, POA   Tachycardia, tachypnea, leukocytosis, elevated lactic acid   No fevers  Most likely reactive secondary to multiple metabolic derangements and volume depletion, in addition to probable malignancy  Also now #5 is likely contributing   Consider urinary source given reported urinary frequency however no pyuria on UA and urine culture showed low colony count of mixed contaminants   Negative CXR, CT C/A/P for infection   Negative COVID-19  No overt evidence of sacral SSTI  Blood cultures remain negative   No history of MDROs   WBC count continues to trend down  Remains afebrile and hemodynamically stable off antibiotics     -continue to observe off anymore antibiotics  -follow temperatures and hemodynamics  -follow CBC     2  Sacral DTI  Superficial with no overt evidence of acute infection  Surgery input appreciated with no indication for surgical debridement at this time      -no antibiotics indicated  -daily local wound care  -frequent repositioning  -serial exams     3  Acute kidney injury   Likely due to volume depletion from poor oral intake/hypotension   Creatinine has improved with IVFs    Nephrology input noted      -follow CBC  -volume management per Nephrology     4  Progressive weight loss, anorexia secondary to metastatic malignancy   Recent outpatient abdominal CT showed numerous lung nodules and sclerosis of T11 and L2 vertebral bodies concerning for metastasis   Patient had scheduled follow-up with Oncology as outpatient  CT now shows right upper lobe paramediastinal mass with extension into superior mediastinum, right upper lobe bronchial branch       -pulmonology, oncology evaluations appreciated   -possible eventual plan for biopsy     5  Acute anemia    With reported coffee-ground emesis  Hemoglobin has trended down since admission  GI input noted      -trend hemoglobin  -GI follow-up with plan for EGD today     5  Sjogren syndrome   On weekly methotrexate   Follows with Rheumatology at [facility]      6  Elevated TSH/hypothyroidism   Recently off meds   Management per primary service      7  Penicillin allergy   With reported history of rash   Tolerating cefazolin without difficulty      Antibiotics:  Off antibiotic D2     I discussed above plan with Dr Xiomara Daniel from Internal Medicine Service  Subjective:  Overall feeling better but she is NPO for EGD today  Denies shortness of breath, cough, fevers or chills  Objective:  Vitals:  Temp:  [97 3 °F (36 3 °C)-98 2 °F (36 8 °C)] 97 3 °F (36 3 °C)  HR:  [88-94] 92  Resp:  [16-18] 18  BP: ()/(58-77) 95/58  SpO2:  [94 %-97 %] 94 %  Temp (24hrs), Av 7 °F (36 5 °C), Min:97 3 °F (36 3 °C), Max:98 2 °F (36 8 °C)  Current: Temperature: (!) 97 3 °F (36 3 °C)    Physical Exam:   General:  No acute distress, very thin, nontoxic  HEENT:  Atraumatic normocephalic  Psychiatric:  Awake and alert  Pulmonary:  Normal respiratory excursion without accessory muscle use  Abdomen:  Soft, nontender  Extremities:  No edema or cyanosis  Skin:  No diffuse rashes  Neuro: Moves all extremities spontaneously    Lab Results:  I have personally reviewed pertinent labs    Results from last 7 days   Lab Units 10/24/22  0533 10/23/22  0509 10/22/22  1211 10/22/22  0633 10/20/22  0037 10/19/22  1540   POTASSIUM mmol/L 3 7 4 0 3 7 3 2*   < > 3 0*   CHLORIDE mmol/L 105 109* 111* 110*   < > 116*   CO2 mmol/L 21 21 21 22   < > 25   BUN mg/dL 17 29* 39* 44*   < > 61*   CREATININE mg/dL 0 70 0 76 0 80 0 95   < > 1 68*   EGFR ml/min/1 73sq m 84 76 71 58   < > 29   CALCIUM mg/dL 7 1* 7 2* 7 3* 7 6*   < > 9 5   AST U/L  --   --   --  37  --  29   ALT U/L  --   --   --  <6*  --  12   ALK PHOS U/L  --   --   --  218*  --  195*    < > = values in this interval not displayed  Results from last 7 days   Lab Units 10/24/22  0533 10/23/22  1825 10/23/22  0509 10/22/22  1211 10/22/22  0633   WBC Thousand/uL 13 08*  --  14 46*  --  20 39*   HEMOGLOBIN g/dL 8 0* 9 0* 8 1*   < > 7 0*   PLATELETS Thousands/uL 102*  --  105*  --  124*    < > = values in this interval not displayed  Results from last 7 days   Lab Units 10/19/22  1900 10/19/22  1548 10/19/22  1540   BLOOD CULTURE   --  No Growth After 4 Days  No Growth After 4 Days  URINE CULTURE  <10,000 cfu/ml   --   --        Imaging Studies:   I have personally reviewed pertinent imaging study reports and images in PACS  EKG, Pathology, and Other Studies:   I have personally reviewed pertinent reports

## 2022-10-24 NOTE — ANESTHESIA PREPROCEDURE EVALUATION
Procedure:  EGD    Relevant Problems   CARDIO   (+) Hypercholesteremia   (+) Hypertension      ENDO   (+) Acquired hypothyroidism      GI/HEPATIC   (+) Coffee ground emesis   (+) Fecal impaction (HCC)      /RENAL   (+) Acute kidney injury ([facility] [address] )      HEMATOLOGY   (+) Anemia      NEURO/PSYCH   (+) Chronic pain             Anesthesia Plan  ASA Score- 2     Anesthesia Type- IV sedation with anesthesia with ASA Monitors           Additional Monitors:   Airway Plan:           Plan Factors-    Induction-     Postoperative Plan-     Informed Consent-

## 2022-10-24 NOTE — PLAN OF CARE
Problem: Potential for Falls  Goal: Patient will remain free of falls  Description: INTERVENTIONS:  - Educate patient/family on patient safety including physical limitations  - Instruct patient to call for assistance with activity   - Consult OT/PT to assist with strengthening/mobility   - Keep Call bell within reach  - Keep bed low and locked with side rails adjusted as appropriate  - Keep care items and personal belongings within reach  - Initiate and maintain comfort rounds  - Make Fall Risk Sign visible to staff  - Offer Toileting every 2 Hours, in advance of need  - Initiate/Maintain bed alarm- Apply yellow socks and bracelet for high fall risk patients  - Consider moving patient to room near nurses station  Outcome: Progressing     Problem: Nutrition/Hydration-ADULT  Goal: Nutrient/Hydration intake appropriate for improving, restoring or maintaining nutritional needs  Description: Monitor and assess patient's nutrition/hydration status for malnutrition  Collaborate with interdisciplinary team and initiate plan and interventions as ordered  Monitor patient's weight and dietary intake as ordered or per policy  Utilize nutrition screening tool and intervene as necessary  Determine patient's food preferences and provide high-protein, high-caloric foods as appropriate       INTERVENTIONS:  - Monitor oral intake, urinary output, labs, and treatment plans  - Assess nutrition and hydration status and recommend course of action  - Evaluate amount of meals eaten  - Assist patient with eating if necessary   - Allow adequate time for meals  - Recommend/ encourage appropriate diets, oral nutritional supplements, and vitamin/mineral supplements  - Order, calculate, and assess calorie counts as needed  - Recommend, monitor, and adjust tube feedings and TPN/PPN based on assessed needs  - Assess need for intravenous fluids  - Provide specific nutrition/hydration education as appropriate  - Include patient/family/caregiver in decisions related to nutrition  Outcome: Progressing     Problem: MOBILITY - ADULT  Goal: Maintain or return to baseline ADL function  Description: INTERVENTIONS:  -  Assess patient's ability to carry out ADLs; assess patient's baseline for ADL function and identify physical deficits which impact ability to perform ADLs (bathing, care of mouth/teeth, toileting, grooming, dressing, etc )  - Assess/evaluate cause of self-care deficits   - Assess range of motion  - Assess patient's mobility; develop plan if impaired  - Assess patient's need for assistive devices and provide as appropriate  - Encourage maximum independence but intervene and supervise when necessary  - Involve family in performance of ADLs  - Assess for home care needs following discharge   - Consider OT consult to assist with ADL evaluation and planning for discharge  - Provide patient education as appropriate  Outcome: Progressing  Goal: Maintains/Returns to pre admission functional level  Description: INTERVENTIONS:  - Perform BMAT or MOVE assessment daily    - Set and communicate daily mobility goal to care team and patient/family/caregiver  - Collaborate with rehabilitation services on mobility goals if consulted  - Perform Range of Motion 3 times a day  - Reposition patient every 3 hours    - Dangle patient 3 times a day  - Stand patient 3 times a day  - Ambulate patient 3 times a day  - Out of bed to chair 3 times a day   - Out of bed for meals 3 times a day  - Out of bed for toileting  - Record patient progress and toleration of activity level   Outcome: Progressing     Problem: INFECTION - ADULT  Goal: Absence or prevention of progression during hospitalization  Description: INTERVENTIONS:  - Assess and monitor for signs and symptoms of infection  - Monitor lab/diagnostic results  - Monitor all insertion sites, i e  indwelling lines, tubes, and drains  - Monitor endotracheal if appropriate and nasal secretions for changes in amount and color  - [place] appropriate cooling/warming therapies per order  - Administer medications as ordered  - Instruct and encourage patient and family to use good hand hygiene technique  - Identify and instruct in appropriate isolation precautions for identified infection/condition  Outcome: Progressing  Goal: Absence of fever/infection during neutropenic period  Description: INTERVENTIONS:  - Monitor WBC    Outcome: Progressing     Problem: CARDIOVASCULAR - ADULT  Goal: Maintains optimal cardiac output and hemodynamic stability  Description: INTERVENTIONS:  - Monitor I/O, vital signs and rhythm  - Monitor for S/S and trends of decreased cardiac output  - Administer and titrate ordered vasoactive medications to optimize hemodynamic stability  - Assess quality of pulses, skin color and temperature  - Assess for signs of decreased coronary artery perfusion  - Instruct patient to report change in severity of symptoms  Outcome: Progressing  Goal: Absence of cardiac dysrhythmias or at baseline rhythm  Description: INTERVENTIONS:  - Continuous cardiac monitoring, vital signs, obtain 12 lead EKG if ordered  - Administer antiarrhythmic and heart rate control medications as ordered  - Monitor electrolytes and administer replacement therapy as ordered  Outcome: Progressing     Problem: METABOLIC, FLUID AND ELECTROLYTES - ADULT  Goal: Electrolytes maintained within normal limits  Description: INTERVENTIONS:  - Monitor labs and assess patient for signs and symptoms of electrolyte imbalances  - Administer electrolyte replacement as ordered  - Monitor response to electrolyte replacements, including repeat lab results as appropriate  - Instruct patient on fluid and nutrition as appropriate  Outcome: Progressing     Problem: Prexisting or High Potential for Compromised Skin Integrity  Goal: Skin integrity is maintained or improved  Description: INTERVENTIONS:  - Identify patients at risk for skin breakdown  - Assess and monitor skin integrity  - Assess and monitor nutrition and hydration status  - Monitor labs   - Assess for incontinence   - Turn and reposition patient  - Assist with mobility/ambulation  - Relieve pressure over bony prominences  - Avoid friction and shearing  - Provide appropriate hygiene as needed including keeping skin clean and dry  - Evaluate need for skin moisturizer/barrier cream  - Collaborate with interdisciplinary team   - Patient/family teaching  - Consider wound care consult   Outcome: Progressing

## 2022-10-25 ENCOUNTER — APPOINTMENT (INPATIENT)
Dept: RADIOLOGY | Facility: HOSPITAL | Age: 77
End: 2022-10-25

## 2022-10-25 ENCOUNTER — APPOINTMENT (INPATIENT)
Dept: RADIOLOGY | Facility: HOSPITAL | Age: 77
End: 2022-10-25
Attending: INTERNAL MEDICINE

## 2022-10-25 LAB
ANION GAP SERPL CALCULATED.3IONS-SCNC: 7 MMOL/L (ref 4–13)
ANION GAP SERPL CALCULATED.3IONS-SCNC: 8 MMOL/L (ref 4–13)
BACTERIA BLD CULT: NORMAL
BACTERIA BLD CULT: NORMAL
BUN SERPL-MCNC: 10 MG/DL (ref 5–25)
BUN SERPL-MCNC: 11 MG/DL (ref 5–25)
CALCIUM SERPL-MCNC: 7 MG/DL (ref 8.3–10.1)
CALCIUM SERPL-MCNC: 7.4 MG/DL (ref 8.3–10.1)
CHLORIDE SERPL-SCNC: 102 MMOL/L (ref 96–108)
CHLORIDE SERPL-SCNC: 96 MMOL/L (ref 96–108)
CO2 SERPL-SCNC: 22 MMOL/L (ref 21–32)
CO2 SERPL-SCNC: 23 MMOL/L (ref 21–32)
CREAT SERPL-MCNC: 0.55 MG/DL (ref 0.6–1.3)
CREAT SERPL-MCNC: 0.57 MG/DL (ref 0.6–1.3)
ERYTHROCYTE [DISTWIDTH] IN BLOOD BY AUTOMATED COUNT: 20.1 % (ref 11.6–15.1)
GFR SERPL CREATININE-BSD FRML MDRD: 90 ML/MIN/1.73SQ M
GFR SERPL CREATININE-BSD FRML MDRD: 91 ML/MIN/1.73SQ M
GLUCOSE SERPL-MCNC: 100 MG/DL (ref 65–140)
GLUCOSE SERPL-MCNC: 101 MG/DL (ref 65–140)
GLUCOSE SERPL-MCNC: 104 MG/DL (ref 65–140)
GLUCOSE SERPL-MCNC: 111 MG/DL (ref 65–140)
GLUCOSE SERPL-MCNC: 336 MG/DL (ref 65–140)
GLUCOSE SERPL-MCNC: 84 MG/DL (ref 65–140)
GLUCOSE SERPL-MCNC: 98 MG/DL (ref 65–140)
HCT VFR BLD AUTO: 24.5 % (ref 34.8–46.1)
HGB BLD-MCNC: 7.7 G/DL (ref 11.5–15.4)
MAGNESIUM SERPL-MCNC: 1.4 MG/DL (ref 1.6–2.6)
MCH RBC QN AUTO: 24.2 PG (ref 26.8–34.3)
MCHC RBC AUTO-ENTMCNC: 31.4 G/DL (ref 31.4–37.4)
MCV RBC AUTO: 77 FL (ref 82–98)
PHOSPHATE SERPL-MCNC: 2.2 MG/DL (ref 2.3–4.1)
PLATELET # BLD AUTO: 140 THOUSANDS/UL (ref 149–390)
PMV BLD AUTO: 10.8 FL (ref 8.9–12.7)
POTASSIUM SERPL-SCNC: 3.6 MMOL/L (ref 3.5–5.3)
POTASSIUM SERPL-SCNC: 3.7 MMOL/L (ref 3.5–5.3)
RBC # BLD AUTO: 3.18 MILLION/UL (ref 3.81–5.12)
SODIUM SERPL-SCNC: 125 MMOL/L (ref 135–147)
SODIUM SERPL-SCNC: 133 MMOL/L (ref 135–147)
WBC # BLD AUTO: 10.83 THOUSAND/UL (ref 4.31–10.16)

## 2022-10-25 PROCEDURE — 0QB23ZX EXCISION OF RIGHT PELVIC BONE, PERCUTANEOUS APPROACH, DIAGNOSTIC: ICD-10-PCS | Performed by: INTERNAL MEDICINE

## 2022-10-25 RX ORDER — HEPARIN SODIUM 5000 [USP'U]/ML
5000 INJECTION, SOLUTION INTRAVENOUS; SUBCUTANEOUS EVERY 8 HOURS SCHEDULED
Status: DISCONTINUED | OUTPATIENT
Start: 2022-10-25 | End: 2022-10-31

## 2022-10-25 RX ORDER — SODIUM CHLORIDE 9 MG/ML
75 INJECTION, SOLUTION INTRAVENOUS CONTINUOUS
Status: DISCONTINUED | OUTPATIENT
Start: 2022-10-25 | End: 2022-10-31

## 2022-10-25 RX ORDER — ONDANSETRON 2 MG/ML
4 INJECTION INTRAMUSCULAR; INTRAVENOUS EVERY 6 HOURS PRN
Status: DISCONTINUED | OUTPATIENT
Start: 2022-10-25 | End: 2022-11-02 | Stop reason: HOSPADM

## 2022-10-25 RX ORDER — LIDOCAINE HYDROCHLORIDE 10 MG/ML
INJECTION, SOLUTION EPIDURAL; INFILTRATION; INTRACAUDAL; PERINEURAL CODE/TRAUMA/SEDATION MEDICATION
Status: DISCONTINUED | OUTPATIENT
Start: 2022-10-25 | End: 2022-11-02 | Stop reason: HOSPADM

## 2022-10-25 RX ADMIN — FOLIC ACID 1 MG: 1 TABLET ORAL at 09:32

## 2022-10-25 RX ADMIN — DULOXETINE HYDROCHLORIDE 30 MG: 30 CAPSULE, DELAYED RELEASE ORAL at 09:32

## 2022-10-25 RX ADMIN — SODIUM CHLORIDE, SODIUM LACTATE, POTASSIUM CHLORIDE, AND CALCIUM CHLORIDE 500 ML: .6; .31; .03; .02 INJECTION, SOLUTION INTRAVENOUS at 21:11

## 2022-10-25 RX ADMIN — Medication 250 MG: at 09:32

## 2022-10-25 RX ADMIN — SUCRALFATE 1 G: 1 TABLET ORAL at 09:31

## 2022-10-25 RX ADMIN — OXYBUTYNIN 5 MG: 5 TABLET, FILM COATED, EXTENDED RELEASE ORAL at 09:31

## 2022-10-25 RX ADMIN — DOCUSATE SODIUM 100 MG: 100 CAPSULE, LIQUID FILLED ORAL at 09:32

## 2022-10-25 RX ADMIN — LIDOCAINE HYDROCHLORIDE 10 ML: 10 INJECTION, SOLUTION EPIDURAL; INFILTRATION; INTRACAUDAL; PERINEURAL at 14:39

## 2022-10-25 RX ADMIN — ONDANSETRON 4 MG: 2 INJECTION INTRAMUSCULAR; INTRAVENOUS at 09:00

## 2022-10-25 RX ADMIN — ATORVASTATIN CALCIUM 40 MG: 40 TABLET, FILM COATED ORAL at 17:40

## 2022-10-25 RX ADMIN — SODIUM CHLORIDE 75 ML/HR: 0.9 INJECTION, SOLUTION INTRAVENOUS at 09:21

## 2022-10-25 RX ADMIN — SODIUM CHLORIDE 75 ML/HR: 0.9 INJECTION, SOLUTION INTRAVENOUS at 23:17

## 2022-10-25 RX ADMIN — SUCRALFATE 1 G: 1 TABLET ORAL at 21:25

## 2022-10-25 RX ADMIN — SUCRALFATE 1 G: 1 TABLET ORAL at 17:40

## 2022-10-25 RX ADMIN — Medication 1000 UNITS: at 09:32

## 2022-10-25 RX ADMIN — HEPARIN SODIUM 5000 UNITS: 5000 INJECTION INTRAVENOUS; SUBCUTANEOUS at 21:25

## 2022-10-25 RX ADMIN — DOCUSATE SODIUM 100 MG: 100 CAPSULE, LIQUID FILLED ORAL at 17:39

## 2022-10-25 RX ADMIN — PANTOPRAZOLE SODIUM 40 MG: 40 INJECTION, POWDER, FOR SOLUTION INTRAVENOUS at 09:00

## 2022-10-25 RX ADMIN — Medication 250 MG: at 17:40

## 2022-10-25 RX ADMIN — PANTOPRAZOLE SODIUM 40 MG: 40 INJECTION, POWDER, FOR SOLUTION INTRAVENOUS at 21:25

## 2022-10-25 RX ADMIN — LEVOTHYROXINE SODIUM 75 MCG: 75 TABLET ORAL at 06:07

## 2022-10-25 NOTE — ACP (ADVANCE CARE PLANNING)
Serious Illness Conversation    1  What is your understanding now of where you are with your illness? Prognostic Understanding: appropriate understanding of prognosis  Patient and sister in law demonstrated reasonable understanding     2  How much information about what is likely to be ahead with your illness would you like to have? Information: patient wants to be fully informed     3  What did you (clinician) communicate to the patient? Prognostic Communication: Function - I hope that this is not the case, but I’m worried that this may be as strong as you will feel, and things are likely to get more difficult  4  If your health situation worsens, what are your most important goals? Patient reported that she needs more time to absorb all the information to make further decisions      5  What are the biggest fears and worries about the future and your health? 6  What abilities are so critical to your life that you cannot imagine living without them? 7  What gives you strength as you think about the future with your illness? 8  If you become sicker, how much are you willing to go through for the possibility of gaining more time? Be in the hospital: Yes    Be in the ICU: Yes Live in a nursing home: Yes   Patient reported that she and sister-in-law is discussion about advanced directives  POLST discussed again  9  How much does your proxy and family know about your priorities and wishes? Discussion Discussion: extensive discussion with family about goals and wishes  Long discussion with patient and sister-in-law again provoiding medical updates to help to make decision regarding further treatment plan, code status, and advanced directives  Again discussed at length regarding diagnosis, prognosis and realistic expectations  Encouraged to complete POLST        Patient verbalized understanding of the plan, Patient wants to finish this discussion at a later time     I have spent *31 minutes speaking with my patient on advanced care planning today or during this visit     Advanced directives

## 2022-10-25 NOTE — PROGRESS NOTES
Progress Note - Infectious Disease   Kathryn Hernandez 69 y o  female MRN: [id number]  Unit/Bed#: 2 [place] [address] Encounter: [id number]      IMPRESSION & RECOMMENDATIONS:   Impression/Recommendations:  1   SIRS versus severe sepsis, POA   Tachycardia, tachypnea, leukocytosis, elevated lactic acid   No fevers  Most likely reactive secondary to multiple metabolic derangements and volume depletion, in addition to probable malignancy   Also now #5 is likely contributing   Consider urinary source given reported urinary frequency however no pyuria on UA and urine culture showed low colony count of mixed contaminants   Negative CXR, CT C/A/P for infection   Negative COVID-19  No overt evidence of sacral SSTI   Blood cultures remain negative   No history of MDROs   WBC count continues to improve off antibiotics, now down to 10  Remains afebrile and hemodynamically stable      -continue to observe off anymore antibiotics  -follow temperatures and hemodynamics  -follow CBC     2  Sacral DTI  Superficial with no overt evidence of acute infection   Surgery input appreciated with no indication for surgical debridement at this time      -no antibiotics indicated  -daily local wound care  -frequent repositioning  -serial exams     3  Acute kidney injury   Likely due to volume depletion from poor oral intake/hypotension   Creatinine has improved with IVFs    Nephrology input noted      -follow CBC  -volume management      4  Progressive weight loss, anorexia secondary to metastatic malignancy   Recent outpatient abdominal CT showed numerous lung nodules and sclerosis of T11 and L2 vertebral bodies concerning for metastasis for which patient had scheduled follow-up with Oncology as outpatient  CT now shows right upper lobe paramediastinal mass with extension into superior mediastinum, right upper lobe bronchial branch    MRI brain concerning for intracranial metastatic lesions      -plan for CT-guided bone biopsy noted  -pulmonology, oncology evaluations appreciated      5  Acute anemia   With reported coffee-ground emesis   Hemoglobin has trended down since admission  GI input noted  Underwent EGD with lower esophageal ulceration and severe ulcerative esophagitis      -trend hemoglobin  -GI follow-up      5  Sjogren syndrome   On weekly methotrexate   Follows with Rheumatology at [facility]      6  Elevated TSH/hypothyroidism   Recently off meds   Management per primary service      7  Penicillin allergy   With reported history of rash   Tolerated cefazolin without difficulty      Antibiotics:  Off antibiotic D3     No ongoing acute ID issues  We will sign off  Please call with any new questions          Subjective:  Patient underwent EGD yesterday  Remains afebrile  No hypoxia  Patient has no new focal complaints  Denies focal pain, shortness of breath, cough, chills  Objective:  Vitals:  Temp:  [97 3 °F (36 3 °C)-97 9 °F (36 6 °C)] 97 3 °F (36 3 °C)  HR:  [] 94  Resp:  [16-20] 18  BP: ()/(44-62) 77/44  SpO2:  [93 %-99 %] 94 %  Temp (24hrs), Av 6 °F (36 4 °C), Min:97 3 °F (36 3 °C), Max:97 9 °F (36 6 °C)  Current: Temperature: (!) 97 3 °F (36 3 °C)    Physical Exam:   General:  Thin and debilitated, nontoxic  Eyes:  Conjunctive clear with no hemorrhages or effusions  Oropharynx:  No visible lesions  Neck:  Supple, trachea midline  Lungs:  Nonlabored respirations  Abdomen:  Soft, nondistended  Extremities:  No peripheral cyanosis, clubbing, or edema  Skin:  No diffuse rashes  Neurological:  Moves all four extremities spontaneously    Lab Results:  I have personally reviewed pertinent labs    Results from last 7 days   Lab Units 10/25/22  0607 10/24/22  0533 10/23/22  0509 10/22/22  1211 10/22/22  0633 10/20/22  0037 10/19/22  1540   POTASSIUM mmol/L 3 7 3 7 4 0   < > 3 2*   < > 3 0*   CHLORIDE mmol/L 96 105 109*   < > 110*   < > 116*   CO2 mmol/L 22 21 21   < > 22   < > 25   BUN mg/dL 11 17 29*   < > 44*   < > 61* CREATININE mg/dL 0 55* 0 70 0 76   < > 0 95   < > 1 68*   EGFR ml/min/1 73sq m 91 84 76   < > 58   < > 29   CALCIUM mg/dL 7 0* 7 1* 7 2*   < > 7 6*   < > 9 5   AST U/L  --   --   --   --  37  --  29   ALT U/L  --   --   --   --  <6*  --  12   ALK PHOS U/L  --   --   --   --  218*  --  195*    < > = values in this interval not displayed  Results from last 7 days   Lab Units 10/25/22  0607 10/24/22  0533 10/23/22  1825 10/23/22  0509   WBC Thousand/uL 10 83* 13 08*  --  14 46*   HEMOGLOBIN g/dL 7 7* 8 0* 9 0* 8 1*   PLATELETS Thousands/uL 140* 102*  --  105*     Results from last 7 days   Lab Units 10/19/22  1900 10/19/22  1548 10/19/22  1540   BLOOD CULTURE   --  No Growth After 5 Days  No Growth After 5 Days  URINE CULTURE  <10,000 cfu/ml   --   --        Imaging Studies:   I have personally reviewed pertinent imaging study reports and images in PACS  MRI brain shows at least 10 intracranial super tentorial and infratentorial metastatic lesions  No midline shift or acute intracranial hemorrhage  A few of the cerebellar lesions may in part be located along the cerebellar folia and should be correlated with dedicated imaging of the spine to exclude the possibility of leptomeningeal spread of disease  Slight interval progression in the degree of ventriculomegaly  EKG, Pathology, and Other Studies:   I have personally reviewed pertinent reports  EGD procedure report reviewed

## 2022-10-25 NOTE — BRIEF OP NOTE (RAD/CATH)
INTERVENTIONAL RADIOLOGY PROCEDURE NOTE    Date: 10/25/2022    Procedure: Bone lesion biopsy    Preoperative diagnosis:   1  Weakness    2  Dehydration    3  SVT (supraventricular tachycardia) ([facility] [address] )    4  Acute kidney injury ([facility] [address] )    5  Hypernatremia    6  Sacral wound    7  Metastatic cancer ([facility] [address] )    8  SIRS (systemic inflammatory response syndrome) (HCC)    9  Moderate protein-calorie malnutrition ([facility] [address] )    10  Coffee ground emesis    11  Pulmonary nodules    12  Anemia, unspecified type    13  Severe protein-calorie malnutrition ([facility] [address] )    14  Metastasis to bone [facility])         Postoperative diagnosis: Same  Surgeon: Bailey Wills     Assistant: None  No qualified resident was available  Blood loss: None    Specimens: Multiple core bone lesion specimens     Findings: CT-guided core biopsy of a right iliac bone lesion  Please see the radiology report for details  Complications: None immediate      Anesthesia: local

## 2022-10-25 NOTE — PLAN OF CARE
Problem: Potential for Falls  Goal: Patient will remain free of falls  Description: INTERVENTIONS:  - Educate patient/family on patient safety including physical limitations  - Instruct patient to call for assistance with activity   - Consult OT/PT to assist with strengthening/mobility   - Keep Call bell within reach  - Keep bed low and locked with side rails adjusted as appropriate  - Keep care items and personal belongings within reach  - Initiate and maintain comfort rounds  - Make Fall Risk Sign visible to staff  - Offer Toileting every 2 Hours, in advance of need  - Initiate/Maintain bed alarm- Apply yellow socks and bracelet for high fall risk patients  - Consider moving patient to room near nurses station  Outcome: Progressing     Problem: Nutrition/Hydration-ADULT  Goal: Nutrient/Hydration intake appropriate for improving, restoring or maintaining nutritional needs  Description: Monitor and assess patient's nutrition/hydration status for malnutrition  Collaborate with interdisciplinary team and initiate plan and interventions as ordered  Monitor patient's weight and dietary intake as ordered or per policy  Utilize nutrition screening tool and intervene as necessary  Determine patient's food preferences and provide high-protein, high-caloric foods as appropriate       INTERVENTIONS:  - Monitor oral intake, urinary output, labs, and treatment plans  - Assess nutrition and hydration status and recommend course of action  - Evaluate amount of meals eaten  - Assist patient with eating if necessary   - Allow adequate time for meals  - Recommend/ encourage appropriate diets, oral nutritional supplements, and vitamin/mineral supplements  - Order, calculate, and assess calorie counts as needed  - Recommend, monitor, and adjust tube feedings and TPN/PPN based on assessed needs  - Assess need for intravenous fluids  - Provide specific nutrition/hydration education as appropriate  - Include patient/family/caregiver in decisions related to nutrition  Outcome: Progressing     Problem: MOBILITY - ADULT  Goal: Maintain or return to baseline ADL function  Description: INTERVENTIONS:  -  Assess patient's ability to carry out ADLs; assess patient's baseline for ADL function and identify physical deficits which impact ability to perform ADLs (bathing, care of mouth/teeth, toileting, grooming, dressing, etc )  - Assess/evaluate cause of self-care deficits   - Assess range of motion  - Assess patient's mobility; develop plan if impaired  - Assess patient's need for assistive devices and provide as appropriate  - Encourage maximum independence but intervene and supervise when necessary  - Involve family in performance of ADLs  - Assess for home care needs following discharge   - Consider OT consult to assist with ADL evaluation and planning for discharge  - Provide patient education as appropriate  Outcome: Progressing  Goal: Maintains/Returns to pre admission functional level  Description: INTERVENTIONS:  - Perform BMAT or MOVE assessment daily    - Set and communicate daily mobility goal to care team and patient/family/caregiver     - Collaborate with rehabilitation services on mobility goals if consulted  - Out of bed for toileting  - Record patient progress and toleration of activity level   Outcome: Progressing     Problem: INFECTION - ADULT  Goal: Absence or prevention of progression during hospitalization  Description: INTERVENTIONS:  - Assess and monitor for signs and symptoms of infection  - Monitor lab/diagnostic results  - Monitor all insertion sites, i e  indwelling lines, tubes, and drains  - Monitor endotracheal if appropriate and nasal secretions for changes in amount and color  - [place] appropriate cooling/warming therapies per order  - Administer medications as ordered  - Instruct and encourage patient and family to use good hand hygiene technique  - Identify and instruct in appropriate isolation precautions for identified infection/condition  Outcome: Progressing  Goal: Absence of fever/infection during neutropenic period  Description: INTERVENTIONS:  - Monitor WBC    Outcome: Progressing     Problem: CARDIOVASCULAR - ADULT  Goal: Maintains optimal cardiac output and hemodynamic stability  Description: INTERVENTIONS:  - Monitor I/O, vital signs and rhythm  - Monitor for S/S and trends of decreased cardiac output  - Administer and titrate ordered vasoactive medications to optimize hemodynamic stability  - Assess quality of pulses, skin color and temperature  - Assess for signs of decreased coronary artery perfusion  - Instruct patient to report change in severity of symptoms  Outcome: Progressing  Goal: Absence of cardiac dysrhythmias or at baseline rhythm  Description: INTERVENTIONS:  - Continuous cardiac monitoring, vital signs, obtain 12 lead EKG if ordered  - Administer antiarrhythmic and heart rate control medications as ordered  - Monitor electrolytes and administer replacement therapy as ordered  Outcome: Progressing     Problem: METABOLIC, FLUID AND ELECTROLYTES - ADULT  Goal: Electrolytes maintained within normal limits  Description: INTERVENTIONS:  - Monitor labs and assess patient for signs and symptoms of electrolyte imbalances  - Administer electrolyte replacement as ordered  - Monitor response to electrolyte replacements, including repeat lab results as appropriate  - Instruct patient on fluid and nutrition as appropriate  Outcome: Progressing     Problem: Prexisting or High Potential for Compromised Skin Integrity  Goal: Skin integrity is maintained or improved  Description: INTERVENTIONS:  - Identify patients at risk for skin breakdown  - Assess and monitor skin integrity  - Assess and monitor nutrition and hydration status  - Monitor labs   - Assess for incontinence   - Turn and reposition patient  - Assist with mobility/ambulation  - Relieve pressure over bony prominences  - Avoid friction and shearing  - Provide appropriate hygiene as needed including keeping skin clean and dry  - Evaluate need for skin moisturizer/barrier cream  - Collaborate with interdisciplinary team   - Patient/family teaching  - Consider wound care consult   Outcome: Progressing

## 2022-10-25 NOTE — APP STUDENT NOTE
[facility] STUDENT  Inpatient Progress Note for TRAINING ONLY  Not Part of Legal Medical Record     Progress Note - Sendy Miguel 69 y o  female MRN: [id number]  Unit/Bed#: [address] [place] [address] Encounter: [id number]        No new Assessment & Plan notes have been filed under this hospital service since the last note was generated  Service: Internal Medicine       1  Metastatic cancer  -Patient reported intermittent lower abdominal pain since May 2022 with constipation  -Reports weight loss since the spring of this year  - 9/22 - CT showed findings of a large hiatal hernia, numerous lung nodules which were concerning for metastasis  Sclerosis of T11-L2 vertebral bodies, also 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  -10/25 - Patient scheduled for IR lung biopsy    2  Coffee ground emesis  -Reported 2 episodes of coffee-ground emesis on 9/21 associated with tachycardia and hypotension in the setting of volume depletion  -Monitoring hemoglobin  -10/22 - Patient required 1 PRBC and hemoglobin was found to improve  -Started on IV Protonix, hold  Continue on PO BID  -10/24 - EGD with findings of severe ulcerative esophagitis, likely source of upper GI bleeding    3  Fecal impaction  -10/21 - Improvement following enema  -Continue colace and miralax  -Enema PRN  -10/25 - Patient reports BM the night prior    4  Anemia  -Hemoglobin on admission 13 7, likely hemoconcentrated  -Coffee ground emesis during hospitalization  -Iron studies showed normal ferritin and low TIBC  -Hemoglobin dropped to 6 8 in the setting of GI bleed and IVF  -10/25 - Hemoglobin 7 7  -Continue to monitor H&H  -Monitor for hemoglobin <7 that will require transfusion    5  SIRS  -POA with leukocytosis, tachycardia, hypothermia  Lactic acidosis  -COVID negative  -UA showed trace leukocytes with normal white counts  -Urine and blood cultures remain negative  -Discontinue antibiotics    6  REECE  -Baseline Cr 0 7-0 8   Upon admission Cr 1 68  -10/25 - Cr 0 55  -Monitor I&Os  -Avoid nephrotoxins and hypotension  -Continue to monitor with daily BMP    7  Orthostatic hypotension  -Noted from history  -Continue to monitor    8  Hypernatremia - resolved 10/23  -On admission, Na 160 secondary to dehydration in the setting of decreased fluid intake  -Improved with hypotonic fluids    9  Swelling of RUE  -10/23 - RUE swelling noted  -10/25 - Vascular performed venous doppler u/s of RUE  Findings of an acute, occlusive DVT in the IJV, subclavian, axillary, and brachial veins  Acute occlusive SVT noted in the cephalic and basilic veins  10  Electrolyte abnormalities  -Hypokalemia, hypophosphatemia, hypomagnesemia noted  -Repleted and continue to monitor    11  Severe protein-calorie malnutrition  -BMI 16 51  -Adult Malnutrition type: Chronic illness  -Adult Degree of Malnutrition: Other severe protein calorie malnutrition  -Malnutrition Characteristics: Fat loss, Muscle loss, Weight loss     360 Statement: Severe protein calorie malnutrion of chronic illness related to increased energy needs as evidenced by 12% weight loss in 1 month, hollow orbits, protruding clavicles, depression at the temples  Awaiting treatment plan     BMI Findings:  Adult BMI Classifications: Underweight < 18 5        Body mass index is 16 51 kg/m²       12  Pulmonary nodules  -CXR: RUQ paramediastinal nodule and numerous additional smaller b/l pulmonary nodules highly suspicious for malignancy  -CT findings as noted under metastatic cancer    13  Elevated glucose  -Improving  -Continue to monitor    14  Chronic pain  -Chronic pain noted in lower back, left hip, knees, and legs  Likely d/t OA and Sjogrens  -Patient follows Rheumatology in [facility] as outpatient  -On methotrexate, cymbalta, voltaren, and tramadol PRN at home  -Continue cymbalta  Hold all other home medications  -Tylenol PRN for pain    15   Generalized weakness  -Likely d/t dehydration and metastatic cancer  -Nutritional support  -PT/OT eval  -Continue with fall precautions    16  Sacral wounds  -Diffuse sacral DTI noted on exam with surrounding redness and scattered black eschar, without drainage  -Continue wound care    17  Sjogren's syndrome  -Home medications include methotrexate 12 5mg PO weekly  -Followed by rheumatology as outpatient with [facility]  -Hold methotrexate for now, resume at discharge  -Natural tears PRN for dry eye    18  Acquired hypothyroidism  -Patient reports not taking medications at home  -Prescribed synthroid, continue    19  Dyslipidemia  -Continue atorvastatin (Lipitor) 40 mg tablet PO    20  SVT - resolved 10/23  -Noted in ED with , converted to NSR s/p Cardizem 10mg IV  -Started on metoprolol tartrate (Lopressor) 12 5 mg tablet PO, continue    21  Dehydration - resolved 10/23  -Patient presented with generalized weakness after being found by PD during a well person check  -Patient reported not eating or drinking while at home  -Improved with IVF    22  Elevated troponin - resolved 10/20  -Troponin on presentation 109 -> 83 -> 78, WNL  -Initial EKG showed sinus tach, rate 115, inferior lateral ST depressions, prolonged QT  -Troponin downtrending  -Telemetry      VTE Pharmacologic Prophylaxis:   Pharmacologic: Pharmacologic VTE Prophylaxis contraindicated due to GI bleed  Mechanical VTE Prophylaxis in Place: Yes    Patient Centered Rounds: I have performed bedside rounds with nursing staff today  Discussions with Specialists or Other Care Team Provider: Nursing, Infectious disease    Education and Discussions with Family / Patient:     Time Spent for Care: 20 minutes  More than 50% of total time spent on counseling and coordination of care as described above  Current Length of Stay: 6 day(s)    Current Patient Status: Inpatient   Certification Statement: The patient will continue to require additional inpatient hospital stay due to stabilization and further diagnostic evaluation      Discharge Plan: Following patient stabilization and management    Code Status: Level 1 - Full Code    Subjective: Brian Luong is a 32GXG who presented to the ED with c/o generalized weakness after being found by PD on her livingroom floor s/p fall during a well-person check  10/25 - Patient reported to acute complaints but noted that her right arm was still swollen and causing her some discomfort  Patient remains NPO awaiting IR lung biopsy  Vascular at bedside to perform RUE venous doppler u/s for the swelling  Objective:     Vitals:   Temp (24hrs), Av 6 °F (36 4 °C), Min:97 3 °F (36 3 °C), Max:97 9 °F (36 6 °C)    Temp:  [97 3 °F (36 3 °C)-97 9 °F (36 6 °C)] 97 3 °F (36 3 °C)  HR:  [] 94  Resp:  [16-20] 18  BP: ()/(44-62) 77/44  SpO2:  [93 %-99 %] 94 %  Body mass index is 16 51 kg/m²  Input and Output Summary (last 24 hours): Intake/Output Summary (Last 24 hours) at 10/25/2022 0930  Last data filed at 10/25/2022 0859  Gross per 24 hour   Intake 1632 5 ml   Output 1100 ml   Net 532 5 ml       Physical Exam:     Physical Exam  Constitutional:       Appearance: She is ill-appearing  Eyes:      General: No scleral icterus  Extraocular Movements: Extraocular movements intact  Pupils: Pupils are equal, round, and reactive to light  Cardiovascular:      Rate and Rhythm: Normal rate  Heart sounds: Normal heart sounds  No murmur heard  No gallop  Pulmonary:      Effort: Pulmonary effort is normal  No respiratory distress  Breath sounds: Normal breath sounds  No stridor  No wheezing, rhonchi or rales  Abdominal:      General: Bowel sounds are normal  There is no distension  Palpations: Abdomen is soft  There is no mass  Tenderness: There is no abdominal tenderness  There is no guarding or rebound  Hernia: No hernia is present  Musculoskeletal:         General: Swelling (RUE) present  Right lower leg: No edema  Left lower leg: No edema     Skin:     General: Skin is warm and dry  Neurological:      Mental Status: She is alert and oriented to person, place, and time  Psychiatric:         Mood and Affect: Mood normal          Behavior: Behavior normal        Historical Information   Past Medical History:   Diagnosis Date   • Disease of thyroid gland    • Hypercholesteremia    • Hypertension      Past Surgical History:   Procedure Laterality Date   • APPENDECTOMY     • BREAST BIOPSY Right     benign   • CHOLECYSTECTOMY     • COLON SURGERY      ruptured bowel   • COLOSTOMY      and closure 6 months later   • HYSTERECTOMY  2002    total   • KNEE ARTHROSCOPY Bilateral     right x1 left x2   • IL COLSC FLX W/RMVL OF TUMOR POLYP LESION SNARE TQ N/A 5/3/2016    Procedure: COLONOSCOPY ;  Surgeon: Jocelyne Connelly MD;  Location: [facility]; Service: Gastroenterology   • TONSILECTOMY AND ADNOIDECTOMY     • TYMPANOPLASTY Right      Social History   Social History     Substance and Sexual Activity   Alcohol Use Not Currently     Social History     Substance and Sexual Activity   Drug Use No     Social History     Tobacco Use   Smoking Status Former Smoker   • Packs/day: 1 00   • Years: 20 00   • Pack years:    • Quit date:    • Years since quittin 8   Smokeless Tobacco Never Used     Family History:   Family History   Problem Relation Age of Onset   • Breast cancer Paternal Aunt 95       Meds/Allergies   PTA meds:   Prior to Admission Medications   Prescriptions Last Dose Informant Patient Reported? Taking?    DULoxetine (CYMBALTA) 30 mg delayed release capsule Past Week at Unknown time  Yes Yes   Sig: Take 1 capsule by mouth daily   Levothyroxine Sodium (SYNTHROID PO)  Self Yes No   Sig: Take 50 mcg by mouth daily   cholecalciferol (VITAMIN D3) 1,000 units tablet  Self Yes No   Sig: Take 1,000 Units by mouth daily   ciclopirox (PENLAC) 8 % solution  Self No No   Sig: Apply topically daily at bedtime   Patient not taking: Reported on 2019   diclofenac (VOLTAREN) 75 mg EC tablet   Yes Yes   Sig: Take 75 mg by mouth 2 (two) times a day   esomeprazole (NexIUM) 40 MG capsule Past Week at Unknown time Self Yes Yes   Sig: Take 40 mg by mouth every morning before breakfast    folic acid (FOLVITE) 1 mg tablet  Self Yes No   Sig: Take 1 mg by mouth every 12 (twelve) hours   gabapentin (NEURONTIN) 300 mg capsule Not Taking at Unknown time  No No   Sig: Take 1 capsule (300 mg total) by mouth 2 (two) times a day   Patient not taking: No sig reported   ketoconazole (NIZORAL) 2 % cream   No No   Sig: Apply topically daily   methotrexate 2 5 mg tablet   Yes No   Sig: Take 12 5 mg by mouth once a week   rosuvastatin (CRESTOR) 40 MG tablet  Self Yes No   Sig: Take 20 mg by mouth daily     solifenacin (VESICARE) 5 mg tablet   Yes No   Sig: Take 1 tablet by mouth daily   traMADol (ULTRAM) 50 mg tablet   Yes Yes   Sig: Take 50 mg by mouth every 12 (twelve) hours as needed for moderate pain      Facility-Administered Medications: None     Allergies   Allergen Reactions   • Penicillins Rash       Additional Data:     Labs:    Results from last 7 days   Lab Units 10/25/22  0607 10/20/22  1758 10/20/22  0629   WBC Thousand/uL 10 83*   < > 30 63*   HEMOGLOBIN g/dL 7 7*   < > 12 2   HEMATOCRIT % 24 5*   < > 43 1   PLATELETS Thousands/uL 140*   < > 369   NEUTROS PCT %  --   --  90*   LYMPHS PCT %  --   --  5*   MONOS PCT %  --   --  4   EOS PCT %  --   --  0    < > = values in this interval not displayed       Results from last 7 days   Lab Units 10/25/22  0607 10/22/22  1211 10/22/22  0633   SODIUM mmol/L 125*   < > 144   POTASSIUM mmol/L 3 7   < > 3 2*   CHLORIDE mmol/L 96   < > 110*   CO2 mmol/L 22   < > 22   BUN mg/dL 11   < > 44*   CREATININE mg/dL 0 55*   < > 0 95   ANION GAP mmol/L 7   < > 12   CALCIUM mg/dL 7 0*   < > 7 6*   ALBUMIN g/dL  --   --  2 2*   TOTAL BILIRUBIN mg/dL  --   --  0 35   ALK PHOS U/L  --   --  218*   ALT U/L  --   --  <6*   AST U/L  --   --  37   GLUCOSE RANDOM mg/dL 336* < > 135    < > = values in this interval not displayed  Results from last 7 days   Lab Units 10/25/22  0727 10/24/22  2032 10/24/22  1606 10/24/22  1137 10/24/22  0719 10/23/22  2054 10/23/22  1549 10/23/22  1120 10/23/22  0720 10/22/22  2118 10/22/22  1558 10/22/22  1119   POC GLUCOSE mg/dl 98 118 90 104 117 125 126 109 117 118 141* 128     Results from last 7 days   Lab Units 10/19/22  1540   HEMOGLOBIN A1C % 5 5     Results from last 7 days   Lab Units 10/20/22  0629 10/20/22  0400 10/20/22  0037 10/19/22  2225 10/19/22  2005 10/19/22  1548 10/19/22  1540   LACTIC ACID mmol/L  --  1 7 3 1* 3 5* 4 0*   < >  --    PROCALCITONIN ng/ml 0 53*  --   --   --   --   --  0 28*    < > = values in this interval not displayed  * I Have Reviewed All Lab Data Listed Above  * Additional Pertinent Lab Tests Reviewed: All Labs Within Last 24 Hours Reviewed    Imaging:    Imaging Reports Reviewed Today Include: MRI, RUE u/s  Imaging Personally Reviewed by Myself Includes:  n/a    Recent Cultures (last 7 days):     Results from last 7 days   Lab Units 10/19/22  1900 10/19/22  1548 10/19/22  1540   BLOOD CULTURE   --  No Growth After 5 Days  No Growth After 5 Days     URINE CULTURE  <10,000 cfu/ml   --   --        Last 24 Hours Medication List:   Current Facility-Administered Medications   Medication Dose Route Frequency Provider Last Rate   • acetaminophen  650 mg Oral Q6H PRN Betina Sierra MD     • atorvastatin  40 mg Oral Daily With Yohannes Handy MD     • calcium carbonate  500 mg Oral Daily PRN Betina Sierra MD     • cholecalciferol  1,000 Units Oral Daily Betina Sierra MD     • docusate sodium  100 mg Oral BID Betina Sierra MD     • DULoxetine  30 mg Oral Daily Betina Sierra MD     • folic acid  1 mg Oral Daily Betina Sierra MD     • Gadobutrol  10 mL Intravenous Once in Sudha Peterson MD     • HYDROmorphone  0 2 mg Intravenous Q6H PRN Reina Tejada MD     • insulin lispro  1-5 Units Subcutaneous TID AC Manish Bond MD     • insulin lispro  1-5 Units Subcutaneous HS Reina Tejada MD     • levothyroxine  75 mcg Oral Early Morning Manish Bond MD     • metoprolol tartrate  12 5 mg Oral Q12H [facility] Manish Bond MD     • ondansetron  4 mg Intravenous Q6H PRN Manish Bond MD     • oxybutynin  5 mg Oral Daily Reina Tejada MD     • pantoprazole  40 mg Intravenous Q12H [address]  Jamal Bond MD     • polyethylene glycol  17 g Oral Daily Reina Tejada MD     • saccharomyces boulardii  250 mg Oral BID Reina Tejada MD     • sodium chloride  75 mL/hr Intravenous Continuous Sarkis MD Cecilia 75 mL/hr (10/25/22 5386)   • sucralfate  1 g Oral 4x Daily (AC & HS) Reina Tejada MD          Today, Patient Was Seen By: Bin Evans    ** Please Note: Dictation voice to text software may have been used in the creation of this document   **

## 2022-10-25 NOTE — SEDATION DOCUMENTATION
Procedure ended, pt tolerated with local only  Pt back top room in stable condition  bandaid aid to site

## 2022-10-25 NOTE — PROGRESS NOTES
[facility] Internal [facility] Progress Note  Patient: Manuel Hooker 69 y o  female   MRN: [id number]  PCP: Richmond Mendez MD  Unit/Bed#: [address] [place] [address] Encounter: [id number]  Date Of Visit: 10/25/22    Problem List:    Principal Problem:    Metastatic cancer ([facility] )  Active Problems:    SIRS (systemic inflammatory response syndrome) ([facility])    Generalized weakness    Coffee ground emesis    Swelling of right upper extremity    Dyslipidemia    Acquired hypothyroidism    Sjogren's syndrome (HCC)    Orthostatic hypotension    Acute kidney injury ([facility] )    Sacral wound    Chronic pain    Elevated glucose    Pulmonary nodules    Severe protein-calorie malnutrition ([facility])    Anemia    Electrolyte abnormality    Fecal impaction ([facility])      Assessment & Plan:    SIRS (systemic inflammatory response syndrome) ([facility])  Assessment & Plan  SIRS versus Sepsis, POA, as evidenced by leukocytosis, tachycardia, hypothermia  Lactic acidosis  Patient is afebrile  · UA shows trace leukocytes, normal white count  SARS-CoV-2 PCR negative  · CXR- shows diffuse pulmonary nodules, no evidence infection   · Lactic acid 5 3 initially, repeat trended down  Likely multifactorial secondary to dehydration   · CT chest abdomen pelvis without any evidence of infection  · Blood cultures are negative so far  · Urine culture negative  ID follow ing, input appreciated   No definitive skin and soft tissue infection  Patient denies any urinary symptoms  Possible related to metastatic disease versus GI bleed  Remains afebrile  Persistent leukocytosis possible related to malignancy  · Initially received cefepime, subsequently transitioned to cefazolin pending cultures  · Follow-up blood cultures-negative so far  · Will discontinue antibiotics as cultures are negative      * Metastatic cancer [facility])  Assessment & Plan  Patient reports intermittent lower abdominal pain since May of this year  Reports constipation at home    Reports weight loss since spring this year  · 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  · Patient was seen by PCP on 9/27, referred to Oncology as outpatient  No history of cancer  History of fibroid , status post hysterectomy and benign breast cyst   · CT chest abdomen pelvis 10/21-noted to have lobulated right upper lobe paramediastinal mass 6 x 4 x 8 5 cm with extension into the superior mediastinum, supraclavicular correlation and extension to and invasion of right upper lobe bronchial branch   Multiple scattered subcentimeter metastatic lung nodules noted in upper lobe bilaterally  Moderate right and small left effusion  Seen by Oncology, input appreciated  · Discussed with Pulmonary, recommended IR guided biopsy  Patient Shae Turpin is agreeable  · S/p IR for biopsy  · Consider hospice consult      Swelling of right upper extremity  Assessment & Plan  Noted right upper extremity swelling, 10/23 without any tenderness or erythema  Likely dependent  · Elevate extremity as tolerated  · Venous Doppler positive for multiple vessel DVT  · Restarted heparin, monitor for bleeding  · PT as tolerated    Coffee ground emesis  Assessment & Plan  Patient noted to have 2 episodes of coffee-ground emesis on 09/21, associated with tachycardia and hypotension setting of volume depletion   Hemoglobin noted to be downtrending, partly dilutional   No further evidence of bleeding or melena   Prior CT scan with large hiatal hernia   CT chest abdomen pelvis during current hospitalization without any acute abnormality, occult bleeding but noted to have hiatal hernia and fecal impaction  Hemoglobin continued to decline to 6 8 grams/deciliter, status post 1 PRBC on 10/22  Hemoglobin appropriately improved, no evidence of overt bleeding    Hemoglobin remains relatively stable  · Initially treated with IV Protonix drip, now will continue Protonix b i d   · GI evaluation appreciated, for EGD today  · Monitor H&H, transfuse as needed  · Follow-up EGD findings    Generalized weakness  Assessment & Plan  Likely multifactorial secondary to dehydration, metastatic cancer  cane at baseline  · Treat underlying causes  · Nutrition support  · PT OT eval treat  · Fall precautions    Fecal impaction [facility])  Assessment & Plan  Status post partial improvement after enema, 10/21  · Moving bowels currently  · Continue Colace and MiraLax   · Enema p r n  Electrolyte abnormality  Assessment & Plan  Hypokalemia, hypophosphatemia, hypomagnesemia  · Replete and monitor    Anemia  Assessment & Plan  Hemoglobin 13 7 grams/deciliter on admission, likely hemoconcentrated  Hemoglobin was 9 4 grams/deciliter on August 2022  Microcytic  Noted to have coffee-ground emesis during hospitalization  Previous B12 low normal  Iron studies-normal ferritin, low TIBC  Hemoglobin gradually declined to 6 8 grams/deciliter in setting of GI bleed and IV fluids  · Monitor H&H  · B12 replacement x1  · Monitor H&H, will transfuse if continues to decline or hemodynamic instability    Severe protein-calorie malnutrition (HCC)  Assessment & Plan  Malnutrition Findings:   Adult Malnutrition type: Chronic illness  Adult Degree of Malnutrition: Other severe protein calorie malnutrition  Malnutrition Characteristics: Fat loss, Muscle loss, Weight loss                  360 Statement: Severe protein calorie malnutrion of chronic illness related to increased energy needs as evidenced by 12% weight loss in 1 month, hollow orbits, protruding clavicles, depression at the temples  Awaiting treatment plan    BMI Findings:  Adult BMI Classifications: Underweight < 18 5        Body mass index is 16 51 kg/m²         Pulmonary nodules  Assessment & Plan  · CXR:  2 x 5 5 cm right upper lobe paramediastinal nodule, and numerous additional smaller bilateral pulmonary nodules highly suspicious for malignancy  Both primary lung cancer and metastatic disease can have this appearance  · Refer to CT scan finding under metastatic cancer      Elevated glucose  Assessment & Plan  Glucose improving,  · Check A1c- 5 5%, SSI    Chronic pain  Assessment & Plan  Chronic arthralgia in lower back, left hip, knees, legs  Likely due to OA and Sjogren's syndrome  Patient follows Rheumatology in [facility] as outpatient  On methotrexate, Cymbalta, Voltaren, tramadol p r n  At home  Denies pain at present  · Continue Cymbalta,   · Hold methotrexate for now in view of sirs versus sepsis  · Hold Voltaren, tramadol   · Tylenol as needed    Sacral wound  Assessment & Plan  Developed in 2 weeks since sister-in-law is away  Diffuse sacral DTI on exam with surrounding redness, no active drainage, scattered black eschar on exam Hard to exclude cellulitis  · Seen by surgery, input appreciated, continue wound care as per wound care    Acute kidney injury [facility])  Assessment & Plan  Baseline creatinine 0 7-0 8 in past year  Creatinine 1 68 on admission  UA without significant abnormality  Prior CT scan without any hydronephrosis  Likely prerenal in setting of dehydration and hypotension  Nephrology following, input appreciated  Improving with IV fluids to baseline  · Will discontinue IV fluid post EGD  · Monitor intake output   · Avoid nephrotoxin and hypotension  · Follow-up BMP    Orthostatic hypotension  Assessment & Plan  Noted history  Sjogren's syndrome [facility])  Assessment & Plan  Patient is on methotrexate 12 5 mg p o  Weekly  Follows rheumatologist in [facility]  · Will hold methotrexate for now  · Resume on discharge  · Natural tears prn for dry eye  Acquired hypothyroidism  Assessment & Plan  Continue Synthroid  Reports not taking medications at home    · Elevated TSH, normal free T4   · Continue Synthroid      Dyslipidemia  Assessment & Plan  Continue statin    Hypernatremia-resolved as of 10/23/2022  Assessment & Plan  Presented with sodium level of 160 secondary to decrease free water intake  Seen by Nephrology, input appreciated  Improved appropriately with hypotonic fluids  · Tolerating clear liquid diet  · Monitor BMP  · Monitor intake output   · Monitor p o  intake  · Hold D5 water, will resume D5 water maintenance if remains NPO    SVT (supraventricular tachycardia) (HCC)-resolved as of 10/23/2022  Assessment & Plan  Developed SVT in ED, heart rate 190  Converted to sinus rhythm after receiving Cardizem 10 mg IV  · Telemetry-no further episode, discontinued  · Optimize electrolytes  · Started on low-dose metoprolol     Elevated troponin-resolved as of 10/20/2022  Assessment & Plan  Troponin 109-83-78> wnl  · Initial EKG showed sinus tach, rate 115, inferior lateral ST depressions, prolonged QT   · Patient denies chest pain  · Likely type 2 MI secondary to # 1 and SIRS versus Sepsis  · Troponin downtrending  · Telemetry    Dehydration-resolved as of 10/23/2022  Assessment & Plan  Patient presents with generalized weakness  Neighbor called police as they saw her trash can outside for a few days  Police found her on the floor  Patient reports rolling off the couch and was on the floor for about 2 hours  Patient denies hitting head  Reports lower abdominal pain since 5/2022  Denies any acute pain  Patient lives Debbie Holton - in-law normally takes her shopping and she was away for about 16 days  Patient reported nothing to eat at home and was hungry  · Improving with IV fluids                VTE Pharmacologic Prophylaxis: VTE Score: 2 Moderate Risk (Score 3-4) - Pharmacological DVT Prophylaxis Ordered: heparin  Patient Centered Rounds: I performed bedside rounds with nursing staff today  Discussions with Specialists or Other Care Team Provider:  yes    Education and Discussions with Family / Patient: Updated  (Sister-in-law) at bedside      Time Spent for Care: 45 minutes  More than 50% of total time spent on counseling and coordination of care as described above  Current Length of Stay: 6 day(s)  Current Patient Status: Inpatient   Certification Statement: The patient will continue to require additional inpatient hospital stay due to Upper extremity DVT, heparin and potential hospice consult  Discharge Plan: Anticipate discharge in 48 hrs to discharge location to be determined pending rehab evaluations  Code Status: Level 1 - Full Code    Subjective:   Patient and CLARENCE at bedside, given results regarding Doppler on RUE  Patient's CLARENCE asked to speak with staff outside, with patient's permission gave CLARENCE status patient current medical conditions  Discuss with CLARENCE about having a family meeting and potential hospice consult, she reported she get back to me regarding family's recommendations  Patient denied any chest pain, SOB, palpitations      Objective:     Vitals:   Temp (24hrs), Av 6 °F (36 4 °C), Min:97 3 °F (36 3 °C), Max:98 °F (36 7 °C)    Temp:  [97 3 °F (36 3 °C)-98 °F (36 7 °C)] 98 °F (36 7 °C)  HR:  [] 96  Resp:  [16-20] 17  BP: ()/(44-73) 88/45  SpO2:  [92 %-98 %] 93 % on room air  Body mass index is 16 51 kg/m²  Input and Output Summary (last 24 hours): Intake/Output Summary (Last 24 hours) at 10/25/2022 2136  Last data filed at 10/25/2022 2111  Gross per 24 hour   Intake  5 ml   Output 1700 ml   Net 332 5 ml       Physical Exam:   Physical Exam  Vitals reviewed  Constitutional:       General: She is not in acute distress  Appearance: Normal appearance  She is ill-appearing  HENT:      Head: Atraumatic  Nose: No congestion  Eyes:      General: No scleral icterus  Pupils: Pupils are equal, round, and reactive to light  Cardiovascular:      Rate and Rhythm: Normal rate  Heart sounds: No murmur heard  Pulmonary:      Effort: No respiratory distress  Breath sounds: No wheezing, rhonchi or rales  Abdominal:      Palpations: Abdomen is soft  Tenderness: There is no abdominal tenderness  There is no guarding or rebound  Musculoskeletal:         General: Tenderness present  No swelling (RUE arm swelling ) or deformity  Cervical back: No tenderness  Right lower leg: No edema  Left lower leg: No edema  Skin:     General: Skin is warm  Coloration: Skin is not jaundiced  Findings: No lesion or rash  Neurological:      Mental Status: She is oriented to person, place, and time  Cranial Nerves: No cranial nerve deficit  Coordination: Coordination normal    Psychiatric:         Mood and Affect: Mood normal          Behavior: Behavior normal          Thought Content: Thought content normal          Judgment: Judgment normal          Additional Data:     Labs:  Results from last 7 days   Lab Units 10/25/22  0607 10/20/22  1758 10/20/22  0629   WBC Thousand/uL 10 83*   < > 30 63*   HEMOGLOBIN g/dL 7 7*   < > 12 2   HEMATOCRIT % 24 5*   < > 43 1   PLATELETS Thousands/uL 140*   < > 369   NEUTROS PCT %  --   --  90*   LYMPHS PCT %  --   --  5*   MONOS PCT %  --   --  4   EOS PCT %  --   --  0    < > = values in this interval not displayed  Results from last 7 days   Lab Units 10/25/22  1946/22  1211 10/22/22  0633   SODIUM mmol/L 133*   < > 144   POTASSIUM mmol/L 3 6   < > 3 2*   CHLORIDE mmol/L 102   < > 110*   CO2 mmol/L 23   < > 22   BUN mg/dL 10   < > 44*   CREATININE mg/dL 0 57*   < > 0 95   ANION GAP mmol/L 8   < > 12   CALCIUM mg/dL 7 4*   < > 7 6*   ALBUMIN g/dL  --   --  2 2*   TOTAL BILIRUBIN mg/dL  --   --  0 35   ALK PHOS U/L  --   --  218*   ALT U/L  --   --  <6*   AST U/L  --   --  37   GLUCOSE RANDOM mg/dL 100   < > 135    < > = values in this interval not displayed           Results from last 7 days   Lab Units 10/25/22  2032 10/25/22  1919 10/25/22  1635 10/25/22  1126 10/25/22  6317 10/24/22  2032 10/24/22  1606 10/24/22  1137 10/24/22  0719 10/23/22  2054 10/23/22  1549 10/23/22  1120   POC GLUCOSE mg/dl 111 104 84 101 98 118 90 104 117 125 126 109     Results from last 7 days   Lab Units 10/19/22  1540   HEMOGLOBIN A1C % 5 5     Results from last 7 days   Lab Units 10/20/22  0629 10/20/22  0400 10/20/22  0037 10/19/22  2225 10/19/22  2005 10/19/22  1548 10/19/22  1540   LACTIC ACID mmol/L  --  1 7 3 1* 3 5* 4 0*   < >  --    PROCALCITONIN ng/ml 0 53*  --   --   --   --   --  0 28*    < > = values in this interval not displayed  Lines/Drains:  Invasive Devices  Report    Peripheral Intravenous Line  Duration           Peripheral IV 10/23/22 Dorsal (posterior); Left Hand 2 days          Drain  Duration           External Urinary Catheter <1 day                         Imaging: Reviewed radiology reports from this admission including: chest xray, abdominal/pelvic CT and CT head venous Doppler    Recent Cultures (last 7 days):   Results from last 7 days   Lab Units 10/19/22  1900 10/19/22  1548 10/19/22  1540   BLOOD CULTURE   --  No Growth After 5 Days  No Growth After 5 Days     URINE CULTURE  <10,000 cfu/ml   --   --        Last 24 Hours Medication List:   Current Facility-Administered Medications   Medication Dose Route Frequency Provider Last Rate   • acetaminophen  650 mg Oral Q6H PRN Giuliana Cardenas MD     • atorvastatin  40 mg Oral Daily With Joey Zaragoza MD     • calcium carbonate  500 mg Oral Daily PRN Giuliana Cardenas MD     • cholecalciferol  1,000 Units Oral Daily Giuliana Cardenas MD     • docusate sodium  100 mg Oral BID Giuliana Cardenas MD     • DULoxetine  30 mg Oral Daily Giuliana Cardenas MD     • folic acid  1 mg Oral Daily Giuliana Cardenas MD     • Gadobutrol  10 mL Intravenous Once in Sudha Peterson MD     • heparin (porcine)  5,000 Units Subcutaneous [facility] Dana Manzano MD     • HYDROmorphone  0 2 mg Intravenous Q6H PRN Giuliana Cardenas MD • insulin lispro  1-5 Units Subcutaneous TID AC Manish Ruvalcaba MD     • insulin lispro  1-5 Units Subcutaneous HS Manish Ruvalcaba MD     • lactated ringers  500 mL Intravenous Once Mackenzie GarrisonKIKO 500 mL (10/25/22 2111)   • levothyroxine  75 mcg Oral Early Morning Manish Ruvalcaba MD     • lidocaine (PF)    Code/Trauma/Sedation Med Sondra Patten MD     • metoprolol tartrate  12 5 mg Oral Q12H [address] Manish Ruvalcaba MD     • ondansetron  4 mg Intravenous Q6H PRN Macie Snow MD     • oxybutynin  5 mg Oral Daily Florentin Mayes MD     • pantoprazole  40 mg Intravenous Q12H [address]  Sherrie Ruvalcaba MD     • polyethylene glycol  17 g Oral Daily Florentin Mayes MD     • saccharomyces boulardii  250 mg Oral BID Florentin Mayes MD     • sodium chloride  75 mL/hr Intravenous Continuous Macie Snow MD 75 mL/hr (10/25/22 2522)   • sucralfate  1 g Oral 4x Daily (AC & HS) Florentin Mayes MD          Today, Patient Was Seen By: Macie Snow    ** Please Note: "This note has been constructed using a voice recognition system  Therefore there may be syntax, spelling, and/or grammatical errors   Please call if you have any questions  "**

## 2022-10-25 NOTE — PROGRESS NOTES
Progress Note- Taniya Tijerina 69 y o  female MRN: [id number]    Unit/Bed#: [address] [place] [address] Encounter: [id number]      Assessment and Plan:    1  Coffee-ground emesis : Coffee-ground emesis secondary to ulcerative esophagitis seen on EGD yesterday  She had a large, superficial, round benign-appearing ulcer in the lower 3rd of the esophagus with clean base, biopsies were obtained  In addition, she did medium-sized hiatal hernia without Ruel lesions, and mild gastritis  Duodenum appeared normal   She is s/p 1 unit RBC 10/22 and Hgb has been stable arounf 8 since that time w/o further evidence of coffee ground emesis  Pt having brown formed stool     -Await biopsy results  -Continue Protonix 40 mg daily, liquid Carafate 1 g q i d   -Recommend soft, non ulcer genic diet    GI will follow as needed, please call for any questions/concerns        ______________________________________________________________________    Subjective:     Patient lying in bed  Denies any abdominal pain, nausea or or any further vomiting  She is moving her bowels, having brown formed stool  Tolerating diet      Medication Administration - last 24 hours from 10/24/2022 1838 to 10/25/2022 3477       Date/Time Order Dose Route Action Action by     10/25/2022 0932 cholecalciferol (VITAMIN D3) tablet 1,000 Units 1,000 Units Oral Given Siva Bazan RN     10/25/2022 0932 DULoxetine (CYMBALTA) delayed release capsule 30 mg 30 mg Oral Given Siva Bazan RN     03/15/3198 9321 folic acid (FOLVITE) tablet 1 mg 1 mg Oral Given Siva Bazan RN     10/25/2022 1740 atorvastatin (LIPITOR) tablet 40 mg 40 mg Oral Given Siva Bazan RN     10/25/2022 0931 oxybutynin (DITROPAN-XL) 24 hr tablet 5 mg 5 mg Oral Given Siva Bazan RN     10/25/2022 0900 ondansetron (ZOFRAN) injection 4 mg 4 mg Intravenous Given Siva Bazan RN     10/25/2022 6753 saccharomyces boulardii (FLORASTOR) capsule 250 mg 250 mg Oral Given Juan F Sharp, RN     10/25/2022 0932 saccharomyces boulardii (FLORASTOR) capsule 250 mg 250 mg Oral Given [facility]     10/25/2022 0915 metoprolol tartrate (LOPRESSOR) partial tablet 12 5 mg 0 mg Oral Hold [facility]     10/24/2022 2020 metoprolol tartrate (LOPRESSOR) partial tablet 12 5 mg 12 5 mg Oral Not Given Comfort Anim-Arturo, RN     10/25/2022 1715 insulin lispro (HumaLOG) 100 units/mL subcutaneous injection 1-5 Units 1 Units Subcutaneous Not Given Juan F Sharp, RN     10/25/2022 1226 insulin lispro (HumaLOG) 100 units/mL subcutaneous injection 1-5 Units 1 Units Subcutaneous Not Given Juan F Sharp, RN     10/25/2022 0809 insulin lispro (HumaLOG) 100 units/mL subcutaneous injection 1-5 Units 1 Units Subcutaneous Not Given Juan F Sharp, RN     10/24/2022 2216 insulin lispro (HumaLOG) 100 units/mL subcutaneous injection 1-5 Units 1 Units Subcutaneous Not Given Comfort Anim-Arturo, RN     10/25/2022 1739 docusate sodium (COLACE) capsule 100 mg 100 mg Oral Given Juan F Sharp, RN     10/25/2022 0932 docusate sodium (COLACE) capsule 100 mg 100 mg Oral [facility]     10/25/2022 3576 levothyroxine tablet 75 mcg 75 mcg Oral Given Comfort Anim-Arturo, RN     10/25/2022 0932 polyethylene glycol (MIRALAX) packet 17 g 0 g Oral [address] Jennifer Desai [facility]     10/25/2022 0859 dextrose 5 % infusion 0 mL/hr Intravenous Stopped Juan F Sharp, RN     10/24/2022 2220 dextrose 5 % infusion 50 mL/hr Intravenous New Bag Comfort Anim-Arturo, RN     10/25/2022 0900 pantoprazole (PROTONIX) injection 40 mg 40 mg Intravenous Given Juan F Aron, RN     10/24/2022 2216 pantoprazole (PROTONIX) injection 40 mg 40 mg Intravenous Given Comfort Anim-Arturo, RN     10/25/2022 1740 sucralfate (CARAFATE) tablet 1 g 1 g Oral Given Juan F Sharp RN     10/25/2022 1330 sucralfate (CARAFATE) tablet 1 g 0 g Oral Hold Juan F Sharp RN 10/25/2022 0931 sucralfate (CARAFATE) tablet 1 g 1 g Oral Given Lupe Shaji Villarrealouosmin jackson, RN     10/24/2022 2216 sucralfate (CARAFATE) tablet 1 g 1 g Oral Given Comfort Anim-Arturo, SELENE     10/25/2022 0921 sodium chloride 0 9 % infusion 75 mL/hr Intravenous New [address] [facility] [place], [facility] [facility]     10/25/2022 1439 lidocaine (PF) (XYLOCAINE-MPF) 1 % injection 10 mL Infiltration Given Junito Colon MD          Objective:     Vitals: Blood pressure 108/61, pulse 90, temperature (!) 97 3 °F (36 3 °C), resp  rate 16, height 5' 5" (1 651 m), weight 45 kg (99 lb 3 3 oz), SpO2 95 %  ,Body mass index is 16 51 kg/m²  Intake/Output Summary (Last 24 hours) at 10/25/2022 7112  Last data filed at 10/25/2022 5902  Gross per 24 hour   Intake 1532 5 ml   Output 1100 ml   Net 432 5 ml       Physical Exam:   General Appearance: Awake and alert, in no acute distress  Abdomen: Soft, non-tender, non-distended; bowel sounds normal; no masses or no organomegaly    Invasive Devices  Report    Peripheral Intravenous Line  Duration           Long-Dwell Peripheral IV (Midline) 18/57/56 Right Basilic 4 days    Peripheral IV 10/23/22 Dorsal (posterior); Left Hand 2 days          Drain  Duration           External Urinary Catheter 5 days                Lab Results:  No results displayed because visit has over 200 results  Imaging Studies: I have personally reviewed pertinent imaging studies

## 2022-10-26 LAB
ANION GAP SERPL CALCULATED.3IONS-SCNC: 8 MMOL/L (ref 4–13)
BUN SERPL-MCNC: 11 MG/DL (ref 5–25)
CALCIUM SERPL-MCNC: 7.4 MG/DL (ref 8.3–10.1)
CHLORIDE SERPL-SCNC: 102 MMOL/L (ref 96–108)
CO2 SERPL-SCNC: 22 MMOL/L (ref 21–32)
CREAT SERPL-MCNC: 0.51 MG/DL (ref 0.6–1.3)
GFR SERPL CREATININE-BSD FRML MDRD: 93 ML/MIN/1.73SQ M
GLUCOSE SERPL-MCNC: 112 MG/DL (ref 65–140)
GLUCOSE SERPL-MCNC: 114 MG/DL (ref 65–140)
GLUCOSE SERPL-MCNC: 125 MG/DL (ref 65–140)
GLUCOSE SERPL-MCNC: 83 MG/DL (ref 65–140)
GLUCOSE SERPL-MCNC: 90 MG/DL (ref 65–140)
POTASSIUM SERPL-SCNC: 3.8 MMOL/L (ref 3.5–5.3)
SODIUM SERPL-SCNC: 132 MMOL/L (ref 135–147)

## 2022-10-26 RX ADMIN — DOCUSATE SODIUM 100 MG: 100 CAPSULE, LIQUID FILLED ORAL at 11:29

## 2022-10-26 RX ADMIN — HEPARIN SODIUM 5000 UNITS: 5000 INJECTION INTRAVENOUS; SUBCUTANEOUS at 14:37

## 2022-10-26 RX ADMIN — ATORVASTATIN CALCIUM 40 MG: 40 TABLET, FILM COATED ORAL at 17:16

## 2022-10-26 RX ADMIN — Medication 250 MG: at 17:16

## 2022-10-26 RX ADMIN — Medication 1000 UNITS: at 11:29

## 2022-10-26 RX ADMIN — Medication 12.5 MG: at 21:42

## 2022-10-26 RX ADMIN — HEPARIN SODIUM 5000 UNITS: 5000 INJECTION INTRAVENOUS; SUBCUTANEOUS at 05:32

## 2022-10-26 RX ADMIN — Medication 12.5 MG: at 11:28

## 2022-10-26 RX ADMIN — HEPARIN SODIUM 5000 UNITS: 5000 INJECTION INTRAVENOUS; SUBCUTANEOUS at 21:42

## 2022-10-26 RX ADMIN — LEVOTHYROXINE SODIUM 75 MCG: 75 TABLET ORAL at 05:32

## 2022-10-26 RX ADMIN — POLYETHYLENE GLYCOL 3350 17 G: 17 POWDER, FOR SOLUTION ORAL at 11:28

## 2022-10-26 RX ADMIN — SUCRALFATE 1 G: 1 TABLET ORAL at 11:29

## 2022-10-26 RX ADMIN — SUCRALFATE 1 G: 1 TABLET ORAL at 21:42

## 2022-10-26 RX ADMIN — SUCRALFATE 1 G: 1 TABLET ORAL at 16:00

## 2022-10-26 RX ADMIN — FOLIC ACID 1 MG: 1 TABLET ORAL at 11:29

## 2022-10-26 RX ADMIN — PANTOPRAZOLE SODIUM 40 MG: 40 INJECTION, POWDER, FOR SOLUTION INTRAVENOUS at 21:42

## 2022-10-26 RX ADMIN — OXYBUTYNIN 5 MG: 5 TABLET, FILM COATED, EXTENDED RELEASE ORAL at 11:29

## 2022-10-26 RX ADMIN — DULOXETINE HYDROCHLORIDE 30 MG: 30 CAPSULE, DELAYED RELEASE ORAL at 11:28

## 2022-10-26 RX ADMIN — Medication 250 MG: at 11:29

## 2022-10-26 RX ADMIN — DOCUSATE SODIUM 100 MG: 100 CAPSULE, LIQUID FILLED ORAL at 17:16

## 2022-10-26 NOTE — NURSING NOTE
B/P noted to be 77/44 mmHG  (confirmed with manual B/P 78/40 mmHg)  Patient is awake and alert  Patient denies light-headedness or dizziness  Patient denies SOB  Communicated to MD Ramona Chin MD ordered NS 0 9% 75 mL/hr which was initiated by nurse Shelly Girard)  Q15 minute vitals initiated  B/P increasing slowly (86/46 mmHg)  This was communicated to MD   No further orders placed  Will continue to monitor

## 2022-10-26 NOTE — CASE MANAGEMENT
Case Management Discharge Planning Note    Patient name Brenda Payer  Location 2 [address] 0 MRN [id number]  : 1945 Date 10/26/2022       Current Admission Date: 10/19/2022  Current Admission Diagnosis:Metastatic cancer [facility])   Patient Active Problem List    Diagnosis Date Noted   • Swelling of right upper extremity 10/23/2022   • Coffee ground emesis 10/22/2022   • Anemia 10/22/2022   • Electrolyte abnormality 10/22/2022   • Fecal impaction ([address] ) 10/22/2022   • Severe protein-calorie malnutrition ([address] ) 10/21/2022   • Chronic pain 10/20/2022   • Elevated glucose 10/20/2022   • Pulmonary nodules 10/20/2022   • Hypertension 10/19/2022   • Hypercholesteremia 10/19/2022   • Disease of thyroid gland 10/19/2022   • Acute kidney injury ([address] ) 10/19/2022   • SIRS (systemic inflammatory response syndrome) ([address] ) 10/19/2022   • Sacral wound 10/19/2022   • Metastatic cancer ([address] ) 10/19/2022   • Generalized weakness 10/19/2022   • Dyslipidemia 2019   • Acquired hypothyroidism 2019   • Sjogren's syndrome ([address] ) 2019   • Orthostatic hypotension 2019   • Recurrent syncope 2019   • Onychomycosis 2019   • Peripheral sensory neuropathy 2019   • Radiculopathy of lumbar region 2019   • Metatarsalgia of both feet 2019      LOS (days): 7  Geometric Mean LOS (GMLOS) (days): 5 00  Days to GMLOS:-2     OBJECTIVE:  Risk of Unplanned Readmission Score: 16 2      Current admission status: Inpatient   Preferred Pharmacy:   [address] [place] [address]  Phone: [phone] Fax: [phone]    Primary Care Provider: Coral Hensley MD    Primary Insurance: [facility]icruz [facility]  Secondary Insurance:     DISCHARGE DETAILS:    Discharge planning discussed with[de-identified] CLARENCE Perez  Freedom of Choice: Yes  Comments - Freedom of Choice: SW provided accepting SNF list for CLARENCE to review   CLARENCE advised she spoke with Attending Dr Yuliana Covington and the family is now considering transitioning patient to hospice  SW reviewed hospice settings including: home, facility, and GIP  Iesha Spears confirmed home hospice is not an option at this time  Iesha Spears also acknowledged she is familiar with GIP (as her  required this level of care) and that pt does not currently meet clinical criteria  Iesha Spears would like to investigate SNF admission  SW advised of OOP cost for Room/Board  Iesha Spears notes she is medical POA  Patient's financial POA is [id number] of [facility] ([phone])  He will be able to clarify pt's assets and assist with MA yamilex if needed  Iesha Spears will review SNF list and advise of facility choice  SW will f/u with SNFs regarding rates for room/board and if they have a preferred hospice agency  CM contacted family/caregiver?: Yes  Were Treatment Team discharge recommendations reviewed with patient/caregiver?: Yes  Did patient/caregiver verbalize understanding of patient care needs?: N/A- going to facility  Were patient/caregiver advised of the risks associated with not following Treatment Team discharge recommendations?: Yes    Contacts  Patient Contacts: Hira Vega  Relationship to Patient[de-identified] Family  Contact Method:  In Person  Reason/Outcome: Discharge Planning, Emergency Contact    Treatment Team Recommendation: Hospice, SNF  Discharge Destination Plan[de-identified] Hospice, SNF

## 2022-10-26 NOTE — CASE MANAGEMENT
Case Management Discharge Planning Note    Patient name Alba Rudolph  [facility] MRN [id number]  : 1945 Date 10/26/2022       Current Admission Date: 10/19/2022  Current Admission Diagnosis:Metastatic cancer [facility])   Patient Active Problem List    Diagnosis Date Noted   • Swelling of right upper extremity 10/23/2022   • Coffee ground emesis 10/22/2022   • Anemia 10/22/2022   • Electrolyte abnormality 10/22/2022   • Fecal impaction ([address] ) 10/22/2022   • Severe protein-calorie malnutrition ([address] ) 10/21/2022   • Chronic pain 10/20/2022   • Elevated glucose 10/20/2022   • Pulmonary nodules 10/20/2022   • Hypertension 10/19/2022   • Hypercholesteremia 10/19/2022   • Disease of thyroid gland 10/19/2022   • Acute kidney injury ([address] ) 10/19/2022   • SIRS (systemic inflammatory response syndrome) ([address] ) 10/19/2022   • Sacral wound 10/19/2022   • Metastatic cancer ([address] ) 10/19/2022   • Generalized weakness 10/19/2022   • Dyslipidemia 2019   • Acquired hypothyroidism 2019   • Sjogren's syndrome ([address] ) 2019   • Orthostatic hypotension 2019   • Recurrent syncope 2019   • Onychomycosis 2019   • Peripheral sensory neuropathy 2019   • Radiculopathy of lumbar region 2019   • Metatarsalgia of both feet 2019      LOS (days): 7  Geometric Mean LOS (GMLOS) (days): 5 00  Days to GMLOS:-1 7     OBJECTIVE:  Risk of Unplanned Readmission Score: 16 12         Current admission status: Inpatient   Preferred Pharmacy:   [address] [place] [address]  Phone: [phone] Fax: [phone]    Primary Care Provider: Dean Mendieta MD    Primary Insurance: Ansley Romero [facility]  Secondary Insurance:     DISCHARGE DETAILS:    [address] meeting planned for today with patient's CLARENCE Luc Mulch

## 2022-10-26 NOTE — PLAN OF CARE
Problem: Potential for Falls  Goal: Patient will remain free of falls  Description: INTERVENTIONS:  - Educate patient/family on patient safety including physical limitations  - Instruct patient to call for assistance with activity   - Consult OT/PT to assist with strengthening/mobility   - Keep Call bell within reach  - Keep bed low and locked with side rails adjusted as appropriate  - Keep care items and personal belongings within reach  - Initiate and maintain comfort rounds  - Make Fall Risk Sign visible to staff  - Offer Toileting every 2 Hours, in advance of need  - Initiate/Maintain bed alarm- Apply yellow socks and bracelet for high fall risk patients  - Consider moving patient to room near nurses station  Outcome: Progressing     Problem: MOBILITY - ADULT  Goal: Maintain or return to baseline ADL function  Description: INTERVENTIONS:  -  Assess patient's ability to carry out ADLs; assess patient's baseline for ADL function and identify physical deficits which impact ability to perform ADLs (bathing, care of mouth/teeth, toileting, grooming, dressing, etc )  - Assess/evaluate cause of self-care deficits   - Assess range of motion  - Assess patient's mobility; develop plan if impaired  - Assess patient's need for assistive devices and provide as appropriate  - Encourage maximum independence but intervene and supervise when necessary  - Involve family in performance of ADLs  - Assess for home care needs following discharge   - Consider OT consult to assist with ADL evaluation and planning for discharge  - Provide patient education as appropriate  Outcome: Progressing     Problem: INFECTION - ADULT  Goal: Absence or prevention of progression during hospitalization  Description: INTERVENTIONS:  - Assess and monitor for signs and symptoms of infection  - Monitor lab/diagnostic results  - Monitor all insertion sites, i e  indwelling lines, tubes, and drains  - Monitor endotracheal if appropriate and nasal secretions for changes in amount and color  - [place] appropriate cooling/warming therapies per order  - Administer medications as ordered  - Instruct and encourage patient and family to use good hand hygiene technique  - Identify and instruct in appropriate isolation precautions for identified infection/condition  Outcome: Progressing

## 2022-10-26 NOTE — PLAN OF CARE
Problem: Potential for Falls  Goal: Patient will remain free of falls  Description: INTERVENTIONS:  - Educate patient/family on patient safety including physical limitations  - Instruct patient to call for assistance with activity   - Consult OT/PT to assist with strengthening/mobility   - Keep Call bell within reach  - Keep bed low and locked with side rails adjusted as appropriate  - Keep care items and personal belongings within reach  - Initiate and maintain comfort rounds  - Make Fall Risk Sign visible to staff  - Offer Toileting every 2 Hours, in advance of need  - Initiate/Maintain bed alarm- Apply yellow socks and bracelet for high fall risk patients  - Consider moving patient to room near nurses station  Outcome: Progressing     Problem: Nutrition/Hydration-ADULT  Goal: Nutrient/Hydration intake appropriate for improving, restoring or maintaining nutritional needs  Description: Monitor and assess patient's nutrition/hydration status for malnutrition  Collaborate with interdisciplinary team and initiate plan and interventions as ordered  Monitor patient's weight and dietary intake as ordered or per policy  Utilize nutrition screening tool and intervene as necessary  Determine patient's food preferences and provide high-protein, high-caloric foods as appropriate       INTERVENTIONS:  - Monitor oral intake, urinary output, labs, and treatment plans  - Assess nutrition and hydration status and recommend course of action  - Evaluate amount of meals eaten  - Assist patient with eating if necessary   - Allow adequate time for meals  - Recommend/ encourage appropriate diets, oral nutritional supplements, and vitamin/mineral supplements  - Order, calculate, and assess calorie counts as needed  - Recommend, monitor, and adjust tube feedings and TPN/PPN based on assessed needs  - Assess need for intravenous fluids  - Provide specific nutrition/hydration education as appropriate  - Include patient/family/caregiver in decisions related to nutrition  Outcome: Progressing     Problem: MOBILITY - ADULT  Goal: Maintain or return to baseline ADL function  Description: INTERVENTIONS:  -  Assess patient's ability to carry out ADLs; assess patient's baseline for ADL function and identify physical deficits which impact ability to perform ADLs (bathing, care of mouth/teeth, toileting, grooming, dressing, etc )  - Assess/evaluate cause of self-care deficits   - Assess range of motion  - Assess patient's mobility; develop plan if impaired  - Assess patient's need for assistive devices and provide as appropriate  - Encourage maximum independence but intervene and supervise when necessary  - Involve family in performance of ADLs  - Assess for home care needs following discharge   - Consider OT consult to assist with ADL evaluation and planning for discharge  - Provide patient education as appropriate  Outcome: Progressing  Goal: Maintains/Returns to pre admission functional level  Description: INTERVENTIONS:  - Perform BMAT or MOVE assessment daily    - Set and communicate daily mobility goal to care team and patient/family/caregiver  - Collaborate with rehabilitation services on mobility goals if consulted  - Perform Range of Motion 3 times a day  - Reposition patient every 2 hours    - Dangle patient 2 times a day  - Stand patient 2 times a day  - Ambulate patient 2 times a day  - Out of bed to chair 2 times a day   - Out of bed for meals 2 times a day  - Out of bed for toileting  - Record patient progress and toleration of activity level   Outcome: Progressing     Problem: INFECTION - ADULT  Goal: Absence or prevention of progression during hospitalization  Description: INTERVENTIONS:  - Assess and monitor for signs and symptoms of infection  - Monitor lab/diagnostic results  - Monitor all insertion sites, i e  indwelling lines, tubes, and drains  - Monitor endotracheal if appropriate and nasal secretions for changes in amount and color  - [place] appropriate cooling/warming therapies per order  - Administer medications as ordered  - Instruct and encourage patient and family to use good hand hygiene technique  - Identify and instruct in appropriate isolation precautions for identified infection/condition  Outcome: Progressing  Goal: Absence of fever/infection during neutropenic period  Description: INTERVENTIONS:  - Monitor WBC    Outcome: Progressing     Problem: CARDIOVASCULAR - ADULT  Goal: Maintains optimal cardiac output and hemodynamic stability  Description: INTERVENTIONS:  - Monitor I/O, vital signs and rhythm  - Monitor for S/S and trends of decreased cardiac output  - Administer and titrate ordered vasoactive medications to optimize hemodynamic stability  - Assess quality of pulses, skin color and temperature  - Assess for signs of decreased coronary artery perfusion  - Instruct patient to report change in severity of symptoms  Outcome: Progressing  Goal: Absence of cardiac dysrhythmias or at baseline rhythm  Description: INTERVENTIONS:  - Continuous cardiac monitoring, vital signs, obtain 12 lead EKG if ordered  - Administer antiarrhythmic and heart rate control medications as ordered  - Monitor electrolytes and administer replacement therapy as ordered  Outcome: Progressing     Problem: METABOLIC, FLUID AND ELECTROLYTES - ADULT  Goal: Electrolytes maintained within normal limits  Description: INTERVENTIONS:  - Monitor labs and assess patient for signs and symptoms of electrolyte imbalances  - Administer electrolyte replacement as ordered  - Monitor response to electrolyte replacements, including repeat lab results as appropriate  - Instruct patient on fluid and nutrition as appropriate  Outcome: Progressing     Problem: Prexisting or High Potential for Compromised Skin Integrity  Goal: Skin integrity is maintained or improved  Description: INTERVENTIONS:  - Identify patients at risk for skin breakdown  - Assess and monitor skin integrity  - Assess and monitor nutrition and hydration status  - Monitor labs   - Assess for incontinence   - Turn and reposition patient  - Assist with mobility/ambulation  - Relieve pressure over bony prominences  - Avoid friction and shearing  - Provide appropriate hygiene as needed including keeping skin clean and dry  - Evaluate need for skin moisturizer/barrier cream  - Collaborate with interdisciplinary team   - Patient/family teaching  - Consider wound care consult   Outcome: Progressing

## 2022-10-26 NOTE — APP STUDENT NOTE
[facility] STUDENT  Inpatient Progress Note for TRAINING ONLY  Not Part of Legal Medical Record     Progress Note - Manuel Hooker 69 y o  female MRN: [id number]  Unit/Bed#: [address] [place] [address] Encounter: [id number]        No new Assessment & Plan notes have been filed under this hospital service since the last note was generated  Service: Internal Medicine    1  Metastatic cancer  -Patient reported intermittent lower abdominal pain since May 2022 with constipation  -Reports weight loss since the spring of this year  - 9/22 - CT showed findings of a large hiatal hernia, numerous lung nodules which were concerning for metastasis  Sclerosis of T11-L2 vertebral bodies, also 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  -10/25 - Patient scheduled for IR biopsy  -10/26 - Cultures pending from IR biopsy     2  Coffee ground emesis  -Reported 2 episodes of coffee-ground emesis on 9/21 associated with tachycardia and hypotension in the setting of volume depletion  -Monitoring hemoglobin  -10/22 - Patient required 1 PRBC and hemoglobin was found to improve  -Started on IV Protonix, hold  Continue on PO BID  -10/24 - EGD with findings of severe ulcerative esophagitis, likely source of upper GI bleeding  -10/26 - Patient reports no new episodes of vomiting     3  Fecal impaction  -10/21 - Improvement following enema  -Continue colace and miralax  -Enema PRN  -10/25 - Patient reports BM the night prior     4  Anemia  -Hemoglobin on admission 13 7, likely hemoconcentrated  -Coffee ground emesis during hospitalization  -Iron studies showed normal ferritin and low TIBC  -Hemoglobin dropped to 6 8 in the setting of GI bleed and IVF  -10/25 - Hemoglobin 7 7  -Continue to monitor H&H  -Monitor for hemoglobin <7 that will require transfusion     5  SIRS  -POA with leukocytosis, tachycardia, hypothermia   Lactic acidosis  -COVID negative  -UA showed trace leukocytes with normal white counts  -Urine and blood cultures remain negative  -Discontinue antibiotics     6  REECE  -Baseline Cr 0 7-0 8  Upon admission Cr 1 68  -10/25 - Cr 0 55  -Monitor I&Os  -Avoid nephrotoxins and hypotension  -Continue to monitor with daily BMP     7  Orthostatic hypotension  -Noted from history  -Continue to monitor     8  Hypernatremia - resolved 10/23  -On admission, Na 160 secondary to dehydration in the setting of decreased fluid intake  -Improved with hypotonic fluids     9  Swelling of RUE  -10/23 - RUE swelling noted  -10/25 - Vascular performed venous doppler u/s of RUE  Findings of an acute, occlusive DVT in the IJV, subclavian, axillary, and brachial veins  Acute occlusive SVT noted in the cephalic and basilic veins   -Restart heparin, monitor for s/s of bleeding  -10/26 - Continue use of ice for pain control  -PT consult, PT as tolerated per patient     10  Electrolyte abnormalities  -Hypokalemia, hypophosphatemia, hypomagnesemia noted  -Repleted and continue to monitor     11  Severe protein-calorie malnutrition  -BMI 16 51  -Adult Malnutrition type: Chronic illness  -Adult Degree of Malnutrition: Other severe protein calorie malnutrition  -Malnutrition Characteristics: Fat loss, Muscle loss, Weight loss     360 Statement: Severe protein calorie malnutrion of chronic illness related to increased energy needs as evidenced by 12% weight loss in 1 month, hollow orbits, protruding clavicles, depression at the temples   Awaiting treatment plan     BMI Findings:  Adult BMI Classifications: Underweight < 18 5        Body mass index is 16 51 kg/m²       12  Pulmonary nodules  -CXR: RUQ paramediastinal nodule and numerous additional smaller b/l pulmonary nodules highly suspicious for malignancy  -CT findings as noted under metastatic cancer  -10/26 - Cultures pending from IR biopsy     13  Elevated glucose  -Improving  -Continue to monitor     14  Chronic pain  -Chronic pain noted in lower back, left hip, knees, and legs   Likely d/t OA and Sjogrens  -Patient follows Rheumatology in [facility] as outpatient  -On methotrexate, cymbalta, voltaren, and tramadol PRN at home  -Continue cymbalta  Hold all other home medications  -Tylenol PRN for pain     15  Generalized weakness  -Likely d/t dehydration and metastatic cancer  -Nutritional support  -PT/OT eval  -Continue with fall precautions     16  Sacral wounds  -Diffuse sacral DTI noted on exam with surrounding redness and scattered black eschar, without drainage  -Continue wound care     17  Sjogren's syndrome  -Home medications include methotrexate 12 5mg PO weekly  -Followed by rheumatology as outpatient with [facility]  -Hold methotrexate for now, resume at discharge  -Natural tears PRN for dry eye     18  Acquired hypothyroidism  -Patient reports not taking medications at home  -Prescribed synthroid, continue     19  Dyslipidemia  -Continue atorvastatin (Lipitor) 40 mg tablet PO     20  SVT - resolved 10/23  -Noted in ED with , converted to NSR s/p Cardizem 10mg IV  -Started on metoprolol tartrate (Lopressor) 12 5 mg tablet PO, continue     21  Dehydration - resolved 10/23  -Patient presented with generalized weakness after being found by PD during a well person check  -Patient reported not eating or drinking while at home  -Improved with IVF     22  Elevated troponin - resolved 10/20  -Troponin on presentation 109 -> 83 -> 78, WNL  -Initial EKG showed sinus tach, rate 115, inferior lateral ST depressions, prolonged QT  -Troponin downtrending  -Telemetry     VTE Pharmacologic Prophylaxis:   Pharmacologic: Heparin  Mechanical VTE Prophylaxis in Place: Yes    Patient Centered Rounds: I have performed bedside rounds with nursing staff today  Discussions with Specialists or Other Care Team Provider: Nursing,    Education and Discussions with Family / Patient: Yes    Time Spent for Care: 20 minutes  More than 50% of total time spent on counseling and coordination of care as described above      Current Length of Stay: 7 day(s)    Current Patient Status: Inpatient   Certification Statement: The patient will continue to require additional inpatient hospital stay due to generalized weakness, upper extremity DVT, heparin  Discharge Plan: Pending treatment plan and rehab consultations  Code Status: Level 1 - Full Code    Subjective: Emma Smith is a 79SAU who presented to the ED with c/o generalized weakness after being found by PD on her livingroom floor s/p fall during a well-person check      10/25 - Patient reported to acute complaints but noted that her right arm was still swollen and causing her some discomfort  Patient remains NPO awaiting IR lung biopsy  Vascular at bedside to perform RUE venous doppler u/s for the swelling  10/26 - Patient found sitting in bed and when I arrived stated that "I thought everyone forgot about me " Assisted patient in eating her breakfast in which she ate about half  Patient noted that with swallowing she would develop some discomfort in her chest region that dissipated after a few seconds  Patient noted some discomfort to her LUE and was provided with an ice pack for some relief  Patient denies chest pain, dyspnea, abdominal pain, and or n/v      Objective:     Vitals:   Temp (24hrs), Av 6 °F (36 4 °C), Min:97 3 °F (36 3 °C), Max:98 °F (36 7 °C)    Temp:  [97 3 °F (36 3 °C)-98 °F (36 7 °C)] 97 6 °F (36 4 °C)  HR:  [] 102  Resp:  [14-19] 18  BP: ()/(45-78) 123/74  SpO2:  [92 %-98 %] 96 %  Body mass index is 16 51 kg/m²  Input and Output Summary (last 24 hours): Intake/Output Summary (Last 24 hours) at 10/26/2022 1014  Last data filed at 10/26/2022 0100  Gross per 24 hour   Intake 1450 ml   Output 1225 ml   Net 225 ml       Physical Exam:     Physical Exam  Constitutional:       Appearance: She is ill-appearing  Eyes:      Extraocular Movements: Extraocular movements intact  Pupils: Pupils are equal, round, and reactive to light     Cardiovascular:      Rate and Rhythm: Normal rate and regular rhythm  Heart sounds: Normal heart sounds  No murmur heard  No friction rub  No gallop  Pulmonary:      Effort: Pulmonary effort is normal  No respiratory distress  Breath sounds: Normal breath sounds  No stridor  No wheezing, rhonchi or rales  Abdominal:      General: Abdomen is flat  There is no distension  Palpations: Abdomen is soft  There is no mass  Tenderness: There is no abdominal tenderness  There is no guarding or rebound  Hernia: No hernia is present  Skin:     General: Skin is warm and dry  Coloration: Skin is not jaundiced or pale  Findings: No bruising, erythema, lesion or rash  Neurological:      Mental Status: She is alert and oriented to person, place, and time  Mental status is at baseline  Psychiatric:         Mood and Affect: Mood normal          Behavior: Behavior normal        Historical Information   Past Medical History:   Diagnosis Date   • Disease of thyroid gland    • Hypercholesteremia    • Hypertension      Past Surgical History:   Procedure Laterality Date   • APPENDECTOMY     • BREAST BIOPSY Right     benign   • CHOLECYSTECTOMY     • COLON SURGERY      ruptured bowel   • COLOSTOMY      and closure 6 months later   • HYSTERECTOMY      total   • KNEE ARTHROSCOPY Bilateral     right x1 left x2   • VA COLSC FLX W/RMVL OF TUMOR POLYP LESION SNARE TQ N/A 5/3/2016    Procedure: COLONOSCOPY ;  Surgeon: Mateusz Bailey MD;  Location: [facility] GI LAB;   Service: Gastroenterology   • TONSILECTOMY AND ADNOIDECTOMY     • TYMPANOPLASTY Right      Social History   Social History     Substance and Sexual Activity   Alcohol Use Not Currently     Social History     Substance and Sexual Activity   Drug Use No     Social History     Tobacco Use   Smoking Status Former Smoker   • Packs/day: 1 00   • Years: 20 00   • Pack years: 20 00   • Quit date:    • Years since quittin 8   Smokeless Tobacco Never Used     Family History: Family History   Problem Relation Age of Onset   • Breast cancer Paternal Aunt 95       Meds/Allergies   PTA meds:   Prior to Admission Medications   Prescriptions Last Dose Informant Patient Reported? Taking?    DULoxetine (CYMBALTA) 30 mg delayed release capsule Past Week at Unknown time  Yes Yes   Sig: Take 1 capsule by mouth daily   Levothyroxine Sodium (SYNTHROID PO)  Self Yes No   Sig: Take 50 mcg by mouth daily   cholecalciferol (VITAMIN D3) 1,000 units tablet  Self Yes No   Sig: Take 1,000 Units by mouth daily   ciclopirox (PENLAC) 8 % solution  Self No No   Sig: Apply topically daily at bedtime   Patient not taking: Reported on 11/20/2019   diclofenac (VOLTAREN) 75 mg EC tablet   Yes Yes   Sig: Take 75 mg by mouth 2 (two) times a day   esomeprazole (NexIUM) 40 MG capsule Past Week at Unknown time Self Yes Yes   Sig: Take 40 mg by mouth every morning before breakfast    folic acid (FOLVITE) 1 mg tablet  Self Yes No   Sig: Take 1 mg by mouth every 12 (twelve) hours   gabapentin (NEURONTIN) 300 mg capsule Not Taking at Unknown time  No No   Sig: Take 1 capsule (300 mg total) by mouth 2 (two) times a day   Patient not taking: No sig reported   ketoconazole (NIZORAL) 2 % cream   No No   Sig: Apply topically daily   methotrexate 2 5 mg tablet   Yes No   Sig: Take 12 5 mg by mouth once a week   rosuvastatin (CRESTOR) 40 MG tablet  Self Yes No   Sig: Take 20 mg by mouth daily     solifenacin (VESICARE) 5 mg tablet   Yes No   Sig: Take 1 tablet by mouth daily   traMADol (ULTRAM) 50 mg tablet   Yes Yes   Sig: Take 50 mg by mouth every 12 (twelve) hours as needed for moderate pain      Facility-Administered Medications: None     Allergies   Allergen Reactions   • Penicillins Rash       Additional Data:     Labs:    Results from last 7 days   Lab Units 10/25/22  0607 10/20/22  1758 10/20/22  0629   WBC Thousand/uL 10 83*   < > 30 63*   HEMOGLOBIN g/dL 7 7*   < > 12 2   HEMATOCRIT % 24 5*   < > 43 1   PLATELETS Thousands/uL 140*   < > 369   NEUTROS PCT %  --   --  90*   LYMPHS PCT %  --   --  5*   MONOS PCT %  --   --  4   EOS PCT %  --   --  0    < > = values in this interval not displayed  Results from last 7 days   Lab Units 10/26/22  0043 10/22/22  1211 10/22/22  0633   SODIUM mmol/L 132*   < > 144   POTASSIUM mmol/L 3 8   < > 3 2*   CHLORIDE mmol/L 102   < > 110*   CO2 mmol/L 22   < > 22   BUN mg/dL 11   < > 44*   CREATININE mg/dL 0 51*   < > 0 95   ANION GAP mmol/L 8   < > 12   CALCIUM mg/dL 7 4*   < > 7 6*   ALBUMIN g/dL  --   --  2 2*   TOTAL BILIRUBIN mg/dL  --   --  0 35   ALK PHOS U/L  --   --  218*   ALT U/L  --   --  <6*   AST U/L  --   --  37   GLUCOSE RANDOM mg/dL 90   < > 135    < > = values in this interval not displayed  Results from last 7 days   Lab Units 10/26/22  0713 10/25/22  2032 10/25/22  1919 10/25/22  1635 10/25/22  1126 10/25/22  0727 10/24/22  2032 10/24/22  1606 10/24/22  1137 10/24/22  0719 10/23/22  2054 10/23/22  1549   POC GLUCOSE mg/dl 83 111 104 84 101 98 118 90 104 117 125 126     Results from last 7 days   Lab Units 10/19/22  1540   HEMOGLOBIN A1C % 5 5     Results from last 7 days   Lab Units 10/20/22  0629 10/20/22  0400 10/20/22  0037 10/19/22  2225 10/19/22  2005 10/19/22  1548 10/19/22  1540   LACTIC ACID mmol/L  --  1 7 3 1* 3 5* 4 0*   < >  --    PROCALCITONIN ng/ml 0 53*  --   --   --   --   --  0 28*    < > = values in this interval not displayed  * I Have Reviewed All Lab Data Listed Above  * Additional Pertinent Lab Tests Reviewed: All Labs Within Last 24 Hours Reviewed    Imaging:    Imaging Reports Reviewed Today Include: Upper limb venous duplex, awaiting IR biopsy results  Imaging Personally Reviewed by Myself Includes:  n/a    Recent Cultures (last 7 days):     Results from last 7 days   Lab Units 10/19/22  1900 10/19/22  1548 10/19/22  1540   BLOOD CULTURE   --  No Growth After 5 Days  No Growth After 5 Days     URINE CULTURE  <10,000 cfu/ml   -- --        Last 24 Hours Medication List:   Current Facility-Administered Medications   Medication Dose Route Frequency Provider Last Rate   • acetaminophen  650 mg Oral Q6H PRN Marylee Heather, MD     • atorvastatin  40 mg Oral Daily With Gurpreet Joseph MD     • calcium carbonate  500 mg Oral Daily PRN Marylee Heather, MD     • cholecalciferol  1,000 Units Oral Daily Marylee Heather, MD     • docusate sodium  100 mg Oral BID Marylee Heather, MD     • DULoxetine  30 mg Oral Daily Marylee Heather, MD     • folic acid  1 mg Oral Daily Marylee Heather, MD     • Gadobutrol  10 mL Intravenous Once in Sudha Peterson MD     • heparin (porcine)  5,000 Units Subcutaneous [facility] Renetta Zhang MD     • HYDROmorphone  0 2 mg Intravenous Q6H PRN Marylee Heather, MD     • insulin lispro  1-5 Units Subcutaneous TID AC Marylee Heather, MD     • insulin lispro  1-5 Units Subcutaneous HS Marylee Heather, MD     • levothyroxine  75 mcg Oral Early Morning Manish Whatley MD     • lidocaine (PF)    Code/Trauma/Sedation Med Erendira Hamilton MD     • metoprolol tartrate  12 5 mg Oral Q12H [address] Manish Whatley MD     • ondansetron  4 mg Intravenous Q6H PRN Renetta Zhang MD     • oxybutynin  5 mg Oral Daily Marylee Heather, MD     • pantoprazole  40 mg Intravenous Q12H [address]  Rafi Whatley MD     • polyethylene glycol  17 g Oral Daily Marylee Heather, MD     • saccharomyces boulardii  250 mg Oral BID Marylee Heather, MD     • sodium chloride  75 mL/hr Intravenous Continuous Renetta Zhang MD 75 mL/hr (10/25/22 9840)   • sucralfate  1 g Oral 4x Daily (AC & HS) Marylee Heather, MD          Today, Patient Was Seen By: Pedro Gee    ** Please Note: Dictation voice to text software may have been used in the creation of this document   **

## 2022-10-26 NOTE — PLAN OF CARE
Problem: Potential for Falls  Goal: Patient will remain free of falls  Description: INTERVENTIONS:  - Educate patient/family on patient safety including physical limitations  - Instruct patient to call for assistance with activity   - Consult OT/PT to assist with strengthening/mobility   - Keep Call bell within reach  - Keep bed low and locked with side rails adjusted as appropriate  - Keep care items and personal belongings within reach  - Initiate and maintain comfort rounds  - Make Fall Risk Sign visible to staff  - Offer Toileting every 2 Hours, in advance of need  - Initiate/Maintain bed alarm- Apply yellow socks and bracelet for high fall risk patients  - Consider moving patient to room near nurses station  Outcome: Progressing     Problem: Nutrition/Hydration-ADULT  Goal: Nutrient/Hydration intake appropriate for improving, restoring or maintaining nutritional needs  Description: Monitor and assess patient's nutrition/hydration status for malnutrition  Collaborate with interdisciplinary team and initiate plan and interventions as ordered  Monitor patient's weight and dietary intake as ordered or per policy  Utilize nutrition screening tool and intervene as necessary  Determine patient's food preferences and provide high-protein, high-caloric foods as appropriate       INTERVENTIONS:  - Monitor oral intake, urinary output, labs, and treatment plans  - Assess nutrition and hydration status and recommend course of action  - Evaluate amount of meals eaten  - Assist patient with eating if necessary   - Allow adequate time for meals  - Recommend/ encourage appropriate diets, oral nutritional supplements, and vitamin/mineral supplements  - Order, calculate, and assess calorie counts as needed  - Recommend, monitor, and adjust tube feedings and TPN/PPN based on assessed needs  - Assess need for intravenous fluids  - Provide specific nutrition/hydration education as appropriate  - Include patient/family/caregiver in decisions related to nutrition  Outcome: Progressing     Problem: MOBILITY - ADULT  Goal: Maintain or return to baseline ADL function  Description: INTERVENTIONS:  -  Assess patient's ability to carry out ADLs; assess patient's baseline for ADL function and identify physical deficits which impact ability to perform ADLs (bathing, care of mouth/teeth, toileting, grooming, dressing, etc )  - Assess/evaluate cause of self-care deficits   - Assess range of motion  - Assess patient's mobility; develop plan if impaired  - Assess patient's need for assistive devices and provide as appropriate  - Encourage maximum independence but intervene and supervise when necessary  - Involve family in performance of ADLs  - Assess for home care needs following discharge   - Consider OT consult to assist with ADL evaluation and planning for discharge  - Provide patient education as appropriate  Outcome: Progressing  Goal: Maintains/Returns to pre admission functional level  Description: INTERVENTIONS:  - Perform BMAT or MOVE assessment daily    - Set and communicate daily mobility goal to care team and patient/family/caregiver     - Collaborate with rehabilitation services on mobility goals if consulted  - Out of bed for toileting  - Record patient progress and toleration of activity level   Outcome: Progressing     Problem: INFECTION - ADULT  Goal: Absence or prevention of progression during hospitalization  Description: INTERVENTIONS:  - Assess and monitor for signs and symptoms of infection  - Monitor lab/diagnostic results  - Monitor all insertion sites, i e  indwelling lines, tubes, and drains  - Monitor endotracheal if appropriate and nasal secretions for changes in amount and color  - [place] appropriate cooling/warming therapies per order  - Administer medications as ordered  - Instruct and encourage patient and family to use good hand hygiene technique  - Identify and instruct in appropriate isolation precautions for identified infection/condition  Outcome: Progressing  Goal: Absence of fever/infection during neutropenic period  Description: INTERVENTIONS:  - Monitor WBC    Outcome: Progressing     Problem: CARDIOVASCULAR - ADULT  Goal: Maintains optimal cardiac output and hemodynamic stability  Description: INTERVENTIONS:  - Monitor I/O, vital signs and rhythm  - Monitor for S/S and trends of decreased cardiac output  - Administer and titrate ordered vasoactive medications to optimize hemodynamic stability  - Assess quality of pulses, skin color and temperature  - Assess for signs of decreased coronary artery perfusion  - Instruct patient to report change in severity of symptoms  Outcome: Progressing  Goal: Absence of cardiac dysrhythmias or at baseline rhythm  Description: INTERVENTIONS:  - Continuous cardiac monitoring, vital signs, obtain 12 lead EKG if ordered  - Administer antiarrhythmic and heart rate control medications as ordered  - Monitor electrolytes and administer replacement therapy as ordered  Outcome: Progressing     Problem: METABOLIC, FLUID AND ELECTROLYTES - ADULT  Goal: Electrolytes maintained within normal limits  Description: INTERVENTIONS:  - Monitor labs and assess patient for signs and symptoms of electrolyte imbalances  - Administer electrolyte replacement as ordered  - Monitor response to electrolyte replacements, including repeat lab results as appropriate  - Instruct patient on fluid and nutrition as appropriate  Outcome: Progressing     Problem: Prexisting or High Potential for Compromised Skin Integrity  Goal: Skin integrity is maintained or improved  Description: INTERVENTIONS:  - Identify patients at risk for skin breakdown  - Assess and monitor skin integrity  - Assess and monitor nutrition and hydration status  - Monitor labs   - Assess for incontinence   - Turn and reposition patient  - Assist with mobility/ambulation  - Relieve pressure over bony prominences  - Avoid friction and shearing  - Provide appropriate hygiene as needed including keeping skin clean and dry  - Evaluate need for skin moisturizer/barrier cream  - Collaborate with interdisciplinary team   - Patient/family teaching  - Consider wound care consult   Outcome: Progressing

## 2022-10-26 NOTE — PROGRESS NOTES
[facility] Internal Medicine Progress Note  Patient: Lucia Middleton 69 y o  female   MRN: [id number]  PCP: Parris Mccurdy MD  Unit/Bed#: [address] [place] [address] Encounter: [id number]  Date Of Visit: 10/26/22    Problem List:    Principal Problem:    Metastatic cancer ([facility] )  Active Problems:    SIRS (systemic inflammatory response syndrome) ([facility])    Generalized weakness    Coffee ground emesis    Swelling of right upper extremity    Dyslipidemia    Acquired hypothyroidism    Sjogren's syndrome (HCC)    Orthostatic hypotension    Acute kidney injury ([facility] )    Sacral wound    Chronic pain    Elevated glucose    Pulmonary nodules    Severe protein-calorie malnutrition (HCC)    Anemia    Electrolyte abnormality    Fecal impaction ([facility])      Assessment & Plan:    SIRS (systemic inflammatory response syndrome) ([facility])  Assessment & Plan  SIRS versus Sepsis, POA, as evidenced by leukocytosis, tachycardia, hypothermia  Lactic acidosis  Patient is afebrile  · UA shows trace leukocytes, normal white count  SARS-CoV-2 PCR negative  · CXR- shows diffuse pulmonary nodules, no evidence infection   · Lactic acid 5 3 trended down  Likely multifactorial secondary to dehydration   · CT chest abdomen pelvis without any evidence of infection  · Blood Cx NGTD5d  · Urine culture negative  · ID following, input appreciated -continue to observe off anymore antibiotics  -follow temperatures and hemodynamics  -follow CBC   Possible related to metastatic disease versus GI bleed  Remains afebrile  Persistent leukocytosis possible related to malignancy  · Initially received cefepime, subsequently transitioned to cefazolin pending cultures    · Follow-up blood cultures-negative so far  · Will discontinue antibiotics as cultures are negative      * Metastatic cancer [facility])  Assessment & Plan    · 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  · Patient was seen by PCP on 9/27, referred to Oncology as outpatient  No history of cancer  History of fibroid , status post hysterectomy and benign breast cyst   · CT chest abdomen pelvis 10/21-noted to have lobulated right upper lobe paramediastinal mass 6 x 4 x 8 5 cm with extension into the superior mediastinum, supraclavicular correlation and extension to and invasion of right upper lobe bronchial branch   Multiple scattered subcentimeter metastatic lung nodules noted in upper lobe bilaterally  Moderate right and small left effusion  Seen by Oncology, input appreciated  · Discussed with Pulmonary, recommended IR guided biopsy  Patient Carlyn Wayne is agreeable  · S/p IR for biopsy-results pending  · Consider hospice consult      Swelling of right upper extremity  Assessment & Plan  Noted right upper extremity swelling, 10/23 without any tenderness or erythema  Likely dependent  · Elevate extremity as tolerated  · Venous Doppler positive for multiple vessel DVT  · Restarted heparin, monitor for bleeding  · PT as tolerated    Coffee ground emesis  Assessment & Plan  Patient noted to have several episodes of coffee-ground emesis since admission,  · CT chest abdomen pelvis w/o occult bleeding but, hiatal hernia and fecal impaction  · Hemoglobin continued to decline to 6 8 grams/deciliter, status post 1 PRBC on 10/22  · Initially treated with IV Protonix drip, now will continue Protonix b i d   · GI recs-Await biopsy results-Continue Protonix 40 mg daily, liquid Carafate 1 g q i d   · -Recommend soft, non ulcer genic diet  · Monitor H&H, transfuse as needed      Generalized weakness  Assessment & Plan  Likely multifactorial secondary to dehydration, metastatic cancer  cane at baseline  · Treat underlying causes    · Nutrition support  · PT OT eval treat  · Fall precautions    Fecal impaction [facility])  Assessment & Plan  Status post partial improvement after enema, 10/21  · Continue Colace and MiraLax   · Enema p r n  Electrolyte abnormality  Assessment & Plan  Hypokalemia, hypophosphatemia, hypomagnesemia  · Replete and monitor    Anemia  Assessment & Plan  Hemoglobin 13 7 grams/deciliter on admission, likely hemoconcentrated  Hemoglobin was 9 4 grams/deciliter on August 2022  Microcytic  Noted to have coffee-ground emesis during hospitalization  Previous B12 low normal  Iron studies-normal ferritin, low TIBC  Hemoglobin gradually declined to 6 8 grams/deciliter in setting of GI bleed and IV fluids  · Monitor H&H  · B12 replacement x1  · Monitor H&H, will transfuse if continues to decline or hemodynamic instability    Severe protein-calorie malnutrition (HCC)  Assessment & Plan  Malnutrition Findings:   Adult Malnutrition type: Chronic illness  Adult Degree of Malnutrition: Other severe protein calorie malnutrition  Malnutrition Characteristics: Fat loss, Muscle loss, Weight loss                  360 Statement: Severe protein calorie malnutrion of chronic illness related to increased energy needs as evidenced by 12% weight loss in 1 month, hollow orbits, protruding clavicles, depression at the temples  Awaiting treatment plan    BMI Findings:  Adult BMI Classifications: Underweight < 18 5        Body mass index is 16 51 kg/m²  Pulmonary nodules  Assessment & Plan  · CXR:  2 x 5 5 cm right upper lobe paramediastinal nodule, and numerous additional smaller bilateral pulmonary nodules highly suspicious for malignancy  Both primary lung cancer and metastatic disease can have this appearance  · Refer to CT scan finding under metastatic cancer  · Pending biopsy results from IR      Elevated glucose  Assessment & Plan  Glucose improving,  · Check A1c- 5 5%, SSI    Chronic pain  Assessment & Plan  Chronic arthralgia in lower back, left hip, knees, legs  Likely due to OA and Sjogren's syndrome  Patient follows Rheumatology in [facility] as outpatient    On methotrexate, Cymbalta, Voltaren, tramadol p r n  At home  Denies pain at present  · Continue Cymbalta,   · Hold methotrexate for now in view of sirs versus sepsis  · Hold Voltaren, tramadol   · Tylenol as needed    Sacral wound  Assessment & Plan  Developed in 2 weeks since sister-in-law is away  Diffuse sacral DTI on exam with surrounding redness, no active drainage, scattered black eschar on exam Hard to exclude cellulitis  · Seen by surgery, input appreciated, continue wound care as per wound care  · ID following no antibiotics at this time    Acute kidney injury [facility])  Assessment & Plan  Baseline creatinine 0 7-0 8 in past year  Creatinine 1 68 on admission  UA without significant abnormality  Prior CT scan without any hydronephrosis  Likely prerenal in setting of dehydration and hypotension  Nephrology following, input appreciated  Improving with IV fluids to baseline  · Will discontinue IV fluid post EGD  · Monitor intake output   · Avoid nephrotoxin and hypotension  · Follow-up BMP    Orthostatic hypotension  Assessment & Plan  Noted history  Sjogren's syndrome [facility])  Assessment & Plan  Patient is on methotrexate 12 5 mg p o  Weekly  Follows rheumatologist in [facility]  · Will hold methotrexate for now  · Resume on discharge  · Natural tears prn for dry eye  Acquired hypothyroidism  Assessment & Plan  Continue Synthroid  Reports not taking medications at home    · Elevated TSH, normal free T4   · Continue Synthroid at elevated level      Dyslipidemia  Assessment & Plan  Continue statin    Hypernatremia-resolved as of 10/23/2022  Assessment & Plan  Presented with sodium level of 160 secondary to decrease free water intake  Seen by Nephrology, input appreciated  Improved appropriately with hypotonic fluids  · Tolerating clear liquid diet  · Monitor BMP  · Monitor intake output   · Monitor p o  intake  · Hold D5 water, will resume D5 water maintenance if remains NPO    SVT (supraventricular tachycardia) (HCC)-resolved as of 10/23/2022  Assessment & Plan  Developed SVT in ED, heart rate 190  Converted to sinus rhythm after receiving Cardizem 10 mg IV  · Telemetry-no further episode, discontinued  · Optimize electrolytes  · Started on low-dose metoprolol     Elevated troponin-resolved as of 10/20/2022  Assessment & Plan  Troponin 109-83-78> wnl  · Initial EKG showed sinus tach, rate 115, inferior lateral ST depressions, prolonged QT   · Patient denies chest pain  · Likely type 2 MI secondary to # 1 and SIRS versus Sepsis  · Troponin downtrending  · Telemetry    Dehydration-resolved as of 10/23/2022  Assessment & Plan  Patient presents with generalized weakness  Neighbor called police as they saw her trash can outside for a few days  Police found her on the floor  Patient reports rolling off the couch and was on the floor for about 2 hours  Patient denies hitting head  Reports lower abdominal pain since 5/2022  Denies any acute pain  Patient lives [facility] - in-law normally takes her shopping and she was away for about 16 days  Patient reported nothing to eat at home and was hungry  · Improving with IV fluids                VTE Pharmacologic Prophylaxis: VTE Score: 2 Moderate Risk (Score 3-4) - Pharmacological DVT Prophylaxis Ordered: heparin  Patient Centered Rounds: I performed bedside rounds with nursing staff today  Discussions with Specialists or Other Care Team Provider:  yes    Education and Discussions with Family / Patient: Updated  (Sister-in-law) at bedside  and cousin    Time Spent for Care: 45 minutes  More than 50% of total time spent on counseling and coordination of care as described above      Current Length of Stay: 7 day(s)  Current Patient Status: Inpatient   Certification Statement: The patient will continue to require additional inpatient hospital stay due to Upper extremity DVT, heparin and potential hospice consult  Discharge Plan: Anticipate discharge in 48 hrs to discharge location to be determined pending rehab evaluations  Code Status: Level 1 - Full Code    Subjective:   Patient and cousin at bedside and discussed with CLARENCE, discussed with CLARENCE about having a family meeting and potential hospice consult, she reported she get back to me regarding family's recommendations  Patient denied any chest pain, abdominal pain, SOB, palpitations, GI bleeding or hematemesis  Secondary long discussion with system lower regarding results of family discussion for course of care for patient concerning rehab versus hospice  Patient's CLARENCE stated that patient is not a candidate to go back home due to being a hoarder and living alone, also patient not a candidate to live with any family members  Hospice consult was placed      Objective:     Vitals:   Temp (24hrs), Av 6 °F (36 4 °C), Min:97 3 °F (36 3 °C), Max:98 °F (36 7 °C)    Temp:  [97 3 °F (36 3 °C)-98 °F (36 7 °C)] 97 6 °F (36 4 °C)  HR:  [] 102  Resp:  [14-19] 18  BP: ()/(45-78) 123/74  SpO2:  [92 %-98 %] 96 % on room air  Body mass index is 16 51 kg/m²  Input and Output Summary (last 24 hours): Intake/Output Summary (Last 24 hours) at 10/26/2022 0939  Last data filed at 10/26/2022 0100  Gross per 24 hour   Intake 1450 ml   Output 1225 ml   Net 225 ml       Physical Exam:   Physical Exam  Vitals reviewed  Exam conducted with a chaperone present  Constitutional:       General: She is not in acute distress  Appearance: Normal appearance  She is ill-appearing  HENT:      Head: Normocephalic and atraumatic  Nose: No congestion  Eyes:      General: No scleral icterus  Conjunctiva/sclera: Conjunctivae normal       Pupils: Pupils are equal, round, and reactive to light  Cardiovascular:      Rate and Rhythm: Normal rate  Heart sounds: No murmur heard  No friction rub  No gallop  Pulmonary:      Effort: No respiratory distress  Breath sounds: No wheezing, rhonchi or rales  Abdominal:      General: Bowel sounds are normal       Palpations: Abdomen is soft  Tenderness: There is no abdominal tenderness  There is no guarding or rebound  Genitourinary:     General: Normal vulva  Exam position: Knee-chest position  Pubic Area: No rash  Labia:         Right: No rash, tenderness, lesion or injury  Left: No rash, tenderness, lesion or injury  Urethra: No urethral pain or urethral lesion  Vagina: Normal       Uterus: Normal        Adnexa: Right adnexa normal and left adnexa normal       Rectum: Normal  No tenderness or anal fissure  Musculoskeletal:         General: No swelling (RUE-edema , LUE hematoma), tenderness or deformity  Normal range of motion  Cervical back: No tenderness  Right lower leg: No edema  Left lower leg: No edema  Right foot: Normal range of motion  Left foot: Normal range of motion  Skin:     General: Skin is warm  Capillary Refill: Capillary refill takes less than 2 seconds  Coloration: Skin is not jaundiced  Findings: Bruising present  No lesion or rash  Neurological:      Mental Status: She is oriented to person, place, and time  Cranial Nerves: No cranial nerve deficit  Motor: Weakness present  Coordination: Coordination normal    Psychiatric:         Mood and Affect: Mood normal          Thought Content: Thought content normal          Additional Data:     Labs:  Results from last 7 days   Lab Units 10/25/22  0607 10/20/22  1758 10/20/22  0629   WBC Thousand/uL 10 83*   < > 30 63*   HEMOGLOBIN g/dL 7 7*   < > 12 2   HEMATOCRIT % 24 5*   < > 43 1   PLATELETS Thousands/uL 140*   < > 369   NEUTROS PCT %  --   --  90*   LYMPHS PCT %  --   --  5*   MONOS PCT %  --   --  4   EOS PCT %  --   --  0    < > = values in this interval not displayed       Results from last 7 days   Lab Units 10/26/22  0043 10/22/22  1211 10/22/22  0633   SODIUM mmol/L 132*   < > 144   POTASSIUM mmol/L 3 8   < > 3 2*   CHLORIDE mmol/L 102   < > 110*   CO2 mmol/L 22   < > 22   BUN mg/dL 11   < > 44*   CREATININE mg/dL 0 51*   < > 0 95   ANION GAP mmol/L 8   < > 12   CALCIUM mg/dL 7 4*   < > 7 6*   ALBUMIN g/dL  --   --  2 2*   TOTAL BILIRUBIN mg/dL  --   --  0 35   ALK PHOS U/L  --   --  218*   ALT U/L  --   --  <6*   AST U/L  --   --  37   GLUCOSE RANDOM mg/dL 90   < > 135    < > = values in this interval not displayed  Results from last 7 days   Lab Units 10/26/22  0713 10/25/22  2032 10/25/22  1919 10/25/22  1635 10/25/22  1126 10/25/22  0727 10/24/22  2032 10/24/22  1606 10/24/22  1137 10/24/22  0719 10/23/22  2054 10/23/22  1549   POC GLUCOSE mg/dl 83 111 104 84 101 98 118 90 104 117 125 126     Results from last 7 days   Lab Units 10/19/22  1540   HEMOGLOBIN A1C % 5 5     Results from last 7 days   Lab Units 10/20/22  0629 10/20/22  0400 10/20/22  0037 10/19/22  2225 10/19/22  2005 10/19/22  1548 10/19/22  1540   LACTIC ACID mmol/L  --  1 7 3 1* 3 5* 4 0*   < >  --    PROCALCITONIN ng/ml 0 53*  --   --   --   --   --  0 28*    < > = values in this interval not displayed  Lines/Drains:  Invasive Devices  Report    Peripheral Intravenous Line  Duration           Peripheral IV 10/23/22 Dorsal (posterior); Left Hand 3 days          Drain  Duration           External Urinary Catheter <1 day                         Imaging: Reviewed radiology reports from this admission including: chest xray, abdominal/pelvic CT and CT head venous Doppler, IR bone biopsy pending    Recent Cultures (last 7 days):   Results from last 7 days   Lab Units 10/19/22  1900 10/19/22  1548 10/19/22  1540   BLOOD CULTURE   --  No Growth After 5 Days  No Growth After 5 Days     URINE CULTURE  <10,000 cfu/ml   --   --        Last 24 Hours Medication List:   Current Facility-Administered Medications   Medication Dose Route Frequency Provider Last Rate   • acetaminophen  650 mg Oral Q6H PRN Manish Vania Barrett MD     • atorvastatin  40 mg Oral Daily With Yohannes Handy MD     • calcium carbonate  500 mg Oral Daily PRN Betina Sierra MD     • cholecalciferol  1,000 Units Oral Daily Betina Sierra MD     • docusate sodium  100 mg Oral BID Betina Sierra MD     • DULoxetine  30 mg Oral Daily Betina Sierra MD     • folic acid  1 mg Oral Daily Betina Sierra MD     • Gadobutrol  10 mL Intravenous Once in Sudha Peterson MD     • heparin (porcine)  5,000 Units Subcutaneous [facility] Stacy Luz MD     • HYDROmorphone  0 2 mg Intravenous Q6H PRN Betina Sierra MD     • insulin lispro  1-5 Units Subcutaneous TID AC Betina Sierra MD     • insulin lispro  1-5 Units Subcutaneous HS Betina Sierra MD     • levothyroxine  75 mcg Oral Early Morning Manish Brunner MD     • lidocaine (PF)    Code/Trauma/Sedation Med Arely Galaviz MD     • metoprolol tartrate  12 5 mg Oral Q12H [address] Manish Brunner MD     • ondansetron  4 mg Intravenous Q6H PRN Stacy Luz MD     • oxybutynin  5 mg Oral Daily Betina Sierra MD     • pantoprazole  40 mg Intravenous Q12H [address]  Amina Brunner MD     • polyethylene glycol  17 g Oral Daily Betina Sierra MD     • saccharomyces boulardii  250 mg Oral BID Betina Sierra MD     • sodium chloride  75 mL/hr Intravenous Continuous Stacy Luz MD 75 mL/hr (10/25/22 1548)   • sucralfate  1 g Oral 4x Daily (AC & HS) Betina Sierra MD          Today, Patient Was Seen By: Stacy Luz    ** Please Note: "This note has been constructed using a voice recognition system  Therefore there may be syntax, spelling, and/or grammatical errors   Please call if you have any questions  "**

## 2022-10-26 NOTE — NURSING NOTE
Patient has no complaints at this time; no distress noted  Vitals taken  5:28:26 97 3 °F (36 3 °C) Abnormal  91 16 98/52 67 95 % --     Bandage noted on R-hip  No redness, swelling, or drainage noted around the site  Patient denies presence of pain at this time  Will continue to monitor

## 2022-10-27 PROBLEM — R65.10 SIRS (SYSTEMIC INFLAMMATORY RESPONSE SYNDROME) (HCC): Status: RESOLVED | Noted: 2022-10-19 | Resolved: 2022-10-27

## 2022-10-27 LAB
BASOPHILS # BLD AUTO: 0.02 THOUSANDS/ÂΜL (ref 0–0.1)
BASOPHILS NFR BLD AUTO: 0 % (ref 0–1)
EOSINOPHIL # BLD AUTO: 0.06 THOUSAND/ÂΜL (ref 0–0.61)
EOSINOPHIL NFR BLD AUTO: 1 % (ref 0–6)
ERYTHROCYTE [DISTWIDTH] IN BLOOD BY AUTOMATED COUNT: 20.8 % (ref 11.6–15.1)
GLUCOSE SERPL-MCNC: 116 MG/DL (ref 65–140)
GLUCOSE SERPL-MCNC: 117 MG/DL (ref 65–140)
GLUCOSE SERPL-MCNC: 124 MG/DL (ref 65–140)
GLUCOSE SERPL-MCNC: 99 MG/DL (ref 65–140)
HCT VFR BLD AUTO: 29.5 % (ref 34.8–46.1)
HGB BLD-MCNC: 9.1 G/DL (ref 11.5–15.4)
IMM GRANULOCYTES # BLD AUTO: 0.22 THOUSAND/UL (ref 0–0.2)
IMM GRANULOCYTES NFR BLD AUTO: 2 % (ref 0–2)
LYMPHOCYTES # BLD AUTO: 1.29 THOUSANDS/ÂΜL (ref 0.6–4.47)
LYMPHOCYTES NFR BLD AUTO: 12 % (ref 14–44)
MCH RBC QN AUTO: 24 PG (ref 26.8–34.3)
MCHC RBC AUTO-ENTMCNC: 30.8 G/DL (ref 31.4–37.4)
MCV RBC AUTO: 78 FL (ref 82–98)
MONOCYTES # BLD AUTO: 0.85 THOUSAND/ÂΜL (ref 0.17–1.22)
MONOCYTES NFR BLD AUTO: 8 % (ref 4–12)
NEUTROPHILS # BLD AUTO: 8.34 THOUSANDS/ÂΜL (ref 1.85–7.62)
NEUTS SEG NFR BLD AUTO: 77 % (ref 43–75)
NRBC BLD AUTO-RTO: 0 /100 WBCS
PLATELET # BLD AUTO: 233 THOUSANDS/UL (ref 149–390)
PMV BLD AUTO: 9.9 FL (ref 8.9–12.7)
RBC # BLD AUTO: 3.79 MILLION/UL (ref 3.81–5.12)
WBC # BLD AUTO: 10.78 THOUSAND/UL (ref 4.31–10.16)

## 2022-10-27 RX ADMIN — LEVOTHYROXINE SODIUM 75 MCG: 75 TABLET ORAL at 05:54

## 2022-10-27 RX ADMIN — Medication 250 MG: at 10:59

## 2022-10-27 RX ADMIN — SUCRALFATE 1 G: 1 TABLET ORAL at 08:40

## 2022-10-27 RX ADMIN — SUCRALFATE 1 G: 1 TABLET ORAL at 12:23

## 2022-10-27 RX ADMIN — SUCRALFATE 1 G: 1 TABLET ORAL at 21:46

## 2022-10-27 RX ADMIN — HEPARIN SODIUM 5000 UNITS: 5000 INJECTION INTRAVENOUS; SUBCUTANEOUS at 15:00

## 2022-10-27 RX ADMIN — OXYBUTYNIN 5 MG: 5 TABLET, FILM COATED, EXTENDED RELEASE ORAL at 10:54

## 2022-10-27 RX ADMIN — HEPARIN SODIUM 5000 UNITS: 5000 INJECTION INTRAVENOUS; SUBCUTANEOUS at 05:54

## 2022-10-27 RX ADMIN — ATORVASTATIN CALCIUM 40 MG: 40 TABLET, FILM COATED ORAL at 16:18

## 2022-10-27 RX ADMIN — HEPARIN SODIUM 5000 UNITS: 5000 INJECTION INTRAVENOUS; SUBCUTANEOUS at 21:46

## 2022-10-27 RX ADMIN — PANTOPRAZOLE SODIUM 40 MG: 40 INJECTION, POWDER, FOR SOLUTION INTRAVENOUS at 10:53

## 2022-10-27 RX ADMIN — POLYETHYLENE GLYCOL 3350 17 G: 17 POWDER, FOR SOLUTION ORAL at 10:55

## 2022-10-27 RX ADMIN — PANTOPRAZOLE SODIUM 40 MG: 40 INJECTION, POWDER, FOR SOLUTION INTRAVENOUS at 21:46

## 2022-10-27 RX ADMIN — Medication 1000 UNITS: at 10:53

## 2022-10-27 RX ADMIN — FOLIC ACID 1 MG: 1 TABLET ORAL at 10:54

## 2022-10-27 RX ADMIN — Medication 250 MG: at 18:35

## 2022-10-27 RX ADMIN — SUCRALFATE 1 G: 1 TABLET ORAL at 16:18

## 2022-10-27 RX ADMIN — DULOXETINE HYDROCHLORIDE 30 MG: 30 CAPSULE, DELAYED RELEASE ORAL at 10:56

## 2022-10-27 NOTE — CASE MANAGEMENT
Case Management Discharge Planning Note    Patient name Taniya Tijerina  Location 2 [address] MRN [id number]  : 1945 Date 10/27/2022       Current Admission Date: 10/19/2022  Current Admission Diagnosis:Metastatic cancer [facility])   Patient Active Problem List    Diagnosis Date Noted   • Swelling of right upper extremity 10/23/2022   • Coffee ground emesis 10/22/2022   • Anemia 10/22/2022   • Electrolyte abnormality 10/22/2022   • Fecal impaction ([address] ) 10/22/2022   • Severe protein-calorie malnutrition ([address] ) 10/21/2022   • Chronic pain 10/20/2022   • Elevated glucose 10/20/2022   • Pulmonary nodules 10/20/2022   • Hypertension 10/19/2022   • Hypercholesteremia 10/19/2022   • Disease of thyroid gland 10/19/2022   • Acute kidney injury ([address] ) 10/19/2022   • Sacral wound 10/19/2022   • Metastatic cancer ([address] ) 10/19/2022   • Generalized weakness 10/19/2022   • Dyslipidemia 2019   • Acquired hypothyroidism 2019   • Sjogren's syndrome ([address] ) 2019   • Orthostatic hypotension 2019   • Recurrent syncope 2019   • Onychomycosis 2019   • Peripheral sensory neuropathy 2019   • Radiculopathy of lumbar region 2019   • Metatarsalgia of both feet 2019      LOS (days): 8  Geometric Mean LOS (GMLOS) (days): 5 00  Days to GMLOS:-2 8     OBJECTIVE:  Risk of Unplanned Readmission Score: 16 4     Current admission status: Inpatient   Preferred Pharmacy:   [address] [place] [address]  Phone: [phone] Fax: [phone]    Primary Care Provider: Bri Montalvo MD    Primary Insurance: [facility]  Secondary Insurance:     DISCHARGE DETAILS:    Discharge planning discussed with[de-identified] [place] of Choice: Yes  Comments - Freedom of Choice: SW following to monitor needs and assist with planning    SW followed up with pt's sister-in-law to review plans and options  Pt and sister-in-law are requesting STR placement at this time  Per CLARENCE once all test results are available future care plan will be considered including possible hospice care  SW provided information on coverage for STR as well as coverage for long term care if needed  EARC-PAS will be completed for pt based on potential need for Medicaid in the future  CLARENCE aware of plan to complete EARC-PAS process  CLARENCE was given accepting facility list to review yesterday  [facility] is requesting rehab placement at [facility]  CM contacted family/caregiver?: Yes  Were Treatment Team discharge recommendations reviewed with patient/caregiver?: Yes  Did patient/caregiver verbalize understanding of patient care needs?: N/A- going to facility  Were patient/caregiver advised of the risks associated with not following Treatment Team discharge recommendations?: Yes    Contacts  Patient Contacts: Dorethea Market  Relationship to Patient[de-identified] Family  Contact Method: In Person  Reason/Outcome: Discharge Planning, Continuity of Care    DME Referral Provided  Referral made for DME?: No    Other Referral/Resources/Interventions Provided:  Interventions: Short Term Rehab  Referral Comments: STR placement at [facility] is planned  Task sent to [facility] Discharge Support for rehab authorization request to be submitted      Treatment Team Recommendation: Short Term Rehab, SNF, Hospice  Discharge Destination Plan[de-identified] Short Term Rehab  Transport at Discharge : BLS Ambulance

## 2022-10-27 NOTE — PHYSICAL THERAPY NOTE
PT TREATMENT     10/27/22 0910   Note Type   Note Type Treatment   Pain Assessment   Pain Assessment Tool 0-10   Pain Score No Pain   Restrictions/Precautions   Other Precautions Chair Alarm; Bed Alarm; Fall Risk   General   Chart Reviewed Yes   Cognition   Overall Cognitive Status WFL   Subjective   Subjective "I feel weak"   Bed Mobility   Supine to Sit 2  Maximal assistance   Sit to Supine 2  Maximal assistance   Transfers   Sit to Stand 2  Maximal assistance   Stand to Sit 2  Maximal assistance   Stand pivot 2  Maximal assistance 1-2   Toilet transfer    Therapeutic exercise 2  Maximal assistance 1-2  X 10 each ankle pumps, AAROM heel slides, AAROM hip abd/add   Balance   Static Sitting Poor pt leans to the right increasingly with fatigue   Static Standing Poor   Activity Tolerance   Activity Tolerance Patient limited by fatigue   Assessment   Prognosis Good   Problem List Decreased strength;Decreased mobility; Impaired balance;Decreased endurance   Assessment Pt remains profoundly weak with c/o dizziness in sitting  Pt BP dropped with sitting however MAP was 68 so pt transferred to the commode but became increasingly weak and dizzy so pt transferred back to bed/supine  Pt's dizziness resolved with  return to supine  RN present for treatmentPt may be able to transfer to a recliner with max assist 1-2 with feet elevated  The patient's [facility] Basic Mobility Inpatient Short Form Raw Score is 8  A Raw score of less than or equal to 16 suggests the patient may benefit from discharge to post-acute rehabilitation services  Plan   Treatment/Interventions LE strengthening/ROM; Therapeutic exercise; Endurance training;Bed mobility; Equipment eval/education;Patient/family training;Functional transfer training   Progress Slow progress, decreased activity tolerance   PT Frequency Other (Comment)  (5x/week)   Recommendation   PT Discharge Recommendation Post acute rehabilitation services   [facility] Basic Mobility Inpatient   Turning in Bed Without Bedrails 2   Lying on Back to Sitting on Edge of Flat Bed 2   Moving Bed to Chair 1   Standing Up From Chair 1   Walk in Room 1   Climb 3-5 Stairs 1   Basic Mobility Inpatient Raw Score 8   Turning Head Towards Sound 4   Follow Simple Instructions 4   Low Function Basic Mobility Raw Score 16   Low Function Basic Mobility Standardized Score 25 72   Highest Level Of Mobility   -[facility] Goal 3: Sit at edge of bed   -[facility] Achieved 4: Move to [place] License Number  Nithin JARA  [id number]

## 2022-10-27 NOTE — APP STUDENT NOTE
[facility] STUDENT  Inpatient Progress Note for TRAINING ONLY  Not Part of Legal Medical Record     Progress Note - Sylvia Mitchell 69 y o  female MRN: [id number]  Unit/Bed#: [address] [place] [address] Encounter: [id number]        No new Assessment & Plan notes have been filed under this hospital service since the last note was generated  Service: Internal Medicine     1  Metastatic cancer  -Patient reported intermittent lower abdominal pain since May 2022 with constipation  -Reports weight loss since the spring of this year  - 9/22 - CT showed findings of a large hiatal hernia, numerous lung nodules which were concerning for metastasis  Sclerosis of T11-L2 vertebral bodies, also concerning for osteoblastic metastasis  -10/25 - Patient scheduled for IR biopsy  -10/26 - Pathology pending from IR biopsy  -10/27 - Pathology pending     2  Coffee ground emesis  -Reported 2 episodes of coffee-ground emesis on 9/21 associated with tachycardia and hypotension in the setting of volume depletion  -Monitoring hemoglobin  -10/22 - Patient required 1 PRBC and hemoglobin was found to improve  -Started on IV Protonix, hold  Continue on PO BID  -10/24 - EGD with findings of severe ulcerative esophagitis, likely source of upper GI bleeding  -10/26 - Patient reports no new episodes of vomiting     3  Fecal impaction  -10/21 - Improvement following enema  -Continue colace and miralax  -Enema PRN  -10/25 - Patient reports BM the night prior     4  Anemia  -Hemoglobin on admission 13 7, likely hemoconcentrated  -Coffee ground emesis during hospitalization  -Iron studies showed normal ferritin and low TIBC  -Hemoglobin dropped to 6 8 in the setting of GI bleed and IVF  -10/25 - Hemoglobin 7 7  -Continue to monitor H&H  -Monitor for hemoglobin <7 that will require transfusion     5  SIRS  -POA with leukocytosis, tachycardia, hypothermia   Lactic acidosis  -COVID negative  -UA showed trace leukocytes with normal white counts  -Urine and blood cultures remain negative  -Discontinue antibiotics     6  REECE  -Baseline Cr 0 7-0 8  Upon admission Cr 1 68  -10/25 - Cr 0 55  -Monitor I&Os  -Avoid nephrotoxins and hypotension  -Continue to monitor with daily BMP     7  Orthostatic hypotension  -Noted from history  -Continue to monitor  -10/27 - Patient noted to drop BP with elevated of HOB  -Continue to monitor     8  Hypernatremia - resolved 10/23  -On admission, Na 160 secondary to dehydration in the setting of decreased fluid intake  -Improved with hypotonic fluids     9  Swelling of RUE  -10/23 - RUE swelling noted  -10/25 - Vascular performed venous doppler u/s of RUE  Findings of an acute, occlusive DVT in the IJV, subclavian, axillary, and brachial veins  Acute occlusive SVT noted in the cephalic and basilic veins   -Restart heparin, monitor for s/s of bleeding  -10/26 - Continue use of ice for pain control  -PT consult, PT as tolerated per patient  10/27 - Patient tolerated PT, still reporting discomfort in RUE     10  Electrolyte abnormalities  -Hypokalemia, hypophosphatemia, hypomagnesemia noted  -Repleted and continue to monitor     11  Severe protein-calorie malnutrition  -BMI 16 51  -Adult Malnutrition type: Chronic illness  -Adult Degree of Malnutrition: Other severe protein calorie malnutrition  -Malnutrition Characteristics: Fat loss, Muscle loss, Weight loss     360 Statement: Severe protein calorie malnutrion of chronic illness related to increased energy needs as evidenced by 12% weight loss in 1 month, hollow orbits, protruding clavicles, depression at the temples   Awaiting treatment plan     BMI Findings:  Adult BMI Classifications: Underweight < 18 5        Body mass index is 16 51 kg/m²       12  Pulmonary nodules  -CXR: RUQ paramediastinal nodule and numerous additional smaller b/l pulmonary nodules highly suspicious for malignancy  -CT findings as noted under metastatic cancer  -10/26 - Cultures pending from IR biopsy     13   Elevated glucose  -Improving  -Continue to monitor     14  Chronic pain  -Chronic pain noted in lower back, left hip, knees, and legs  Likely d/t OA and Sjogrens  -Patient follows Rheumatology in [facility] as outpatient  -On methotrexate, cymbalta, voltaren, and tramadol PRN at home  -Continue cymbalta  Hold all other home medications  -Tylenol PRN for pain     15  Generalized weakness  -Likely d/t dehydration and metastatic cancer  -Nutritional support  -PT/OT eval  -Continue with fall precautions     16  Sacral wounds  -Diffuse sacral DTI noted on exam with surrounding redness and scattered black eschar, without drainage  -Continue wound care     17  Sjogren's syndrome  -Home medications include methotrexate 12 5mg PO weekly  -Followed by rheumatology as outpatient with [facility]  -Hold methotrexate for now, resume at discharge  -Natural tears PRN for dry eye     18  Acquired hypothyroidism  -Patient reports not taking medications at home  -Prescribed synthroid, continue     19  Dyslipidemia  -Continue atorvastatin (Lipitor) 40 mg tablet PO     20  SVT - resolved 10/23  -Noted in ED with , converted to NSR s/p Cardizem 10mg IV  -Started on metoprolol tartrate (Lopressor) 12 5 mg tablet PO, continue     21  Dehydration - resolved 10/23  -Patient presented with generalized weakness after being found by PD during a well person check  -Patient reported not eating or drinking while at home  -Improved with IVF     22  Elevated troponin - resolved 10/20  -Troponin on presentation 109 -> 83 -> 78, WNL  -Initial EKG showed sinus tach, rate 115, inferior lateral ST depressions, prolonged QT  -Troponin downtrending  -Telemetry     VTE Pharmacologic Prophylaxis:   Pharmacologic: Heparin  Mechanical VTE Prophylaxis in Place: Yes    Patient Centered Rounds: I have performed bedside rounds with nursing staff today      Discussions with Specialists or Other Care Team Provider: Nursing, case management    Education and Discussions with Family / Patient: Yes, in person    Time Spent for Care: 15 minutes  More than 50% of total time spent on counseling and coordination of care as described above  Current Length of Stay: 8 day(s)    Current Patient Status: Inpatient   Certification Statement: The patient will continue to require additional inpatient hospital stay due to metastatic cancer, RUE DVTs, generalized weakness  Discharge Plan: Family meeting pending at 3pm today    Code Status: Level 1 - Full Code    Subjective: Vishnu Salazar is a 22HMI who presented to the ED with c/o generalized weakness after being found by PD on her livingroom floor s/p fall during a well-person check      10/25 - Patient reported to acute complaints but noted that her right arm was still swollen and causing her some discomfort  Patient remains NPO awaiting IR lung biopsy  Vascular at bedside to perform RUE venous doppler u/s for the swelling      10/26 - Patient found sitting in bed and when I arrived stated that "I thought everyone forgot about me " Assisted patient in eating her breakfast in which she ate about half  Patient noted that with swallowing she would develop some discomfort in her chest region that dissipated after a few seconds  Patient noted some discomfort to her LUE and was provided with an ice pack for some relief  Patient denies chest pain, dyspnea, abdominal pain, and or n/v     10/27 - Patient found laying in bed with heated blankets and reported that she had been feeling cold  Patient reported that she ate some of her breakfast and was not having experiencing any abdominal pain or nausea  Patient reported that she was experiencing some tightening in her chest and pain with swallowing solid foods but reports that it subsides and is not constant  Patient reports RUE discomfort with swelling and diffuse ecchymosis was evident      Objective:     Vitals:   Temp (24hrs), Av °F (36 1 °C), Min:96 6 °F (35 9 °C), Max:97 6 °F (36 4 °C)    Temp:  [96 6 °F (35 9 °C)-97 6 °F (36 4 °C)] 96 6 °F (35 9 °C)  HR:  [] 83  Resp:  [16-18] 17  BP: ()/(49-70) 94/55  SpO2:  [91 %-100 %] 91 %  Body mass index is 16 51 kg/m²  Input and Output Summary (last 24 hours): Intake/Output Summary (Last 24 hours) at 10/27/2022 1151  Last data filed at 10/27/2022 0315  Gross per 24 hour   Intake --   Output 700 ml   Net -700 ml       Physical Exam:     Physical Exam  Eyes:      Extraocular Movements: Extraocular movements intact  Cardiovascular:      Rate and Rhythm: Normal rate and regular rhythm  Pulses: Normal pulses  Heart sounds: Normal heart sounds  No murmur heard  No friction rub  No gallop  Pulmonary:      Effort: Pulmonary effort is normal  No respiratory distress  Breath sounds: Normal breath sounds  No stridor  No wheezing, rhonchi or rales  Chest:      Chest wall: No tenderness  Abdominal:      General: There is no distension  Palpations: Abdomen is soft  There is no mass  Tenderness: There is no abdominal tenderness  There is no guarding or rebound  Hernia: No hernia is present  Neurological:      Mental Status: She is alert and oriented to person, place, and time  Mental status is at baseline  Psychiatric:         Mood and Affect: Mood normal          Behavior: Behavior normal        Historical Information   Past Medical History:   Diagnosis Date   • Disease of thyroid gland    • Hypercholesteremia    • Hypertension      Past Surgical History:   Procedure Laterality Date   • APPENDECTOMY     • BREAST BIOPSY Right     benign   • CHOLECYSTECTOMY     • COLON SURGERY      ruptured bowel   • COLOSTOMY  2009    and closure 6 months later   • HYSTERECTOMY  2002    total   • IR BIOPSY BONE  10/25/2022   • KNEE ARTHROSCOPY Bilateral     right x1 left x2   • ME COLSC FLX W/RMVL OF TUMOR POLYP LESION SNARE TQ N/A 5/3/2016    Procedure: COLONOSCOPY ;  Surgeon: Ximena Hester MD;  Location: [facility];   Service: Gastroenterology   • TONSILECTOMY AND ADNOIDECTOMY     • TYMPANOPLASTY Right      Social History   Social History     Substance and Sexual Activity   Alcohol Use Not Currently     Social History     Substance and Sexual Activity   Drug Use No     Social History     Tobacco Use   Smoking Status Former Smoker   • Packs/day: 1 00   • Years:    • Pack years:    • Quit date:    • Years since quittin 8   Smokeless Tobacco Never Used     Family History:   Family History   Problem Relation Age of Onset   • Breast cancer Paternal Aunt 95       Meds/Allergies   PTA meds:   Prior to Admission Medications   Prescriptions Last Dose Informant Patient Reported? Taking?    DULoxetine (CYMBALTA) 30 mg delayed release capsule Past Week at Unknown time  Yes Yes   Sig: Take 1 capsule by mouth daily   Levothyroxine Sodium (SYNTHROID PO)  Self Yes No   Sig: Take 50 mcg by mouth daily   cholecalciferol (VITAMIN D3) 1,000 units tablet  Self Yes No   Sig: Take 1,000 Units by mouth daily   ciclopirox (PENLAC) 8 % solution  Self No No   Sig: Apply topically daily at bedtime   Patient not taking: Reported on 2019   diclofenac (VOLTAREN) 75 mg EC tablet   Yes Yes   Sig: Take 75 mg by mouth 2 (two) times a day   esomeprazole (NexIUM) 40 MG capsule Past Week at Unknown time Self Yes Yes   Sig: Take 40 mg by mouth every morning before breakfast    folic acid (FOLVITE) 1 mg tablet  Self Yes No   Sig: Take 1 mg by mouth every 12 (twelve) hours   gabapentin (NEURONTIN) 300 mg capsule Not Taking at Unknown time  No No   Sig: Take 1 capsule (300 mg total) by mouth 2 (two) times a day   Patient not taking: No sig reported   ketoconazole (NIZORAL) 2 % cream   No No   Sig: Apply topically daily   methotrexate 2 5 mg tablet   Yes No   Sig: Take 12 5 mg by mouth once a week   rosuvastatin (CRESTOR) 40 MG tablet  Self Yes No   Sig: Take 20 mg by mouth daily     solifenacin (VESICARE) 5 mg tablet   Yes No   Sig: Take 1 tablet by mouth daily   traMADol (ULTRAM) 50 mg tablet   Yes Yes   Sig: Take 50 mg by mouth every 12 (twelve) hours as needed for moderate pain      Facility-Administered Medications: None     Allergies   Allergen Reactions   • Penicillins Rash       Additional Data:     Labs:    Results from last 7 days   Lab Units 10/25/22  0607   WBC Thousand/uL 10 83*   HEMOGLOBIN g/dL 7 7*   HEMATOCRIT % 24 5*   PLATELETS Thousands/uL 140*     Results from last 7 days   Lab Units 10/26/22  0043 10/22/22  1211 10/22/22  0633   SODIUM mmol/L 132*   < > 144   POTASSIUM mmol/L 3 8   < > 3 2*   CHLORIDE mmol/L 102   < > 110*   CO2 mmol/L 22   < > 22   BUN mg/dL 11   < > 44*   CREATININE mg/dL 0 51*   < > 0 95   ANION GAP mmol/L 8   < > 12   CALCIUM mg/dL 7 4*   < > 7 6*   ALBUMIN g/dL  --   --  2 2*   TOTAL BILIRUBIN mg/dL  --   --  0 35   ALK PHOS U/L  --   --  218*   ALT U/L  --   --  <6*   AST U/L  --   --  37   GLUCOSE RANDOM mg/dL 90   < > 135    < > = values in this interval not displayed  Results from last 7 days   Lab Units 10/27/22  0730 10/26/22  2057 10/26/22  1608 10/26/22  1147 10/26/22  0713 10/25/22  2032 10/25/22  1919 10/25/22  1635 10/25/22  1126 10/25/22  0727 10/24/22  2032 10/24/22  1606   POC GLUCOSE mg/dl 99 114 112 125 83 111 104 84 101 98 118 90                 * I Have Reviewed All Lab Data Listed Above  * Additional Pertinent Lab Tests Reviewed:  All Labs Within Last 24 Hours Reviewed    Imaging:    Imaging Reports Reviewed Today Include: n/a  Imaging Personally Reviewed by Myself Includes:  n/a    Recent Cultures (last 7 days):           Last 24 Hours Medication List:   Current Facility-Administered Medications   Medication Dose Route Frequency Provider Last Rate   • acetaminophen  650 mg Oral Q6H PRN Kerwin Eagle MD     • atorvastatin  40 mg Oral Daily With Alonzo Zarco MD     • calcium carbonate  500 mg Oral Daily PRN Kerwin Eagle MD     • cholecalciferol  1,000 Units Oral Daily Oni Warner MD     • docusate sodium  100 mg Oral BID Oni Warner MD     • DULoxetine  30 mg Oral Daily Oni Warner MD     • folic acid  1 mg Oral Daily Oni Warner MD     • Gadobutrol  10 mL Intravenous Once in imaging Oni Warner MD     • heparin (porcine)  5,000 Units Subcutaneous [facility] Eyal East MD     • HYDROmorphone  0 2 mg Intravenous Q6H PRN Oni Warner MD     • insulin lispro  1-5 Units Subcutaneous TID AC Manish Loco MD     • insulin lispro  1-5 Units Subcutaneous HS Oni Warner MD     • levothyroxine  75 mcg Oral Early Morning Manish Loco MD     • lidocaine (PF)    Code/Trauma/Sedation Med Markel Sever, MD     • metoprolol tartrate  12 5 mg Oral Q12H [address] Manish Loco MD     • ondansetron  4 mg Intravenous Q6H PRN Eyal East MD     • oxybutynin  5 mg Oral Daily Oni Warner MD     • pantoprazole  40 mg Intravenous Q12H [address]  Rhina Loco MD     • polyethylene glycol  17 g Oral Daily Oni Warner MD     • saccharomyces boulardii  250 mg Oral BID Oni Warner MD     • sodium chloride  75 mL/hr Intravenous Continuous Eyal East MD 75 mL/hr (10/25/22 6657)   • sucralfate  1 g Oral 4x Daily (AC & HS) Oni Warner MD          Today, Patient Was Seen By: Luis Parham    ** Please Note: Dictation voice to text software may have been used in the creation of this document   **

## 2022-10-27 NOTE — PLAN OF CARE
Problem: Potential for Falls  Goal: Patient will remain free of falls  Description: INTERVENTIONS:  - Educate patient/family on patient safety including physical limitations  - Instruct patient to call for assistance with activity   - Consult OT/PT to assist with strengthening/mobility   - Keep Call bell within reach  - Keep bed low and locked with side rails adjusted as appropriate  - Keep care items and personal belongings within reach  - Initiate and maintain comfort rounds  - Make Fall Risk Sign visible to staff  - Offer Toileting every 2 Hours, in advance of need  - Initiate/Maintain bed alarm- Apply yellow socks and bracelet for high fall risk patients  - Consider moving patient to room near nurses station  Outcome: Progressing     Problem: Nutrition/Hydration-ADULT  Goal: Nutrient/Hydration intake appropriate for improving, restoring or maintaining nutritional needs  Description: Monitor and assess patient's nutrition/hydration status for malnutrition  Collaborate with interdisciplinary team and initiate plan and interventions as ordered  Monitor patient's weight and dietary intake as ordered or per policy  Utilize nutrition screening tool and intervene as necessary  Determine patient's food preferences and provide high-protein, high-caloric foods as appropriate       INTERVENTIONS:  - Monitor oral intake, urinary output, labs, and treatment plans  - Assess nutrition and hydration status and recommend course of action  - Evaluate amount of meals eaten  - Assist patient with eating if necessary   - Allow adequate time for meals  - Recommend/ encourage appropriate diets, oral nutritional supplements, and vitamin/mineral supplements  - Order, calculate, and assess calorie counts as needed  - Recommend, monitor, and adjust tube feedings and TPN/PPN based on assessed needs  - Assess need for intravenous fluids  - Provide specific nutrition/hydration education as appropriate  - Include patient/family/caregiver in decisions related to nutrition  Outcome: Progressing     Problem: MOBILITY - ADULT  Goal: Maintain or return to baseline ADL function  Description: INTERVENTIONS:  -  Assess patient's ability to carry out ADLs; assess patient's baseline for ADL function and identify physical deficits which impact ability to perform ADLs (bathing, care of mouth/teeth, toileting, grooming, dressing, etc )  - Assess/evaluate cause of self-care deficits   - Assess range of motion  - Assess patient's mobility; develop plan if impaired  - Assess patient's need for assistive devices and provide as appropriate  - Encourage maximum independence but intervene and supervise when necessary  - Involve family in performance of ADLs  - Assess for home care needs following discharge   - Consider OT consult to assist with ADL evaluation and planning for discharge  - Provide patient education as appropriate  Outcome: Progressing

## 2022-10-27 NOTE — PROGRESS NOTES
[facility] Progress Note  Patient: Rachel Amin 69 y o  female   MRN: [id number]  PCP: Mary Banks MD  Unit/Bed#: [address] [place] [address] Encounter: [id number]  Date Of Visit: 10/27/22    Problem List:    Principal Problem:    Metastatic cancer ([address] )  Active Problems:    Generalized weakness    Coffee ground emesis    Swelling of right upper extremity    Dyslipidemia    Acquired hypothyroidism    Sjogren's syndrome (HCC)    Orthostatic hypotension    Acute kidney injury ([address] )    Sacral wound    Chronic pain    Elevated glucose    Pulmonary nodules    Severe protein-calorie malnutrition ([address] )    Anemia    Electrolyte abnormality    Fecal impaction ([address] )  Taking getting anything in fluid IV    Assessment & Plan:    * Metastatic cancer ([address] )  Assessment & Plan    · 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  · Patient was seen by PCP on 9/27, referred to Oncology as outpatient  No history of cancer  History of fibroid , status post hysterectomy and benign breast cyst   · CT chest abdomen pelvis 10/21-noted to have lobulated right upper lobe paramediastinal mass 6 x 4 x 8 5 cm with extension into the superior mediastinum, supraclavicular correlation and extension to and invasion of right upper lobe bronchial branch   Multiple scattered subcentimeter metastatic lung nodules noted in upper lobe bilaterally  Moderate right and small left effusion  Seen by Oncology, input appreciated  · Discussed with Pulmonary, recommended IR guided biopsy  Patient Aracelis Varghese is agreeable  · S/p IR for biopsy-results pending  · Consider hospice consult      SIRS (systemic inflammatory response syndrome) (HCC)-resolved as of 10/27/2022  Assessment & Plan  Improving, asymptomatic    SIRS versus Sepsis, POA, as evidenced by leukocytosis, tachycardia, hypothermia    Lactic acidosis  Patient is afebrile  · UA shows trace leukocytes, normal white count  SARS-CoV-2 PCR negative  · CXR- shows diffuse pulmonary nodules, no evidence infection   · Lactic acid trended down  Likely multifactorial secondary to dehydration v metastatic disease versus GI bleed   · CT chest abdomen pelvis without any evidence of infection  · Blood Cx [id number]  · Urine culture negative  · ID following, input appreciated -continue to observe off anymore antibiotics  -follow temperatures and hemodynamics  -follow CBC  · Initially received cefepime, subsequently transitioned to cefazolin pending cultures  Swelling of right upper extremity  Assessment & Plan  Noted right upper extremity swelling, 10/23 without any tenderness or erythema  Likely dependent  · Elevate extremity as tolerated  · Venous Doppler positive for multiple vessel DVT  · Restarted heparin, monitor for bleeding  · PT as tolerated    Coffee ground emesis  Assessment & Plan  Patient noted to have several episodes of coffee-ground emesis since admission,  · CT chest abdomen pelvis w/o occult bleeding but, hiatal hernia and fecal impaction  · Hemoglobin 6 8 grams/deciliter, status post 1 PRBC on 10/22  · Initially treated with IV Protonix drip>continue Protonix b i d   · GI recs-Await biopsy results-Continue Protonix 40 mg daily, liquid Carafate 1 g q i d  · Recommend soft, non ulcer genic diet  · Monitor H&H, transfuse as needed      Generalized weakness  Assessment & Plan  Likely multifactorial secondary to dehydration, metastatic cancer  cane at baseline  · Treat underlying causes  · Nutrition support  · PT OT eval treat  · Fall precautions    Fecal impaction [facility])  Assessment & Plan  Status post partial improvement after enema, 10/21  · Continue Colace and MiraLax   · Enema p r n      Electrolyte abnormality  Assessment & Plan  Hypokalemia, hypophosphatemia, hypomagnesemia  · Replete and monitor    Anemia  Assessment & Plan  Hemoglobin 13 7 grams/deciliter on admission, likely hemoconcentrated  Hemoglobin was 9 4 grams/deciliter on August 2022  Microcytic  Noted to have coffee-ground emesis during hospitalization  Previous B12 low normal  Iron studies-normal ferritin, low TIBC  Hemoglobin gradually declined to 6 8 grams/deciliter in setting of GI bleed and IV fluids  · Monitor H&H  · B12 replacement x1  · Monitor H&H, will transfuse if continues to decline or hemodynamic instability    Severe protein-calorie malnutrition (HCC)  Assessment & Plan  Malnutrition Findings:   Adult Malnutrition type: Chronic illness  Adult Degree of Malnutrition: Other severe protein calorie malnutrition  Malnutrition Characteristics: Fat loss, Muscle loss, Weight loss                  360 Statement: Severe protein calorie malnutrion of chronic illness related to increased energy needs as evidenced by 12% weight loss in 1 month, hollow orbits, protruding clavicles, depression at the temples  Awaiting treatment plan    BMI Findings:  Adult BMI Classifications: Underweight < 18 5        Body mass index is 16 51 kg/m²  Pulmonary nodules  Assessment & Plan  · CXR:  2 x 5 5 cm right upper lobe paramediastinal nodule, and numerous additional smaller bilateral pulmonary nodules highly suspicious for malignancy  Both primary lung cancer and metastatic disease can have this appearance  · Refer to CT scan finding under metastatic cancer  · Pending biopsy results from IR      Elevated glucose  Assessment & Plan  Glucose improving,  · Check A1c- 5 5%, SSI    Chronic pain  Assessment & Plan  Chronic arthralgia in lower back, left hip, knees, legs  Likely due to OA and Sjogren's syndrome  Patient [facility] Rhe[facility]ology in [facility] as [facility]  On methotrexate, Cymbalta, Voltaren, tramadol p r n  At home    Denies pain at present  · Continue Cymbalta,   · Hold methotrexate for now in view of sirs versus sepsis  · Hold Voltaren, tramadol   · Tylenol as needed    Sacral wound  Assessment & Plan  Developed in 2 weeks since sister-in-law is away  Diffuse sacral DTI on exam with surrounding redness, no active drainage, scattered black eschar on exam Hard to exclude cellulitis  · Seen by surgery, input appreciated, continue wound care as per wound care  · ID following no antibiotics at this time    Acute kidney injury [facility])  Assessment & Plan  Baseline creatinine 0 7-0 8 in past year  Creatinine 1 68 on admission  UA without significant abnormality  Prior CT scan without any hydronephrosis  Likely prerenal in setting of dehydration and hypotension  Nephrology following, input appreciated  Improving with IV fluids to baseline  · Will discontinue IV fluid post EGD  · Monitor intake output   · Avoid nephrotoxin and hypotension  · Follow-up BMP    Orthostatic hypotension  Assessment & Plan  Noted history  Sjogren's syndrome [facility])  Assessment & Plan  Patient is on methotrexate 12 5 mg p o  Weekly  Follows rheumatologist in [facility]  · Will hold methotrexate for now  · Resume on discharge  · Natural tears prn for dry eye  Acquired hypothyroidism  Assessment & Plan  Continue Synthroid  Reports not taking medications at home  · Elevated TSH, normal free T4   · Continue Synthroid at elevated level      Dyslipidemia  Assessment & Plan  Continue statin    Hypernatremia-resolved as of 10/23/2022  Assessment & Plan  Presented with sodium level of 160 secondary to decrease free water intake  Seen by Nephrology, input appreciated  Improved appropriately with hypotonic fluids  · Tolerating clear liquid diet  · Monitor BMP  · Monitor intake output   · Monitor p o  intake  · Hold D5 water, will resume D5 water maintenance if remains NPO    SVT (supraventricular tachycardia) (HCC)-resolved as of 10/23/2022  Assessment & Plan  Developed SVT in ED, heart rate 190  Converted to sinus rhythm after receiving Cardizem 10 mg IV  · Telemetry-no further episode, discontinued  · Optimize electrolytes  · Started on low-dose metoprolol     Elevated troponin-resolved as of 10/20/2022  Assessment & Plan  Troponin 109-83-78> wnl  · Initial EKG showed sinus tach, rate 115, inferior lateral ST depressions, prolonged QT   · Patient denies chest pain  · Likely type 2 MI secondary to # 1 and SIRS versus Sepsis  · Troponin downtrending  · Telemetry    Dehydration-resolved as of 10/23/2022  Assessment & Plan  Patient presents with generalized weakness  Neighbor called police as they saw her trash can outside for a few days  Police found her on the floor  Patient reports rolling off the couch and was on the floor for about 2 hours  Patient denies hitting head  Reports lower abdominal pain since 5/2022  Denies any acute pain  Patient lives [place] - in- normally takes her shopping and she was away for about 16 days  Patient reported nothing to eat at home and was hungry  · Improving with IV fluids          VTE Pharmacologic Prophylaxis: VTE Score: 2 Moderate Risk (Score 3-4) - Pharmacological DVT Prophylaxis Ordered: heparin  Patient Centered Rounds: I performed bedside rounds with nursing staff today  Discussions with Specialists or Other Care Team Provider:  yes    Education and Discussions with Family / Patient: Updated  (Sister-in-law) at bedside  and cousin,     Time Spent for Care: 45 minutes  More than 50% of total time spent on counseling and coordination of care as described above  Current Length of Stay: 8 day(s)  Current Patient Status: Inpatient   Certification Statement: The patient will continue to require additional inpatient hospital stay due to Upper extremity DVT, heparin and potential hospice consult  Discharge Plan: Anticipate discharge in 48 hrs to discharge location to be determined pending rehab evaluations      Code Status: Level 1 - Full Code    Subjective:   Patient seen at bedside, patient reported that she has a little sore this morning, no headache, blurry vision, chest pain, shortness a breath, GI bleeding  Nursing reported a decrease in blood pressure with atrial be elevation  Patient made aware of family meeting today to discuss options upon discharge and ACP  Objective:     Vitals:   Temp (24hrs), Av °F (36 1 °C), Min:96 6 °F (35 9 °C), Max:97 6 °F (36 4 °C)    Temp:  [96 6 °F (35 9 °C)-97 6 °F (36 4 °C)] 96 6 °F (35 9 °C)  HR:  [] 83  Resp:  [16-18] 17  BP: ()/(49-70) 94/55  SpO2:  [91 %-100 %] 91 % on room air  Body mass index is 16 51 kg/m²  Input and Output Summary (last 24 hours): Intake/Output Summary (Last 24 hours) at 10/27/2022 1149  Last data filed at 10/27/2022 0315  Gross per 24 hour   Intake --   Output 700 ml   Net -700 ml       Physical Exam:   Physical Exam  Vitals reviewed  Exam conducted with a chaperone present  Constitutional:       General: She is not in acute distress  Appearance: Normal appearance  HENT:      Head: Normocephalic and atraumatic  Nose: No congestion  Eyes:      General: No scleral icterus  Conjunctiva/sclera: Conjunctivae normal       Pupils: Pupils are equal, round, and reactive to light  Cardiovascular:      Rate and Rhythm: Normal rate  Heart sounds: No murmur heard  Pulmonary:      Effort: No respiratory distress  Breath sounds: No wheezing, rhonchi or rales  Abdominal:      General: Bowel sounds are normal       Palpations: Abdomen is soft  Tenderness: There is no abdominal tenderness  There is no guarding  Genitourinary:     General: Normal vulva  Exam position: Knee-chest position  Pubic Area: No rash  Labia:         Right: No rash, tenderness, lesion or injury  Left: No rash, tenderness, lesion or injury  Urethra: No urethral pain or urethral lesion  Vagina: Normal       Uterus: Normal        Adnexa: Right adnexa normal and left adnexa normal       Rectum: Normal  No tenderness or anal fissure     Musculoskeletal: General: Swelling (RUE edema) present  No deformity  Normal range of motion  Cervical back: No tenderness  Right lower leg: No edema  Left lower leg: No edema  Right foot: Normal range of motion  Left foot: Normal range of motion  Skin:     General: Skin is warm  Capillary Refill: Capillary refill takes less than 2 seconds  Findings: Bruising (Diffuse ecchymoses) present  No lesion or rash  Neurological:      Mental Status: She is oriented to person, place, and time  Mental status is at baseline  Cranial Nerves: No cranial nerve deficit  Coordination: Coordination normal    Psychiatric:         Mood and Affect: Mood normal          Thought Content: Thought content normal          Judgment: Judgment normal          Additional Data:     Labs:  Results from last 7 days   Lab Units 10/25/22  0607   WBC Thousand/uL 10 83*   HEMOGLOBIN g/dL 7 7*   HEMATOCRIT % 24 5*   PLATELETS Thousands/uL 140*     Results from last 7 days   Lab Units 10/26/22  0043 10/22/22  1211 10/22/22  0633   SODIUM mmol/L 132*   < > 144   POTASSIUM mmol/L 3 8   < > 3 2*   CHLORIDE mmol/L 102   < > 110*   CO2 mmol/L 22   < > 22   BUN mg/dL 11   < > 44*   CREATININE mg/dL 0 51*   < > 0 95   ANION GAP mmol/L 8   < > 12   CALCIUM mg/dL 7 4*   < > 7 6*   ALBUMIN g/dL  --   --  2 2*   TOTAL BILIRUBIN mg/dL  --   --  0 35   ALK PHOS U/L  --   --  218*   ALT U/L  --   --  <6*   AST U/L  --   --  37   GLUCOSE RANDOM mg/dL 90   < > 135    < > = values in this interval not displayed           Results from last 7 days   Lab Units 10/27/22  0730 10/26/22  2057 10/26/22  1608 10/26/22  1147 10/26/22  0713 10/25/22  2032 10/25/22  1919 10/25/22  1635 10/25/22  1126 10/25/22  0727 10/24/22  2032 10/24/22  1606   POC GLUCOSE mg/dl 99 114 112 125 83 111 104 84 101 98 118 90               Lines/Drains:  Invasive Devices  Report    Peripheral Intravenous Line  Duration           Peripheral IV 10/23/22 Dorsal (posterior); Left Hand 4 days    Peripheral IV 10/26/22 Left;Ventral (anterior) Forearm <1 day          Drain  Duration           External Urinary Catheter 1 day                         Imaging: Reviewed radiology reports from this admission including: chest xray, abdominal/pelvic CT and CT head venous Doppler, IR bone biopsy pending    Recent Cultures (last 7 days):         Last 24 Hours Medication List:   Current Facility-Administered Medications   Medication Dose Route Frequency Provider Last Rate   • acetaminophen  650 mg Oral Q6H PRN Manish Oconnell MD     • atorvastatin  40 mg Oral Daily With Ten Stout MD     • calcium carbonate  500 mg Oral Daily PRN Kiersten Smith MD     • cholecalciferol  1,000 Units Oral Daily Kiersten Smith MD     • docusate sodium  100 mg Oral BID Kiersten Smith MD     • DULoxetine  30 mg Oral Daily Kiersten Smith MD     • folic acid  1 mg Oral Daily Kiersten Smith MD     • Gadobutrol  10 mL Intravenous Once in imaging Kiersten Smith MD     • heparin (porcine)  5,000 Units Subcutaneous [facility] Cheri Caballero MD     • HYDROmorphone  0 2 mg Intravenous Q6H PRN Kiersten Smith MD     • insulin lispro  1-5 Units Subcutaneous TID AC Kiersten Smith MD     • insulin lispro  1-5 Units Subcutaneous HS Kiersten Smith MD     • levothyroxine  75 mcg Oral Early Morning Manish Oconnell MD     • lidocaine (PF)    Code/Trauma/Sedation Pablo Licona MD     • metoprolol tartrate  12 5 mg Oral Q12H [address] Manish Oconnell MD     • ondansetron  4 mg Intravenous Q6H PRN Cheri Caballero MD     • oxybutynin  5 mg Oral Daily Kiersten Smith MD     • pantoprazole  40 mg Intravenous Q12H [address]  [place] Oconnell MD     • polyethylene glycol  17 g Oral Daily Kiersten Smith MD     • saccharomyces boulardii  250 mg Oral BID Manish Oconnell MD     • sodium chloride  75 mL/hr Intravenous Continuous Jessica Clayton MD 75 mL/hr (10/25/22 2717)   • sucralfate  1 g Oral 4x Daily (AC & HS) Yessenia Lorenz MD          Today, Patient Was Seen By: Jessica Clayton    ** Please Note: "This note has been constructed using a voice recognition system  Therefore there may be syntax, spelling, and/or grammatical errors   Please call if you have any questions  "**

## 2022-10-27 NOTE — PLAN OF CARE
Problem: Potential for Falls  Goal: Patient will remain free of falls  Description: INTERVENTIONS:  - Educate patient/family on patient safety including physical limitations  - Instruct patient to call for assistance with activity   - Consult OT/PT to assist with strengthening/mobility   - Keep Call bell within reach  - Keep bed low and locked with side rails adjusted as appropriate  - Keep care items and personal belongings within reach  - Initiate and maintain comfort rounds  - Make Fall Risk Sign visible to staff  - Offer Toileting every 2 Hours, in advance of need  - Initiate/Maintain bed alarm- Apply yellow socks and bracelet for high fall risk patients  - Consider moving patient to room near nurses station  10/27/2022 1838 by Héctor Esteves RN  Outcome: Progressing  10/27/2022 1838 by Héctor Esteves RN  Outcome: Progressing     Problem: Nutrition/Hydration-ADULT  Goal: Nutrient/Hydration intake appropriate for improving, restoring or maintaining nutritional needs  Description: Monitor and assess patient's nutrition/hydration status for malnutrition  Collaborate with interdisciplinary team and initiate plan and interventions as ordered  Monitor patient's weight and dietary intake as ordered or per policy  Utilize nutrition screening tool and intervene as necessary  Determine patient's food preferences and provide high-protein, high-caloric foods as appropriate       INTERVENTIONS:  - Monitor oral intake, urinary output, labs, and treatment plans  - Assess nutrition and hydration status and recommend course of action  - Evaluate amount of meals eaten  - Assist patient with eating if necessary   - Allow adequate time for meals  - Recommend/ encourage appropriate diets, oral nutritional supplements, and vitamin/mineral supplements  - Order, calculate, and assess calorie counts as needed  - Recommend, monitor, and adjust tube feedings and TPN/PPN based on assessed needs  - Assess need for intravenous fluids  - Provide specific nutrition/hydration education as appropriate  - Include patient/family/caregiver in decisions related to nutrition  10/27/2022 1838 by Thanh Vines RN  Outcome: Progressing  10/27/2022 1838 by Thanh Vines RN  Outcome: Progressing     Problem: MOBILITY - ADULT  Goal: Maintain or return to baseline ADL function  Description: INTERVENTIONS:  -  Assess patient's ability to carry out ADLs; assess patient's baseline for ADL function and identify physical deficits which impact ability to perform ADLs (bathing, care of mouth/teeth, toileting, grooming, dressing, etc )  - Assess/evaluate cause of self-care deficits   - Assess range of motion  - Assess patient's mobility; develop plan if impaired  - Assess patient's need for assistive devices and provide as appropriate  - Encourage maximum independence but intervene and supervise when necessary  - Involve family in performance of ADLs  - Assess for home care needs following discharge   - Consider OT consult to assist with ADL evaluation and planning for discharge  - Provide patient education as appropriate  10/27/2022 1838 by Thanh Vines RN  Outcome: Progressing  10/27/2022 1838 by Thanh Vines RN  Outcome: Progressing  Goal: Maintains/Returns to pre admission functional level  Description: INTERVENTIONS:  - Perform BMAT or MOVE assessment daily    - Set and communicate daily mobility goal to care team and patient/family/caregiver     - Collaborate with rehabilitation services on mobility goals if consulted  - Perform Range of Motion2  Problem: INFECTION - ADULT  Goal: Absence or prevention of progression during hospitalization  Description: INTERVENTIONS:  - Assess and monitor for signs and symptoms of infection  - Monitor lab/diagnostic results  - Monitor all insertion sites, i e  indwelling lines, tubes, and drains  - Monitor endotracheal if appropriate and nasal secretions for changes in amount and color  - [place] appropriate cooling/warming therapies per order  - Administer medications as ordered  - Instruct and encourage patient and family to use good hand hygiene technique  - Identify and instruct in appropriate isolation precautions for identified infection/condition  10/27/2022 1838 by Katt Esposito RN  Outcome: Progressing  10/27/2022 1838 by Katt Esposito RN  Outcome: Progressing  Goal: Absence of fever/infection during neutropenic period  Description: INTERVENTIONS:  - Monitor WBC    10/27/2022 1838 by Katt Esposito RN  Outcome: Progressing  10/27/2022 1838 by Katt Esposito RN  Outcome: Progressing     Problem: CARDIOVASCULAR - ADULT  Goal: Maintains optimal cardiac output and hemodynamic stability  Description: INTERVENTIONS:  - Monitor I/O, vital signs and rhythm  - Monitor for S/S and trends of decreased cardiac output  - Administer and titrate ordered vasoactive medications to optimize hemodynamic stability  - Assess quality of pulses, skin color and temperature  - Assess for signs of decreased coronary artery perfusion  - Instruct patient to report change in severity of symptoms  10/27/2022 1838 by Katt Esposito RN  Outcome: Progressing  10/27/2022 1838 by Katt Esposito RN  Outcome: Progressing  Goal: Absence of cardiac dysrhythmias or at baseline rhythm  Description: INTERVENTIONS:  - Continuous cardiac monitoring, vital signs, obtain 12 lead EKG if ordered  - Administer antiarrhythmic and heart rate control medications as ordered  - Monitor electrolytes and administer replacement therapy as ordered  10/27/2022 1838 by Katt Esposito RN  Outcome: Progressing  10/27/2022 1838 by Katt Esposito RN  Outcome: Progressing     Problem: METABOLIC, FLUID AND ELECTROLYTES - ADULT  Goal: Electrolytes maintained within normal limits  Description: INTERVENTIONS:  - Monitor labs and assess patient for signs and symptoms of electrolyte imbalances  - Administer electrolyte replacement as ordered  - Monitor response to electrolyte replacements, including repeat lab results as appropriate  - Instruct patient on fluid and nutrition as appropriate  10/27/2022 1838 by Mar Gomez RN  Outcome: Progressing  10/27/2022 1838 by Mar Gomez RN  Outcome: Progressing     Problem: Prexisting or High Potential for Compromised Skin Integrity  Goal: Skin integrity is maintained or improved  Description: INTERVENTIONS:  - Identify patients at risk for skin breakdown  - Assess and monitor skin integrity  - Assess and monitor nutrition and hydration status  - Monitor labs   - Assess for incontinence   - Turn and reposition patient  - Assist with mobility/ambulation  - Relieve pressure over bony prominences  - Avoid friction and shearing  - Provide appropriate hygiene as needed including keeping skin clean and dry  - Evaluate need for skin moisturizer/barrier cream  - Collaborate with interdisciplinary team   - Patient/family teaching  - Consider wound care consult   10/27/2022 1838 by Mar Gomez RN  Outcome: Progressing  10/27/2022 1838 by Mar Gomez RN  Outcome: Progressing    times a day  - Reposition patient every 2 hours    - Out of bed for toileting  - Record patient progress and toleration of activity level   10/27/2022 1838 by Mar Gomez RN  Outcome: Progressing  10/27/2022 1838 by Mar Gomez RN  Outcome: Progressing

## 2022-10-28 PROBLEM — K92.0 COFFEE GROUND EMESIS: Status: RESOLVED | Noted: 2022-10-22 | Resolved: 2022-10-28

## 2022-10-28 PROBLEM — E87.8 ELECTROLYTE ABNORMALITY: Status: RESOLVED | Noted: 2022-10-22 | Resolved: 2022-10-28

## 2022-10-28 PROBLEM — K56.41 FECAL IMPACTION (HCC): Status: RESOLVED | Noted: 2022-10-22 | Resolved: 2022-10-28

## 2022-10-28 PROBLEM — I82.621 ACUTE DEEP VEIN THROMBOSIS (DVT) OF RIGHT UPPER EXTREMITY (HCC): Status: ACTIVE | Noted: 2022-10-28

## 2022-10-28 PROBLEM — R73.09 ELEVATED GLUCOSE: Status: RESOLVED | Noted: 2022-10-20 | Resolved: 2022-10-28

## 2022-10-28 PROBLEM — N17.9 ACUTE KIDNEY INJURY (HCC): Status: RESOLVED | Noted: 2022-10-19 | Resolved: 2022-10-28

## 2022-10-28 LAB
GLUCOSE SERPL-MCNC: 104 MG/DL (ref 65–140)
GLUCOSE SERPL-MCNC: 112 MG/DL (ref 65–140)
GLUCOSE SERPL-MCNC: 121 MG/DL (ref 65–140)
GLUCOSE SERPL-MCNC: 156 MG/DL (ref 65–140)

## 2022-10-28 RX ADMIN — SUCRALFATE 1 G: 1 TABLET ORAL at 21:30

## 2022-10-28 RX ADMIN — PANTOPRAZOLE SODIUM 40 MG: 40 INJECTION, POWDER, FOR SOLUTION INTRAVENOUS at 09:13

## 2022-10-28 RX ADMIN — HEPARIN SODIUM 5000 UNITS: 5000 INJECTION INTRAVENOUS; SUBCUTANEOUS at 21:30

## 2022-10-28 RX ADMIN — POLYETHYLENE GLYCOL 3350 17 G: 17 POWDER, FOR SOLUTION ORAL at 09:22

## 2022-10-28 RX ADMIN — Medication 12.5 MG: at 21:30

## 2022-10-28 RX ADMIN — DOCUSATE SODIUM 100 MG: 100 CAPSULE, LIQUID FILLED ORAL at 17:28

## 2022-10-28 RX ADMIN — DULOXETINE HYDROCHLORIDE 30 MG: 30 CAPSULE, DELAYED RELEASE ORAL at 09:13

## 2022-10-28 RX ADMIN — HEPARIN SODIUM 5000 UNITS: 5000 INJECTION INTRAVENOUS; SUBCUTANEOUS at 13:00

## 2022-10-28 RX ADMIN — Medication 250 MG: at 17:28

## 2022-10-28 RX ADMIN — PANTOPRAZOLE SODIUM 40 MG: 40 INJECTION, POWDER, FOR SOLUTION INTRAVENOUS at 21:30

## 2022-10-28 RX ADMIN — ATORVASTATIN CALCIUM 40 MG: 40 TABLET, FILM COATED ORAL at 16:21

## 2022-10-28 RX ADMIN — SUCRALFATE 1 G: 1 TABLET ORAL at 11:00

## 2022-10-28 RX ADMIN — FOLIC ACID 1 MG: 1 TABLET ORAL at 09:13

## 2022-10-28 RX ADMIN — SUCRALFATE 1 G: 1 TABLET ORAL at 06:25

## 2022-10-28 RX ADMIN — HEPARIN SODIUM 5000 UNITS: 5000 INJECTION INTRAVENOUS; SUBCUTANEOUS at 05:55

## 2022-10-28 RX ADMIN — LEVOTHYROXINE SODIUM 75 MCG: 75 TABLET ORAL at 05:55

## 2022-10-28 RX ADMIN — SUCRALFATE 1 G: 1 TABLET ORAL at 16:21

## 2022-10-28 RX ADMIN — INSULIN LISPRO 1 UNITS: 100 INJECTION, SOLUTION INTRAVENOUS; SUBCUTANEOUS at 21:00

## 2022-10-28 RX ADMIN — Medication 1000 UNITS: at 09:13

## 2022-10-28 RX ADMIN — DOCUSATE SODIUM 100 MG: 100 CAPSULE, LIQUID FILLED ORAL at 09:13

## 2022-10-28 RX ADMIN — Medication 250 MG: at 09:13

## 2022-10-28 RX ADMIN — OXYBUTYNIN 5 MG: 5 TABLET, FILM COATED, EXTENDED RELEASE ORAL at 09:13

## 2022-10-28 NOTE — PLAN OF CARE
Problem: Potential for Falls  Goal: Patient will remain free of falls  Description: INTERVENTIONS:  - Educate patient/family on patient safety including physical limitations  - Instruct patient to call for assistance with activity   - Consult OT/PT to assist with strengthening/mobility   - Keep Call bell within reach  - Keep bed low and locked with side rails adjusted as appropriate  - Keep care items and personal belongings within reach  - Initiate and maintain comfort rounds  - Make Fall Risk Sign visible to staff  - Offer Toileting every 2 Hours, in advance of need  - Initiate/Maintain bed alarm- Apply yellow socks and bracelet for high fall risk patients  - Consider moving patient to room near nurses station  Outcome: Progressing     Problem: Nutrition/Hydration-ADULT  Goal: Nutrient/Hydration intake appropriate for improving, restoring or maintaining nutritional needs  Description: Monitor and assess patient's nutrition/hydration status for malnutrition  Collaborate with interdisciplinary team and initiate plan and interventions as ordered  Monitor patient's weight and dietary intake as ordered or per policy  Utilize nutrition screening tool and intervene as necessary  Determine patient's food preferences and provide high-protein, high-caloric foods as appropriate       INTERVENTIONS:  - Monitor oral intake, urinary output, labs, and treatment plans  - Assess nutrition and hydration status and recommend course of action  - Evaluate amount of meals eaten  - Assist patient with eating if necessary   - Allow adequate time for meals  - Recommend/ encourage appropriate diets, oral nutritional supplements, and vitamin/mineral supplements  - Order, calculate, and assess calorie counts as needed  - Recommend, monitor, and adjust tube feedings and TPN/PPN based on assessed needs  - Assess need for intravenous fluids  - Provide specific nutrition/hydration education as appropriate  - Include patient/family/caregiver in decisions related to nutrition  Outcome: Progressing     Problem: MOBILITY - ADULT  Goal: Maintain or return to baseline ADL function  Description: INTERVENTIONS:  -  Assess patient's ability to carry out ADLs; assess patient's baseline for ADL function and identify physical deficits which impact ability to perform ADLs (bathing, care of mouth/teeth, toileting, grooming, dressing, etc )  - Assess/evaluate cause of self-care deficits   - Assess range of motion  - Assess patient's mobility; develop plan if impaired  - Assess patient's need for assistive devices and provide as appropriate  - Encourage maximum independence but intervene and supervise when necessary  - Involve family in performance of ADLs  - Assess for home care needs following discharge   - Consider OT consult to assist with ADL evaluation and planning for discharge  - Provide patient education as appropriate  Outcome: Progressing  Goal: Maintains/Returns to pre admission functional level  Description: INTERVENTIONS:  - Perform BMAT or MOVE assessment daily    - Set and communicate daily mobility goal to care team and patient/family/caregiver  - Collaborate with rehabilitation services on mobility goals if consulted  - Perform Range of Motion 4 times a day  - Reposition patient every 2 hours    - Dangle patient 4 times a day  - Stand patient 3 times a day  - Ambulate patient 3 times a day  - Out of bed to chair 3 times a day   - Out of bed for meals 3 times a day  - Out of bed for toileting  - Record patient progress and toleration of activity level   Outcome: Progressing     Problem: INFECTION - ADULT  Goal: Absence or prevention of progression during hospitalization  Description: INTERVENTIONS:  - Assess and monitor for signs and symptoms of infection  - Monitor lab/diagnostic results  - Monitor all insertion sites, i e  indwelling lines, tubes, and drains  - Monitor endotracheal if appropriate and nasal secretions for changes in amount and color  - [place] appropriate cooling/warming therapies per order  - Administer medications as ordered  - Instruct and encourage patient and family to use good hand hygiene technique  - Identify and instruct in appropriate isolation precautions for identified infection/condition  Outcome: Progressing  Goal: Absence of fever/infection during neutropenic period  Description: INTERVENTIONS:  - Monitor WBC    Outcome: Progressing     Problem: CARDIOVASCULAR - ADULT  Goal: Maintains optimal cardiac output and hemodynamic stability  Description: INTERVENTIONS:  - Monitor I/O, vital signs and rhythm  - Monitor for S/S and trends of decreased cardiac output  - Administer and titrate ordered vasoactive medications to optimize hemodynamic stability  - Assess quality of pulses, skin color and temperature  - Assess for signs of decreased coronary artery perfusion  - Instruct patient to report change in severity of symptoms  Outcome: Progressing  Goal: Absence of cardiac dysrhythmias or at baseline rhythm  Description: INTERVENTIONS:  - Continuous cardiac monitoring, vital signs, obtain 12 lead EKG if ordered  - Administer antiarrhythmic and heart rate control medications as ordered  - Monitor electrolytes and administer replacement therapy as ordered  Outcome: Progressing     Problem: METABOLIC, FLUID AND ELECTROLYTES - ADULT  Goal: Electrolytes maintained within normal limits  Description: INTERVENTIONS:  - Monitor labs and assess patient for signs and symptoms of electrolyte imbalances  - Administer electrolyte replacement as ordered  - Monitor response to electrolyte replacements, including repeat lab results as appropriate  - Instruct patient on fluid and nutrition as appropriate  Outcome: Progressing     Problem: Prexisting or High Potential for Compromised Skin Integrity  Goal: Skin integrity is maintained or improved  Description: INTERVENTIONS:  - Identify patients at risk for skin breakdown  - Assess and monitor skin integrity  - Assess and monitor nutrition and hydration status  - Monitor labs   - Assess for incontinence   - Turn and reposition patient  - Assist with mobility/ambulation  - Relieve pressure over bony prominences  - Avoid friction and shearing  - Provide appropriate hygiene as needed including keeping skin clean and dry  - Evaluate need for skin moisturizer/barrier cream  - Collaborate with interdisciplinary team   - Patient/family teaching  - Consider wound care consult   Outcome: Progressing

## 2022-10-28 NOTE — PROGRESS NOTES
[facility] Progress Note  Patient: Xuan Coleman 69 y o  female   MRN: [id number]  PCP: Douglas Garcia MD  Unit/Bed#: [address] [place] [address] Encounter: [id number]  Date Of Visit: 10/28/22    Problem List:    Principal Problem:    Metastatic cancer ([facility] [address] )  Active Problems:    Acute deep vein thrombosis (DVT) of right upper extremity (HCC)    Generalized weakness    Swelling of right upper extremity    Dyslipidemia    Acquired hypothyroidism    Sjogren's syndrome (HCC)    Orthostatic hypotension    Sacral wound    Chronic pain    Pulmonary nodules    Severe protein-calorie malnutrition ([facility] [address] )    Anemia  Taking getting anything in fluid IV    Assessment & Plan:    Acute deep vein thrombosis (DVT) of right upper extremity (HCC)  Assessment & Plan  US:  Positive  Oncology recs Dopplers demonstrated multiple DVTs going up into the internal jugular vein  Patient is on subcu heparin 5000 units every 8 hours  Patient obviously needs to be monitored closely, history of GI bleeding  Yesterday's hemoglobin level of 9 1 g/dL good/acceptable  Likely multiple etiologies for the DVT - malignancy, immobility, in association with the right upper lobe mass  Continue 5 K heparin subQ  Monitor for bleeding risk, consider NOAC on discharge versus warfarin    * Metastatic cancer ([facility] )  Assessment & Plan    · 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  · Patient was seen by PCP on 9/27, referred to Oncology as outpatient  No history of cancer  History of fibroid , status post hysterectomy and benign breast cyst   · CT chest abdomen pelvis 10/21-noted to have lobulated right upper lobe paramediastinal mass 6 x 4 x 8 5 cm with extension into the superior mediastinum, supraclavicular correlation and extension to and invasion of right upper lobe bronchial branch   Multiple scattered subcentimeter metastatic lung nodules noted in upper lobe bilaterally  Moderate right and small left effusion  Seen by Oncology, input appreciated  · Discussed with Pulmonary, recommended IR guided biopsy  Patient Larisa Abreu is agreeable  · S/p IR for biopsy-results pending  · Consider hospice consult after STR      SIRS (systemic inflammatory response syndrome) (HCC)-resolved as of 10/27/2022  Assessment & Plan  Improving, asymptomatic    SIRS versus Sepsis, POA, as evidenced by leukocytosis, tachycardia, hypothermia  Lactic acidosis  Patient is afebrile  · UA shows trace leukocytes, normal white count  SARS-CoV-2 PCR negative  · CXR- shows diffuse pulmonary nodules, no evidence infection   · Lactic acid trended down  Likely multifactorial secondary to dehydration v metastatic disease versus GI bleed   · CT chest abdomen pelvis without any evidence of infection  · Blood Cx [id number]  · Urine culture negative  · ID following, input appreciated -continue to observe off anymore antibiotics  -follow temperatures and hemodynamics  -follow CBC  · Initially received cefepime, subsequently transitioned to cefazolin pending cultures  Swelling of right upper extremity  Assessment & Plan  Unresolved,   Noted right upper extremity swelling, 10/23 without any tenderness or erythema  · Elevate extremity as tolerated  · Venous Doppler positive for multiple vessel DVT  · Restarted heparin, monitor for bleeding transition to DOAC  · PT as tolerated    Generalized weakness  Assessment & Plan  May be new baseline possibly secondary to deconditioning  Likely multifactorial secondary to dehydration, metastatic cancer  cane at baseline  · Treat underlying causes    · Nutrition support  · PT OT STR  · Fall precautions    Coffee ground emesis-resolved as of 10/28/2022  Assessment & Plan  Patient noted to have several episodes of coffee-ground emesis since admission,  · CT chest abdomen pelvis w/o occult bleeding but, hiatal hernia and fecal impaction  · Hemoglobin 6 8 grams/deciliter, status post 1 PRBC on 10/22  · Initially treated with IV Protonix drip>continue Protonix b i d   · GI recs-Await biopsy results-Continue Protonix 40 mg daily, liquid Carafate 1 g q i d  · Recommend soft, non ulcer genic diet  · Monitor H&H, transfuse as needed      Anemia  Assessment & Plan  Hemoglobin 13 >10 78 g  Noted to have coffee-ground emesis during hospitalization  Previous B12 low normal  Iron studies-normal ferritin, low TIBC  · Monitor H&H, will transfuse if continues to decline or hemodynamic instability    Severe protein-calorie malnutrition (HCC)  Assessment & Plan  Malnutrition Findings:   Adult Malnutrition type: Chronic illness  Adult Degree of Malnutrition: Other severe protein calorie malnutrition  Malnutrition Characteristics: Fat loss, Muscle loss, Weight loss    360 Statement: Severe protein calorie malnutrion of chronic illness related to increased energy needs as evidenced by 12% weight loss in 1 month, hollow orbits, protruding clavicles, depression at the temples  Awaiting treatment plan    BMI Findings:  Adult BMI Classifications: Underweight < 18 5     Body mass index is 16 51 kg/m²  Pulmonary nodules  Assessment & Plan  · CXR:  2 x 5 5 cm right upper lobe paramediastinal nodule, and numerous additional smaller bilateral pulmonary nodules highly suspicious for malignancy  Both primary lung cancer and metastatic disease can have this appearance  · Refer to CT scan finding under metastatic cancer  · Pending biopsy results from IR      Chronic pain  Assessment & Plan  Control, asymptomatic  Chronic arthralgia in lower back, left hip, knees, legs  Likely due to OA and Sjogren's syndrome  Patient follows Rheumatology in [facility] as outpatient  On methotrexate, Cymbalta, Voltaren, tramadol p r n  At home    Denies pain at present  · Continue Cymbalta,   · Hold methotrexate for now in view of sirs versus sepsis  · Hold Voltaren, tramadol   · Tylenol as needed    Sacral wound  Assessment & Plan  Developed in 2 weeks since sister-in-law is away  Diffuse sacral DTI on exam with surrounding redness, no active drainage, scattered black eschar on exam Hard to exclude cellulitis  · Seen by surgery, input appreciated, continue wound care as per wound care  · ID following no antibiotics at this time  · Wound care is needed, offloading    Orthostatic hypotension  Assessment & Plan  Asymptomatic, Noted history  Sjogren's syndrome ([facility] )  Assessment & Plan  Concurrently asymptomatic, Patient is on methotrexate 12 5 mg p o  Weekly  Follows rheumatologist in [facility]  · Will hold methotrexate for now  · Reconsider while outpatient on discharge  · Natural tears prn for dry eye  Acquired hypothyroidism  Assessment & Plan  Continue Synthroid  Reports not taking medications at home  · Elevated TSH, normal free T4   · Continue Synthroid at elevated level      Dyslipidemia  Assessment & Plan  Chronic, Continue statin    Fecal impaction (HCC)-resolved as of 10/28/2022  Assessment & Plan  Status post partial improvement after enema, 10/21  · Continue Colace and MiraLax   · Enema p r n      Electrolyte abnormality-resolved as of 10/28/2022  Assessment & Plan  Hypokalemia, hypophosphatemia, hypomagnesemia  · Replete and monitor    Hypernatremia-resolved as of 10/23/2022  Assessment & Plan  Presented with sodium level of 160 secondary to decrease free water intake  Seen by Nephrology, input appreciated  Improved appropriately with hypotonic fluids  · Tolerating clear liquid diet  · Monitor BMP  · Monitor intake output   · Monitor p o  intake  · Hold D5 water, will resume D5 water maintenance if remains NPO    Elevated glucose-resolved as of 10/28/2022  Assessment & Plan  Glucose improving,  · Check A1c- 5 5%, SSI    SVT (supraventricular tachycardia) (HCC)-resolved as of 10/23/2022  Assessment & Plan  Developed SVT in ED, heart rate 190  Converted to sinus rhythm after receiving Cardizem 10 mg IV  · Telemetry-no further episode, discontinued  · Optimize electrolytes  · Started on low-dose metoprolol     Elevated troponin-resolved as of 10/20/2022  Assessment & Plan  Troponin 109-83-78> wnl  · Initial EKG showed sinus tach, rate 115, inferior lateral ST depressions, prolonged QT   · Patient denies chest pain  · Likely type 2 MI secondary to # 1 and SIRS versus Sepsis  · Troponin downtrending  · Telemetry    Acute kidney injury (HCC)-resolved as of 10/28/2022  Assessment & Plan  Baseline creatinine 0 7-0 8 in past year  Creatinine 1 68 on admission  UA without significant abnormality  Prior CT scan without any hydronephrosis  Likely prerenal in setting of dehydration and hypotension  Nephrology following, input appreciated  Improving with IV fluids to baseline  · Will discontinue IV fluid post EGD  · Monitor intake output   · Avoid nephrotoxin and hypotension  · Follow-up BMP    Dehydration-resolved as of 10/23/2022  Assessment & Plan  Patient presents with generalized weakness  Neighbor called police as they saw her trash can outside for a few days  Police found her on the floor  Patient reports rolling off the couch and was on the floor for about 2 hours  Patient denies hitting head  Reports lower abdominal pain since 5/2022  Denies any acute pain  Patient lives [place] - in-law normally takes her shopping and she was away for about 16 days  Patient reported nothing to eat at home and was hungry  · Improving with IV fluids          VTE Pharmacologic Prophylaxis: VTE Score: 2 Moderate Risk (Score 3-4) - Pharmacological DVT Prophylaxis Ordered: heparin  Patient Centered Rounds: I performed bedside rounds with nursing staff today  Discussions with Specialists or Other Care Team Provider:  yes    Education and Discussions with Family / Patient: Updated  (Sister-in-law) at bedside   and danny, CM    Time Spent for Care: 45 minutes  More than 50% of total time spent on counseling and coordination of care as described above  Current Length of Stay: 9 day(s)  Current Patient Status: Inpatient   Certification Statement: The patient will continue to require additional inpatient hospital stay due to Upper extremity DVT, heparin and potential hospice consult  Discharge Plan: Anticipate discharge in 48 hrs to discharge location to be determined pending rehab evaluations  Code Status: Level 1 - Full Code    Subjective:   Patient seen at bedside, patient stated that she had no GI bleeding, shortness of breath, palpitations  Patient reported some soreness in right upper arm and swelling  Patient had no other concerns at this time  Objective:     Vitals:   Temp (24hrs), Av 4 °F (36 3 °C), Min:96 7 °F (35 9 °C), Max:97 9 °F (36 6 °C)    Temp:  [96 7 °F (35 9 °C)-97 9 °F (36 6 °C)] 97 5 °F (36 4 °C)  HR:  [88-98] 88  Resp:  [18] 18  BP: ()/(54-67) 107/63  SpO2:  [92 %-98 %] 96 % on room air  Body mass index is 16 51 kg/m²  Input and Output Summary (last 24 hours): Intake/Output Summary (Last 24 hours) at 10/28/2022 1350  Last data filed at 10/28/2022 0630  Gross per 24 hour   Intake --   Output 1100 ml   Net -1100 ml       Physical Exam:   Physical Exam  Vitals reviewed  Exam conducted with a chaperone present  Constitutional:       General: She is not in acute distress  Appearance: Normal appearance  She is ill-appearing  HENT:      Head: Normocephalic and atraumatic  Nose: No congestion  Eyes:      General: No scleral icterus  Conjunctiva/sclera: Conjunctivae normal       Pupils: Pupils are equal, round, and reactive to light  Cardiovascular:      Rate and Rhythm: Normal rate  Heart sounds: No murmur heard  Pulmonary:      Effort: No respiratory distress  Breath sounds: No wheezing, rhonchi or rales     Abdominal:      General: Bowel sounds are normal       Palpations: Abdomen is soft  Tenderness: There is no abdominal tenderness  There is no guarding  Genitourinary:     General: Normal vulva  Exam position: Knee-chest position  Pubic Area: No rash  Labia:         Right: No rash, tenderness, lesion or injury  Left: No rash, tenderness, lesion or injury  Urethra: No urethral pain or urethral lesion  Vagina: Normal       Uterus: Normal        Adnexa: Right adnexa normal and left adnexa normal       Rectum: Normal  No tenderness or anal fissure  Musculoskeletal:         General: Swelling present  No deformity  Normal range of motion  Cervical back: No tenderness  Right lower leg: No edema  Left lower leg: No edema  Right foot: Normal range of motion  Left foot: Normal range of motion  Comments: Right upper extremity edema   Skin:     General: Skin is warm  Capillary Refill: Capillary refill takes less than 2 seconds  Findings: No lesion or rash  Neurological:      Mental Status: She is oriented to person, place, and time  Cranial Nerves: No cranial nerve deficit  Coordination: Coordination normal    Psychiatric:         Mood and Affect: Mood normal          Thought Content:  Thought content normal          Additional Data:     Labs:  Results from last 7 days   Lab Units 10/27/22  1215   WBC Thousand/uL 10 78*   HEMOGLOBIN g/dL 9 1*   HEMATOCRIT % 29 5*   PLATELETS Thousands/uL 233   NEUTROS PCT % 77*   LYMPHS PCT % 12*   MONOS PCT % 8   EOS PCT % 1     Results from last 7 days   Lab Units 10/26/22  0043 10/22/22  1211 10/22/22  0633   SODIUM mmol/L 132*   < > 144   POTASSIUM mmol/L 3 8   < > 3 2*   CHLORIDE mmol/L 102   < > 110*   CO2 mmol/L 22   < > 22   BUN mg/dL 11   < > 44*   CREATININE mg/dL 0 51*   < > 0 95   ANION GAP mmol/L 8   < > 12   CALCIUM mg/dL 7 4*   < > 7 6*   ALBUMIN g/dL  --   --  2 2*   TOTAL BILIRUBIN mg/dL  --   --  0 35   ALK PHOS U/L  --   --  218*   ALT U/L  --   -- <6*   AST U/L  --   --  37   GLUCOSE RANDOM mg/dL 90   < > 135    < > = values in this interval not displayed  Results from last 7 days   Lab Units 10/28/22  1112 10/28/22  0710 10/27/22  2057 10/27/22  1545 10/27/22  1213 10/27/22  0730 10/26/22  2057 10/26/22  1608 10/26/22  1147 10/26/22  0713 10/25/22  2032 10/25/22  1919   POC GLUCOSE mg/dl 112 104 116 124 117 99 114 112 125 83 111 104               Lines/Drains:  Invasive Devices  Report    Peripheral Intravenous Line  Duration           Peripheral IV 10/23/22 Dorsal (posterior); Left Hand 5 days    Peripheral IV 10/26/22 Left;Ventral (anterior) Forearm 1 day          Drain  Duration           External Urinary Catheter 2 days                         Imaging: Reviewed radiology reports from this admission including: chest xray, abdominal/pelvic CT and CT head venous Doppler, IR bone biopsy pending    Recent Cultures (last 7 days):         Last 24 Hours Medication List:   Current Facility-Administered Medications   Medication Dose Route Frequency Provider Last Rate   • acetaminophen  650 mg Oral Q6H PRN Manish Plummer MD     • atorvastatin  40 mg Oral Daily With Katja Barton MD     • calcium carbonate  500 mg Oral Daily PRN Eliza Whatley MD     • cholecalciferol  1,000 Units Oral Daily Eliza Whatley MD     • docusate sodium  100 mg Oral BID Eliza Whatley MD     • DULoxetine  30 mg Oral Daily Eliza Whatley MD     • folic acid  1 mg Oral Daily Eliza Whatley MD     • Gadobutrol  10 mL Intravenous Once in imaging Eliza Whatley MD     • heparin (porcine)  5,000 Units Subcutaneous [facility] Bia Bellamy MD     • HYDROmorphone  0 2 mg Intravenous Q6H PRN Eliza Whatley MD     • insulin lispro  1-5 Units Subcutaneous TID AC Manish Plummer MD     • insulin lispro  1-5 Units Subcutaneous HS Manish Plummer MD     • levothyroxine  75 mcg Oral Early Morning Manish Cassidy Knedrick MD     • lidocaine (PF)    Code/Trauma/Sedation Med Teri Cormier MD     • metoprolol tartrate  12 5 mg Oral Q12H Kuldip Renteria MD     • ondansetron  4 mg Intravenous Q6H PRN Radha Tai MD     • oxybutynin  5 mg Oral Daily Marielle Guerrero MD     • pantoprazole  40 mg Intravenous Q12H [address]  Cassidy Kendrick MD     • polyethylene glycol  17 g Oral Daily Marielle Guerrero MD     • saccharomyces boulardii  250 mg Oral BID Marielle Guerrero MD     • sodium chloride  75 mL/hr Intravenous Continuous Radha Tai MD 75 mL/hr (10/25/22 4689)   • sucralfate  1 g Oral 4x Daily (AC & HS) Marielle Guerrero MD          Today, Patient Was Seen By: Radha Tai    ** Please Note: "This note has been constructed using a voice recognition system  Therefore there may be syntax, spelling, and/or grammatical errors   Please call if you have any questions  "**

## 2022-10-28 NOTE — WOUND OSTOMY CARE
Progress Note - Wound   Delayne Smita 69 y o  female MRN: [id number]  Unit/Bed#: 2 [place] [address] Encounter: [id number]      Assessment: This is a follow up visit for this 69year old female patient admitted on 10/19/22 with SIRS  She has a history of Sjogren's syndrome, hypothyroidism and metastatic lung disease  She was awake, alert & oriented x 3 and states that she is feeling much better today  She is dependent upon nursing staff for all of her care and is repositioned in bed with assist of 1-2 persons  She had a Purewick in place and was incontinent of stool during time of this visit  The perineal area did not appear to have open areas at time of wound assessment  Open wounds in this area would most likely be caused by moisture unless related to a medical device  Assessment Findings:  1-Intact DTPI noted on admission has progressed to unstageable pressure injury with 100% yellow slough  Orders in place for wound care and for prevention  2-Blanchable erythema to bilateral heels - left heel has light purple & maroon discoloration which is blanchable  Orders in place for skin care & for prevention       Plan:   Skin care plans:  1-Cleanse sacrobuttock wound with foaming cleanser & pat dry  Apply Triad paste in a thin layer & small amount to areas with slough  Cover with Allevyn sacral bordered foam dresssing & change every other day and prn soilage/dislodgement  2-Calazime to perineal area TID and PRN soilage/dislodgement  3-Hydraguard to bilateral heels BID and PRN  4-Heel protectors to bilateral heels at all times in bed  If patient refuses, must float heels on 2 pillows so heels not in contact with mattress or pillows to offload pressure  5-Ehob cushion when out of bed to chair  Must limit sitting to 1-2 hrs maximum for meals then back to bed turning side to side to offload sacrobuttock pressure  6-Turn/reposition q2h or when medically stable for pressure re-distribution on skin    7-Moisturize skin daily with skin nourishing cream     Right heel with blanchable erythema    Left heel with blanchable maroon/light purple erythema        Wound 10/19/22 Pressure Injury Buttocks Bilateral (Active)   Wound Image     10/28/22 0954   Wound Description Yellow;slough 10/27/22 0954   Pressure Injury Stage This wound has progressed from intact DTPI (POA) to unstageable  10/28/22 0954   Padmini-wound Assessment Purple;maroon;non-blanchable 10/28/22 0954   Wound Length (cm) Right=6 cm; Left=7 cm 10/28/22 0954   Wound Width (cm) Right=0 8 cm; Left=2 cm 10/28/22 0954   Wound Depth (cm) Right=0 1 cm; Left=0 1 cm 10/28/22 0954   Wound Surface Area (cm^2) 55 cm^2 10/28/22 0954   Wound Volume (cm^3) 5 5 cm^3 10/28/22 0954   Calculated Wound Volume (cm^3) 5 5 cm^3 10/28/22 0954   Drainage Amount Scant 10/28/22 0954   Drainage Description Serosanguineous 10/28/22 0954   Treatments Cleansed 10/28/22 0954   Dressing Foam, Silicon (eg  Allevyn, etc) 10/28/22 0954   Wound packed? No 10/28/22 0954   Dressing Changed New 10/28/22 0954   Dressing Status Clean;Dry; Intact 10/28/22 0954     Discussed assessment findings, and plan of care/recommendations with Elizabet MORTON      Wound care will follow along with patient throughout admission, please call or tiger text with questions and concerns      Recommendations written as orders    Wilber Iniguez RN, BSN, [employer]

## 2022-10-28 NOTE — CASE MANAGEMENT
Case Management Progress Note    Patient name Dinah Joy  Location 2 [place]  [facility] MRN [id number]  : 1945 Date 10/28/2022       LOS (days): 9  Geometric Mean LOS (GMLOS) (days): 5 00  Days to GMLOS:-3 9        OBJECTIVE:    Current admission status: Inpatient  Preferred Pharmacy:   [place] [id number]  Phone: [phone] Fax: [phone]    Primary Care Provider: Tobi Cross MD    Primary Insurance: [employer]  Secondary Insurance:     PROGRESS NOTE:    Call received from Aleena at [facility]  Per Aleena after medical review the medical director has deny skilled admission due to acuity level  Per [facility] Director feels pt would more stable to remain in current facility  Peer to Peer Review offered - [phone] Option 1   SW informed Dr Rome Granados of denial and provided Peer to Peer Review information  SW will follow to monitor progress and assist with transfer when ready

## 2022-10-28 NOTE — ASSESSMENT & PLAN NOTE
Noted right upper extremity swelling, 10/23 without any tenderness or erythema  Venous Doppler confirmed multiple DVTs going up to internal jugular vein  Likely multifactorial secondary to malignancy, right upper lobe mass  Immobility, IV access  · Patient was initially monitored on heparin 5000 units subQ q 8 hours due to recent GI bleed  · Subsequently Transitioned to Eliquis 2 5 mg b i d  · Elevate extremity as tolerated  · Discussed with Oncology, Dr Norah Sadler, regarding anticoagulation and brain mets  Recommended to continue Eliquis 2 5 mg b i d  With closely monitoring for bleeding  · Reviewed with patient and sister-in-law at bedside regarding risk versus benefits with overall prognosis  · Hemoglobin relatively stable without any evidence of overt bleeding    Will require close monitoring

## 2022-10-28 NOTE — PROGRESS NOTES
Hematology - Oncology Progress Note    Carol Colindres, [birth year], [id number]  [address]      Impression and plan:    67-year-old female admitted with weight loss, progressive weakness and fatigue  Issues:    Concern for malignancy  Workup demonstrated a right-sided pulmonary mass as well as bilateral pulmonary lesions and osteoblastic lesions  Patient is status post bone lesion biopsy on October 25 2022 - IR treatment greatly appreciated  Pathology results in process - high in the differential would be bronchogenic carcinoma with evidence for metastatic disease  Patient's present performance status not very good at all - treatment options would be quite limited  We can wait for the pathology results but end of life care/hospice may be a good/viable option in the not too distant future  SIRS versus sepsis  Patient seen/evaluated by ID, now off antibiotics, patient being monitored  Right upper extremity swelling  Dopplers demonstrated multiple DVTs going up into the internal jugular vein  Patient is on subcu heparin 5000 units every 8 hours  Patient obviously needs to be monitored closely, history of GI bleeding  Yesterday's hemoglobin level of 9 1 g/dL good/acceptable  Likely multiple etiologies for the DVT - malignancy, immobility, in association with the right upper lobe mass  Anemia  Etiology also likely multifactorial including GI bleeding, acute process/infection, malignancy  Transfuse if hemoglobin approaches 7 0 g/dL  Will follow  __________________________________________________________________________________________    Chief complaint/reason for admission:  Patient found on the floor, progressive weakness, decreased p o  intake    History of present illness:  67-year-old female admitted on October 20th for the above  Past medical history includes Sjogren's syndrome, chronic joint pain, hyperlipidemia, hypothyroidism, hypertension    Recent scans demonstrated bilateral pulmonary nodules, T11/L2 osteoblastic lesions - obvious concern is malignancy  Patient was found in bed asleep in no apparent distress      Hospital medications list:  Current Facility-Administered Medications   Medication Dose Route Frequency Provider Last Rate   • acetaminophen  650 mg Oral Q6H PRN Sulma Richey MD     • atorvastatin  40 mg Oral Daily With Mara Harrington MD     • calcium carbonate  500 mg Oral Daily PRN Sulma Richey MD     • cholecalciferol  1,000 Units Oral Daily Sulma Richey MD     • docusate sodium  100 mg Oral BID Sulma Richey MD     • DULoxetine  30 mg Oral Daily Sulma Richey MD     • folic acid  1 mg Oral Daily Sulma Richey MD     • Gadobutrol  10 mL Intravenous Once in imaging Sulma Richey MD     • heparin (porcine)  5,000 Units Subcutaneous [facility] Moises Younger MD     • HYDROmorphone  0 2 mg Intravenous Q6H PRN Sulma Richey MD     • insulin lispro  1-5 Units Subcutaneous TID AC Manish Hunt MD     • insulin lispro  1-5 Units Subcutaneous HS Sulma Richey MD     • levothyroxine  75 mcg Oral Early Morning Manish Hunt MD     • lidocaine (PF)    Code/Trauma/Sedation Med Darinel Vuong MD     • metoprolol tartrate  12 5 mg Oral Q12H [facility] Manish Hunt MD     • ondansetron  4 mg Intravenous Q6H PRN Moises Younegr MD     • oxybutynin  5 mg Oral Daily Sulma Richey MD     • pantoprazole  40 mg Intravenous Q12H [address]  Buzz Hunt MD     • polyethylene glycol  17 g Oral Daily Sulma Richey MD     • saccharomyces boulardii  250 mg Oral BID Sulma Richey MD     • sodium chloride  75 mL/hr Intravenous Continuous Moises Younger MD 75 mL/hr (10/25/22 231)   • sucralfate  1 g Oral 4x Daily (AC & HS) Sulma Richey MD       Physical exam  /65   Pulse 88   Temp (!) 97 4 °F (36 3 °C) Resp 18   Ht 5' 5" (1 651 m)   Wt 45 kg (99 lb 3 3 oz)   SpO2 96%   BMI 16 51 kg/m²   Constitutional:  Frail appearing female asleep, no respiratory distress, no signs of pain  Respiratory:  Fair entry bilaterally, scattered bilateral rhonchi  Integument:  Pale, warm, slightly dry, upper extremity purpura bilaterally  Extremities:  0-1 +bilateral lower extremity edema, no cords, pulses are 1+, +right upper extremity swelling  Rectal: Deferred    Laboratory test results    10/27/2022 WBC = 10 7 hemoglobin = 9 1 hematocrit = 29 5 MCV = 78 RDW = 21 platelet = 622 neutrophil = 77%    10/26/2022 BUN = 11 creatinine = 0 51 sodium = 132    Imaging    10/21/2022 CT scan chest abdomen pelvis without    IMPRESSION:     1   Lobulated right upper lobe paramediastinal mass measuring 6 0 x 4 0 x 8 5 cm with extension into the superior mediastinum, supraclavicular correlation and extension to and invasion of right upper lobe bronchial branch  2   Multiple scattered subcentimeter metastatic lung nodules predominantly in upper lobes bilaterally  3  Moderate right and small left effusion  4   Stool impaction in the colon  Mid transverse colon wall thickening possibly due to stool impaction  Findings most likely represent primary right lung malignancy with lung and bone metastatic process  10/25/2022 vascular upper limb venous duplex scan unilateral    RIGHT UPPER LIMB:  Acute, occlusive deep vein thrombosis noted in the internal jugular vein,  subclavian vein, axillary vein and brachial vein  Acute occlusive superficial thrombophlebitis noted in the cephalic vein and  basilic vein  Note: Thrombus noted around the mid-line  Doppler evaluation shows a normal response to augmentation maneuvers  LEFT UPPER LIMB LIMITED:  Evaluation shows no evidence of thrombus in the internal jugular vein,  subclavian vein, and the brachiocephalic vein      05/73/5106 CT head without contrast     1   No acute intracranial abnormality  2  Microangiopathic changes and progression of cerebral volume loss     10/19/2022 chest x-ray one view portable     There is a 4 2 x 5 5 cm right upper lobe paramediastinal nodule, and numerous additional smaller bilateral pulmonary nodules highly suspicious for malignancy   Both primary lung cancer and metastatic disease can have this appearance  Chest CT would be helpful for further evaluation      09/22/2022 CT scan abdomen without contrast     1   Large hiatal hernia     2   Numerous lung nodules, concerning for metastasis  3   Sclerosis of T11 and L2 vertebral bodies, 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

## 2022-10-28 NOTE — PLAN OF CARE
Problem: Potential for Falls  Goal: Patient will remain free of falls  Description: INTERVENTIONS:  - Educate patient/family on patient safety including physical limitations  - Instruct patient to call for assistance with activity   - Consult OT/PT to assist with strengthening/mobility   - Keep Call bell within reach  - Keep bed low and locked with side rails adjusted as appropriate  - Keep care items and personal belongings within reach  - Initiate and maintain comfort rounds  - Make Fall Risk Sign visible to staff  - Offer Toileting every 2 Hours, in advance of need  - Initiate/Maintain bed alarm- Apply yellow socks and bracelet for high fall risk patients  - Consider moving patient to room near nurses station  Outcome: Progressing     Problem: Nutrition/Hydration-ADULT  Goal: Nutrient/Hydration intake appropriate for improving, restoring or maintaining nutritional needs  Description: Monitor and assess patient's nutrition/hydration status for malnutrition  Collaborate with interdisciplinary team and initiate plan and interventions as ordered  Monitor patient's weight and dietary intake as ordered or per policy  Utilize nutrition screening tool and intervene as necessary  Determine patient's food preferences and provide high-protein, high-caloric foods as appropriate       INTERVENTIONS:  - Monitor oral intake, urinary output, labs, and treatment plans  - Assess nutrition and hydration status and recommend course of action  - Evaluate amount of meals eaten  - Assist patient with eating if necessary   - Allow adequate time for meals  - Recommend/ encourage appropriate diets, oral nutritional supplements, and vitamin/mineral supplements  - Order, calculate, and assess calorie counts as needed  - Recommend, monitor, and adjust tube feedings and TPN/PPN based on assessed needs  - Assess need for intravenous fluids  - Provide specific nutrition/hydration education as appropriate  - Include patient/family/caregiver in decisions related to nutrition  Outcome: Progressing     Problem: MOBILITY - ADULT  Goal: Maintain or return to baseline ADL function  Description: INTERVENTIONS:  -  Assess patient's ability to carry out ADLs; assess patient's baseline for ADL function and identify physical deficits which impact ability to perform ADLs (bathing, care of mouth/teeth, toileting, grooming, dressing, etc )  - Assess/evaluate cause of self-care deficits   - Assess range of motion  - Assess patient's mobility; develop plan if impaired  - Assess patient's need for assistive devices and provide as appropriate  - Encourage maximum independence but intervene and supervise when necessary  - Involve family in performance of ADLs  - Assess for home care needs following discharge   - Consider OT consult to assist with ADL evaluation and planning for discharge  - Provide patient education as appropriate  Outcome: Progressing  Goal: Maintains/Returns to pre admission functional level  Description: INTERVENTIONS:  - Perform BMAT or MOVE assessment daily    - Set and communicate daily mobility goal to care team and patient/family/caregiver  - Collaborate with rehabilitation services on mobility goals if consulted  - Perform Range of Motion 2 times a day  - Reposition patient every 2 hours    - Dangle patient 2 times a day  - Stand patient 2 times a day  - Ambulate patient 2 times a day  - Out of bed to chair 2 times a day   - Out of bed for meals 2 times a day  - Out of bed for toileting  - Record patient progress and toleration of activity level   Outcome: Progressing     Problem: INFECTION - ADULT  Goal: Absence or prevention of progression during hospitalization  Description: INTERVENTIONS:  - Assess and monitor for signs and symptoms of infection  - Monitor lab/diagnostic results  - Monitor all insertion sites, i e  indwelling lines, tubes, and drains  - Monitor endotracheal if appropriate and nasal secretions for changes in amount and color  - [place] appropriate cooling/warming therapies per order  - Administer medications as ordered  - Instruct and encourage patient and family to use good hand hygiene technique  - Identify and instruct in appropriate isolation precautions for identified infection/condition  Outcome: Progressing  Goal: Absence of fever/infection during neutropenic period  Description: INTERVENTIONS:  - Monitor WBC    Outcome: Progressing     Problem: CARDIOVASCULAR - ADULT  Goal: Maintains optimal cardiac output and hemodynamic stability  Description: INTERVENTIONS:  - Monitor I/O, vital signs and rhythm  - Monitor for S/S and trends of decreased cardiac output  - Administer and titrate ordered vasoactive medications to optimize hemodynamic stability  - Assess quality of pulses, skin color and temperature  - Assess for signs of decreased coronary artery perfusion  - Instruct patient to report change in severity of symptoms  Outcome: Progressing  Goal: Absence of cardiac dysrhythmias or at baseline rhythm  Description: INTERVENTIONS:  - Continuous cardiac monitoring, vital signs, obtain 12 lead EKG if ordered  - Administer antiarrhythmic and heart rate control medications as ordered  - Monitor electrolytes and administer replacement therapy as ordered  Outcome: Progressing     Problem: METABOLIC, FLUID AND ELECTROLYTES - ADULT  Goal: Electrolytes maintained within normal limits  Description: INTERVENTIONS:  - Monitor labs and assess patient for signs and symptoms of electrolyte imbalances  - Administer electrolyte replacement as ordered  - Monitor response to electrolyte replacements, including repeat lab results as appropriate  - Instruct patient on fluid and nutrition as appropriate  Outcome: Progressing     Problem: Prexisting or High Potential for Compromised Skin Integrity  Goal: Skin integrity is maintained or improved  Description: INTERVENTIONS:  - Identify patients at risk for skin breakdown  - Assess and monitor skin integrity  - Assess and monitor nutrition and hydration status  - Monitor labs   - Assess for incontinence   - Turn and reposition patient  - Assist with mobility/ambulation  - Relieve pressure over bony prominences  - Avoid friction and shearing  - Provide appropriate hygiene as needed including keeping skin clean and dry  - Evaluate need for skin moisturizer/barrier cream  - Collaborate with interdisciplinary team   - Patient/family teaching  - Consider wound care consult   Outcome: Progressing

## 2022-10-28 NOTE — CASE MANAGEMENT
Case Management Discharge Planning Note    Patient name Adelita Back  Location [address] [place] 0 MRN [id number]  : 1945 Date 10/28/2022       Current Admission Date: 10/19/2022  Current Admission Diagnosis:Metastatic cancer [facility])   Patient Active Problem List    Diagnosis Date Noted   • Acute deep vein thrombosis (DVT) of right upper extremity ([facility] ) 10/28/2022   • Swelling of right upper extremity 10/23/2022   • Anemia 10/22/2022   • Severe protein-calorie malnutrition ([facility] [address] ) 10/21/2022   • Chronic pain 10/20/2022   • Pulmonary nodules 10/20/2022   • Hypertension 10/19/2022   • Hypercholesteremia 10/19/2022   • Disease of thyroid gland 10/19/2022   • Sacral wound 10/19/2022   • [facility] ([facility] ) 10/19/2022   • Generalized weakness 10/19/2022   • Dyslipidemia 2019   • Acquired hypothyroidism 2019   • Sjogren's syndrome ([facility] ) 2019   • Orthostatic hypotension 2019   • Recurrent syncope 2019   • Onychomycosis 2019   • Peripheral sensory neuropathy 2019   • Radiculopathy of lumbar region 2019   • Metatarsalgia of both feet 2019      LOS (days): 9  Geometric Mean LOS (GMLOS) (days): 5 00  Days to GMLOS:-3 9     OBJECTIVE:  Risk of Unplanned Readmission Score: 15 19     Current admission status: Inpatient   Preferred Pharmacy:   [address] [place] [address]  Phone: [phone] Fax: [phone]    Primary Care Provider: Ang Granados MD    Primary Insurance: Sendy Sanchez [facility]  Secondary Insurance:     DISCHARGE DETAILS:    Discharge planning discussed with[de-identified] Patient and sister-in-law, Hailey Lights of Choice: Yes  Comments - Freedom of Choice: SW met with pt and sister-in-law to review plans  Pt and sister-in-law are requesting transfer to [facility] when discharged    Discussed current pending authorization with Nahum Khan and jami to transfer as soon as approval is received  CM contacted family/caregiver?: Yes  Were Treatment Team discharge recommendations reviewed with patient/caregiver?: Yes  Did patient/caregiver verbalize understanding of patient care needs?: N/A- going to facility  Were patient/caregiver advised of the risks associated with not following Treatment Team discharge recommendations?: Yes    Contacts  Patient Contacts: Asim Nassar  Relationship to Patient[de-identified] Family  Contact Method: In Person  Reason/Outcome: Discharge Planning    Other Referral/Resources/Interventions Provided:  Interventions: Short Term Rehab  Referral Comments: STR placement at [facility] is planned  Rehab authorization request submitted to [employer] through [facility]  Treatment Team Recommendation: Short Term Rehab, SNF, Hospice  Discharge Destination Plan[de-identified] Short Term Rehab  Transport at Discharge : BLS Ambulance     IMM Given (Date):: 10/28/22  IMM Given to[de-identified] Patient (IMM reviewed with pt  Pt verbalized understanding and agrees with discharge determination  Pt signed IMM and copy given    Copy also placed in scan bin for chart )

## 2022-10-28 NOTE — PROGRESS NOTES
Spiritual Care Progress Note    10/28/2022  Patient: Perry Wong : 1945  Admission Date & Time: 10/19/2022 1453  MRN: [id number] CSN: [id number]       visited patient per physician referral  Pamela Carrillo provided introduction to spiritual care, explored pt's emotional and relational needs, and facilitated conversation about pt's hopes and concerns for the future  Patient stated she "failed to take care of myself," which placed her in the hospital, and added "I feel I am getting stronger "  supported patient in identifying support systems (CLARENCE) and offered encouragement  Pt expressed gratitude for visit  Spiritual care will remain available               Chaplaincy Interventions Utilized:   Empowerment: Provided chaplaincy education    Exploration: Explored hope and Explored relational needs & resources    Collaboration: Consulted with interdisciplinary team    Relationship Building: Cultivated a relationship of care and support      Chaplaincy Outcomes Achieved:  Expressed gratitude and Identified priorities       10/28/22 1100   Clinical Encounter Type   Visited With Patient   Routine Visit Introduction   Referral From Physician  (Dr Yuliana Covington)

## 2022-10-29 LAB
GLUCOSE SERPL-MCNC: 112 MG/DL (ref 65–140)
GLUCOSE SERPL-MCNC: 112 MG/DL (ref 65–140)
GLUCOSE SERPL-MCNC: 154 MG/DL (ref 65–140)
GLUCOSE SERPL-MCNC: 90 MG/DL (ref 65–140)

## 2022-10-29 RX ADMIN — Medication 250 MG: at 08:33

## 2022-10-29 RX ADMIN — OXYBUTYNIN 5 MG: 5 TABLET, FILM COATED, EXTENDED RELEASE ORAL at 08:33

## 2022-10-29 RX ADMIN — PANTOPRAZOLE SODIUM 40 MG: 40 INJECTION, POWDER, FOR SOLUTION INTRAVENOUS at 08:32

## 2022-10-29 RX ADMIN — FOLIC ACID 1 MG: 1 TABLET ORAL at 08:33

## 2022-10-29 RX ADMIN — Medication 12.5 MG: at 08:33

## 2022-10-29 RX ADMIN — POLYETHYLENE GLYCOL 3350 17 G: 17 POWDER, FOR SOLUTION ORAL at 08:33

## 2022-10-29 RX ADMIN — SUCRALFATE 1 G: 1 TABLET ORAL at 06:20

## 2022-10-29 RX ADMIN — SUCRALFATE 1 G: 1 TABLET ORAL at 22:40

## 2022-10-29 RX ADMIN — SODIUM CHLORIDE 75 ML/HR: 0.9 INJECTION, SOLUTION INTRAVENOUS at 06:23

## 2022-10-29 RX ADMIN — SUCRALFATE 1 G: 1 TABLET ORAL at 12:22

## 2022-10-29 RX ADMIN — LEVOTHYROXINE SODIUM 75 MCG: 75 TABLET ORAL at 06:20

## 2022-10-29 RX ADMIN — MODERNA COVID-19 VACCINE, BIVALENT 0.5 ML: 25; 25 INJECTION, SUSPENSION INTRAMUSCULAR at 12:22

## 2022-10-29 RX ADMIN — HEPARIN SODIUM 5000 UNITS: 5000 INJECTION INTRAVENOUS; SUBCUTANEOUS at 13:37

## 2022-10-29 RX ADMIN — DULOXETINE HYDROCHLORIDE 30 MG: 30 CAPSULE, DELAYED RELEASE ORAL at 08:33

## 2022-10-29 RX ADMIN — PANTOPRAZOLE SODIUM 40 MG: 40 INJECTION, POWDER, FOR SOLUTION INTRAVENOUS at 22:40

## 2022-10-29 RX ADMIN — Medication 250 MG: at 17:13

## 2022-10-29 RX ADMIN — ATORVASTATIN CALCIUM 40 MG: 40 TABLET, FILM COATED ORAL at 16:27

## 2022-10-29 RX ADMIN — INSULIN LISPRO 1 UNITS: 100 INJECTION, SOLUTION INTRAVENOUS; SUBCUTANEOUS at 12:22

## 2022-10-29 RX ADMIN — HEPARIN SODIUM 5000 UNITS: 5000 INJECTION INTRAVENOUS; SUBCUTANEOUS at 06:20

## 2022-10-29 RX ADMIN — SUCRALFATE 1 G: 1 TABLET ORAL at 16:27

## 2022-10-29 RX ADMIN — Medication 1000 UNITS: at 08:33

## 2022-10-29 RX ADMIN — DOCUSATE SODIUM 100 MG: 100 CAPSULE, LIQUID FILLED ORAL at 17:13

## 2022-10-29 RX ADMIN — DOCUSATE SODIUM 100 MG: 100 CAPSULE, LIQUID FILLED ORAL at 08:33

## 2022-10-29 RX ADMIN — HEPARIN SODIUM 5000 UNITS: 5000 INJECTION INTRAVENOUS; SUBCUTANEOUS at 22:41

## 2022-10-29 NOTE — UTILIZATION REVIEW
Continued Stay Review    Date: 10/29/22                          Current Patient Class: inpatient  Current Level of Care: med surg  HPI:77 y o  female initially admitted on 10/19/22     Assessment/Plan:   Metastatic cancer, COVID booster given  RUE with persistent diffuse edema  Heparin continued for multiple DVT's, monitor closely for bleeding 2nd recent GI bleed  Remains on IV PPI & Carafate  Wound care continued to sacral DTI, wound care nurse following, continue offloading      Vital Signs:   10/29/22 22:58:50 97 7 °F (36 5 °C) 84 17 124/59 81 98 % -- --   10/29/22 2100 -- -- -- -- -- 100 % None (Room air) --   10/29/22 20:27:24 98 1 °F (36 7 °C) 96 17 95/54 68 99 % -- --   10/29/22 1634 -- -- -- -- -- 100 % -- --   10/29/22 1630 97 5 °F (36 4 °C) 95 18 122/68 86 -- -- Sitting   10/29/22 08:02:20 97 5 °F (36 4 °C) 86 -- 124/68 87 98 % -- --     Pertinent Labs/Diagnostic Results:   Results from last 7 days   Lab Units 10/27/22  1215 10/25/22  0607 10/24/22  0533 10/23/22  1825 10/23/22  0509   WBC Thousand/uL 10 78* 10 83* 13 08*  --  14 46*   HEMOGLOBIN g/dL 9 1* 7 7* 8 0* 9 0* 8 1*   HEMATOCRIT % 29 5* 24 5* 27 0*  --  26 9*   PLATELETS Thousands/uL 233 140* 102*  --  105*   NEUTROS ABS Thousands/µL 8 34*  --   --   --   --      Results from last 7 days   Lab Units 10/26/22  0043 10/25/22  1946/22  0607 10/24/22  0533 10/23/22  0509   SODIUM mmol/L 132* 133* 125* 137 141   POTASSIUM mmol/L 3 8 3 6 3 7 3 7 4 0   CHLORIDE mmol/L 102 102 96 105 109*   CO2 mmol/L 22 23 22 21 21   ANION GAP mmol/L 8 8 7 11 11   BUN mg/dL 11 10 11 17 29*   CREATININE mg/dL 0 51* 0 57* 0 55* 0 70 0 76   EGFR ml/min/1 73sq m 93 90 91 84 76   CALCIUM mg/dL 7 4* 7 4* 7 0* 7 1* 7 2*   MAGNESIUM mg/dL  --   --  1 4* 1 7 2 5   PHOSPHORUS mg/dL  --   --  2 2* 2 2* 2 2*     Results from last 7 days   Lab Units 10/29/22  0723 10/28/22  2032 10/28/22  1558 10/28/22  1112 10/28/22  0710 10/27/22  2057 10/27/22  1545 10/27/22  1213 10/27/22  0730 10/26/22  2057 10/26/22  1608 10/26/22  1147   POC GLUCOSE mg/dl 90 156* 121 112 104 116 124 117 99 114 112 125     Results from last 7 days   Lab Units 10/26/22  0043 10/25/22  1946/22  0607 10/24/22  0533 10/23/22  0509 10/22/22  1211   GLUCOSE RANDOM mg/dL 90 100 336* 120 110 127     Medications:   Scheduled Medications:  atorvastatin, 40 mg, Oral, Daily With Dinner  cholecalciferol, 1,000 Units, Oral, Daily  COVID-19 Moderna bivalent vac, 0 5 mL, Intramuscular, Once  docusate sodium, 100 mg, Oral, BID  DULoxetine, 30 mg, Oral, Daily  folic acid, 1 mg, Oral, Daily  heparin (porcine), 5,000 Units, Subcutaneous, Q8H MASTER  insulin lispro, 1-5 Units, Subcutaneous, TID AC  insulin lispro, 1-5 Units, Subcutaneous, HS  levothyroxine, 75 mcg, Oral, Early Morning  metoprolol tartrate, 12 5 mg, Oral, Q12H MASTER  oxybutynin, 5 mg, Oral, Daily  pantoprazole, 40 mg, Intravenous, Q12H MASTER  polyethylene glycol, 17 g, Oral, Daily  saccharomyces boulardii, 250 mg, Oral, BID  sucralfate, 1 g, Oral, 4x Daily (AC & HS)    Continuous IV Infusions:  sodium chloride, 75 mL/hr, Intravenous, Continuous    PRN Meds:  acetaminophen, 650 mg, Oral, Q6H PRN  calcium carbonate, 500 mg, Oral, Daily PRN  Gadobutrol, 10 mL, Intravenous, Once in imaging  HYDROmorphone, 0 2 mg, Intravenous, Q6H PRN  lidocaine (PF), , , Code/Trauma/Sedation Med  ondansetron, 4 mg, Intravenous, Q6H PRN    Discharge Plan: d    Network Utilization Review Department  ATTENTION: Please call with any questions or concerns to [phone] and carefully listen to the prompts so that you are directed to the right person  All voicemails are confidential   Yu Donna all requests for admission clinical reviews, approved or denied determinations and any other requests to dedicated fax number below belonging to the campus where the patient is receiving treatment   List of dedicated fax numbers for the Facilities:  FACILITY NAME UR FAX NUMBER   ADMISSION DENIALS (Administrative/Medical Necessity) [phone]   [address] (Maternity/NICU/Pediatrics) Cintia Catalan [address] Rosalina Zeepda  [phone]   [address] [place] [address] [facility] [address] [facility] [place] 134 815 [facility] [phone]

## 2022-10-29 NOTE — PLAN OF CARE
Problem: Nutrition/Hydration-ADULT  Goal: Nutrient/Hydration intake appropriate for improving, restoring or maintaining nutritional needs  Description: Monitor and assess patient's nutrition/hydration status for malnutrition  Collaborate with interdisciplinary team and initiate plan and interventions as ordered  Monitor patient's weight and dietary intake as ordered or per policy  Utilize nutrition screening tool and intervene as necessary  Determine patient's food preferences and provide high-protein, high-caloric foods as appropriate       INTERVENTIONS:  - Monitor oral intake, urinary output, labs, and treatment plans  - Assess nutrition and hydration status and recommend course of action  - Evaluate amount of meals eaten  - Assist patient with eating if necessary   - Allow adequate time for meals  - Recommend/ encourage appropriate diets, oral nutritional supplements, and vitamin/mineral supplements  - Order, calculate, and assess calorie counts as needed  - Recommend, monitor, and adjust tube feedings and TPN/PPN based on assessed needs  - Assess need for intravenous fluids  - Provide specific nutrition/hydration education as appropriate  - Include patient/family/caregiver in decisions related to nutrition  Outcome: Progressing     Problem: Potential for Falls  Goal: Patient will remain free of falls  Description: INTERVENTIONS:  - Educate patient/family on patient safety including physical limitations  - Instruct patient to call for assistance with activity   - Consult OT/PT to assist with strengthening/mobility   - Keep Call bell within reach  - Keep bed low and locked with side rails adjusted as appropriate  - Keep care items and personal belongings within reach  - Initiate and maintain comfort rounds  - Make Fall Risk Sign visible to staff  - Offer Toileting every 2 Hours, in advance of need  - Initiate/Maintain bed alarm- Apply yellow socks and bracelet for high fall risk patients  - Consider moving patient to room near nurses station  Outcome: Progressing

## 2022-10-29 NOTE — PROGRESS NOTES
[facility] Internal [facility] Progress Note  Patient: Felipe Patel 69 y o  female   MRN: [id number]  PCP: Jimmie Caballero MD  Unit/Bed#: [address] [place] [address] Encounter: [id number]  Date Of Visit: 10/29/22    Problem List:    Principal Problem:    Metastatic cancer ([facility] )  Active Problems:    Acute deep vein thrombosis (DVT) of right upper extremity (HCC)    Generalized weakness    Swelling of right upper extremity    Dyslipidemia    Acquired hypothyroidism    Sjogren's syndrome (HCC)    Orthostatic hypotension    Sacral wound    Chronic pain    Pulmonary nodules    Severe protein-calorie malnutrition ([facility] )    Anemia  Taking getting anything in fluid IV    Assessment & Plan:    Acute deep vein thrombosis (DVT) of right upper extremity (HCC)  Assessment & Plan  US:  Positive  Oncology recs Dopplers demonstrated multiple DVTs going up into the internal jugular vein  Patient is on subcu heparin 5000 units every 8 hours  Patient obviously needs to be monitored closely, history of GI bleeding  Yesterday's hemoglobin level of 9 1 g/dL good/acceptable  Likely multiple etiologies for the DVT - malignancy, immobility, in association with the right upper lobe mass  Continue 5 K heparin subQ  Monitor for bleeding risk, consider NOAC on discharge versus warfarin    * Metastatic cancer ([facility] )  Assessment & Plan    · 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  · Patient was seen by PCP on 9/27, referred to Oncology as outpatient  No history of cancer  History of fibroid , status post hysterectomy and benign breast cyst   · CT chest abdomen pelvis 10/21-noted to have lobulated right upper lobe paramediastinal mass 6 x 4 x 8 5 cm with extension into the superior mediastinum, supraclavicular correlation and extension to and invasion of right upper lobe bronchial branch   Multiple scattered subcentimeter metastatic lung nodules noted in upper lobe bilaterally  Moderate right and small left effusion  Seen by Oncology, input appreciated  · Discussed with Pulmonary, recommended IR guided biopsy  Patient Tashi Kaminski is agreeable  · S/p IR for biopsy-results pending  · Consider hospice consult after STR      SIRS (systemic inflammatory response syndrome) (HCC)-resolved as of 10/27/2022  Assessment & Plan  Improving, asymptomatic    SIRS versus Sepsis, POA, as evidenced by leukocytosis, tachycardia, hypothermia  Lactic acidosis  Patient is afebrile  · UA shows trace leukocytes, normal white count  SARS-CoV-2 PCR negative  · CXR- shows diffuse pulmonary nodules, no evidence infection   · Lactic acid trended down  Likely multifactorial secondary to dehydration v metastatic disease versus GI bleed   · CT chest abdomen pelvis without any evidence of infection  · Blood Cx [id number]  · Urine culture negative  · ID following, input appreciated -continue to observe off anymore antibiotics  -follow temperatures and hemodynamics  -follow CBC  · Initially received cefepime, subsequently transitioned to cefazolin pending cultures  Swelling of right upper extremity  Assessment & Plan  Unresolved,   Noted right upper extremity swelling, 10/23 without any tenderness or erythema  · Elevate extremity as tolerated  · Venous Doppler positive for multiple vessel DVT  · Restarted heparin, monitor for bleeding transition to DOAC  · PT as tolerated    Generalized weakness  Assessment & Plan  May be new baseline possibly secondary to deconditioning  Likely multifactorial secondary to dehydration, metastatic cancer  cane at baseline  · Treat underlying causes    · Nutrition support  · PT OT STR  · Fall precautions    Coffee ground emesis-resolved as of 10/28/2022  Assessment & Plan  Patient noted to have several episodes of coffee-ground emesis since admission,  · CT chest abdomen pelvis w/o occult bleeding but, hiatal hernia and fecal impaction  · Hemoglobin 6 8 grams/deciliter, status post 1 PRBC on 10/22  · Initially treated with IV Protonix drip>continue Protonix b i d   · GI recs-Await biopsy results-Continue Protonix 40 mg daily, liquid Carafate 1 g q i d  · Recommend soft, non ulcer genic diet  · Monitor H&H, transfuse as needed      Anemia  Assessment & Plan  Hemoglobin 13 >10 78 g  Noted to have coffee-ground emesis during hospitalization  Previous B12 low normal  Iron studies-normal ferritin, low TIBC  · Monitor H&H, will transfuse if continues to decline or hemodynamic instability    Severe protein-calorie malnutrition (HCC)  Assessment & Plan  Malnutrition Findings:   Adult Malnutrition type: Chronic illness  Adult Degree of Malnutrition: Other severe protein calorie malnutrition  Malnutrition Characteristics: Fat loss, Muscle loss, Weight loss    360 Statement: Severe protein calorie malnutrion of chronic illness related to increased energy needs as evidenced by 12% weight loss in 1 month, hollow orbits, protruding clavicles, depression at the temples  Awaiting treatment plan    BMI Findings:  Adult BMI Classifications: Underweight < 18 5     Body mass index is 16 51 kg/m²  Pulmonary nodules  Assessment & Plan  · CXR:  2 x 5 5 cm right upper lobe paramediastinal nodule, and numerous additional smaller bilateral pulmonary nodules highly suspicious for malignancy  Both primary lung cancer and metastatic disease can have this appearance  · Refer to CT scan finding under metastatic cancer  · Pending biopsy results from IR      Chronic pain  Assessment & Plan  Control, asymptomatic  Chronic arthralgia in lower back, left hip, knees, legs  Likely due to OA and Sjogren's syndrome  Patient follows Rheumatology in [facility] as outpatient  On methotrexate, Cymbalta, Voltaren, tramadol p r n  At home    Denies pain at present  · Continue Cymbalta,   · Hold methotrexate for now in view of sirs versus sepsis  · Hold Voltaren, tramadol   · Tylenol as needed    Sacral wound  Assessment & Plan  Developed in 2 weeks since sister-in-law is away  Diffuse sacral DTI on exam with surrounding redness, no active drainage, scattered black eschar on exam Hard to exclude cellulitis  · Seen by surgery, input appreciated, continue wound care as per wound care  · ID following no antibiotics at this time  · Wound care is needed, offloading    Orthostatic hypotension  Assessment & Plan  Asymptomatic, Noted history  Sjogren's syndrome ([facility] )  Assessment & Plan  Concurrently asymptomatic, Patient is on methotrexate 12 5 mg p o  Weekly  Follows rheumatologist in [facility]  · Will hold methotrexate for now  · Reconsider while outpatient on discharge  · Natural tears prn for dry eye  Acquired hypothyroidism  Assessment & Plan  Continue Synthroid  Reports not taking medications at home  · Elevated TSH, normal free T4   · Continue Synthroid at elevated level      Dyslipidemia  Assessment & Plan  Chronic, Continue statin    Fecal impaction (HCC)-resolved as of 10/28/2022  Assessment & Plan  Status post partial improvement after enema, 10/21  · Continue Colace and MiraLax   · Enema p r n      Electrolyte abnormality-resolved as of 10/28/2022  Assessment & Plan  Hypokalemia, hypophosphatemia, hypomagnesemia  · Replete and monitor    Hypernatremia-resolved as of 10/23/2022  Assessment & Plan  Presented with sodium level of 160 secondary to decrease free water intake  Seen by Nephrology, input appreciated  Improved appropriately with hypotonic fluids  · Tolerating clear liquid diet  · Monitor BMP  · Monitor intake output   · Monitor p o  intake  · Hold D5 water, will resume D5 water maintenance if remains NPO    Elevated glucose-resolved as of 10/28/2022  Assessment & Plan  Glucose improving,  · Check A1c- 5 5%, SSI    SVT (supraventricular tachycardia) (HCC)-resolved as of 10/23/2022  Assessment & Plan  Developed SVT in ED, heart rate 190  Converted to sinus rhythm after receiving Cardizem 10 mg IV  · Telemetry-no further episode, discontinued  · Optimize electrolytes  · Started on low-dose metoprolol     Elevated troponin-resolved as of 10/20/2022  Assessment & Plan  Troponin 109-83-78> wnl  · Initial EKG showed sinus tach, rate 115, inferior lateral ST depressions, prolonged QT   · Patient denies chest pain  · Likely type 2 MI secondary to # 1 and SIRS versus Sepsis  · Troponin downtrending  · Telemetry    Acute kidney injury (HCC)-resolved as of 10/28/2022  Assessment & Plan  Baseline creatinine 0 7-0 8 in past year  Creatinine 1 68 on admission  UA without significant abnormality  Prior CT scan without any hydronephrosis  Likely prerenal in setting of dehydration and hypotension  Nephrology following, input appreciated  Improving with IV fluids to baseline  · Will discontinue IV fluid post EGD  · Monitor intake output   · Avoid nephrotoxin and hypotension  · Follow-up BMP    Dehydration-resolved as of 10/23/2022  Assessment & Plan  Patient presents with generalized weakness  Neighbor called police as they saw her trash can outside for a few days  Police found her on the floor  Patient reports rolling off the couch and was on the floor for about 2 hours  Patient denies hitting head  Reports lower abdominal pain since 5/2022  Denies any acute pain  Patient lives [place] - in-law normally takes her shopping and she was away for about 16 days  Patient reported nothing to eat at home and was hungry  · Improving with IV fluids          VTE Pharmacologic Prophylaxis: VTE Score: 2 Moderate Risk (Score 3-4) - Pharmacological DVT Prophylaxis Ordered: heparin  Patient Centered Rounds: I performed bedside rounds with nursing staff today  Discussions with Specialists or Other Care Team Provider:  yes    Education and Discussions with Family / Patient: Updated  (Sister-in-law) at bedside   and cousin, CM    Time Spent for Care: 45 minutes  More than 50% of total time spent on counseling and coordination of care as described above  Current Length of Stay: 10 day(s)  Current Patient Status: Inpatient   Certification Statement: The patient will continue to require additional inpatient hospital stay due to Upper extremity DVT, heparin and potential hospice consult  Discharge Plan: Anticipate discharge in 48 hrs to discharge location to be determined pending rehab evaluations  Code Status: Level 1 - Full Code    Subjective:   Patient seen at bedside, patient reported that she had note GI bleeding or vomiting  Patient reported improvement in arms swelling and soreness and movement  Patient denies chest pain, shortness a breath or palpitations  Patient had no other concerns at this time    Objective:     Vitals:   Temp (24hrs), Av 2 °F (36 8 °C), Min:97 5 °F (36 4 °C), Max:98 6 °F (37 °C)    Temp:  [97 5 °F (36 4 °C)-98 6 °F (37 °C)] 97 5 °F (36 4 °C)  HR:  [81-98] 86  Resp:  [18] 18  BP: (107-124)/(60-68) 124/68  SpO2:  [95 %-98 %] 98 % on room air  Body mass index is 16 51 kg/m²  Input and Output Summary (last 24 hours): Intake/Output Summary (Last 24 hours) at 10/29/2022 0950  Last data filed at 10/28/2022 1800  Gross per 24 hour   Intake 520 ml   Output 850 ml   Net -330 ml       Physical Exam:   Physical Exam  Vitals reviewed  Exam conducted with a chaperone present  Constitutional:       General: She is not in acute distress  Appearance: Normal appearance  She is ill-appearing  HENT:      Head: Normocephalic and atraumatic  Nose: No congestion  Eyes:      General: No scleral icterus  Conjunctiva/sclera: Conjunctivae normal       Pupils: Pupils are equal, round, and reactive to light  Cardiovascular:      Rate and Rhythm: Normal rate  Heart sounds: No murmur heard  Pulmonary:      Effort: No respiratory distress  Breath sounds: No wheezing, rhonchi or rales     Abdominal: General: Bowel sounds are normal       Palpations: Abdomen is soft  Tenderness: There is no abdominal tenderness  There is no guarding  Genitourinary:     General: Normal vulva  Exam position: Knee-chest position  Pubic Area: No rash  Labia:         Right: No rash, tenderness, lesion or injury  Left: No rash, tenderness, lesion or injury  Urethra: No urethral pain or urethral lesion  Vagina: Normal       Uterus: Normal        Adnexa: Right adnexa normal and left adnexa normal       Rectum: Normal  No tenderness or anal fissure  Musculoskeletal:         General: No swelling or deformity  Normal range of motion  Cervical back: No tenderness  Right lower leg: No edema  Left lower leg: No edema  Right foot: Normal range of motion  Left foot: Normal range of motion  Comments: Right upper extremity diffuse edema peripheral pulse   Skin:     General: Skin is warm  Capillary Refill: Capillary refill takes less than 2 seconds  Findings: No lesion or rash  Neurological:      Mental Status: She is oriented to person, place, and time  Cranial Nerves: No cranial nerve deficit  Coordination: Coordination normal    Psychiatric:         Mood and Affect: Mood normal          Thought Content:  Thought content normal          Additional Data:     Labs:  Results from last 7 days   Lab Units 10/27/22  1215   WBC Thousand/uL 10 78*   HEMOGLOBIN g/dL 9 1*   HEMATOCRIT % 29 5*   PLATELETS Thousands/uL 233   NEUTROS PCT % 77*   LYMPHS PCT % 12*   MONOS PCT % 8   EOS PCT % 1     Results from last 7 days   Lab Units 10/26/22  0043   SODIUM mmol/L 132*   POTASSIUM mmol/L 3 8   CHLORIDE mmol/L 102   CO2 mmol/L 22   BUN mg/dL 11   CREATININE mg/dL 0 51*   ANION GAP mmol/L 8   CALCIUM mg/dL 7 4*   GLUCOSE RANDOM mg/dL 90         Results from last 7 days   Lab Units 10/29/22  0723 10/28/22  2032 10/28/22  1558 10/28/22  1112 10/28/22  0710 10/27/22  2057 10/27/22  1545 10/27/22  1213 10/27/22  0730 10/26/22  2057 10/26/22  1608 10/26/22  1147   POC GLUCOSE mg/dl 90 156* 121 112 104 116 124 117 99 114 112 125               Lines/Drains:  Invasive Devices  Report    Peripheral Intravenous Line  Duration           Peripheral IV 10/23/22 Dorsal (posterior); Left Hand 6 days    Peripheral IV 10/26/22 Left;Ventral (anterior) Forearm 2 days          Drain  Duration           External Urinary Catheter 3 days                         Imaging: Reviewed radiology reports from this admission including: chest xray, abdominal/pelvic CT and CT head venous Doppler, IR bone biopsy pending    Recent Cultures (last 7 days):         Last 24 Hours Medication List:   Current Facility-Administered Medications   Medication Dose Route Frequency Provider Last Rate   • acetaminophen  650 mg Oral Q6H PRN Manish Hernández MD     • atorvastatin  40 mg Oral Daily With Amara Chiu MD     • calcium carbonate  500 mg Oral Daily PRN Nicole Garcia MD     • cholecalciferol  1,000 Units Oral Daily Nicole Garcia MD     • COVID-19 Moderna bivalent vac  0 5 mL Intramuscular Once Sabina Mullen MD     • docusate sodium  100 mg Oral BID Nicole Garcia MD     • DULoxetine  30 mg Oral Daily Nicole Garcia MD     • folic acid  1 mg Oral Daily Nicole Garcia MD     • Gadobutrol  10 mL Intravenous Once in imaging Nicole Garcia MD     • heparin (porcine)  5,000 Units Subcutaneous [facility] Sabina Mullen MD     • HYDROmorphone  0 2 mg Intravenous Q6H PRN Nicole Garcia MD     • insulin lispro  1-5 Units Subcutaneous TID AC Manish Hernández MD     • insulin lispro  1-5 Units Subcutaneous HS Nicole Garcia MD     • levothyroxine  75 mcg Oral Early Morning Manish Hernández MD     • lidocaine (PF)    Code/Trauma/Sedation Pablo Santiago MD     • metoprolol tartrate  12 5 mg Oral Q12H [address] Manish Hemal Andrews MD     • ondansetron  4 mg Intravenous Q6H PRN Florin Spencer MD     • oxybutynin  5 mg Oral Daily Essie Campbell MD     • pantoprazole  40 mg Intravenous Q12H [address]  Hemal Andrews MD     • polyethylene glycol  17 g Oral Daily Essie Campbell MD     • saccharomyces boulardii  250 mg Oral BID Essie Campbell MD     • sodium chloride  75 mL/hr Intravenous Continuous Florin Spencer MD 75 mL/hr (10/29/22 3034)   • sucralfate  1 g Oral 4x Daily (AC & HS) Essie Campbell MD          Today, Patient Was Seen By: Florin Spencer    ** Please Note: "This note has been constructed using a voice recognition system  Therefore there may be syntax, spelling, and/or grammatical errors   Please call if you have any questions  "**

## 2022-10-30 LAB
GLUCOSE SERPL-MCNC: 100 MG/DL (ref 65–140)
GLUCOSE SERPL-MCNC: 110 MG/DL (ref 65–140)
GLUCOSE SERPL-MCNC: 137 MG/DL (ref 65–140)
GLUCOSE SERPL-MCNC: 92 MG/DL (ref 65–140)

## 2022-10-30 RX ADMIN — Medication 12.5 MG: at 08:53

## 2022-10-30 RX ADMIN — OXYBUTYNIN 5 MG: 5 TABLET, FILM COATED, EXTENDED RELEASE ORAL at 08:53

## 2022-10-30 RX ADMIN — FOLIC ACID 1 MG: 1 TABLET ORAL at 08:53

## 2022-10-30 RX ADMIN — SUCRALFATE 1 G: 1 TABLET ORAL at 16:00

## 2022-10-30 RX ADMIN — SODIUM CHLORIDE 75 ML/HR: 0.9 INJECTION, SOLUTION INTRAVENOUS at 19:28

## 2022-10-30 RX ADMIN — LEVOTHYROXINE SODIUM 75 MCG: 75 TABLET ORAL at 06:06

## 2022-10-30 RX ADMIN — DOCUSATE SODIUM 100 MG: 100 CAPSULE, LIQUID FILLED ORAL at 08:53

## 2022-10-30 RX ADMIN — HEPARIN SODIUM 5000 UNITS: 5000 INJECTION INTRAVENOUS; SUBCUTANEOUS at 21:46

## 2022-10-30 RX ADMIN — Medication 250 MG: at 08:53

## 2022-10-30 RX ADMIN — DULOXETINE HYDROCHLORIDE 30 MG: 30 CAPSULE, DELAYED RELEASE ORAL at 08:53

## 2022-10-30 RX ADMIN — SODIUM CHLORIDE 75 ML/HR: 0.9 INJECTION, SOLUTION INTRAVENOUS at 06:05

## 2022-10-30 RX ADMIN — ATORVASTATIN CALCIUM 40 MG: 40 TABLET, FILM COATED ORAL at 16:00

## 2022-10-30 RX ADMIN — Medication 1000 UNITS: at 08:53

## 2022-10-30 RX ADMIN — SUCRALFATE 1 G: 1 TABLET ORAL at 21:46

## 2022-10-30 RX ADMIN — SUCRALFATE 1 G: 1 TABLET ORAL at 11:29

## 2022-10-30 RX ADMIN — Medication 250 MG: at 17:53

## 2022-10-30 RX ADMIN — POLYETHYLENE GLYCOL 3350 17 G: 17 POWDER, FOR SOLUTION ORAL at 08:52

## 2022-10-30 RX ADMIN — PANTOPRAZOLE SODIUM 40 MG: 40 INJECTION, POWDER, FOR SOLUTION INTRAVENOUS at 08:53

## 2022-10-30 RX ADMIN — PANTOPRAZOLE SODIUM 40 MG: 40 INJECTION, POWDER, FOR SOLUTION INTRAVENOUS at 21:46

## 2022-10-30 RX ADMIN — HEPARIN SODIUM 5000 UNITS: 5000 INJECTION INTRAVENOUS; SUBCUTANEOUS at 14:00

## 2022-10-30 RX ADMIN — DOCUSATE SODIUM 100 MG: 100 CAPSULE, LIQUID FILLED ORAL at 17:53

## 2022-10-30 RX ADMIN — SUCRALFATE 1 G: 1 TABLET ORAL at 06:05

## 2022-10-30 RX ADMIN — HEPARIN SODIUM 5000 UNITS: 5000 INJECTION INTRAVENOUS; SUBCUTANEOUS at 06:05

## 2022-10-30 NOTE — UTILIZATION REVIEW
Continued Stay Review    Date: 10/30/22                          Current Patient Class: inpatient  Current Level of Care: med surg  HPI:77 y o  female initially admitted on 10/19/22     Assessment/Plan:   Metastatic cancer, s/p bone bx done 10/25, results pending  Heparin continued for multiple DVT's  Monitor closely for bleeding  Persistent swelling note RUE  IVF maintained       Vital Signs: /77   Pulse 85   Temp 97 8 °F (36 6 °C)   Resp 17   Ht 5' 5" (1 651 m)   Wt 45 kg (99 lb 3 3 oz)   SpO2 96%   BMI 16 51 kg/m²     Pertinent Labs/Diagnostic Results:   Results from last 7 days   Lab Units 10/27/22  1215 10/25/22  0607 10/24/22  0533 10/23/22  1825   WBC Thousand/uL 10 78* 10 83* 13 08*  --    HEMOGLOBIN g/dL 9 1* 7 7* 8 0* 9 0*   HEMATOCRIT % 29 5* 24 5* 27 0*  --    PLATELETS Thousands/uL 233 140* 102*  --    NEUTROS ABS Thousands/µL 8 34*  --   --   --      Results from last 7 days   Lab Units 10/26/22  0043 10/25/22  1946/22  0607 10/24/22  0533   SODIUM mmol/L 132* 133* 125* 137   POTASSIUM mmol/L 3 8 3 6 3 7 3 7   CHLORIDE mmol/L 102 102 96 105   CO2 mmol/L 22 23 22 21   ANION GAP mmol/L 8 8 7 11   BUN mg/dL 11 10 11 17   CREATININE mg/dL 0 51* 0 57* 0 55* 0 70   EGFR ml/min/1 73sq m 93 90 91 84   CALCIUM mg/dL 7 4* 7 4* 7 0* 7 1*   MAGNESIUM mg/dL  --   --  1 4* 1 7   PHOSPHORUS mg/dL  --   --  2 2* 2 2*     Results from last 7 days   Lab Units 10/30/22  1037 10/30/22  0655 10/29/22  2104 10/29/22  1559 10/29/22  1143 10/29/22  0723 10/28/22  2032 10/28/22  1558 10/28/22  1112 10/28/22  0710 10/27/22  2057 10/27/22  1545   POC GLUCOSE mg/dl 92 110 112 112 154* 90 156* 121 112 104 116 124     Results from last 7 days   Lab Units 10/26/22  0043 10/25/22  1946/22  0607 10/24/22  0533   GLUCOSE RANDOM mg/dL 90 100 336* 120     Medications:   Scheduled Medications:  atorvastatin, 40 mg, Oral, Daily With Dinner  cholecalciferol, 1,000 Units, Oral, Daily  docusate sodium, 100 mg, Oral, BID  DULoxetine, 30 mg, Oral, Daily  folic acid, 1 mg, Oral, Daily  heparin (porcine), 5,000 Units, Subcutaneous, Q8H [address]  insulin lispro, 1-5 Units, Subcutaneous, TID AC  insulin lispro, 1-5 Units, Subcutaneous, HS  levothyroxine, 75 mcg, Oral, Early Morning  metoprolol tartrate, 12 5 mg, Oral, Q12H MASTER  oxybutynin, 5 mg, Oral, Daily  pantoprazole, 40 mg, Intravenous, Q12H MASTER  polyethylene glycol, 17 g, Oral, Daily  saccharomyces boulardii, 250 mg, Oral, BID  sucralfate, 1 g, Oral, 4x Daily (AC & HS)    Continuous IV Infusions:  sodium chloride, 75 mL/hr, Intravenous, Continuous    PRN Meds:  acetaminophen, 650 mg, Oral, Q6H PRN  calcium carbonate, 500 mg, Oral, Daily PRN  Gadobutrol, 10 mL, Intravenous, Once in imaging  HYDROmorphone, 0 2 mg, Intravenous, Q6H PRN  lidocaine (PF), , , Code/Trauma/Sedation Med  ondansetron, 4 mg, Intravenous, Q6H PRN    Discharge Plan: d    Network Utilization Review Department  ATTENTION: Please call with any questions or concerns to [phone] and carefully listen to the prompts so that you are directed to the right person  All voicemails are confidential   [facility] all requests for admission clinical reviews, approved or denied determinations and any other requests to dedicated fax number below belonging to the campus where the patient is receiving treatment   List of dedicated fax numbers for the Facilities:  [address] DENIALS (Administrative/Medical Necessity) [phone]   [address] [place] [address] (Maternity/NICU/Pediatrics) [phone]   [address] [place] [address] Medical [place] [address] Jani Guzman [place] [address] [address] [facility] 134 979 [facility] [phone]

## 2022-10-30 NOTE — PROGRESS NOTES
[facility] Internal Medicine Progress Note  Patient: Kathryn Hernandez 69 y o  female   MRN: [id number]  PCP: Norbert Ulloa MD  Unit/Bed#: [address] [place] [address] Encounter: [id number]  Date Of Visit: 10/30/22    Problem List:    Principal Problem:    Metast[facility] ([facility] )  Active Problems:    Acute deep vein thrombosis (DVT) of right upper extremity (HCC)    Generalized weakness    Swelling of right upper extremity    Dyslipidemia    Acquired hypothyroidism    Sjogren's syndrome (HCC)    Orthostatic hypotension    Sacral wound    Chronic pain    Pulmonary nodules    Severe protein-calorie malnutrition ([facility] )    Anemia  Taking getting anything in fluid IV    Assessment & Plan:    Acute deep vein thrombosis (DVT) of right upper extremity (HCC)  Assessment & Plan  US:  Positive  Oncology recs Dopplers demonstrated multiple DVTs going up into the internal jugular vein  Patient is on subcu heparin 5000 units every 8 hours  Patient obviously needs to be monitored closely, history of GI bleeding  Yesterday's hemoglobin level of 9 1 g/dL good/acceptable  Likely multiple etiologies for the DVT - malignancy, immobility, in association with the right upper lobe mass  Continue 5 K heparin subQ  Monitor for bleeding risk, consider NOAC on discharge versus warfarin    * Metastatic cancer ([facility] )  Assessment & Plan    · 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  · Patient was seen by PCP on 9/27, referred to Oncology as outpatient  No history of cancer  History of fibroid , status post hysterectomy and benign breast cyst   · CT chest abdomen pelvis 10/21-noted to have lobulated right upper lobe paramediastinal mass 6 x 4 x 8 5 cm with extension into the superior mediastinum, supraclavicular correlation and extension to and invasion of right upper lobe bronchial branch   Multiple scattered subcentimeter metastatic lung nodules noted in upper lobe bilaterally  Moderate right and small left effusion  Seen by Oncology, input appreciated  · Discussed with Pulmonary, recommended IR guided biopsy  Patient Trixie Kidd is agreeable  · S/p IR for biopsy-results pending  · Consider hospice consult after STR      SIRS (systemic inflammatory response syndrome) (HCC)-resolved as of 10/27/2022  Assessment & Plan  Improving, asymptomatic    SIRS versus Sepsis, POA, as evidenced by leukocytosis, tachycardia, hypothermia  Lactic acidosis  Patient is afebrile  · UA shows trace leukocytes, normal white count  SARS-CoV-2 PCR negative  · CXR- shows diffuse pulmonary nodules, no evidence infection   · Lactic acid trended down  Likely multifactorial secondary to dehydration v metastatic disease versus GI bleed   · CT chest abdomen pelvis without any evidence of infection  · Blood Cx NGTD5d  · Urine culture negative  · ID following, input appreciated -continue to observe off anymore antibiotics  -follow temperatures and hemodynamics  -follow CBC  · Initially received cefepime, subsequently transitioned to cefazolin pending cultures  Swelling of right upper extremity  Assessment & Plan  Unresolved,   Noted right upper extremity swelling, 10/23 without any tenderness or erythema  · Elevate extremity as tolerated  · Venous Doppler positive for multiple vessel DVT  · Restarted heparin, monitor for bleeding transition to DOAC  · PT as tolerated    Generalized weakness  Assessment & Plan  May be new baseline possibly secondary to deconditioning  Likely multifactorial secondary to dehydration, metastatic cancer  cane at baseline  · Treat underlying causes    · Nutrition support  · PT OT STR  · Fall precautions    Coffee ground emesis-resolved as of 10/28/2022  Assessment & Plan  Patient noted to have several episodes of coffee-ground emesis since admission,  · CT chest abdomen pelvis w/o occult bleeding but, hiatal hernia and fecal impaction  · Hemoglobin 6 8 grams/deciliter, status post 1 PRBC on 10/22  · Initially treated with IV Protonix drip>continue Protonix b i d   · GI recs-Await biopsy results-Continue Protonix 40 mg daily, liquid Carafate 1 g q i d  · Recommend soft, non ulcer genic diet  · Monitor H&H, transfuse as needed      Anemia  Assessment & Plan  Hemoglobin 13 0>9 1  Noted to have coffee-ground emesis during hospitalization  Previous B12 low normal  Iron studies-normal ferritin, low TIBC  · Monitor H&H, will transfuse if continues to decline or hemodynamic instability    Severe protein-calorie malnutrition (HCC)  Assessment & Plan  Malnutrition Findings:   Adult Malnutrition type: Chronic illness  Adult Degree of Malnutrition: Other severe protein calorie malnutrition  Malnutrition Characteristics: Fat loss, Muscle loss, Weight loss    360 Statement: Severe protein calorie malnutrion of chronic illness related to increased energy needs as evidenced by 12% weight loss in 1 month, hollow orbits, protruding clavicles, depression at the temples  Awaiting treatment plan    BMI Findings:  Adult BMI Classifications: Underweight < 18 5     Body mass index is 16 51 kg/m²  Pulmonary nodules  Assessment & Plan  · CXR:  2 x 5 5 cm right upper lobe paramediastinal nodule, and numerous additional smaller bilateral pulmonary nodules highly suspicious for malignancy  Both primary lung cancer and metastatic disease can have this appearance  · Refer to CT scan finding under metastatic cancer  · Pending biopsy results from IR      Chronic pain  Assessment & Plan  Control, asymptomatic  Chronic arthralgia in lower back, left hip, knees, legs  Likely due to OA and Sjogren's syndrome  Patient [facility] Rhe[facility] as outpatient  On methotrexate, Cymbalta, Voltaren, tramadol p r n  At home    Denies pain at present  · Continue Cymbalta,   · Hold methotrexate for now in view of sirs versus sepsis  · Hold Voltaren, tramadol   · Tylenol as needed    Sacral wound  Assessment & Plan  Developed in 2 weeks since sister-in-law is away  Diffuse sacral DTI on exam with surrounding redness, no active drainage, scattered black eschar on exam Hard to exclude cellulitis  · Seen by surgery, input appreciated, continue wound care as per wound care  · ID following no antibiotics at this time  · Wound care is needed, offloading    Orthostatic hypotension  Assessment & Plan  Asymptomatic, Noted history  Sjogren's syndrome ([facility] )  Assessment & Plan  Concurrently asymptomatic, Patient is on methotrexate 12 5 mg p o  Weekly  Follows rheumatologist in [facility]  · Will hold methotrexate for now  · Reconsider while outpatient on discharge  · Natural tears prn for dry eye  Acquired hypothyroidism  Assessment & Plan  Chronic Continue Synthroid  Reports not taking medications at home  · Elevated TSH, normal free T4   · Continue Synthroid at elevated level      Dyslipidemia  Assessment & Plan  Chronic, Continue statin    Fecal impaction (HCC)-resolved as of 10/28/2022  Assessment & Plan  Status post partial improvement after enema, 10/21  · Continue Colace and MiraLax   · Enema p r n      Electrolyte abnormality-resolved as of 10/28/2022  Assessment & Plan  Hypokalemia, hypophosphatemia, hypomagnesemia  · Replete and monitor    Hypernatremia-resolved as of 10/23/2022  Assessment & Plan  Presented with sodium level of 160 secondary to decrease free water intake  Seen by Nephrology, input appreciated  Improved appropriately with hypotonic fluids  · Tolerating clear liquid diet  · Monitor BMP  · Monitor intake output   · Monitor p o  intake  · Hold D5 water, will resume D5 water maintenance if remains NPO    Elevated glucose-resolved as of 10/28/2022  Assessment & Plan  Glucose improving,  · Check A1c- 5 5%, SSI    SVT (supraventricular tachycardia) (HCC)-resolved as of 10/23/2022  Assessment & Plan  Developed SVT in ED, heart rate 190  Converted to sinus rhythm after receiving Cardizem 10 mg IV  · Telemetry-no further episode, discontinued  · Optimize electrolytes  · Started on low-dose metoprolol     Elevated troponin-resolved as of 10/20/2022  Assessment & Plan  Troponin 109-83-78> wnl  · Initial EKG showed sinus tach, rate 115, inferior lateral ST depressions, prolonged QT   · Patient denies chest pain  · Likely type 2 MI secondary to # 1 and SIRS versus Sepsis  · Troponin downtrending  · Telemetry    Acute kidney injury (HCC)-resolved as of 10/28/2022  Assessment & Plan  Baseline creatinine 0 7-0 8 in past year  Creatinine 1 68 on admission, now 0 51  UA without significant abnormality  Prior CT scan without any hydronephrosis  Likely prerenal in setting of dehydration and hypotension  Nephrology following, input appreciated  Improving with IV fluids to baseline  · Will discontinue IV fluid post EGD  · Monitor intake output   · Avoid nephrotoxin and hypotension  · Follow-up BMP    Dehydration-resolved as of 10/23/2022  Assessment & Plan  Patient presents with generalized weakness  Neighbor called police as they saw her trash can outside for a few days  Police found her on the floor  Patient reports rolling off the couch and was on the floor for about 2 hours  Patient denies hitting head  Reports lower abdominal pain since 5/2022  Denies any acute pain  Patient lives [facility] - in-law normally takes her shopping and she was away for about 16 days  Patient reported nothing to eat at home and was hungry  · Improving with IV fluids          VTE Pharmacologic Prophylaxis: VTE Score: 2 Moderate Risk (Score 3-4) - Pharmacological DVT Prophylaxis Ordered: heparin  Patient Centered Rounds: I performed bedside rounds with nursing staff today  Discussions with Specialists or Other Care Team Provider:  yes    Education and Discussions with Family / Patient: Updated  (Sister-in-law) at bedside   and ANNIE delgado    Time Spent for Care: 45 minutes  More than 50% of total time spent on counseling and coordination of care as described above  Current Length of Stay: 11 day(s)  Current Patient Status: Inpatient   Certification Statement: The patient will continue to require additional inpatient hospital stay due to Upper extremity DVT, heparin and potential hospice consult  Discharge Plan: Anticipate discharge in 48 hrs to discharge location to be determined pending rehab evaluations  Code Status: Level 1 - Full Code    Subjective:   Patient seen at bedside, and reported that she has no GI bleeding, chest pain, shortness of breath, patient reported upper right arm swelling going down  I had no other concerns at this time    Objective:     Vitals:   Temp (24hrs), Av 8 °F (36 6 °C), Min:97 5 °F (36 4 °C), Max:98 1 °F (36 7 °C)    Temp:  [97 5 °F (36 4 °C)-98 1 °F (36 7 °C)] 97 7 °F (36 5 °C)  HR:  [84-96] 84  Resp:  [17-18] 17  BP: ()/(54-68) 124/59  SpO2:  [98 %-100 %] 98 % on room air  Body mass index is 16 51 kg/m²  Input and Output Summary (last 24 hours): Intake/Output Summary (Last 24 hours) at 10/30/2022 0823  Last data filed at 10/30/2022 0605  Gross per 24 hour   Intake 900 ml   Output 1350 ml   Net -450 ml       Physical Exam:   Physical Exam  Vitals reviewed  Exam conducted with a chaperone present  Constitutional:       General: She is not in acute distress  Appearance: Normal appearance  She is not ill-appearing  HENT:      Head: Normocephalic and atraumatic  Nose: No congestion  Eyes:      General: No scleral icterus  Conjunctiva/sclera: Conjunctivae normal       Pupils: Pupils are equal, round, and reactive to light  Cardiovascular:      Rate and Rhythm: Normal rate  Heart sounds: No murmur heard  Pulmonary:      Effort: No respiratory distress  Breath sounds: No wheezing, rhonchi or rales     Abdominal:      General: Bowel sounds are normal  Palpations: Abdomen is soft  Tenderness: There is no abdominal tenderness  There is no guarding  Genitourinary:     General: Normal vulva  Exam position: Knee-chest position  Pubic Area: No rash  Labia:         Right: No rash, tenderness, lesion or injury  Left: No rash, tenderness, lesion or injury  Urethra: No urethral pain or urethral lesion  Vagina: Normal       Uterus: Normal        Adnexa: Right adnexa normal and left adnexa normal       Rectum: Normal  No tenderness or anal fissure  Musculoskeletal:         General: No swelling or deformity  Normal range of motion  Cervical back: No tenderness  Right lower leg: No edema  Left lower leg: No edema  Right foot: Normal range of motion  Left foot: Normal range of motion  Comments: RUE edema   Skin:     General: Skin is warm  Capillary Refill: Capillary refill takes less than 2 seconds  Findings: No lesion or rash  Neurological:      Mental Status: She is oriented to person, place, and time  Cranial Nerves: No cranial nerve deficit  Coordination: Coordination normal    Psychiatric:         Mood and Affect: Mood normal          Thought Content:  Thought content normal          Additional Data:     Labs:  Results from last 7 days   Lab Units 10/27/22  1215   WBC Thousand/uL 10 78*   HEMOGLOBIN g/dL 9 1*   HEMATOCRIT % 29 5*   PLATELETS Thousands/uL 233   NEUTROS PCT % 77*   LYMPHS PCT % 12*   MONOS PCT % 8   EOS PCT % 1     Results from last 7 days   Lab Units 10/26/22  0043   SODIUM mmol/L 132*   POTASSIUM mmol/L 3 8   CHLORIDE mmol/L 102   CO2 mmol/L 22   BUN mg/dL 11   CREATININE mg/dL 0 51*   ANION GAP mmol/L 8   CALCIUM mg/dL 7 4*   GLUCOSE RANDOM mg/dL 90         Results from last 7 days   Lab Units 10/30/22  0655 10/29/22  2104 10/29/22  1559 10/29/22  1143 10/29/22  0723 10/28/22  2032 10/28/22  1558 10/28/22  1112 10/28/22  0710 10/27/22  2057 10/27/22  1545 10/27/22  1213   POC GLUCOSE mg/dl 110 112 112 154* 90 156* 121 112 104 116 124 117               Lines/Drains:  Invasive Devices  Report    Peripheral Intravenous Line  Duration           Peripheral IV 10/26/22 Left;Ventral (anterior) Forearm 3 days          Drain  Duration           External Urinary Catheter 4 days                         Imaging: Reviewed radiology reports from this admission including: chest xray, abdominal/pelvic CT and CT head venous Doppler, IR bone biopsy pending    Recent Cultures (last 7 days):         Last 24 Hours Medication List:   Current Facility-Administered Medications   Medication Dose Route Frequency Provider Last Rate   • acetaminophen  650 mg Oral Q6H PRN Manish Gudino MD     • atorvastatin  40 mg Oral Daily With Nbail Lora MD     • calcium carbonate  500 mg Oral Daily PRN Ki Hernández MD     • cholecalciferol  1,000 Units Oral Daily Ki Hernández MD     • docusate sodium  100 mg Oral BID Ki Hernández MD     • DULoxetine  30 mg Oral Daily Ki Hernández MD     • folic acid  1 mg Oral Daily Ki Hernández MD     • Gadobutrol  10 mL Intravenous Once in imaging Ki Hernández MD     • heparin (porcine)  5,000 Units Subcutaneous [facility] Dorene Garcia MD     • HYDROmorphone  0 2 mg Intravenous Q6H PRN Ki Hernández MD     • insulin lispro  1-5 Units Subcutaneous TID AC Manish Gudino MD     • insulin lispro  1-5 Units Subcutaneous HS Ki Hernnádez MD     • levothyroxine  75 mcg Oral Early Morning Manish Gudino MD     • lidocaine (PF)    Code/Trauma/Sedation Med Bailey Wills MD     • metoprolol tartrate  12 5 mg Oral Q12H [address]  Patrick Gudino MD     • ondansetron  4 mg Intravenous Q6H PRN Dorene Garcia MD     • oxybutynin  5 mg Oral Daily Ki Hernández MD     • pantoprazole  40 mg Intravenous Q12H Adithya Medina MD • polyethylene glycol  17 g Oral Daily Manish Brunner MD     • saccharomyces boulardii  250 mg Oral BID Betina Sierra MD     • sodium chloride  75 mL/hr Intravenous Continuous Stacy Luz MD 75 mL/hr (10/30/22 0419)   • sucralfate  1 g Oral 4x Daily (AC & HS) Betina Sierra MD          Today, Patient Was Seen By: Stacy Luz    ** Please Note: "This note has been constructed using a voice recognition system  Therefore there may be syntax, spelling, and/or grammatical errors   Please call if you have any questions  "**

## 2022-10-30 NOTE — PLAN OF CARE
Problem: Potential for Falls  Goal: Patient will remain free of falls  Description: INTERVENTIONS:  - Educate patient/family on patient safety including physical limitations  - Instruct patient to call for assistance with activity   - Consult OT/PT to assist with strengthening/mobility   - Keep Call bell within reach  - Keep bed low and locked with side rails adjusted as appropriate  - Keep care items and personal belongings within reach  - Initiate and maintain comfort rounds  - Make Fall Risk Sign visible to staff  - Offer Toileting every 2 Hours, in advance of need  - Initiate/Maintain bed alarm- Apply yellow socks and bracelet for high fall risk patients  - Consider moving patient to room near nurses station  10/30/2022 0409 by Brandy Anglin RN  Outcome: Progressing  10/30/2022 0407 by Brandy Anglin RN  Outcome: Progressing     Problem: Nutrition/Hydration-ADULT  Goal: Nutrient/Hydration intake appropriate for improving, restoring or maintaining nutritional needs  Description: Monitor and assess patient's nutrition/hydration status for malnutrition  Collaborate with interdisciplinary team and initiate plan and interventions as ordered  Monitor patient's weight and dietary intake as ordered or per policy  Utilize nutrition screening tool and intervene as necessary  Determine patient's food preferences and provide high-protein, high-caloric foods as appropriate       INTERVENTIONS:  - Monitor oral intake, urinary output, labs, and treatment plans  - Assess nutrition and hydration status and recommend course of action  - Evaluate amount of meals eaten  - Assist patient with eating if necessary   - Allow adequate time for meals  - Recommend/ encourage appropriate diets, oral nutritional supplements, and vitamin/mineral supplements  - Order, calculate, and assess calorie counts as needed  - Recommend, monitor, and adjust tube feedings and TPN/PPN based on assessed needs  - Assess need for intravenous fluids  - Provide specific nutrition/hydration education as appropriate  - Include patient/family/caregiver in decisions related to nutrition  10/30/2022 0409 by Nolvia Alvarado RN  Outcome: Progressing  10/30/2022 0407 by Nolvia Avlarado RN  Outcome: Progressing     Problem: METABOLIC, FLUID AND ELECTROLYTES - ADULT  Goal: Electrolytes maintained within normal limits  Description: INTERVENTIONS:  - Monitor labs and assess patient for signs and symptoms of electrolyte imbalances  - Administer electrolyte replacement as ordered  - Monitor response to electrolyte replacements, including repeat lab results as appropriate  - Instruct patient on fluid and nutrition as appropriate  10/30/2022 0409 by Nolvia Alvarado RN  Outcome: Progressing  10/30/2022 0407 by Nolvia Alvarado RN  Outcome: Progressing     Problem: METABOLIC, FLUID AND ELECTROLYTES - ADULT  Goal: Electrolytes maintained within normal limits  Description: INTERVENTIONS:  - Monitor labs and assess patient for signs and symptoms of electrolyte imbalances  - Administer electrolyte replacement as ordered  - Monitor response to electrolyte replacements, including repeat lab results as appropriate  - Instruct patient on fluid and nutrition as appropriate  10/30/2022 0409 by Nolvia Alvarado RN  Outcome: Progressing  10/30/2022 0407 by Nolvia Alvarado RN  Outcome: Progressing     Problem: Prexisting or High Potential for Compromised Skin Integrity  Goal: Skin integrity is maintained or improved  Description: INTERVENTIONS:  - Identify patients at risk for skin breakdown  - Assess and monitor skin integrity  - Assess and monitor nutrition and hydration status  - Monitor labs   - Assess for incontinence   - Turn and reposition patient  - Assist with mobility/ambulation  - Relieve pressure over bony prominences  - Avoid friction and shearing  - Provide appropriate hygiene as needed including keeping skin clean and dry  - Evaluate need for skin moisturizer/barrier cream  - Collaborate with interdisciplinary team   - Patient/family teaching  - Consider wound care consult   10/30/2022 0409 by Brandy Anglin RN  Outcome: Progressing  10/30/2022 0407 by Brandy Anglin RN  Outcome: Progressing

## 2022-10-30 NOTE — PLAN OF CARE
Problem: Potential for Falls  Goal: Patient will remain free of falls  Description: INTERVENTIONS:  - Educate patient/family on patient safety including physical limitations  - Instruct patient to call for assistance with activity   - Consult OT/PT to assist with strengthening/mobility   - Keep Call bell within reach  - Keep bed low and locked with side rails adjusted as appropriate  - Keep care items and personal belongings within reach  - Initiate and maintain comfort rounds  - Make Fall Risk Sign visible to staff  - Offer Toileting every 2 Hours, in advance of need  - Initiate/Maintain bed alarm- Apply yellow socks and bracelet for high fall risk patients  - Consider moving patient to room near nurses station  Outcome: Progressing     Problem: Nutrition/Hydration-ADULT  Goal: Nutrient/Hydration intake appropriate for improving, restoring or maintaining nutritional needs  Description: Monitor and assess patient's nutrition/hydration status for malnutrition  Collaborate with interdisciplinary team and initiate plan and interventions as ordered  Monitor patient's weight and dietary intake as ordered or per policy  Utilize nutrition screening tool and intervene as necessary  Determine patient's food preferences and provide high-protein, high-caloric foods as appropriate       INTERVENTIONS:  - Monitor oral intake, urinary output, labs, and treatment plans  - Assess nutrition and hydration status and recommend course of action  - Evaluate amount of meals eaten  - Assist patient with eating if necessary   - Allow adequate time for meals  - Recommend/ encourage appropriate diets, oral nutritional supplements, and vitamin/mineral supplements  - Order, calculate, and assess calorie counts as needed  - Recommend, monitor, and adjust tube feedings and TPN/PPN based on assessed needs  - Assess need for intravenous fluids  - Provide specific nutrition/hydration education as appropriate  - Include patient/family/caregiver in decisions related to nutrition  Outcome: Progressing     Problem: MOBILITY - ADULT  Goal: Maintain or return to baseline ADL function  Description: INTERVENTIONS:  -  Assess patient's ability to carry out ADLs; assess patient's baseline for ADL function and identify physical deficits which impact ability to perform ADLs (bathing, care of mouth/teeth, toileting, grooming, dressing, etc )  - Assess/evaluate cause of self-care deficits   - Assess range of motion  - Assess patient's mobility; develop plan if impaired  - Assess patient's need for assistive devices and provide as appropriate  - Encourage maximum independence but intervene and supervise when necessary  - Involve family in performance of ADLs  - Assess for home care needs following discharge   - Consider OT consult to assist with ADL evaluation and planning for discharge  - Provide patient education as appropriate  Outcome: Progressing  Goal: Maintains/Returns to pre admission functional level  Description: INTERVENTIONS:  - Perform BMAT or MOVE assessment daily    - Set and communicate daily mobility goal to care team and patient/family/caregiver  - Collaborate with rehabilitation services on mobility goals if consulted  - Perform Range of Motion 2 times a day  - Reposition patient every 2 hours    - Dangle patient 2 times a day  - Stand patient 2 times a day  - Ambulate patient 2 times a day  - Out of bed to chair 2 times a day   - Out of bed for meals 2 times a day  - Out of bed for toileting  - Record patient progress and toleration of activity level   Outcome: Progressing     Problem: METABOLIC, FLUID AND ELECTROLYTES - ADULT  Goal: Electrolytes maintained within normal limits  Description: INTERVENTIONS:  - Monitor labs and assess patient for signs and symptoms of electrolyte imbalances  - Administer electrolyte replacement as ordered  - Monitor response to electrolyte replacements, including repeat lab results as appropriate  - Instruct patient on fluid and nutrition as appropriate  Outcome: Progressing     Problem: Prexisting or High Potential for Compromised Skin Integrity  Goal: Skin integrity is maintained or improved  Description: INTERVENTIONS:  - Identify patients at risk for skin breakdown  - Assess and monitor skin integrity  - Assess and monitor nutrition and hydration status  - Monitor labs   - Assess for incontinence   - Turn and reposition patient  - Assist with mobility/ambulation  - Relieve pressure over bony prominences  - Avoid friction and shearing  - Provide appropriate hygiene as needed including keeping skin clean and dry  - Evaluate need for skin moisturizer/barrier cream  - Collaborate with interdisciplinary team   - Patient/family teaching  - Consider wound care consult   Outcome: Progressing

## 2022-10-31 LAB
BASOPHILS # BLD AUTO: 0.04 THOUSANDS/ÂΜL (ref 0–0.1)
BASOPHILS NFR BLD AUTO: 0 % (ref 0–1)
EOSINOPHIL # BLD AUTO: 0.09 THOUSAND/ÂΜL (ref 0–0.61)
EOSINOPHIL NFR BLD AUTO: 1 % (ref 0–6)
ERYTHROCYTE [DISTWIDTH] IN BLOOD BY AUTOMATED COUNT: 21.6 % (ref 11.6–15.1)
GLUCOSE SERPL-MCNC: 102 MG/DL (ref 65–140)
GLUCOSE SERPL-MCNC: 120 MG/DL (ref 65–140)
GLUCOSE SERPL-MCNC: 128 MG/DL (ref 65–140)
GLUCOSE SERPL-MCNC: 86 MG/DL (ref 65–140)
HCT VFR BLD AUTO: 30.1 % (ref 34.8–46.1)
HGB BLD-MCNC: 9.3 G/DL (ref 11.5–15.4)
IMM GRANULOCYTES # BLD AUTO: 0.06 THOUSAND/UL (ref 0–0.2)
IMM GRANULOCYTES NFR BLD AUTO: 1 % (ref 0–2)
LYMPHOCYTES # BLD AUTO: 1.32 THOUSANDS/ÂΜL (ref 0.6–4.47)
LYMPHOCYTES NFR BLD AUTO: 15 % (ref 14–44)
MCH RBC QN AUTO: 23.9 PG (ref 26.8–34.3)
MCHC RBC AUTO-ENTMCNC: 30.9 G/DL (ref 31.4–37.4)
MCV RBC AUTO: 77 FL (ref 82–98)
MONOCYTES # BLD AUTO: 0.61 THOUSAND/ÂΜL (ref 0.17–1.22)
MONOCYTES NFR BLD AUTO: 7 % (ref 4–12)
NEUTROPHILS # BLD AUTO: 6.8 THOUSANDS/ÂΜL (ref 1.85–7.62)
NEUTS SEG NFR BLD AUTO: 76 % (ref 43–75)
NRBC BLD AUTO-RTO: 0 /100 WBCS
PLATELET # BLD AUTO: 263 THOUSANDS/UL (ref 149–390)
PMV BLD AUTO: 9.4 FL (ref 8.9–12.7)
RBC # BLD AUTO: 3.89 MILLION/UL (ref 3.81–5.12)
WBC # BLD AUTO: 8.92 THOUSAND/UL (ref 4.31–10.16)

## 2022-10-31 RX ADMIN — PANTOPRAZOLE SODIUM 40 MG: 40 INJECTION, POWDER, FOR SOLUTION INTRAVENOUS at 08:26

## 2022-10-31 RX ADMIN — POLYETHYLENE GLYCOL 3350 17 G: 17 POWDER, FOR SOLUTION ORAL at 08:29

## 2022-10-31 RX ADMIN — FOLIC ACID 1 MG: 1 TABLET ORAL at 08:26

## 2022-10-31 RX ADMIN — ATORVASTATIN CALCIUM 40 MG: 40 TABLET, FILM COATED ORAL at 16:22

## 2022-10-31 RX ADMIN — Medication 250 MG: at 08:29

## 2022-10-31 RX ADMIN — SUCRALFATE 1 G: 1 TABLET ORAL at 11:43

## 2022-10-31 RX ADMIN — Medication 250 MG: at 17:22

## 2022-10-31 RX ADMIN — SUCRALFATE 1 G: 1 TABLET ORAL at 16:22

## 2022-10-31 RX ADMIN — SODIUM CHLORIDE 75 ML/HR: 0.9 INJECTION, SOLUTION INTRAVENOUS at 20:16

## 2022-10-31 RX ADMIN — Medication 12.5 MG: at 08:27

## 2022-10-31 RX ADMIN — SUCRALFATE 1 G: 1 TABLET ORAL at 21:43

## 2022-10-31 RX ADMIN — SUCRALFATE 1 G: 1 TABLET ORAL at 06:00

## 2022-10-31 RX ADMIN — PANTOPRAZOLE SODIUM 40 MG: 40 INJECTION, POWDER, FOR SOLUTION INTRAVENOUS at 21:43

## 2022-10-31 RX ADMIN — DULOXETINE HYDROCHLORIDE 30 MG: 30 CAPSULE, DELAYED RELEASE ORAL at 08:26

## 2022-10-31 RX ADMIN — HEPARIN SODIUM 5000 UNITS: 5000 INJECTION INTRAVENOUS; SUBCUTANEOUS at 05:21

## 2022-10-31 RX ADMIN — APIXABAN 2.5 MG: 2.5 TABLET, FILM COATED ORAL at 17:22

## 2022-10-31 RX ADMIN — OXYBUTYNIN 5 MG: 5 TABLET, FILM COATED, EXTENDED RELEASE ORAL at 08:28

## 2022-10-31 RX ADMIN — LEVOTHYROXINE SODIUM 75 MCG: 75 TABLET ORAL at 05:21

## 2022-10-31 RX ADMIN — SODIUM CHLORIDE 75 ML/HR: 0.9 INJECTION, SOLUTION INTRAVENOUS at 05:37

## 2022-10-31 RX ADMIN — APIXABAN 2.5 MG: 2.5 TABLET, FILM COATED ORAL at 12:55

## 2022-10-31 RX ADMIN — Medication 1000 UNITS: at 08:27

## 2022-10-31 RX ADMIN — DOCUSATE SODIUM 100 MG: 100 CAPSULE, LIQUID FILLED ORAL at 08:28

## 2022-10-31 NOTE — PHYSICAL THERAPY NOTE
PT TREATMENT     10/31/22 1044   Note Type   Note Type Treatment   Pain Assessment   Pain Assessment Tool 0-10   Pain Score No Pain   Restrictions/Precautions   Other Precautions Fall Risk; Chair Alarm; Bed Alarm   General   Chart Reviewed Yes   Cognition   Overall Cognitive Status WFL   Subjective   Subjective "I'm so weak"   Bed Mobility   Rolling R 2  Maximal assistance   Additional items Bedrails   Rolling L 2  Maximal assistance   Additional items Bedrails   Additional Comments Pt declined additional mobility due to fatigue after exercises  Activity Tolerance   Activity Tolerance Patient limited by fatigue;Patient tolerated treatment well   Exercises   Neuro re-ed 2x10 each B LE ankle pumps, SAQ, AAROM heel slides, AAROM hip abd/add, hip IR/ER;  2x 10 each B UE elbow flex/ext, chest press, horizontal abd/add, shoulder elevation  Rest breaks between exercises needed for fatigue  Assessment   Prognosis Good   Problem List Decreased strength;Decreased mobility; Impaired balance   Assessment Pt was able to participate in 20 minutes of supine UE/LE exercises today but was then too fatigued to sit up at the edge of the bed or even sit up in the chair position of the bed  Plan to have pt sit up prior to exercises next session  Also encouraged pt to have the nursing staff put her bed in the chair position later today to improve tolerance to upright activity  Pt remains very weak and will benefit from continued skilled PT  Plan to focus on tolerance to upright activity with goal of transferring OOB to the chair  The patient's AM-PAC Basic Mobility Inpatient Short Form Raw Score is 8  A Raw score of less than or equal to 16 suggests the patient may benefit from discharge to post-acute rehabilitation services  Plan   Treatment/Interventions LE strengthening/ROM; Functional transfer training;ADL retraining; Endurance training; Therapeutic exercise; Bed mobility; Equipment eval/education;Patient/family training Progress Progressing toward goals   PT Frequency Other (Comment)  (5x/week)   Recommendation   PT Discharge Recommendation Post acute rehabilitation services   AM-PAC Basic Mobility Inpatient   Turning in Bed Without Bedrails 2   Lying on Back to Sitting on Edge of Flat Bed 2   Moving Bed to Chair 1   Standing Up From Chair 1   Walk in Room 1   Climb 3-5 Stairs 1   Basic Mobility Inpatient Raw Score 8   Turning Head Towards Sound 4   Follow Simple Instructions 4   Low Function Basic Mobility Raw Score 16   Low Function Basic Mobility Standardized Score 25 72   Highest Level Of Mobility   -HLM Goal 3: Sit at edge of bed   -HL Achieved 2: Bed activities/Dependent transfer  (pt too fatigued to sit up after exercises )   Education   Education Provided Home exercise program  (sitting upright in the bed with nursing assist, AROM B UE/LE ad more)   End of Consult   Patient Position at End of Consult All needs within reach; Supine   Licensure   [address] [facility] License Number  Ambrocioaurelio Dacia PT [id number]

## 2022-10-31 NOTE — CASE MANAGEMENT
Case Management Discharge Planning Note    Patient name Kimberly Poe  Location [address] 0 MRN [id number]  : 1945 Date 10/31/2022       Current Admission Date: 10/19/2022  Current Admission Diagnosis:Metastatic cancer [facility])   Patient Active Problem List    Diagnosis Date Noted   • Acute deep vein thrombosis (DVT) of right upper extremity ([facility] [address] ) 10/28/2022   • Swelling of right upper extremity 10/23/2022   • Anemia 10/22/2022   • Severe protein-calorie malnutrition ([facility] [address] ) 10/21/2022   • Chronic pain 10/20/2022   • Pulmonary nodules 10/20/2022   • Hypertension 10/19/2022   • Hypercholesteremia 10/19/2022   • Disease of thyroid gland 10/19/2022   • Sacral wound 10/19/2022   • Metastatic cancer ([facility] [address] ) 10/19/2022   • Generalized weakness 10/19/2022   • Dyslipidemia 2019   • Acquired hypothyroidism 2019   • Sjogren's syndrome ([facility] [address] ) 2019   • Orthostatic hypotension 2019   • Recurrent syncope 2019   • Onychomycosis 2019   • Peripheral sensory neuropathy 2019   • Radiculopathy of lumbar region 2019   • Metatarsalgia of both feet 2019      LOS (days): 12  Geometric Mean LOS (GMLOS) (days): 5 00  Days to GMLOS:-6 9     OBJECTIVE:  Risk of Unplanned Readmission Score: 12 54     Current admission status: Inpatient   Preferred Pharmacy:   [address] [place] [address]  Phone: [phone] Fax: [phone]    Primary Care Provider: Patrick Ashley MD    Primary Insurance: Deepthi Johnston [facility]  Secondary Insurance:     DISCHARGE DETAILS:    Discharge planning discussed with[de-identified] Patient and sister-in-law, Amish Arroyo  Freedom of Choice: Yes  Comments - Freedom of Choice: SW following to assist with DCP  STR at [facility] is planned  SW spoke with pt and sister-in-law to review plan  Authorization approval was received from Tyrel dunlap this afternoon    SW notified [facility] admissions  Per [facility] admissions they do not have a bed for pt now and anticipate one open tomorrow afternoon  Pt and sister-in-law aware  SW will arrange transport for tomorrow  CM contacted family/caregiver?: Yes  Were Treatment Team discharge recommendations reviewed with patient/caregiver?: Yes  Did patient/caregiver verbalize understanding of patient care needs?: N/A- going to facility  Were patient/caregiver advised of the risks associated with not following Treatment Team discharge recommendations?: Yes    Contacts  Patient Contacts: Dellis Schirmer  Relationship to Patient[de-identified] Family  Contact Method: Phone  Phone Number: [phone]  Reason/Outcome: Discharge Planning    Other Referral/Resources/Interventions Provided:  Interventions: Short Term Rehab  Referral Comments: STR placement at [facility] is planned  Call received from [facility] with authorization approval  Due to availability at [facility] transfer is planned for tomorrow  Treatment Team Recommendation: Short Term Rehab, SNF  Discharge Destination Plan[de-identified] SNF, Short Term Rehab  Transport at Discharge : [facility]  Dispatcher Contacted: Yes  Number/Name of Dispatcher: Roundtrip  Transported by [employer] and Unit #):  (pending)  ETA of Transport (Date): 11/01/22  ETA of Transport (Time): 1500 (requested)    IMM Given (Date):: 10/31/22  IMM Given to[de-identified] Patient (IMM reviewed with pt  Pt verbalized understanding and agrees with discharge determination  Pt signed IMM and copy given   Copy also placed in scan bin for chart )

## 2022-10-31 NOTE — PLAN OF CARE
Problem: Potential for Falls  Goal: Patient will remain free of falls  Description: INTERVENTIONS:  - Educate patient/family on patient safety including physical limitations  - Instruct patient to call for assistance with activity   - Consult OT/PT to assist with strengthening/mobility   - Keep Call bell within reach  - Keep bed low and locked with side rails adjusted as appropriate  - Keep care items and personal belongings within reach  - Initiate and maintain comfort rounds  - Make Fall Risk Sign visible to staff  - Offer Toileting every 2 Hours, in advance of need  - Initiate/Maintain bed alarm- Apply yellow socks and bracelet for high fall risk patients  - Consider moving patient to room near nurses station  Outcome: Progressing

## 2022-10-31 NOTE — OCCUPATIONAL THERAPY NOTE
OCCUPATIONAL THERAPY       10/31/22 1416   Note Type   Note Type Cancelled Session   Cancel Reasons Other  (pt refused, c/o dizziness)   Licensure   [place] License Number  Susana Acuna MS, OTR/L, #[id number]

## 2022-10-31 NOTE — PROGRESS NOTES
[facility] Progress Note  Patient: Saulo Ordoñez 69 y o  female   MRN: [id number]  PCP: Dhiraj Merchant MD  Unit/Bed#: [address] [place] [address] Encounter: [id number]  Date Of Visit: 10/31/22    Problem List:    Principal Problem:    Metastatic cancer ([facility] [address] )  Active Problems:    Acute deep vein thrombosis (DVT) of right upper extremity (HCC)    Generalized weakness    Swelling of right upper extremity    Dyslipidemia    Acquired hypothyroidism    Sjogren's syndrome (HCC)    Orthostatic hypotension    Sacral wound    Chronic pain    Pulmonary nodules    Severe protein-calorie malnutrition ([address] )    Anemia  Taking getting anything in fluid IV    Assessment & Plan:    Acute deep vein thrombosis (DVT) of right upper extremity (HCC)  Assessment & Plan  US:  Positive  Oncology recs Dopplers demonstrated multiple DVTs going up into the internal jugular vein  Patient is on subcu heparin 5000 units every 8 hours  Patient obviously needs to be monitored closely, history of GI bleeding  Yesterday's hemoglobin level of 9 1 g/dL good/acceptable  Likely multiple etiologies for the DVT - malignancy, immobility, in association with the right upper lobe mass  Continue 5 K heparin subQ  Monitor for bleeding risk, Eliquis consider at renal dosing of 2 5 due to provoked DVT with midline 3    * Metastatic cancer [facility])  Assessment & Plan    · 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  · Patient was seen by PCP on 9/27, referred to Oncology as outpatient  No history of cancer    History of fibroid , status post hysterectomy and benign breast cyst   · CT chest abdomen pelvis 10/21-noted to have lobulated right upper lobe paramediastinal mass 6 x 4 x 8 5 cm with extension into the superior mediastinum, supraclavicular correlation and extension to and invasion of right upper lobe bronchial branch   Multiple scattered subcentimeter metastatic lung nodules noted in upper lobe bilaterally  Moderate right and small left effusion  Seen by Oncology, input appreciated  · Discussed with Pulmonary, recommended IR guided biopsy  Patient Bradley Hall is agreeable  · S/p IR for biopsy-results pending  · Consider hospice consult after STR      SIRS (systemic inflammatory response syndrome) (HCC)-resolved as of 10/27/2022  Assessment & Plan  Improving, asymptomatic    SIRS versus Sepsis, POA, as evidenced by leukocytosis, tachycardia, hypothermia  Lactic acidosis  Patient is afebrile  · UA shows trace leukocytes, normal white count  SARS-CoV-2 PCR negative  · CXR- shows diffuse pulmonary nodules, no evidence infection   · Lactic acid trended down  Likely multifactorial secondary to dehydration v metastatic disease versus GI bleed   · CT chest abdomen pelvis without any evidence of infection  · Blood Cx NGTD5d  · Urine culture negative  · ID following, input appreciated -continue to observe off anymore antibiotics  -follow temperatures and hemodynamics  -follow CBC  · Initially received cefepime, subsequently transitioned to cefazolin pending cultures  Swelling of right upper extremity  Assessment & Plan  Improving,   Noted right upper extremity swelling, 10/23 without any tenderness or erythema  · Elevate extremity as tolerated  · Venous Doppler positive for multiple vessel DVT  · Restarted heparin, monitor for bleeding transition to [address] for 3 months  · PT as tolerated    Generalized weakness  Assessment & Plan  Maybe new baseline possibly secondary to deconditioning  Likely multifactorial secondary to dehydration, metastatic cancer  cane at baseline  · Treat underlying causes    · Nutrition support  · PT/ OT Post acute rehabilitation service  · Fall precautions    Coffee ground emesis-resolved as of 10/28/2022  Assessment & Plan  Patient noted to have several episodes of coffee-ground emesis since admission,  · CT chest abdomen pelvis w/o occult bleeding but, hiatal hernia and fecal impaction  · Hemoglobin 6 8 grams/deciliter, status post 1 PRBC on 10/22  · Initially treated with IV Protonix drip>continue Protonix b i d   · GI recs-Await biopsy results-Continue Protonix 40 mg daily, liquid Carafate 1 g q i d  · Recommend soft, non ulcer genic diet  · Monitor H&H, transfuse as needed      Anemia  Assessment & Plan  Hemoglobin 13 0>9 1  Noted to have coffee-ground emesis during hospitalization  Previous B12 low normal  Iron studies-normal ferritin, low TIBC  · Monitor H&H, will transfuse if continues to decline or hemodynamic instability    Severe protein-calorie malnutrition (HCC)  Assessment & Plan  Malnutrition Findings:   Adult Malnutrition type: Chronic illness  Adult Degree of Malnutrition: Other severe protein calorie malnutrition  Malnutrition Characteristics: Fat loss, Muscle loss, Weight loss    360 Statement: Severe protein calorie malnutrion of chronic illness related to increased energy needs as evidenced by 12% weight loss in 1 month, hollow orbits, protruding clavicles, depression at the temples  Awaiting treatment plan    BMI Findings:  Adult BMI Classifications: Underweight < 18 5     Body mass index is 16 51 kg/m²  Pulmonary nodules  Assessment & Plan  · CXR:  2 x 5 5 cm right upper lobe paramediastinal nodule, and numerous additional smaller bilateral pulmonary nodules highly suspicious for malignancy  Both primary lung cancer and metastatic disease can have this appearance  · Refer to CT scan finding under metastatic cancer  · Pending biopsy results from IR      Chronic pain  Assessment & Plan  Control, asymptomatic  Chronic arthralgia in lower back, left hip, knees, legs  Likely due to OA and Sjogren's syndrome  Patient follows Rheumatology in [facility] as outpatient  On methotrexate, Cymbalta, Voltaren, tramadol p r n  At home    Denies pain at present  · Continue Cymbalta,   · Hold methotrexate for now in view of sirs versus sepsis  · Hold Voltaren, tramadol   · Tylenol as needed    Sacral wound  Assessment & Plan  Developed in 2 weeks since sister-in-law is away  Diffuse sacral DTI on exam with surrounding redness, no active drainage, scattered black eschar on exam Hard to exclude cellulitis  · Seen by surgery, input appreciated, continue wound care as per wound care  · ID following no antibiotics at this time  · Wound care is needed, offloading    Orthostatic hypotension  Assessment & Plan  Asymptomatic, Noted history  Sjogren's syndrome ([facility] [address] )  Assessment & Plan  Concurrently asymptomatic, Patient is on methotrexate 12 5 mg p o  Weekly  Follows rheumatologist in [facility]  · Will hold methotrexate for now  · Reconsider while outpatient on discharge  · Natural tears prn for dry eye  Acquired hypothyroidism  Assessment & Plan  Chronic Continue Synthroid  Reports not taking medications at home  · Elevated TSH, normal free T4   · Continue Synthroid at elevated level      Dyslipidemia  Assessment & Plan  Chronic, Continue statin    Fecal impaction (HCC)-resolved as of 10/28/2022  Assessment & Plan  Status post partial improvement after enema, 10/21  · Continue Colace and MiraLax   · Enema p r n      Electrolyte abnormality-resolved as of 10/28/2022  Assessment & Plan  Hypokalemia, hypophosphatemia, hypomagnesemia  · Replete and monitor    Hypernatremia-resolved as of 10/23/2022  Assessment & Plan  Presented with sodium level of 160 secondary to decrease free water intake  Seen by Nephrology, input appreciated  Improved appropriately with hypotonic fluids  · Tolerating clear liquid diet  · Monitor BMP  · Monitor intake output   · Monitor p o  intake  · Hold D5 water, will resume D5 water maintenance if remains NPO    Elevated glucose-resolved as of 10/28/2022  Assessment & Plan  Glucose improving,  · Check A1c- 5 5%, SSI    SVT (supraventricular tachycardia) (HCC)-resolved as of 10/23/2022  Assessment & Plan  Developed SVT in ED, heart rate 190  Converted to sinus rhythm after receiving Cardizem 10 mg IV  · Telemetry-no further episode, discontinued  · Optimize electrolytes  · Started on low-dose metoprolol     Elevated troponin-resolved as of 10/20/2022  Assessment & Plan  Troponin 109-83-78> wnl  · Initial EKG showed sinus tach, rate 115, inferior lateral ST depressions, prolonged QT   · Patient denies chest pain  · Likely type 2 MI secondary to # 1 and SIRS versus Sepsis  · Troponin downtrending  · Telemetry    Acute kidney injury (HCC)-resolved as of 10/28/2022  Assessment & Plan  Baseline creatinine 0 7-0 8 in past year  Creatinine 1 68 on admission, now 0 51  UA without significant abnormality  Prior CT scan without any hydronephrosis  Likely prerenal in setting of dehydration and hypotension  Nephrology following, input appreciated  Improving with IV fluids to baseline  · Will discontinue IV fluid post EGD  · Monitor intake output   · Avoid nephrotoxin and hypotension  · Follow-up BMP    Dehydration-resolved as of 10/23/2022  Assessment & Plan  Patient presents with generalized weakness  Neighbor called police as they saw her trash can outside for a few days  Police found her on the floor  Patient reports rolling off the couch and was on the floor for about 2 hours  Patient denies hitting head  Reports lower abdominal pain since 5/2022  Denies any acute pain  Patient lives [place] - in-law normally takes her shopping and she was away for about 16 days  Patient reported nothing to eat at home and was hungry  · Improving with IV fluids          VTE Pharmacologic Prophylaxis: VTE Score: 2 Moderate Risk (Score 3-4) - Pharmacological DVT Prophylaxis Ordered: heparin  Patient Centered Rounds: I performed bedside rounds with nursing staff today    Discussions with Specialists or Other Care Team Provider:  yes    Education and Discussions with Family / Patient: Updated  (Sister-in-law) at bedside  and cousin, CM    Time Spent for Care: 45 minutes  More than 50% of total time spent on counseling and coordination of care as described above  Current Length of Stay: 12 day(s)  Current Patient Status: Inpatient   Certification Statement: The patient will continue to require additional inpatient hospital stay due to Upper extremity DVT, heparin and potential hospice consult  Discharge Plan: Anticipate discharge in 48 hrs to discharge location to be determined pending rehab evaluations  Code Status: Level 1 - Full Code    Subjective:   Patient seen at bedside, and had no complaints today, no chest pain, no shortness of GI bleeding patient informed that her authorization was approved  Objective:     Vitals:   Temp (24hrs), Av 2 °F (36 2 °C), Min:96 7 °F (35 9 °C), Max:97 7 °F (36 5 °C)    Temp:  [96 7 °F (35 9 °C)-97 7 °F (36 5 °C)] 97 °F (36 1 °C)  HR:  [] 90  Resp:  [17-20] 20  BP: ()/(49-83) 92/49  SpO2:  [94 %-100 %] 100 % on room air  Body mass index is 16 51 kg/m²  Input and Output Summary (last 24 hours): Intake/Output Summary (Last 24 hours) at 10/31/2022 1636  Last data filed at 10/31/2022 0730  Gross per 24 hour   Intake --   Output 400 ml   Net -400 ml       Physical Exam:   Physical Exam  Vitals reviewed  Exam conducted with a chaperone present  Constitutional:       General: She is not in acute distress  Appearance: Normal appearance  HENT:      Head: Normocephalic and atraumatic  Nose: No congestion  Eyes:      General: No scleral icterus  Conjunctiva/sclera: Conjunctivae normal       Pupils: Pupils are equal, round, and reactive to light  Cardiovascular:      Rate and Rhythm: Normal rate  Heart sounds: No murmur heard  Pulmonary:      Effort: No respiratory distress  Breath sounds: No wheezing, rhonchi or rales     Abdominal:      General: Bowel sounds are normal       Palpations: Abdomen is soft  Tenderness: There is no abdominal tenderness  There is no guarding  Genitourinary:     General: Normal vulva  Exam position: Knee-chest position  Pubic Area: No rash  Labia:         Right: No rash, tenderness, lesion or injury  Left: No rash, tenderness, lesion or injury  Urethra: No urethral pain or urethral lesion  Vagina: Normal       Uterus: Normal        Adnexa: Right adnexa normal and left adnexa normal       Rectum: Normal  No tenderness or anal fissure  Musculoskeletal:         General: No swelling or deformity  Normal range of motion  Cervical back: No tenderness  Right lower leg: No edema  Left lower leg: No edema  Right foot: Normal range of motion  Left foot: Normal range of motion  Comments: upper extremity right- edema   Skin:     General: Skin is warm  Capillary Refill: Capillary refill takes less than 2 seconds  Findings: No lesion or rash  Neurological:      Mental Status: She is oriented to person, place, and time  Cranial Nerves: No cranial nerve deficit  Motor: Weakness present  Coordination: Coordination normal    Psychiatric:         Mood and Affect: Mood normal          Thought Content:  Thought content normal          Additional Data:     Labs:  Results from last 7 days   Lab Units 10/31/22  1442   WBC Thousand/uL 8 92   HEMOGLOBIN g/dL 9 3*   HEMATOCRIT % 30 1*   PLATELETS Thousands/uL 263   NEUTROS PCT % 76*   LYMPHS PCT % 15   MONOS PCT % 7   EOS PCT % 1     Results from last 7 days   Lab Units 10/26/22  0043   SODIUM mmol/L 132*   POTASSIUM mmol/L 3 8   CHLORIDE mmol/L 102   CO2 mmol/L 22   BUN mg/dL 11   CREATININE mg/dL 0 51*   ANION GAP mmol/L 8   CALCIUM mg/dL 7 4*   GLUCOSE RANDOM mg/dL 90         Results from last 7 days   Lab Units 10/31/22  1615 10/31/22  1144 10/31/22  0710 10/30/22  2053 10/30/22  1541 10/30/22  1037 10/30/22  2503 10/29/22  2104 10/29/22  1559 10/29/22  1143 10/29/22  0723 10/28/22  2032   POC GLUCOSE mg/dl 120 86 102 137 100 92 110 112 112 154* 90 156*               Lines/Drains:  Invasive Devices  Report    Peripheral Intravenous Line  Duration           Peripheral IV 10/26/22 Left;Ventral (anterior) Forearm 5 days          Drain  Duration           External Urinary Catheter 5 days                         Imaging: Reviewed radiology reports from this admission including: chest xray, abdominal/pelvic CT and CT head venous Doppler, IR bone biopsy pending    Recent Cultures (last 7 days):         Last 24 Hours Medication List:   Current Facility-Administered Medications   Medication Dose Route Frequency Provider Last Rate   • acetaminophen  650 mg Oral Q6H PRN Shilo Palomo MD     • apixaban  2 5 mg Oral BID Shira Cox MD     • atorvastatin  40 mg Oral Daily With Carlos Goyal MD     • calcium carbonate  500 mg Oral Daily PRN Shilo Palomo MD     • cholecalciferol  1,000 Units Oral Daily Shilo Palomo MD     • docusate sodium  100 mg Oral BID Shilo Palomo MD     • DULoxetine  30 mg Oral Daily Shilo Palomo MD     • folic acid  1 mg Oral Daily Shilo Palomo MD     • Gadobutrol  10 mL Intravenous Once in imaging Shilo Palomo MD     • HYDROmorphone  0 2 mg Intravenous Q6H PRN Shilo Palomo MD     • insulin lispro  1-5 Units Subcutaneous TID AC Manish Giang MD     • insulin lispro  1-5 Units Subcutaneous HS Shilo Palomo MD     • levothyroxine  75 mcg Oral Early Morning Manish Giang MD     • lidocaine (PF)    Code/Trauma/Sedation Pablo Diaz MD     • metoprolol tartrate  12 5 mg Oral Q12H [address] Manish Giang MD     • ondansetron  4 mg Intravenous Q6H PRN Shira Cox MD     • oxybutynin  5 mg Oral Daily Shilo Palomo MD     • pantoprazole  40 mg Intravenous Q12H [address] Stanley Rene MD     • polyethylene glycol  17 g Oral Daily Stanley Rene MD     • saccharomyces boulardii  250 mg Oral BID Stanley Rene MD     • sodium chloride  75 mL/hr Intravenous Continuous Kalen Dodge MD 75 mL/hr (10/31/22 0537)   • sucralfate  1 g Oral 4x Daily (AC & HS) Stanley Rene MD          Today, Patient Was Seen By: Kalen Dodge    ** Please Note: "This note has been constructed using a voice recognition system  Therefore there may be syntax, spelling, and/or grammatical errors   Please call if you have any questions  "**

## 2022-10-31 NOTE — UTILIZATION REVIEW
Continued Stay Review    Date: 10/31/22                           Current Patient Class: inpatient  Current Level of Care: med surg     HPI:77 y o  female initially admitted on 10/19/22 inpatient due to Dehydration/REECE/SIRS/Sacral Wound/Weakness/SVT  Has chronic pain, metastatic cancer  Found to have DVT RUE  Assessment/Plan:   10/31/22:  Fatigued  Remains very weak  On exam:  Decreased strength, mobility, impaired balance  Pressure wound bilateral buttocks - area excoriated with erythema  Some serosanguinous drainage  LE strength 4/5  Lungs diminished breath sounds  Sinus tachycardia  Bilateral non pitting trace edema  Redness to groins, perineum, buttocks and hell  Epidermis thin with loss of SC tissue  Started on  Eliquis  Continue IVF  Vital Signs:   10/31/22 11:11:12 96 7 °F (35 9 °C) Abnormal  76 -- 109/62 78 100 % -- --   10/31/22 07:30:30 97 7 °F (36 5 °C) 105 17 124/83 97 97 % None (Room air) Lying   10/30/22 22:49:49 97 6 °F (36 4 °C) 89 -- 105/61 76 96 % -- --   10/30/22 21:45:48 -- 93 -- 91/49 Abnormal  63 Abnormal  97 %         Pertinent Labs/Diagnostic Results:   IR biopsy bone   Final Result by Erendira Hamilton MD (10/26 1837)   Impression:   CT-guided bone lesion biopsy at the right ileum  Workstation performed: [id number]         VAS upper limb venous duplex scan, unilateral/limited   Final Result by Kita Najjar, MD (10/25 2049)      MRI brain w wo contrast   Final Result by Daphne Corcoran MD (10/24 8154)      At least 10 intracranial supratentorial and infratentorial from metastatic lesions, as described above  No midline shift or acute intracranial hemorrhage  A few of the cerebellar lesions may in part be located along the cerebellar folia and should be correlated with dedicated imaging of the spine to exclude the possibility of leptomeningeal spread of disease    Osseous metastatic lesions were also    identified within the thoracic and lumbar spine on recent CT   Slight interval progression in the degree of ventriculomegaly is again thought to be secondary to progression of cerebral volume loss since 2017  Attention on follow-up examinations is recommended to ensure stability  The study was marked in [facility] for immediate notification  Workstation performed: [id number]         CT chest abdomen pelvis wo contrast   Final Result by Ta Ernst MD (10/21 6026)      1  Lobulated right upper lobe paramediastinal mass measuring 6 0 x 4 0 x 8 5 cm with extension into the superior mediastinum, supraclavicular correlation and extension to and invasion of right upper lobe bronchial branch  2   Multiple scattered subcentimeter metastatic lung nodules predominantly in upper lobes bilaterally  3  Moderate right and small left effusion  4   Stool impaction in the colon  Mid transverse colon wall thickening possibly due to stool impaction  Findings most likely represent primary right lung malignancy with lung and bone metastatic process  Workstation performed: [id number]         CT head without contrast   Final Result by Roscoe Patterson MD (10/19 9706)      1  No acute intracranial abnormality   2  Microangiopathic changes and progression of cerebral volume loss                        Workstation performed: [id number]         XR chest 1 view portable   Final Result by Roge Barrera MD (10/20 3254)      There is a 4 2 x 5 5 cm right upper lobe paramediastinal nodule, and numerous additional smaller bilateral pulmonary nodules highly suspicious for malignancy  Both primary lung cancer and metastatic disease can have this appearance  Chest CT would be helpful for further evaluation                    Workstation performed: [id number]           10/25/22 venous duplex RIGHT UPPER LIMB:  Acute, occlusive deep vein thrombosis noted in the internal jugular vein,  subclavian vein, axillary vein and brachial vein   Acute occlusive superficial thrombophlebitis noted in the cephalic vein and  basilic vein  Note: Thrombus noted around the mid-line  Doppler evaluation shows a normal response to augmentation maneuvers    LEFT UPPER LIMB LIMITED:  Evaluation shows no evidence of thrombus in the internal jugular vein,  subclavian vein, and the brachiocephalic vein  Results from last 7 days   Lab Units 10/27/22  1215 10/25/22  0607   WBC Thousand/uL 10 78* 10 83*   HEMOGLOBIN g/dL 9 1* 7 7*   HEMATOCRIT % 29 5* 24 5*   PLATELETS Thousands/uL 233 140*   NEUTROS ABS Thousands/µL 8 34*  --      Results from last 7 days   Lab Units 10/26/22  0043 10/25/22  1946/22  0607   SODIUM mmol/L 132* 133* 125*   POTASSIUM mmol/L 3 8 3 6 3 7   CHLORIDE mmol/L 102 102 96   CO2 mmol/L 22 23 22   ANION GAP mmol/L 8 8 7   BUN mg/dL 11 10 11   CREATININE mg/dL 0 51* 0 57* 0 55*   EGFR ml/min/1 73sq m 93 90 91   CALCIUM mg/dL 7 4* 7 4* 7 0*   MAGNESIUM mg/dL  --   --  1 4*   PHOSPHORUS mg/dL  --   --  2 2*         Results from last 7 days   Lab Units 10/31/22  1144 10/31/22  0710 10/30/22  2053 10/30/22  1541 10/30/22  1037 10/30/22  0655 10/29/22  2104 10/29/22  1559 10/29/22  1143 10/29/22  0723 10/28/22  2032 10/28/22  1558   POC GLUCOSE mg/dl 86 102 137 100 92 110 112 112 154* 90 156* 121     Results from last 7 days   Lab Units 10/26/22  0043 10/25/22  1946/22  0607   GLUCOSE RANDOM mg/dL 90 100 336*         Medications:   Scheduled Medications:  apixaban, 2 5 mg, Oral, BID  atorvastatin, 40 mg, Oral, Daily With Dinner  cholecalciferol, 1,000 Units, Oral, Daily  docusate sodium, 100 mg, Oral, BID  DULoxetine, 30 mg, Oral, Daily  folic acid, 1 mg, Oral, Daily  insulin lispro, 1-5 Units, Subcutaneous, TID AC  insulin lispro, 1-5 Units, Subcutaneous, HS  levothyroxine, 75 mcg, Oral, Early Morning  metoprolol tartrate, 12 5 mg, Oral, Q12H MASTER  oxybutynin, 5 mg, Oral, Daily  pantoprazole, 40 mg, Intravenous, Q12H [address]  polyethylene glycol, 17 g, Oral, Daily  saccharomyces boulardii, 250 mg, Oral, BID  sucralfate, 1 g, Oral, 4x Daily (AC & HS)      Continuous IV Infusions:  sodium chloride, 75 mL/hr, Intravenous, Continuous      PRN Meds:  acetaminophen, 650 mg, Oral, Q6H PRN  calcium carbonate, 500 mg, Oral, Daily PRN  Gadobutrol, 10 mL, Intravenous, Once in imaging  HYDROmorphone, 0 2 mg, Intravenous, Q6H PRN  lidocaine (PF), , , Code/Trauma/Sedation Med  ondansetron, 4 mg, Intravenous, Q6H PRN    Skin care plans:   -Cleanse sacrobuttock wound with foaming cleanser & pat dry  Apply Triad paste in a thin layer & small amount to areas with slough  Cover with Allevyn sacral bordered foam dresssing & change every other day and prn soilage/dislodgement  2-Calazime to perineal area TID and PRN soilage/dislodgement  3-Hydraguard to bilateral heels BID and PRN   4-Heel protectors to bilateral heels at all times in bed  If patient refuses, must float heels on 2 pillows so heels not in contact with mattress or pillows to offload pressure   5-Ehob cushion when out of bed to chair  Must limit sitting to 1-2 hrs maximum for meals then back to bed turning side to side to offload sacrobuttock pressure  6-Turn/reposition q2h or when medically stable for pressure re-distribution on skin  7-Moisturize skin daily with skin nourishing cream     Discharge Plan: to be determined     Network Utilization Review Department  ATTENTION: Please call with any questions or concerns to [phone] and carefully listen to the prompts so that you are directed to the right person  All voicemails are confidential   Daria Eddy all requests for admission clinical reviews, approved or denied determinations and any other requests to dedicated fax number below belonging to the campus where the patient is receiving treatment   List of dedicated fax numbers for the Facilities:  FACILITY NAME UR FAX NUMBER   ADMISSION DENIALS (Administrative/Medical Necessity) [phone]   [facility] [address] (Maternity/NICU/Pediatrics) Cintia Catalan [address] [facility]  [phone]   [address] [place] [address] 28 [facility] 310 [facility] 134 795 [facility] [phone]

## 2022-10-31 NOTE — PLAN OF CARE
Problem: Potential for Falls  Goal: Patient will remain free of falls  Description: INTERVENTIONS:  - Educate patient/family on patient safety including physical limitations  - Instruct patient to call for assistance with activity   - Consult OT/PT to assist with strengthening/mobility   - Keep Call bell within reach  - Keep bed low and locked with side rails adjusted as appropriate  - Keep care items and personal belongings within reach  - Initiate and maintain comfort rounds  - Make Fall Risk Sign visible to staff  - Offer Toileting every 2 Hours, in advance of need  - Initiate/Maintain bed alarm- Apply yellow socks and bracelet for high fall risk patients  - Consider moving patient to room near nurses station  Outcome: Progressing     Problem: Nutrition/Hydration-ADULT  Goal: Nutrient/Hydration intake appropriate for improving, restoring or maintaining nutritional needs  Description: Monitor and assess patient's nutrition/hydration status for malnutrition  Collaborate with interdisciplinary team and initiate plan and interventions as ordered  Monitor patient's weight and dietary intake as ordered or per policy  Utilize nutrition screening tool and intervene as necessary  Determine patient's food preferences and provide high-protein, high-caloric foods as appropriate       INTERVENTIONS:  - Monitor oral intake, urinary output, labs, and treatment plans  - Assess nutrition and hydration status and recommend course of action  - Evaluate amount of meals eaten  - Assist patient with eating if necessary   - Allow adequate time for meals  - Recommend/ encourage appropriate diets, oral nutritional supplements, and vitamin/mineral supplements  - Order, calculate, and assess calorie counts as needed  - Recommend, monitor, and adjust tube feedings and TPN/PPN based on assessed needs  - Assess need for intravenous fluids  - Provide specific nutrition/hydration education as appropriate  - Include patient/family/caregiver in decisions related to nutrition  Outcome: Progressing     Problem: MOBILITY - ADULT  Goal: Maintain or return to baseline ADL function  Description: INTERVENTIONS:  -  Assess patient's ability to carry out ADLs; assess patient's baseline for ADL function and identify physical deficits which impact ability to perform ADLs (bathing, care of mouth/teeth, toileting, grooming, dressing, etc )  - Assess/evaluate cause of self-care deficits   - Assess range of motion  - Assess patient's mobility; develop plan if impaired  - Assess patient's need for assistive devices and provide as appropriate  - Encourage maximum independence but intervene and supervise when necessary  - Involve family in performance of ADLs  - Assess for home care needs following discharge   - Consider OT consult to assist with ADL evaluation and planning for discharge  - Provide patient education as appropriate  Outcome: Progressing  Goal: Maintains/Returns to pre admission functional level  Description: INTERVENTIONS:  - Perform BMAT or MOVE assessment daily    - Set and communicate daily mobility goal to care team and patient/family/caregiver  - Collaborate with rehabilitation services on mobility goals if consulted  - Perform Range of Motion   times a day  - Reposition patient every   hours    - Dangle patient   times a day  - Stand patient   times a day  - Ambulate patient   times a day  - Out of bed to chair   times a day   - Out of bed for meals    times a day  - Out of bed for toileting  - Record patient progress and toleration of activity level   Outcome: Progressing     Problem: METABOLIC, FLUID AND ELECTROLYTES - ADULT  Goal: Electrolytes maintained within normal limits  Description: INTERVENTIONS:  - Monitor labs and assess patient for signs and symptoms of electrolyte imbalances  - Administer electrolyte replacement as ordered  - Monitor response to electrolyte replacements, including repeat lab results as appropriate  - Instruct patient on fluid and nutrition as appropriate  Outcome: Progressing     Problem: Prexisting or High Potential for Compromised Skin Integrity  Goal: Skin integrity is maintained or improved  Description: INTERVENTIONS:  - Identify patients at risk for skin breakdown  - Assess and monitor skin integrity  - Assess and monitor nutrition and hydration status  - Monitor labs   - Assess for incontinence   - Turn and reposition patient  - Assist with mobility/ambulation  - Relieve pressure over bony prominences  - Avoid friction and shearing  - Provide appropriate hygiene as needed including keeping skin clean and dry  - Evaluate need for skin moisturizer/barrier cream  - Collaborate with interdisciplinary team   - Patient/family teaching  - Consider wound care consult   Outcome: Progressing

## 2022-11-01 LAB
ALBUMIN SERPL BCP-MCNC: 1.4 G/DL (ref 3.5–5)
ALP SERPL-CCNC: 148 U/L (ref 46–116)
ALT SERPL W P-5'-P-CCNC: 10 U/L (ref 12–78)
ANION GAP SERPL CALCULATED.3IONS-SCNC: 8 MMOL/L (ref 4–13)
ANION GAP SERPL CALCULATED.3IONS-SCNC: 8 MMOL/L (ref 4–13)
AST SERPL W P-5'-P-CCNC: 22 U/L (ref 5–45)
BILIRUB SERPL-MCNC: 0.31 MG/DL (ref 0.2–1)
BUN SERPL-MCNC: 7 MG/DL (ref 5–25)
BUN SERPL-MCNC: 9 MG/DL (ref 5–25)
CALCIUM ALBUM COR SERPL-MCNC: 9.7 MG/DL (ref 8.3–10.1)
CALCIUM SERPL-MCNC: 7.6 MG/DL (ref 8.3–10.1)
CALCIUM SERPL-MCNC: 7.8 MG/DL (ref 8.3–10.1)
CHLORIDE SERPL-SCNC: 100 MMOL/L (ref 96–108)
CHLORIDE SERPL-SCNC: 101 MMOL/L (ref 96–108)
CO2 SERPL-SCNC: 23 MMOL/L (ref 21–32)
CO2 SERPL-SCNC: 26 MMOL/L (ref 21–32)
CREAT SERPL-MCNC: 0.43 MG/DL (ref 0.6–1.3)
CREAT SERPL-MCNC: 0.58 MG/DL (ref 0.6–1.3)
ERYTHROCYTE [DISTWIDTH] IN BLOOD BY AUTOMATED COUNT: 21.4 % (ref 11.6–15.1)
ERYTHROCYTE [DISTWIDTH] IN BLOOD BY AUTOMATED COUNT: 21.8 % (ref 11.6–15.1)
GFR SERPL CREATININE-BSD FRML MDRD: 89 ML/MIN/1.73SQ M
GFR SERPL CREATININE-BSD FRML MDRD: 99 ML/MIN/1.73SQ M
GLUCOSE SERPL-MCNC: 108 MG/DL (ref 65–140)
GLUCOSE SERPL-MCNC: 113 MG/DL (ref 65–140)
GLUCOSE SERPL-MCNC: 113 MG/DL (ref 65–140)
GLUCOSE SERPL-MCNC: 125 MG/DL (ref 65–140)
GLUCOSE SERPL-MCNC: 140 MG/DL (ref 65–140)
GLUCOSE SERPL-MCNC: 78 MG/DL (ref 65–140)
HCT VFR BLD AUTO: 26.6 % (ref 34.8–46.1)
HCT VFR BLD AUTO: 30.9 % (ref 34.8–46.1)
HGB BLD-MCNC: 7.9 G/DL (ref 11.5–15.4)
HGB BLD-MCNC: 9.3 G/DL (ref 11.5–15.4)
MAGNESIUM SERPL-MCNC: 1 MG/DL (ref 1.6–2.6)
MCH RBC QN AUTO: 24.1 PG (ref 26.8–34.3)
MCH RBC QN AUTO: 24.2 PG (ref 26.8–34.3)
MCHC RBC AUTO-ENTMCNC: 29.7 G/DL (ref 31.4–37.4)
MCHC RBC AUTO-ENTMCNC: 30.1 G/DL (ref 31.4–37.4)
MCV RBC AUTO: 80 FL (ref 82–98)
MCV RBC AUTO: 81 FL (ref 82–98)
PHOSPHATE SERPL-MCNC: 3.7 MG/DL (ref 2.3–4.1)
PLATELET # BLD AUTO: 228 THOUSANDS/UL (ref 149–390)
PLATELET # BLD AUTO: 317 THOUSANDS/UL (ref 149–390)
PMV BLD AUTO: 9.4 FL (ref 8.9–12.7)
PMV BLD AUTO: 9.6 FL (ref 8.9–12.7)
POTASSIUM SERPL-SCNC: 2.9 MMOL/L (ref 3.5–5.3)
POTASSIUM SERPL-SCNC: 3.6 MMOL/L (ref 3.5–5.3)
PROT SERPL-MCNC: 4.8 G/DL (ref 6.4–8.4)
RBC # BLD AUTO: 3.28 MILLION/UL (ref 3.81–5.12)
RBC # BLD AUTO: 3.85 MILLION/UL (ref 3.81–5.12)
SODIUM SERPL-SCNC: 132 MMOL/L (ref 135–147)
SODIUM SERPL-SCNC: 134 MMOL/L (ref 135–147)
WBC # BLD AUTO: 12.41 THOUSAND/UL (ref 4.31–10.16)
WBC # BLD AUTO: 6.69 THOUSAND/UL (ref 4.31–10.16)

## 2022-11-01 RX ORDER — PANTOPRAZOLE SODIUM 40 MG/1
40 TABLET, DELAYED RELEASE ORAL
Status: DISCONTINUED | OUTPATIENT
Start: 2022-11-02 | End: 2022-11-02 | Stop reason: HOSPADM

## 2022-11-01 RX ORDER — MAGNESIUM SULFATE HEPTAHYDRATE 40 MG/ML
2 INJECTION, SOLUTION INTRAVENOUS ONCE
Status: COMPLETED | OUTPATIENT
Start: 2022-11-01 | End: 2022-11-01

## 2022-11-01 RX ORDER — POTASSIUM CHLORIDE 14.9 MG/ML
20 INJECTION INTRAVENOUS ONCE
Status: COMPLETED | OUTPATIENT
Start: 2022-11-01 | End: 2022-11-01

## 2022-11-01 RX ORDER — POTASSIUM CHLORIDE 20 MEQ/1
20 TABLET, EXTENDED RELEASE ORAL
Status: COMPLETED | OUTPATIENT
Start: 2022-11-01 | End: 2022-11-01

## 2022-11-01 RX ORDER — ATORVASTATIN CALCIUM 10 MG/1
10 TABLET, FILM COATED ORAL
Status: DISCONTINUED | OUTPATIENT
Start: 2022-11-02 | End: 2022-11-02 | Stop reason: HOSPADM

## 2022-11-01 RX ORDER — POTASSIUM CHLORIDE 29.8 MG/ML
40 INJECTION INTRAVENOUS ONCE
Status: DISCONTINUED | OUTPATIENT
Start: 2022-11-01 | End: 2022-11-01

## 2022-11-01 RX ADMIN — Medication 250 MG: at 17:17

## 2022-11-01 RX ADMIN — FOLIC ACID 1 MG: 1 TABLET ORAL at 09:28

## 2022-11-01 RX ADMIN — POTASSIUM CHLORIDE 20 MEQ: 14.9 INJECTION, SOLUTION INTRAVENOUS at 10:45

## 2022-11-01 RX ADMIN — SUCRALFATE 1 G: 1 TABLET ORAL at 06:48

## 2022-11-01 RX ADMIN — Medication 12.5 MG: at 09:28

## 2022-11-01 RX ADMIN — APIXABAN 2.5 MG: 2.5 TABLET, FILM COATED ORAL at 09:28

## 2022-11-01 RX ADMIN — DULOXETINE HYDROCHLORIDE 30 MG: 30 CAPSULE, DELAYED RELEASE ORAL at 09:28

## 2022-11-01 RX ADMIN — SUCRALFATE 1 G: 1 TABLET ORAL at 16:30

## 2022-11-01 RX ADMIN — SUCRALFATE 1 G: 1 TABLET ORAL at 11:39

## 2022-11-01 RX ADMIN — DOCUSATE SODIUM 100 MG: 100 CAPSULE, LIQUID FILLED ORAL at 09:28

## 2022-11-01 RX ADMIN — POTASSIUM CHLORIDE 20 MEQ: 14.9 INJECTION, SOLUTION INTRAVENOUS at 12:49

## 2022-11-01 RX ADMIN — PANTOPRAZOLE SODIUM 40 MG: 40 INJECTION, POWDER, FOR SOLUTION INTRAVENOUS at 09:28

## 2022-11-01 RX ADMIN — OXYBUTYNIN 5 MG: 5 TABLET, FILM COATED, EXTENDED RELEASE ORAL at 09:28

## 2022-11-01 RX ADMIN — POTASSIUM CHLORIDE 20 MEQ: 1500 TABLET, EXTENDED RELEASE ORAL at 09:28

## 2022-11-01 RX ADMIN — MAGNESIUM OXIDE TAB 400 MG (241.3 MG ELEMENTAL MG) 400 MG: 400 (241.3 MG) TAB at 17:17

## 2022-11-01 RX ADMIN — POLYETHYLENE GLYCOL 3350 17 G: 17 POWDER, FOR SOLUTION ORAL at 09:28

## 2022-11-01 RX ADMIN — ATORVASTATIN CALCIUM 40 MG: 40 TABLET, FILM COATED ORAL at 16:30

## 2022-11-01 RX ADMIN — Medication 1000 UNITS: at 09:28

## 2022-11-01 RX ADMIN — POTASSIUM CHLORIDE 20 MEQ: 1500 TABLET, EXTENDED RELEASE ORAL at 11:40

## 2022-11-01 RX ADMIN — Medication 250 MG: at 09:28

## 2022-11-01 RX ADMIN — MAGNESIUM OXIDE TAB 400 MG (241.3 MG ELEMENTAL MG) 400 MG: 400 (241.3 MG) TAB at 09:28

## 2022-11-01 RX ADMIN — SUCRALFATE 1 G: 1 TABLET ORAL at 23:39

## 2022-11-01 RX ADMIN — LEVOTHYROXINE SODIUM 75 MCG: 75 TABLET ORAL at 05:26

## 2022-11-01 RX ADMIN — APIXABAN 2.5 MG: 2.5 TABLET, FILM COATED ORAL at 17:17

## 2022-11-01 RX ADMIN — MAGNESIUM SULFATE HEPTAHYDRATE 2 G: 40 INJECTION, SOLUTION INTRAVENOUS at 11:08

## 2022-11-01 NOTE — CASE MANAGEMENT
Case Management Progress Note    Patient name Mary Ford  Location 2 [place]  [facility] MRN [id number]  : 1945 Date 2022       LOS (days): 13  Geometric Mean LOS (GMLOS) (days): 5 00  Days to GMLOS:-7 8        OBJECTIVE:    Current admission status: Inpatient  Preferred Pharmacy:   [facility], [place] [address]  Phone: [phone] Fax: [phone]    Primary Care Provider: Edwin Dewitt MD    Primary Insurance: [employer] [facility]  Secondary Insurance:     PROGRESS NOTE:    Case discussed with Dr Denisha Blancas  Discharge to [facility] was planned however per physician pt is not stable for discharge today  SW will notify [facility] and cancel transport through [address]  SW will continue to follow to monitor progress and assist with transfer when ready

## 2022-11-01 NOTE — PHYSICAL THERAPY NOTE
PT cancellation     11/01/22 1418   PT Last Visit   PT Visit Date 11/01/22   Note Type   Note Type Cancelled Session   Cancel Reasons Other  (Attempted to see pt x 2   RN going to get blood first attempt; MD talking to pt and family   will follow)   Licensure   [place] License Number  Micah Dumont PT  [id number]

## 2022-11-01 NOTE — PROGRESS NOTES
[facility] Progress Note  Patient: Tori Side 69 y o  female   MRN: [id number]  PCP: Anand Diaz MD  Unit/Bed#: [address] [place] [address] Encounter: [id number]  Date Of Visit: 11/01/22    Problem List:    Principal Problem:    Metastatic cancer ([facility] )  Active Problems:    Acute deep vein thrombosis (DVT) of right upper extremity (HCC)    Orthostatic hypotension    Pulmonary nodules    Severe protein-calorie malnutrition (HCC)    Anemia    Electrolyte abnormality    Dyslipidemia    Acquired hypothyroidism    Sjogren's syndrome ([facility] )    Sacral wound    Generalized weakness    Chronic pain      Assessment & Plan:    Acute deep vein thrombosis (DVT) of right upper extremity ([facility] )  Assessment & Plan   Noted right upper extremity swelling, 10/23 without any tenderness or erythema  Venous Doppler confirmed multiple DVTs going up to internal jugular vein  Likely multifactorial secondary to malignancy, right upper lobe mass  Immobility, IV access  · Patient was initially monitor on heparin 5000 units subQ q 8 hours due to recent GI bleed  · Transitioned to Eliquis 2 5 mg b i d  · Elevate extremity as tolerated  · Venous Doppler positive for multiple vessel DVT  · Discussed with Oncology, Dr Stacie Hale, regarding anticoagulation and brain meds  Recommended to continue Eliquis 2 5 mg b i d  With closely monitoring for bleeding  · Reviewed with patient and sister-in-law at bedside regarding risk versus benefits with overall prognosis    * Metastatic cancer [facility])  Assessment & Plan  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  Patient was seen by PCP on 9/27, referred to Oncology as outpatient  No history of cancer    History of fibroid , status post hysterectomy and benign breast cyst   CT chest abdomen pelvis 10/21-noted to have lobulated right upper lobe paramediastinal mass 6 x 4 x 8 5 cm with extension into the superior mediastinum, supraclavicular correlation and extension to and invasion of right upper lobe bronchial branch   Multiple scattered subcentimeter metastatic lung nodules noted in upper lobe bilaterally  Moderate right and small left effusion  MRI brain - with evidence of at least 10 intracranial supratentorial and infratentorial metastatic lesion without any evidence of midline shift or intracranial hemorrhage  Patient was also noted to have evidence of cerebellar lesions  At least 10 intracranial supratentorial and infratentorial from metastatic lesions, as described above   No midline shift or acute intracranial hemorrhage  Oncology following, input appreciated  Overall poor prognosis given extensive metastatic disease and poor functional and nutritional status  · Status post IR guided biopsy-pending pathology  · Patient is currently full code  Will review with patient and sister-in-law regarding goals of care given prognosis  Will also follow-up on prior discussion regarding advanced directives and POLST      Coffee ground emesis-resolved as of 10/28/2022  Assessment & Plan  Patient noted to have several episodes of coffee-ground emesis since admission,  · CT chest abdomen pelvis w/o occult bleeding but, hiatal hernia and fecal impaction  · Hemoglobin 6 8 grams/deciliter, status post 1 PRBC on 10/22  · Initially treated with IV Protonix drip>continue Protonix b i d   · GI recs-Await biopsy results-Continue Protonix 40 mg daily, liquid Carafate 1 g q i d  · Recommend soft, non ulcer genic diet  · Monitor H&H, transfuse as needed      Electrolyte abnormality  Assessment & Plan  Noted to have Hypokalemia, hypo magnesemia  · Replete IV and p o   · Monitor    Anemia  Assessment & Plan  Hemoglobin 13 0>9 1  Noted to have coffee-ground emesis during hospitalization  Previous B12 low normal  Iron studies-normal ferritin, low TIBC     Hemoglobin slightly lower without any overt bleeding after starting Eliquis  · Monitor H&H, will transfuse if continues to decline or hemodynamic instability    Severe protein-calorie malnutrition (HCC)  Assessment & Plan  Malnutrition Findings:   Adult Malnutrition type: Chronic illness  Adult Degree of Malnutrition: Other severe protein calorie malnutrition  Malnutrition Characteristics: Fat loss, Muscle loss, Weight loss    360 Statement: Severe protein calorie malnutrion of chronic illness related to increased energy needs as evidenced by 12% weight loss in 1 month, hollow orbits, protruding clavicles, depression at the temples  Awaiting treatment plan    BMI Findings:  Adult BMI Classifications: Underweight < 18 5     Body mass index is 16 51 kg/m²  Pulmonary nodules  Assessment & Plan  · CXR:  2 x 5 5 cm right upper lobe paramediastinal nodule, and numerous additional smaller bilateral pulmonary nodules highly suspicious for malignancy  Both primary lung cancer and metastatic disease can have this appearance  · Refer to CT scan finding under metastatic cancer  · Pending biopsy results from IR      Orthostatic hypotension  Assessment & Plan  Asymptomatic, Noted history  Fecal impaction (HCC)-resolved as of 10/28/2022  Assessment & Plan  Status post partial improvement after enema, 10/21  · Continue Colace and MiraLax   · Enema p r n      Hypernatremia-resolved as of 10/23/2022  Assessment & Plan  Presented with sodium level of 160 secondary to decrease free water intake  Seen by Nephrology, input appreciated  Improved appropriately with hypotonic fluids  · Tolerating clear liquid diet  · Monitor BMP  · Monitor intake output   · Monitor p o  intake  · Hold D5 water, will resume D5 water maintenance if remains NPO    SIRS (systemic inflammatory response syndrome) (HCC)-resolved as of 10/27/2022  Assessment & Plan  Improving, asymptomatic    SIRS versus Sepsis, POA, as evidenced by leukocytosis, tachycardia, hypothermia  Lactic acidosis  Patient is afebrile  · UA shows trace leukocytes, normal white count  SARS-CoV-2 PCR negative  · CXR- shows diffuse pulmonary nodules, no evidence infection   · Lactic acid trended down  Likely multifactorial secondary to dehydration v metastatic disease versus GI bleed   · CT chest abdomen pelvis without any evidence of infection  · Blood Cx NGTD5d  · Urine culture negative  · ID following, input appreciated -continue to observe off anymore antibiotics  -follow temperatures and hemodynamics  -follow CBC  · Initially received cefepime, subsequently transitioned to cefazolin pending cultures  Acute kidney injury (HCC)-resolved as of 10/28/2022  Assessment & Plan  Baseline creatinine 0 7-0 8 in past year  Creatinine 1 68 on admission, now 0 51  UA without significant abnormality  Prior CT scan without any hydronephrosis  Likely prerenal in setting of dehydration and hypotension  Nephrology following, input appreciated  Improving with IV fluids to baseline  · Will discontinue IV fluid post EGD  · Monitor intake output   · Avoid nephrotoxin and hypotension  · Follow-up BMP    Chronic pain  Assessment & Plan  Chronic arthralgia in lower back, left hip, knees, legs  Likely due to OA and Sjogren's syndrome  Patient follows Rheumatology in [facility] as outpatient  On methotrexate, Cymbalta, Voltaren, tramadol p r n  At home  Denies pain at present  · Continue Cymbalta,   · Hold methotrexate  · Hold Voltaren, tramadol   · Tylenol as needed    Generalized weakness  Assessment & Plan  Likely multifactorial secondary to dehydration, metastatic cancer  cane at baseline  · Treat underlying causes  · Nutrition support and supportive care  · PT/ OT Post acute rehabilitation service  · Fall precautions    Sacral wound  Assessment & Plan  Developed in 2 weeks since sister-in-law is away    Diffuse sacral DTI on exam with surrounding redness, no active drainage, scattered black eschar on exam Hard to exclude cellulitis  · Seen by surgery, input appreciated, continue wound care as per wound care  · Wound care is needed, offloading    Sjogren's syndrome ([address] )  Assessment & Plan  Concurrently asymptomatic, Patient is on methotrexate 12 5 mg p o  Weekly  Follows rheumatologist in [facility]  · Will hold methotrexate for now  · Reconsider while outpatient on discharge  · Natural tears prn for dry eye  Acquired hypothyroidism  Assessment & Plan  Chronic Continue Synthroid  Reports not taking medications at home  · Elevated TSH, normal free T4   · Continue Synthroid at current dose      Dyslipidemia  Assessment & Plan  LDL 47  On statin, will decrease dose    Elevated glucose-resolved as of 10/28/2022  Assessment & Plan  Glucose improving,  · Check A1c- 5 5%, SSI    SVT (supraventricular tachycardia) (HCC)-resolved as of 10/23/2022  Assessment & Plan  Developed SVT in ED, heart rate 190  Converted to sinus rhythm after receiving Cardizem 10 mg IV  · Telemetry-no further episode, discontinued  · Optimize electrolytes  · Started on low-dose metoprolol     Dehydration-resolved as of 10/23/2022  Assessment & Plan  Patient presents with generalized weakness  Neighbor called police as they saw her trash can outside for a few days  Police found her on the floor  Patient reports rolling off the couch and was on the floor for about 2 hours  Patient denies hitting head  Reports lower abdominal pain since 5/2022  Denies any acute pain  Patient lives [facility] - in-law normally takes her shopping and she was away for about 16 days  Patient reported nothing to eat at home and was hungry  · Improving with IV fluids        Elevated troponin-resolved as of 10/20/2022  Assessment & Plan  Troponin 109-83-78> wnl  · Initial EKG showed sinus tach, rate 115, inferior lateral ST depressions, prolonged QT   · Patient denies chest pain    · Likely type 2 MI secondary to # 1 and SIRS versus Sepsis  · Troponin downtrending  · Telemetry          VTE Pharmacologic Prophylaxis: VTE Score: 2 Moderate Risk (Score 3-4) - Pharmacological DVT Prophylaxis Ordered: apixaban (Eliquis)  Patient Centered Rounds: I performed bedside rounds with nursing staff today  Discussions with Specialists or Other Care Team Provider:  Dr Floyd President from Oncology    Education and Discussions with Family / Patient: Updated  (Sister-in-law) at bedside  Time Spent for Care: 45 minutes  More than 50% of total time spent on counseling and coordination of care as described above  Current Length of Stay: 13 day(s)  Current Patient Status: Inpatient   Certification Statement: The patient will continue to require additional inpatient hospital stay due to Multiple electrolyte abnormalities, anemia, goals of care discussion  Discharge Plan: Anticipate discharge later today or tomorrow to rehab facility  Code Status: Level 1 - Full Code, transition to DNR DNI after discussion with patient and POA    Subjective:   Noted to be comfortably lying in bed  Reports feeling okay  Denies any chest pain shortness of breath or abdominal pain  Appetite fair  Bowel movement yesterday with brown stool without any bleeding  Noted to have multiple electrolytes abnormality and declining hemoglobin    Denies headache or dizziness    Objective:     Vitals:   Temp (24hrs), Av 3 °F (36 3 °C), Min:96 7 °F (35 9 °C), Max:98 5 °F (36 9 °C)    Temp:  [96 7 °F (35 9 °C)-98 5 °F (36 9 °C)] 97 7 °F (36 5 °C)  HR:  [] 102  Resp:  [16-20] 18  BP: ()/(49-62) 119/56  SpO2:  [95 %-100 %] 97 %  Body mass index is 16 51 kg/m²  Input and Output Summary (last 24 hours): Intake/Output Summary (Last 24 hours) at 2022 0851  Last data filed at 2022 0545  Gross per 24 hour   Intake 3133 75 ml   Output 2550 ml   Net 583 75 ml       Physical Exam:   Physical Exam  Constitutional:       General: She is not in acute distress       Appearance: She is ill-appearing (Chronically)  Comments: Cachectic, frail   HENT:      Head: Normocephalic and atraumatic  Cardiovascular:      Rate and Rhythm: Normal rate  Pulmonary:      Effort: Pulmonary effort is normal  No respiratory distress  Breath sounds: Normal breath sounds  No wheezing or rales  Abdominal:      General: Bowel sounds are normal  There is no distension  Palpations: Abdomen is soft  Tenderness: There is no abdominal tenderness  There is no guarding or rebound  Musculoskeletal:      Comments: Right upper extremity swelling, generalized edema   Skin:     General: Skin is warm and dry  Findings: No rash  Neurological:      Mental Status: She is alert  Mental status is at baseline           Additional Data:     Labs:  Results from last 7 days   Lab Units 11/01/22  0538 10/31/22  1442   WBC Thousand/uL 6 69 8 92   HEMOGLOBIN g/dL 7 9* 9 3*   HEMATOCRIT % 26 6* 30 1*   PLATELETS Thousands/uL 228 263   NEUTROS PCT %  --  76*   LYMPHS PCT %  --  15   MONOS PCT %  --  7   EOS PCT %  --  1     Results from last 7 days   Lab Units 11/01/22  0538   SODIUM mmol/L 132*   POTASSIUM mmol/L 2 9*   CHLORIDE mmol/L 101   CO2 mmol/L 23   BUN mg/dL 7   CREATININE mg/dL 0 43*   ANION GAP mmol/L 8   CALCIUM mg/dL 7 6*   ALBUMIN g/dL 1 4*   TOTAL BILIRUBIN mg/dL 0 31   ALK PHOS U/L 148*   ALT U/L 10*   AST U/L 22   GLUCOSE RANDOM mg/dL 78         Results from last 7 days   Lab Units 11/01/22  0734 10/31/22  2047 10/31/22  1615 10/31/22  1144 10/31/22  0710 10/30/22  2053 10/30/22  1541 10/30/22  1037 10/30/22  0655 10/29/22  2104 10/29/22  1559 10/29/22  1143   POC GLUCOSE mg/dl 113 128 120 86 102 137 100 92 110 112 112 154*               Lines/Drains:  Invasive Devices  Report    Peripheral Intravenous Line  Duration           Peripheral IV 10/26/22 Left;Ventral (anterior) Forearm 5 days          Drain  Duration           External Urinary Catheter <1 day                      Imaging: Reviewed radiology reports from this admission including: chest CT scan, abdominal/pelvic CT and MRI brain    Recent Cultures (last 7 days):         Last 24 Hours Medication List:   Current Facility-Administered Medications   Medication Dose Route Frequency Provider Last Rate   • acetaminophen  650 mg Oral Q6H PRN Alessandra Martínez MD     • apixaban  2 5 mg Oral BID Alberto Olivarez MD     • atorvastatin  40 mg Oral Daily With Presley Sellers MD     • calcium carbonate  500 mg Oral Daily PRN Alessandra Martínez MD     • cholecalciferol  1,000 Units Oral Daily Alessandra Martínez MD     • docusate sodium  100 mg Oral BID Alessandra Martínez MD     • DULoxetine  30 mg Oral Daily Alessandra Martínez MD     • folic acid  1 mg Oral Daily Alessandra Martínez MD     • Gadobutrol  10 mL Intravenous Once in imaging Alessandra Martínez MD     • insulin lispro  1-5 Units Subcutaneous TID AC Manish Oliveira MD     • insulin lispro  1-5 Units Subcutaneous HS Alessandra Martínez MD     • levothyroxine  75 mcg Oral Early Morning Manish Oliveira MD     • lidocaine (PF)    Code/Trauma/Sedation Med Keanu Levine MD     • magnesium oxide  400 mg Oral BID Peace Phelps MD     • metoprolol tartrate  12 5 mg Oral Q12H [facility] Manish Oliveira MD     • ondansetron  4 mg Intravenous Q6H PRN Alberto Olivarez MD     • oxybutynin  5 mg Oral Daily Alessandra Martínez MD     • [START ON 11/2/2022] pantoprazole  40 mg Oral BID AC Peace Phelps MD     • polyethylene glycol  17 g Oral Daily Alessandra Martínez MD     • saccharomyces boulardii  250 mg Oral BID Alessandra Martínez MD     • sucralfate  1 g Oral 4x Daily (AC & HS) Alessandra Martínez MD          Today, Patient Was Seen By: Peace Phelps    ** Please Note: "This note has been constructed using a voice recognition system  Therefore there may be syntax, spelling, and/or grammatical errors   Please call if you have any questions  "**

## 2022-11-01 NOTE — CASE MANAGEMENT
Case Management Progress Note    Patient name Obed [facility]  Location 18 [facility] 203/2 [facility] MRN [id number]  : 1945 Date 2022       LOS (days): 13  Geometric Mean LOS (GMLOS) (days): 5 00  Days to GMLOS:-7 8        OBJECTIVE:        Current admission status: Inpatient  Preferred Pharmacy:   [facility], [address] [place] [address]  Phone: [phone] Fax: [phone]    Primary Care Provider: Brianna Wheeler MD    Primary Insurance: [facility] REP  Secondary Insurance:     PROGRESS NOTE:    Weekly Care Management Length of Stay Review     Current LOS: 13 Days    Most Recent Labs:     Lab Results   Component Value Date/Time    WBC 6 69 2022 05:38 AM    HGB 7 9 (L) 2022 05:38 AM    HCT 26 6 (L) 2022 05:38 AM     2022 05:38 AM    SODIUM 132 (L) 2022 05:38 AM    K 2 9 (L) 2022 05:38 AM     2022 05:38 AM    CO2 23 2022 05:38 AM    BUN 7 2022 05:38 AM    CREATININE 0 43 (L) 2022 05:38 AM    GLUC 78 2022 05:38 AM    ALKPHOS 148 (H) 2022 05:38 AM    ALT 10 (L) 2022 05:38 AM    AST 22 2022 05:38 AM    ALB 1 4 (L) 2022 05:38 AM    TBILI 0 31 2022 05:38 AM       Most Recent Vitals:   Vitals:    22 1209   BP: 121/79   Pulse: 74   Resp: 17   Temp: (!) 97 3 °F (36 3 °C)   SpO2: 95%        Identified Barriers to Discharge/Discharge Goals/Care Management Interventions: metastatic CA, DVT, SIRS - resolved, today - electrolyte imbalances on labs, drop in HGB    Intended Discharge Disposition: Nohemi Lamar received after peer to peer    Expected Discharge Date:

## 2022-11-01 NOTE — PROGRESS NOTES
Spiritual Care Progress Note    2022  Patient: Carol Colindres : 1945  Admission Date & Time: 10/19/2022 1453  MRN: [id number] CSN: [id number]       followed-up with patient from previous visits   provided emotional support and reflective listening as patient processed news she had received from medical team today  Patient described feelings of overwhelm, shock, and grief   supported patient through identifying support systems, exploring legacy work, and inquiring about pt's wishes for next steps  Pt noted her wish to eventually return home to be with loved ones and pets  Pt thanked  for visiting  Spiritual care will remain available               Chaplaincy Interventions Utilized:   Empowerment: Clarified, confirmed, or reviewed information from treatment team     Exploration: Explored relational needs & resources    Collaboration: Consulted with interdisciplinary team    Relationship Building: Cultivated a relationship of care and support and Listened empathically        Chaplaincy Outcomes Achieved:  Debriefed/defused experience, Progressed toward acceptance, and Verbally processed emotions     22 1600   Clinical Encounter Type   Visited With Patient   Routine Visit Follow-up   Referral From Departmental follow-up

## 2022-11-02 VITALS
RESPIRATION RATE: 18 BRPM | HEART RATE: 88 BPM | DIASTOLIC BLOOD PRESSURE: 55 MMHG | TEMPERATURE: 98 F | WEIGHT: 99.21 LBS | BODY MASS INDEX: 16.53 KG/M2 | OXYGEN SATURATION: 97 % | HEIGHT: 65 IN | SYSTOLIC BLOOD PRESSURE: 97 MMHG

## 2022-11-02 LAB
ANION GAP SERPL CALCULATED.3IONS-SCNC: 6 MMOL/L (ref 4–13)
BUN SERPL-MCNC: 9 MG/DL (ref 5–25)
CALCIUM SERPL-MCNC: 7.7 MG/DL (ref 8.3–10.1)
CHLORIDE SERPL-SCNC: 101 MMOL/L (ref 96–108)
CO2 SERPL-SCNC: 25 MMOL/L (ref 21–32)
CREAT SERPL-MCNC: 0.46 MG/DL (ref 0.6–1.3)
ERYTHROCYTE [DISTWIDTH] IN BLOOD BY AUTOMATED COUNT: 21.7 % (ref 11.6–15.1)
GFR SERPL CREATININE-BSD FRML MDRD: 96 ML/MIN/1.73SQ M
GLUCOSE SERPL-MCNC: 104 MG/DL (ref 65–140)
GLUCOSE SERPL-MCNC: 124 MG/DL (ref 65–140)
GLUCOSE SERPL-MCNC: 86 MG/DL (ref 65–140)
GLUCOSE SERPL-MCNC: 92 MG/DL (ref 65–140)
HCT VFR BLD AUTO: 22.7 % (ref 34.8–46.1)
HCT VFR BLD AUTO: 26 % (ref 34.8–46.1)
HGB BLD-MCNC: 7.2 G/DL (ref 11.5–15.4)
HGB BLD-MCNC: 8 G/DL (ref 11.5–15.4)
MAGNESIUM SERPL-MCNC: 1.5 MG/DL (ref 1.6–2.6)
MCH RBC QN AUTO: 24.2 PG (ref 26.8–34.3)
MCHC RBC AUTO-ENTMCNC: 31.7 G/DL (ref 31.4–37.4)
MCV RBC AUTO: 76 FL (ref 82–98)
PHOSPHATE SERPL-MCNC: 3 MG/DL (ref 2.3–4.1)
PLATELET # BLD AUTO: 260 THOUSANDS/UL (ref 149–390)
PMV BLD AUTO: 9.4 FL (ref 8.9–12.7)
POTASSIUM SERPL-SCNC: 3.7 MMOL/L (ref 3.5–5.3)
RBC # BLD AUTO: 2.97 MILLION/UL (ref 3.81–5.12)
SODIUM SERPL-SCNC: 132 MMOL/L (ref 135–147)
WBC # BLD AUTO: 7.33 THOUSAND/UL (ref 4.31–10.16)

## 2022-11-02 RX ORDER — SACCHAROMYCES BOULARDII 250 MG
250 CAPSULE ORAL 2 TIMES DAILY
Refills: 0
Start: 2022-11-02

## 2022-11-02 RX ORDER — CALCIUM CARBONATE 200(500)MG
500 TABLET,CHEWABLE ORAL DAILY PRN
Refills: 0
Start: 2022-11-02

## 2022-11-02 RX ORDER — ACETAMINOPHEN 325 MG/1
325 TABLET ORAL EVERY 6 HOURS PRN
Refills: 0
Start: 2022-11-02

## 2022-11-02 RX ORDER — MAGNESIUM SULFATE HEPTAHYDRATE 40 MG/ML
2 INJECTION, SOLUTION INTRAVENOUS ONCE
Status: COMPLETED | OUTPATIENT
Start: 2022-11-02 | End: 2022-11-02

## 2022-11-02 RX ORDER — LEVOTHYROXINE SODIUM 0.07 MG/1
75 TABLET ORAL
Refills: 0
Start: 2022-11-03

## 2022-11-02 RX ORDER — DOCUSATE SODIUM 100 MG/1
100 CAPSULE, LIQUID FILLED ORAL 2 TIMES DAILY
Refills: 0
Start: 2022-11-02

## 2022-11-02 RX ORDER — ROSUVASTATIN CALCIUM 5 MG/1
5 TABLET, COATED ORAL DAILY
Start: 2022-11-02

## 2022-11-02 RX ORDER — POLYETHYLENE GLYCOL 3350 17 G/17G
17 POWDER, FOR SOLUTION ORAL DAILY PRN
Refills: 0
Start: 2022-11-02

## 2022-11-02 RX ORDER — SUCRALFATE 1 G/1
1 TABLET ORAL
Refills: 0
Start: 2022-11-02

## 2022-11-02 RX ORDER — PANTOPRAZOLE SODIUM 40 MG/1
40 TABLET, DELAYED RELEASE ORAL
Refills: 0
Start: 2022-11-02

## 2022-11-02 RX ADMIN — POLYETHYLENE GLYCOL 3350 17 G: 17 POWDER, FOR SOLUTION ORAL at 08:32

## 2022-11-02 RX ADMIN — SUCRALFATE 1 G: 1 TABLET ORAL at 06:36

## 2022-11-02 RX ADMIN — APIXABAN 2.5 MG: 2.5 TABLET, FILM COATED ORAL at 08:32

## 2022-11-02 RX ADMIN — APIXABAN 2.5 MG: 2.5 TABLET, FILM COATED ORAL at 17:20

## 2022-11-02 RX ADMIN — Medication 250 MG: at 17:20

## 2022-11-02 RX ADMIN — MAGNESIUM SULFATE HEPTAHYDRATE 2 G: 40 INJECTION, SOLUTION INTRAVENOUS at 09:52

## 2022-11-02 RX ADMIN — Medication 250 MG: at 08:32

## 2022-11-02 RX ADMIN — PANTOPRAZOLE SODIUM 40 MG: 40 TABLET, DELAYED RELEASE ORAL at 06:36

## 2022-11-02 RX ADMIN — DOCUSATE SODIUM 100 MG: 100 CAPSULE, LIQUID FILLED ORAL at 08:32

## 2022-11-02 RX ADMIN — SUCRALFATE 1 G: 1 TABLET ORAL at 16:54

## 2022-11-02 RX ADMIN — MAGNESIUM OXIDE TAB 400 MG (241.3 MG ELEMENTAL MG) 400 MG: 400 (241.3 MG) TAB at 08:32

## 2022-11-02 RX ADMIN — ATORVASTATIN CALCIUM 10 MG: 10 TABLET, FILM COATED ORAL at 16:54

## 2022-11-02 RX ADMIN — MAGNESIUM OXIDE TAB 400 MG (241.3 MG ELEMENTAL MG) 400 MG: 400 (241.3 MG) TAB at 17:20

## 2022-11-02 RX ADMIN — FOLIC ACID 1 MG: 1 TABLET ORAL at 08:32

## 2022-11-02 RX ADMIN — OXYBUTYNIN 5 MG: 5 TABLET, FILM COATED, EXTENDED RELEASE ORAL at 08:32

## 2022-11-02 RX ADMIN — LEVOTHYROXINE SODIUM 75 MCG: 75 TABLET ORAL at 06:36

## 2022-11-02 RX ADMIN — PANTOPRAZOLE SODIUM 40 MG: 40 TABLET, DELAYED RELEASE ORAL at 16:54

## 2022-11-02 RX ADMIN — Medication 12.5 MG: at 08:32

## 2022-11-02 RX ADMIN — DULOXETINE HYDROCHLORIDE 30 MG: 30 CAPSULE, DELAYED RELEASE ORAL at 08:33

## 2022-11-02 RX ADMIN — SUCRALFATE 1 G: 1 TABLET ORAL at 11:57

## 2022-11-02 RX ADMIN — Medication 1000 UNITS: at 08:32

## 2022-11-02 NOTE — PLAN OF CARE
Problem: Potential for Falls  Goal: Patient will remain free of falls  Description: INTERVENTIONS:  - Educate patient/family on patient safety including physical limitations  - Instruct patient to call for assistance with activity   - Consult OT/PT to assist with strengthening/mobility   - Keep Call bell within reach  - Keep bed low and locked with side rails adjusted as appropriate  - Keep care items and personal belongings within reach  - Initiate and maintain comfort rounds  - Make Fall Risk Sign visible to staff  - Offer Toileting every 2 Hours, in advance of need  - Initiate/Maintain bed alarm- Apply yellow socks and bracelet for high fall risk patients  - Consider moving patient to room near nurses station  Outcome: Progressing     Problem: Nutrition/Hydration-ADULT  Goal: Nutrient/Hydration intake appropriate for improving, restoring or maintaining nutritional needs  Description: Monitor and assess patient's nutrition/hydration status for malnutrition  Collaborate with interdisciplinary team and initiate plan and interventions as ordered  Monitor patient's weight and dietary intake as ordered or per policy  Utilize nutrition screening tool and intervene as necessary  Determine patient's food preferences and provide high-protein, high-caloric foods as appropriate       INTERVENTIONS:  - Monitor oral intake, urinary output, labs, and treatment plans  - Assess nutrition and hydration status and recommend course of action  - Evaluate amount of meals eaten  - Assist patient with eating if necessary   - Allow adequate time for meals  - Recommend/ encourage appropriate diets, oral nutritional supplements, and vitamin/mineral supplements  - Order, calculate, and assess calorie counts as needed  - Recommend, monitor, and adjust tube feedings and TPN/PPN based on assessed needs  - Assess need for intravenous fluids  - Provide specific nutrition/hydration education as appropriate  - Include patient/family/caregiver in decisions related to nutrition  Outcome: Progressing     Problem: MOBILITY - ADULT  Goal: Maintain or return to baseline ADL function  Description: INTERVENTIONS:  -  Assess patient's ability to carry out ADLs; assess patient's baseline for ADL function and identify physical deficits which impact ability to perform ADLs (bathing, care of mouth/teeth, toileting, grooming, dressing, etc )  - Assess/evaluate cause of self-care deficits   - Assess range of motion  - Assess patient's mobility; develop plan if impaired  - Assess patient's need for assistive devices and provide as appropriate  - Encourage maximum independence but intervene and supervise when necessary  - Involve family in performance of ADLs  - Assess for home care needs following discharge   - Consider OT consult to assist with ADL evaluation and planning for discharge  - Provide patient education as appropriate  Outcome: Progressing  Goal: Maintains/Returns to pre admission functional level  Description: INTERVENTIONS:  - Perform BMAT or MOVE assessment daily    - Set and communicate daily mobility goal to care team and patient/family/caregiver  - Collaborate with rehabilitation services on mobility goals if consulted  - Perform Range of Motion 3 times a day  - Reposition patient every 2 hours    - Dangle patient 3 times a day  - Stand patient 3 times a day  - Ambulate patient 3  times a day  - Out of bed to chair 3 times a day   - Out of bed for meals 3times a day  - Out of bed for toileting  - Record patient progress and toleration of activity level   Outcome: Progressing     Problem: METABOLIC, FLUID AND ELECTROLYTES - ADULT  Goal: Electrolytes maintained within normal limits  Description: INTERVENTIONS:  - Monitor labs and assess patient for signs and symptoms of electrolyte imbalances  - Administer electrolyte replacement as ordered  - Monitor response to electrolyte replacements, including repeat lab results as appropriate  - Instruct patient on fluid and nutrition as appropriate  Outcome: Progressing     Problem: Prexisting or High Potential for Compromised Skin Integrity  Goal: Skin integrity is maintained or improved  Description: INTERVENTIONS:  - Identify patients at risk for skin breakdown  - Assess and monitor skin integrity  - Assess and monitor nutrition and hydration status  - Monitor labs   - Assess for incontinence   - Turn and reposition patient  - Assist with mobility/ambulation  - Relieve pressure over bony prominences  - Avoid friction and shearing  - Provide appropriate hygiene as needed including keeping skin clean and dry  - Evaluate need for skin moisturizer/barrier cream  - Collaborate with interdisciplinary team   - Patient/family teaching  - Consider wound care consult   Outcome: Progressing

## 2022-11-02 NOTE — [place] UNIVERSAL TRANSFER FORM
[place] UNIVERSAL TRANSFER FORM  (ALL ITEMS MUST BE COMPLETED)    1  TRANSFER FROM: [address] [facility]      TRANSFER TO: [facility], [facility]     2  DATE OF TRANSFER: 11/2/2022                        TIME OF TRANSFER: 17:00    3  PATIENT NAME: YESSICA Murcia      YOB: 1945                             GENDER: female    4  LANGUAGE:   English    5  PHYSICIAN NAME:  Barrie Bhardwaj MD                   PHONE: [phone]    6  CODE STATUS: Level 3 - DNAR and DNI        Out of Hospital DNR Attached: No    7  :                                      :  Extended Emergency Contact Information  Primary Emergency Contact: [address] Phone: [phone]  Mobile Phone: [phone]  Relation: Sister In-Law           Health Care Representative/Proxy:  Yes           Legal Guardian:  No             NAME OF:           HEALTH CARE REPRESENTATIVE/PROXY:                                         OR           LEGAL GUARDIAN, IF NOT :                                               PHONE:  (Day)           (Night)                        (Cell)    8  REASON FOR TRANSFER: (Must include brief medical history and recent changes in physical function or cognition ) Patient discharged for rehab            V/S: BP 97/55 (BP Location: Left arm)   Pulse 88   Temp 98 °F (36 7 °C) (Oral)   Resp 18   Ht 5' 5" (1 651 m)   Wt 45 kg (99 lb 3 3 oz)   SpO2 97%   BMI 16 51 kg/m²           PAIN: None    9  PRIMARY DIAGNOSIS: Metastatic cancer ([facility] [address] )      Secondary Diagnosis:         Pacemaker: Yes      Internal Defib: No          Mental Health Diagnosis (if Applicable):    10  RESTRAINTS: No     11  RESPIRATORY NEEDS: None    12  ISOLATION/PRECAUTION: None    13  ALLERGY: Penicillins    14  SENSORY:       Vision Glasses, Hearing Good  and Speech Clear    15  SKIN CONDITION: Yes:  Stage (Pressure) Unstageable    16   DIET: Mechanically Altered Diet    17  IV ACCESS: None    18  PERSONAL ITEMS SENT WITH PATIENT: Glasses    19  ATTACHED DOCUMENTS: MUST ATTACH CURRENT MEDICATION INFORMATION Face Sheet, Advanced Directive and Discharge Summary    20  AT RISK ALERTS:Falls, Pressure Ulcer and Aspiration        HARM TO: N/A    21  WEIGHT BEARING STATUS:         Left Leg: Limited        Right Leg: Limited    22  MENTAL STATUS:Alert    23  FUNCTION:        Walk: Not Able        Transfer: Not Able        Toilet: Not Able        Feed: Self    24  IMMUNIZATIONS/SCREENING:     Immunization History   Administered Date(s) Administered   • COVID-19 MODERNA VACC 0 25 ML IM BOOSTER 12/21/2021   • COVID-19 Moderna Vac BIVALENT 18 Yr+ Im (BOOSTER ONLY) 10/29/2022   • Influenza, high dose seasonal 0 7 mL 10/20/2022       25  BOWEL: Incontinent  and Date Last BM11/2/2022    26  BLADDER: Incontinent    27   SENDING FACILITY CONTACT: Elana Petersen                  Title: RN        Unit: 2S        Phone: [phone] [address] (if known):        Title:        Unit:         Phone:         FORM PREFILLED BY (if applicable)       Title:       Unit:        Phone:         FORM COMPLETED BY Milli Rothman      Title: SELENE      Phone: [phone]

## 2022-11-02 NOTE — DISCHARGE SUMMARY
Discharge Summary - [facility]    Patient Information: Dinah Joy 69 y o  female MRN: [id number]  Unit/Bed#: [address] [place] [address] Encounter: [id number]    Discharging Physician / Practitioner: Milo Burgos  PCP: Tobi Cross MD  Admission Date: 10/19/2022  Discharge Date: 11/02/22    Reason for Admission: Weakness - Generalized (Patient arrives via ALS  States PD did a welfare check and found patient on the floor  Patient states she has increasing generalized weakness for about two weeks  Hasn't been taking her meds or eating/drinking  C/o constipation x several weeks and abdominal pain x 1 week   )      Discharge Diagnoses:     Principal Problem:    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  Active Problems:    Acute deep vein thrombosis (DVT) of right upper extremity (HCC)    Orthostatic hypotension    Pulmonary nodules    Severe protein-calorie malnutrition (HCC)    Anemia    Electrolyte abnormality    Dyslipidemia    Acquired hypothyroidism    Sjogren's syndrome (HCC)    Sacral wound    Generalized weakness    Chronic pain  Resolved Problems:    Coffee ground emesis    Acute kidney injury (HCC)    SIRS (systemic inflammatory response syndrome) (HCC)    Hypernatremia    Fecal impaction (HCC)    Dehydration    SVT (supraventricular tachycardia) (HCC)    Elevated glucose    Elevated troponin        Acute deep vein thrombosis (DVT) of right upper extremity ([address] )  Assessment & Plan   Noted right upper extremity swelling, 10/23 without any tenderness or erythema  Venous Doppler confirmed multiple DVTs going up to internal jugular vein  Likely multifactorial secondary to malignancy, right upper lobe mass  Immobility, IV access  · Patient was initially monitored on heparin 5000 units subQ q 8 hours due to recent GI bleed  · Subsequently Transitioned to Eliquis 2 5 mg b i d     · Elevate extremity as tolerated  · Discussed with Oncology, Dr Marlyn Middleton, regarding anticoagulation and brain mets  Recommended to continue Eliquis 2 5 mg b i d  With closely monitoring for bleeding  · Reviewed with patient and sister-in-law at bedside regarding risk versus benefits with overall prognosis  · Hemoglobin relatively stable without any evidence of overt bleeding  Will require close monitoring    * 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  Assessment & Plan  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  Patient was seen by PCP on 9/27, referred to Oncology as outpatient  No history of cancer  History of fibroid , status post hysterectomy and benign breast cyst   CT chest abdomen pelvis 10/21-noted to have lobulated right upper lobe paramediastinal mass 6 x 4 x 8 5 cm with extension into the superior mediastinum, supraclavicular correlation and extension to and invasion of right upper lobe bronchial branch   Multiple scattered subcentimeter metastatic lung nodules noted in upper lobe bilaterally  Moderate right and small left effusion  MRI brain - with evidence of at least 10 intracranial supratentorial and infratentorial metastatic lesion without any evidence of midline shift or intracranial hemorrhage  Patient was also noted to have evidence of cerebellar lesions  At least 10 intracranial supratentorial and infratentorial from metastatic lesions, as described above   No midline shift or acute intracranial hemorrhage  Status post IR guided right iliac bone biopsy-Metastatic adenocarcinoma, consistent with lung primary  · Oncology following, input appreciated  · Overall poor prognosis given extensive metastatic disease and poor functional and nutritional status  · Extensive ongoing goals of care discussion during hospitalization    Reviewed findings with extensive metastatic disease and overall prognosis given poor functional status  POLST completed, advanced directives were updated to  DNR/DNI  · Discussed regarding potential rehabilitation, expectation and overall prognosis  Discussed Oncology recommendation that patient currently not candidate for any palliative chemotherapy given functional status  After discussion, patient and sister-in-law agreed with consideration of hospice if patient fails to improve with functional and nutrition status at rehab    Coffee ground emesis-resolved as of 10/28/2022  Assessment & Plan  Patient noted to have several episodes of coffee-ground emesis since admission,  CT chest abdomen pelvis w/o occult bleeding but, hiatal hernia and fecal impaction  Initially treated with IV Protonix drip>continue Protonix b i d  EGD-ulcerative esophagitis, hiatal hernia, mild erythema in antrum  Normal duodenum  · GI recs-Await biopsy results  · -Continue Protonix 40 mg daily, liquid Carafate 1 g q i d  · Recommend soft, non ulcer genic diet  · Monitor H&H, transfuse as needed      Electrolyte abnormality  Assessment & Plan  Noted to have Hypokalemia, hypo magnesemia  Improving with improvement in p o  Intake  · Monitor    Anemia  Assessment & Plan  Hemoglobin decline during hospitalization, likely multifactorial secondary to GI bleed, dilutional, chronic illness, mal nutrition  Noted to have coffee-ground emesis during hospitalization  B12 normal  Hemoglobin 6 8 grams/deciliter, status post 1 PRBC on 10/22, subsequently remained relatively stable  Iron studies-normal ferritin, low TIBC  Hemoglobin currently relatively stable, no further evidence of overt bleeding    · Continue to monitor H&H and signs and symptoms of bleeding  · Transfusion as needed    Severe protein-calorie malnutrition ([facility] [address] )  Assessment & Plan  Malnutrition Findings:   Adult Malnutrition type: Chronic illness  Adult Degree of Malnutrition: Other severe protein calorie malnutrition  Malnutrition Characteristics: Fat loss, Muscle loss, Weight loss    360 Statement: Severe protein calorie malnutrion of chronic illness related to increased energy needs as evidenced by 12% weight loss in 1 month, hollow orbits, protruding clavicles, depression at the temples  Awaiting treatment plan    BMI Findings:  Adult BMI Classifications: Underweight < 18 5     Body mass index is 16 51 kg/m²  Continue nutrition supplementation      Pulmonary nodules  Assessment & Plan  · CXR:  2 x 5 5 cm right upper lobe paramediastinal nodule, and numerous additional smaller bilateral pulmonary nodules highly suspicious for malignancy  Both primary lung cancer and metastatic disease can have this appearance  · Refer to CT scan finding under metastatic cancer  · Biopsy confirming adenocarcinoma with lung primary    Orthostatic hypotension  Assessment & Plan  Asymptomatic, Noted history  Clinically stable, may require midodrine when ambulatory    Fecal impaction (HCC)-resolved as of 10/28/2022  Assessment & Plan  Status post partial improvement after enema, 10/21  Moving bowels with brown stool  · Continue Colace, PRN MiraLax   · Enema p r n  Hypernatremia-resolved as of 10/23/2022  Assessment & Plan  Presented with sodium level of 160 secondary to decrease free water intake  Seen by Nephrology, input appreciated  Improved appropriately with hypotonic fluids  · Tolerating diet well with good appetite  · Monitor BMP  · Monitor intake output     SIRS (systemic inflammatory response syndrome) (HCC)-resolved as of 10/27/2022  Assessment & Plan  SIRS versus Sepsis, POA, as evidenced by leukocytosis, tachycardia, hypothermia  Lactic acidosis  Patient is afebrile  · UA shows trace leukocytes, normal white count  SARS-CoV-2 PCR negative  · CXR- shows diffuse pulmonary nodules, no evidence infection   · Lactic acid trended down    Likely multifactorial secondary to dehydration v metastatic disease versus GI bleed   · CT chest abdomen pelvis without any evidence of infection  · Blood Cx NGTD5d  · Urine culture negative  Initially received cefepime, subsequently transitioned to cefazolin pending culture results  Seen by infectious disease, no evidence of infection was noted  Antibiotics were discontinued  Sepsis ruled out          Acute kidney injury (HCC)-resolved as of 10/28/2022  Assessment & Plan  Baseline creatinine 0 7-0 8 in past year  Creatinine 1 68 on admission, now 0 51  UA without significant abnormality  Prior CT scan without any hydronephrosis  Likely prerenal in setting of dehydration and hypotension  Nephrology following, input appreciated  Improved with IV fluids to baseline  · Monitor intake output   · Avoid nephrotoxin and hypotension  · Follow-up [facility]    Chronic pain  Assessment & Plan  Chronic arthralgia in lower back, left hip, knees, legs  Likely due to OA and Sjogren's syndrome  Patient follows Rheumatology in [facility] as outpatient  On methotrexate, Cymbalta, Voltaren, tramadol p r n  At home  Denies pain at present, not requiring p r n  Pain medications  · Continue Cymbalta,   · Holding methotrexate  · Discontinue Voltaren, tramadol   · Tylenol as/if needed    Generalized weakness  Assessment & Plan  Likely multifactorial secondary to dehydration, metastatic cancer  cane at baseline  · Treat underlying causes  · Nutrition support and supportive care  · PT/ OT Post acute rehabilitation service  · Fall precautions    Sacral wound  Assessment & Plan  Developed in 2 weeks since sister-in-law was away  Diffuse sacral DTI on exam with surrounding redness, no active drainage, scattered black eschar on exam Hard to exclude cellulitis  · Seen by surgery, input appreciated, continue wound care as per wound care  · Continue wound care, offloading with P 500  · Nutrition support    Sjogren's syndrome [facility])  Assessment & Plan  Concurrently asymptomatic, Patient is on methotrexate 12 5 mg p o  Weekly  Follows rheumatologist in [facility]  · Will hold methotrexate for now  · Reconsider while outpatient on discharge  · Natural tears prn for dry eye  Acquired hypothyroidism  Assessment & Plan  Continue Synthroid  Reports not taking medications at home  · Elevated TSH, normal free T4   · Continue Synthroid at current dose  · Follow-up thyroid profile as output      Dyslipidemia  Assessment & Plan  LDL 47  On statin, decreased dose    Elevated glucose-resolved as of 10/28/2022  Assessment & Plan    · Hemoglobin A1c- 5 5%  · Monitor p o  Intake diet  · Does not require insulin sliding scale    SVT (supraventricular tachycardia) (HCC)-resolved as of 10/23/2022  Assessment & Plan  Developed SVT in ED, heart rate 190  Converted to sinus rhythm after receiving Cardizem 10 mg IV  Likely secondary to acute illness and dehydration  · Telemetry-no further episode, discontinued  · Started on low-dose metoprolol , continue as tolerated with holding parameters    Dehydration-resolved as of 10/23/2022  Assessment & Plan  Patient presents with generalized weakness  Neighbor called police as they saw her trash can outside for a few days  Police found her on the floor  Patient reports rolling off the couch and was on the floor for about 2 hours  Patient denies hitting head  Reports lower abdominal pain since 5/2022  Denies any acute pain  Patient lives [place] - in-law normally takes her shopping and she was away for about 16 days  Patient reported nothing to eat at home and was hungry  · Improving with IV fluids        Elevated troponin-resolved as of 10/20/2022  Assessment & Plan  Troponin [id number]> wnl  · Initial EKG showed sinus tach, rate 115, inferior lateral ST depressions, prolonged QT   · Patient denies chest pain  · Likely type 2 MI secondary to # 1 and SIRS versus Sepsis  · Troponin downtrending    · Telemetry      Consultations During Hospital Stay:  IP CONSULT TO NEPHROLOGY  IP CONSULT TO ACUTE CARE SURGERY  IP CONSULT TO ONCOLOGY  IP CONSULT TO INFECTIOUS DISEASES  IP CONSULT TO NUTRITION SERVICES  IP CONSULT TO GASTROENTEROLOGY  IP CONSULT TO PULMONOLOGY  INPATIENT CONSULT TO IR  INPATIENT CONSULT TO IR  IP CONSULT TO HOSPICE    Procedures Performed:     · EGD    Significant Findings:     · Refer to hospital course and above listed diagnosis related plan for details    Imaging while in hospital:    EGD    Result Date: 10/24/2022  Narrative: [facility] Operating Room [address] [place] [address] [phone] DATE OF SERVICE: 10/24/22 PHYSICIAN(S): Attending: Ki Hernández MD Fellow: No Staff Documented Procedure : EGD with Biopsies INDICATION: Anemia, unspecified type POST-OP DIAGNOSIS: See the impression below  PREPROCEDURE: Informed consent was obtained for the procedure, including sedation  Risks of perforation, hemorrhage, adverse drug reaction and aspiration were discussed  The patient was placed in the left lateral decubitus position  Patient was explained about the risks and benefits of the procedure  Risks including but not limited to bleeding, infection, and perforation were explained in detail  Also explained about less than 100% sensitivity with the exam and other alternatives  DETAILS OF PROCEDURE: Patient was taken to the procedure room where a time out was performed to confirm correct patient and correct procedure  The patient underwent monitored anesthesia care, which was administered by an anesthesia professional  The patient's blood pressure, heart rate, level of consciousness, respirations and oxygen were monitored throughout the procedure  The scope was advanced to the second part of the duodenum  Retroflexion was performed in the fundus  Prior to the procedure, the patient's H  Pylori status was unknown  The patient experienced no blood loss  The procedure was not difficult  The patient tolerated the procedure well   There were no apparent complications  ANESTHESIA INFORMATION: ASA: II Anesthesia Type: IV Sedation with Anesthesia MEDICATIONS: No administrations occurring from 1422 to 1500 on 10/24/22 FINDINGS: Large, superficial, round, benign-appearing ulcer in the lower third of the esophagus with clean base (Sudeep III); performed 3 cold forceps biopsies  Severe ulcerative esophagitis Medium sliding hiatal hernia (type I hiatal hernia) without Pernell Miladys lesions present Mild, localized erythematous mucosa in the antrum, consistent with gastritis; performed cold forceps biopsy to rule out H  pylori The duodenal bulb, 1st part of the duodenum and 2nd part of the duodenum appeared normal  SPECIMENS: ID Type Source Tests Collected by Time Destination 1 : bx Tissue Stomach TISSUE EXAM Javier Zapata MD 10/24/2022  2:56 PM  2 : lower esophageal ulcer bx Tissue Esophagus TISSUE EXAM Javier Zapata MD 10/24/2022  2:57 PM      Impression: 1  Ulcerative Esophagitis 2  Hiatal Hernia 3  Mild Erythema in antrum 4  Normal Duodenum RECOMMENDATION: Await pathology results Liquid duscralfate 1 gm QID Pantoprazole 40 mg po q daily Soft diet   Javier Zapata MD     CT chest abdomen pelvis wo contrast    Result Date: 10/21/2022  Narrative: CT CHEST, ABDOMEN AND PELVIS WITHOUT IV CONTRAST INDICATION:   Sepsis Metastatic cancer, sepsis, anemia  COMPARISON:  Compared with CT abdomen pelvis 9/22/2022 TECHNIQUE: CT examination of the chest, abdomen and pelvis was performed without intravenous contrast  Axial, sagittal, and coronal 2D reformatted images were created from the source data and submitted for interpretation  Radiation dose length product (DLP) for this visit:  300 74 mGy-cm   This examination, like all CT scans performed in the [facility], was performed utilizing techniques to minimize radiation dose exposure, including the use of iterative  reconstruction and automated exposure control   Enteric contrast was administered  FINDINGS: CHEST LUNGS:  Lobulated right upper lobe paramediastinal mass measuring 6 x 4 x 8 5 cm with extension to and involvement of the anterior right upper lobe bronchial branches     Remaining lung parenchyma demonstrates multiple metastatic subcentimeter lung nodules predominantly in the upper lobe  There is no tracheal or endobronchial lesion  PLEURA:  Small left and moderate right pleural effusion is new  Pleural-based nodules bilaterally more on the right of metastasis  HEART/GREAT VESSELS: Heart is unremarkable for patient's age  No thoracic aortic aneurysm  MEDIASTINUM AND LADONNA:  Moderate hiatal hernia  Soft tissue nodule in the cardiophrenic region anteriorly measuring 1 4 cm compared to 1 2 cm  Right upper lobe mass extends into the superior mediastinal right paratracheal region  CHEST WALL AND LOWER NECK: Right upper lobe mass extends into the supraclavicular region behind the jugular vein  Subcentimeter axillary lymph nodes bilaterally  ABDOMEN LIVER/BILIARY TREE:  Unremarkable  GALLBLADDER:  Gallbladder is surgically absent  SPLEEN:  Unremarkable  PANCREAS:  Unremarkable  ADRENAL GLANDS:  Unremarkable  KIDNEYS/URETERS:  Unremarkable  No hydronephrosis  STOMACH AND BOWEL:  Stool impaction in the colon   Mid transverse colon wall thickening possibly secondary to stool impaction APPENDIX:  No findings to suggest appendicitis  ABDOMINOPELVIC CAVITY:  No ascites  No pneumoperitoneum  No lymphadenopathy  VESSELS:  Unremarkable for patient's age  PELVIS REPRODUCTIVE ORGANS:  Unremarkable for patient's age  URINARY BLADDER:  Unremarkable  ABDOMINAL WALL/INGUINAL REGIONS:  Unremarkable  OSSEOUS STRUCTURES:  Sclerotic metastatic lesions in the bones, most predominant in the right posterior 6th rib, posterior elements of T5, T6 and vertebral body involvement of T11, L2    Sclerotic metastatic involvement of the right acetabulum, right anterior iliac bone and left posterior iliac bone and  left superior acetabulum        Impression: 1  Lobulated right upper lobe paramediastinal mass measuring 6 0 x 4 0 x 8 5 cm with extension into the superior mediastinum, supraclavicular correlation and extension to and invasion of right upper lobe bronchial branch  2   Multiple scattered subcentimeter metastatic lung nodules predominantly in upper lobes bilaterally  3  Moderate right and small left effusion  4   Stool impaction in the colon  Mid transverse colon wall thickening possibly due to stool impaction  Findings most likely represent primary right lung malignancy with lung and bone metastatic process  Workstation performed: [id number]     XR chest 1 view portable    Result Date: 10/20/2022  Narrative: CHEST INDICATION:   weakness  COMPARISON:  Chest x-ray from 11/12/2017  Abdomen and pelvic CT from 9/22/2022  EXAM PERFORMED/VIEWS:  XR CHEST PORTABLE FINDINGS: Large hiatal hernia  There is a 4 2 x 5 5 cm right upper lobe paramediastinal nodule, and numerous additional smaller bilateral pulmonary nodules highly suspicious for malignancy  Both primary lung cancer and metastatic disease can have this appearance  No pleural effusion  No pneumothorax  No pneumothorax or pleural effusion  Osseous structures appear within normal limits for patient age  (Please see recent abdomen and pelvic CT, where T11 sclerotic bone metastasis is visible )     Impression: There is a 4 2 x 5 5 cm right upper lobe paramediastinal nodule, and numerous additional smaller bilateral pulmonary nodules highly suspicious for malignancy  Both primary lung cancer and metastatic disease can have this appearance  Chest CT would be helpful for further evaluation  Workstation performed: [id number]     CT head without contrast    Result Date: 10/19/2022  Narrative: CT BRAIN - WITHOUT CONTRAST INDICATION:   Weakness and possible fall  Found on ground    COMPARISON:  11/3/2017 TECHNIQUE:  CT examination of the brain was performed    In addition to axial images, sagittal and coronal 2D reformatted images were created and submitted for interpretation  Radiation dose length product (DLP) for this visit:  933 96 mGy-cm   This examination, like all CT scans performed in the [facility], was performed utilizing techniques to minimize radiation dose exposure, including the use of iterative  reconstruction and automated exposure control  IMAGE QUALITY:  Diagnostic  FINDINGS: PARENCHYMA: Decreased attenuation is noted in periventricular and subcortical white matter demonstrating an appearance that is statistically most likely to represent moderate microangiopathic change  No CT signs of acute infarction  No intracranial mass, mass effect or midline shift  No acute parenchymal hemorrhage  VENTRICLES AND EXTRA-AXIAL SPACES:  Ventricles and extra-axial CSF spaces are prominent commensurate with the degree of volume loss, which has increased since the prior study VISUALIZED ORBITS AND PARANASAL SINUSES:  No acute finding  Changes of chronic right mastoiditis with partial loss of air cells and bony sclerosis but unchanged from 2017 CALVARIUM AND EXTRACRANIAL SOFT TISSUES:  Normal      Impression: 1  No acute intracranial abnormality 2  Microangiopathic changes and progression of cerebral volume loss Workstation performed: [id number]     MRI brain w wo contrast    Result Date: 10/24/2022  Narrative: MRI BRAIN WITH AND WITHOUT CONTRAST INDICATION: Lung mass with metastases, rule out intracranial metastases  COMPARISON:  CT from 10/19/2022 and 11/3/2017  Correlation made to recent chest, abdomen and pelvis CT   TECHNIQUE: Sagittal T1, axial T2, axial FLAIR, axial T1, axial Veteran, axial diffusion  Sagittal, axial T1 postcontrast   Axial bravo postcontrast with coronal reconstructions  IV Contrast:  10 mL of Gadobutrol injection (SINGLE-DOSE)  IMAGE QUALITY:   Diagnostic   FINDINGS: BRAIN PARENCHYMA:  There is no evidence of recent infarction on diffusion weighted imaging  There is no midline shift  There is no acute intracranial hemorrhage  There is chronic microvascular ischemic change  There are multiple scattered metastatic lesions throughout the cerebellar and cerebral hemispheres described below  A few of the cerebellar lesions may in part be located along cerebellar folia  -8 x 5 mm inferior right frontal lobe lesion within the gyrus rectus on series 11, image 61  -5 x 5 mm left occipital lobe lesion on series 11, image 65  -Left superior cerebellar lesion on series 11, image 46 measures 1 0 x 1 0 cm  -4 mm left cerebellar lesion on series 11, image 37  -2 mm left cerebellar lesion on series 11, image 42  -3 mm right cerebellar lesion on series 11, image 35  -3 mm lesion within the right paramedian cerebellum on series 11, image 36  -2 mm lesion within the superior right cerebellum on series 9, image 15  -3 mm right inferior cerebellar lesion posterolaterally on series 11, image 19  -5 mm lesion within the left occipital lobe on series 9, image 10  -There are questionable punctate lesions within the brainstem on series 9, image 12 versus volume averaging and not definitively seen on postcontrast bravo imaging  Attention on follow-up imaging is recommended  VENTRICLES:  There is redemonstration of ventriculomegaly, slightly more prominent since the prior examination from 2017 favored to be an ex vacuo basis with progression of involutional change since the prior study  Periventricular FLAIR signal hyperintensity is noted  SELLA AND PITUITARY GLAND:  Normal  ORBITS:  Normal  PARANASAL SINUSES:  There is a right mastoid air cell effusion  VASCULATURE:  Evaluation of the major intracranial vasculature demonstrates appropriate flow voids  CALVARIUM AND SKULL BASE:  Normal  EXTRACRANIAL SOFT TISSUES:  Normal      Impression: At least 10 intracranial supratentorial and infratentorial from metastatic lesions, as described above    No midline shift or acute intracranial hemorrhage  A few of the cerebellar lesions may in part be located along the cerebellar folia and should be correlated with dedicated imaging of the spine to exclude the possibility of leptomeningeal spread of disease  Osseous metastatic lesions were also identified within the thoracic and lumbar spine on recent CT   Slight interval progression in the degree of ventriculomegaly is again thought to be secondary to progression of cerebral volume loss since 2017  Attention on follow-up examinations is recommended to ensure stability  The study was marked in [facility] [facility] for immediate notification  Workstation performed: [id number]     VAS upper limb venous duplex scan, unilateral/limited    Result Date: 10/25/2022  Narrative:  [facility] REPORT CLINICAL: Indications: Patient presents with right upper extremity edema  Patient states the swelling started after the Mid-line placement  Operative History: No previous cardiovascular surgeries  Risk Factors: The patient has history of HTN and Hyperlipidemia    FINDINGS:  Right                             Impression          Bas Upper                         Thrombus            Cephalic Shoulder V               Thrombus            Int  Jugular                      Thrombosed (acute)  Innominate Vein                   Not Visualized      Ceph Upper                        Thrombus            Bas Mid Arm                       Thrombus            Basilic Upper Arm Distal          Not visualized      Ceph Mid Arm                      Thrombus            Subclavian                        Thrombosed (acute)  Axillary                          Thrombosed (acute)  Ceph AC                           Thrombus            Brachial                          Thrombosed (acute)  Cephalic Vein - Forearm Proximal  Thrombus               CONCLUSION: Impression RIGHT UPPER LIMB: Acute, occlusive deep vein thrombosis noted in the internal jugular vein, subclavian vein, axillary vein and brachial vein  Acute occlusive superficial thrombophlebitis noted in the cephalic vein and basilic vein  Note: Thrombus noted around the mid-line  Doppler evaluation shows a normal response to augmentation maneuvers  LEFT UPPER LIMB LIMITED: Evaluation shows no evidence of thrombus in the internal jugular vein, subclavian vein, and the brachiocephalic vein  Technical findings given to Dr Hector Montgomery at 10:51 am   SIGNATURE: Electronically Signed by: Becca Gomez MD, [address] on 2022-10-25 08:49:32 PM    IR biopsy bone    Result Date: 10/26/2022  Narrative: CT-scan guided bone core biopsy History: 71-year-old female with multiple sclerotic osseous lesions, concerning for metastatic disease  Conscious sedation time: 0 min Technique: The patient was brought to the CT scanner and placed supine on the table  After axial images were obtained through the pelvis, an area of the skin was then marked, prepped, and draped in usual sterile fashion  Lidocaine was administered to the  skin, and subcutaneous tissues down to the periosteum of the right ileum at the anterior superior iliac spine, and a small skin incision was made  An ClassOwl bone lesion kit needle was advanced percutaneously into a sclerotic bone lesion at the right iliac wing, and a bone biopsy was performed using the bone biopsy lesion needle  The bone core was reviewed with the pathologist and placed in formalin  The patient tolerated the procedure well and suffered no complications  Impression: Impression: CT-guided bone lesion biopsy at the right ileum   Workstation performed: [id number]       Incidental Findings:   · Imaging findings as above, though not incidental    Test Results Pending at Discharge (will require follow up):   · As per After Visit Summary     Outpatient Tests Requested:  · CBC, CMP  · Thyroid profile in 4-6 weeks    Complications:  Refer to hospital course and above listed diagnosis related plan, if any    [facility] Course: As per HPI  Shobha Bateman is a 69 y o  female patient with history of Sjogren syndrome, chronic joint pains, dyslipidemia, hypothyroidism, orthostatic hypotension, who originally presented to the hospital on 10/19/2022 due to generalized weakness  Neighbor called police as they saw her trash can outside for a few days  Police found her on the floor  Patient reported rolling off the couch and was on the floor for about 2 hours  Patient denied hitting head  Reported lower abdominal pain since 5/2022  Denied any acute pain  Patient lives [place] - in-law normally takes her shopping and she was away for about 16 days  Patient reported nothing to eat at home and was hungry  Patient denied chest pain, headache, dizziness, SOB, nausea vomiting, fever, chills  Reported constipation at home  Reported weight loss since spring this year  Patient reports history of blood pressure dropping when she stands up  Patient offered no other complaints  Patient was noted to have evidence of dehydration, generalized weakness and SIRS on presentation  Patient was initially treated with IV antibiotics and IV fluid  Volume status and dehydration gradually improved  Extensive workup revealed no evidence of infection and antibiotics were discontinued  Imaging revealed extensive metastatic disease as described above  Patient was also noted to have evidence of coffee-ground emesis indicating upper GI bleed  SIRs was deemed secondary to dehydration, metastasis, stress response and GI bleeding  Hemoglobin was monitor, patient was monitor on Protonix drip  Required 1 unit of PRBC  After stabilization of the electrolyte and volume status patient underwent EGD which showed findings as above  Subsequently hemoglobin remains stable  Patient was evaluated by oncology for metastatic disease also seen by Pulmonary as patient was noted to have lung mass recommended IR guided biopsy for metastatic lesion    Patient subsequently underwent bone biopsy  MRI of brain was done which also urged revealed metastatic disease as above  Hemoglobin electrolytes and volume status was monitored     Patient was continued on nutritional support, physical therapy  No further evidence of GI bleeding noted but patient was noted to have upper extremity swelling and Doppler confirmed extensive DVT  Patient was initially continue on DVT prophylaxis and after transition to Eliquis 2 5 mg b i d  Once hemoglobin remains stable without any evidence of overt bleeding  Extensive goals of care discussion were held during hospitalization given evidence of diffuse metastatic disease and poor functional status with guarded to poor prognosis  POLST was completed with patient and sister-in-law  Patient is now being discharged to rehab, will continued on restorative physical and nutritional support  Patient can be considered for palliative treatment if improvement in functional status as per Oncology recommendation  Patient's progress will be monitored at the rehab, as per discussion with patient and family in consideration of hospice if patient fails to improve with rehabilitation  Will refer to follow-up with oncology and palliative care as outpatient  Please see above list of diagnoses and related plan for additional information  Condition at Discharge: fair     Discharge Day Visit / Exam:     Subjective:    Noted to be comfortably sitting up in bed  Denies any pain  Reports that she has appetite is good and she is tolerating diet well  Moving bowels with brown stool  Vital sign remains stable  Denies headache, dizziness    Again reviewed biopsy results confirming malignancy and evidence of extensive metastatic disease including bone and brain Mets  Prognosis discussed      Vitals: Blood Pressure: 97/55 (11/02/22 1152)  Pulse: 88 (11/02/22 1152)  Temperature: 98 °F (36 7 °C) (11/02/22 1152)  Temp Source: Oral (11/02/22 1152)  Respirations: 18 (11/02/22 1152)  Height: 5' 5" (165 1 cm) (10/19/22 1507)  Weight - Scale: 45 kg (99 lb 3 3 oz) (10/19/22 1507)  SpO2: 97 % (11/02/22 1152)  Exam:   Physical Exam  Constitutional:       General: She is not in acute distress  Appearance: She is ill-appearing  Comments: Cachectic, frail   HENT:      Head: Normocephalic and atraumatic  Cardiovascular:      Rate and Rhythm: Normal rate  Pulmonary:      Effort: Pulmonary effort is normal  No respiratory distress  Breath sounds: Normal breath sounds  No wheezing or rales  Comments: Diminished, clear  Abdominal:      General: Bowel sounds are normal  There is no distension  Palpations: Abdomen is soft  Tenderness: There is no abdominal tenderness  There is no guarding or rebound  Musculoskeletal:      Comments: Left upper extremity edema, generalized swelling   Skin:     General: Skin is warm and dry  Findings: No rash  Neurological:      General: No focal deficit present  Mental Status: She is alert and oriented to person, place, and time  Mental status is at baseline  Psychiatric:         Mood and Affect: Mood normal          Discharge instructions/Information to patient and family:(Discharge Medications and Follow up):   See after visit summary for information provided to patient and family  Provisions for Follow-Up Care:  See after visit summary for information related to follow-up care and any pertinent home health orders  Disposition: Skilled nursing facility at [facility]    Planned Readmission:  No     Discharge Statement:  I spent 45 minutes discharging the patient  This time was spent on the day of discharge  I had direct contact with the patient on the day of discharge  Greater than 50% of the total time was spent examining patient, answering all patient questions, arranging and discussing plan of care with patient as well as directly providing post-discharge instructions    Additional time then spent on discharge activities  Discussed with family at bedside  Discharge Medications:  See after visit summary for reconciled discharge medications provided to patient and family  ** Please Note: "This note has been constructed using a voice recognition system  Therefore there may be syntax, spelling, and/or grammatical errors   Please call if you have any questions  "**

## 2022-11-03 ENCOUNTER — TELEPHONE (OUTPATIENT)
Dept: HEMATOLOGY ONCOLOGY | Facility: CLINIC | Age: 77
End: 2022-11-03

## 2022-11-03 NOTE — UTILIZATION REVIEW
NOTIFICATION OF ADMISSION DISCHARGE   This is a Notification of Discharge from [facility]  Please be advised that this patient has been discharge from our facility  Below you will find the admission and discharge date and time including the patient’s disposition  UTILIZATION REVIEW CONTACT:  [facility]  Utilization   Network Utilization Review Department  Phone: [phone] x carefully listen to the prompts  All voicemails are confidential   Email: [email or web address]     ADMISSION INFORMATION  PRESENTATION DATE: 10/19/2022  2:53 PM  OBERVATION ADMISSION DATE:   INPATIENT ADMISSION DATE: 10/19/22  6:47 PM   DISCHARGE DATE: 11/2/2022  5:45 PM  DISPOSITION: Non [facility] SNF/TCU/SNU Non [facility] SNF/TCU/SNU      IMPORTANT INFORMATION:  Send all requests for admission clinical reviews, approved or denied determinations and any other requests to dedicated fax number below belonging to the campus where the patient is receiving treatment   List of dedicated fax numbers:  [address] DENIALS (Administrative/Medical Necessity) [phone]   [address] (Maternity/NICU/Pediatrics) [phone]   [facility] [phone]   [place] [address] [phone]   [address] [phone]   [address] [facility] [phone]   [address] [place] [address] [phone]   [address] [place] [address] [phone]   [facility]  [phone]   [address] [facility] [phone]   [address] [facility] [address] [facility] [phone]

## 2022-11-16 ENCOUNTER — TELEPHONE (OUTPATIENT)
Dept: HEMATOLOGY ONCOLOGY | Facility: MEDICAL CENTER | Age: 77
End: 2022-11-16

## 2022-11-16 NOTE — TELEPHONE ENCOUNTER
Call from [address] [place] [address] [address]  [phone] asking if consult appt can be virtual as patient is weak    PER discharge note:  • Discussed regarding potential rehabilitation, expectation and overall prognosis   Discussed Oncology recommendation that patient currently not candidate for any palliative chemotherapy given functional status   After discussion, patient and sister-in-law agreed with consideration of hospice if patient fails to improve with functional and nutrition status at rehab      Eriscaleb Engd will discuss with sister in law what her goals are for this appt and will speak with  at [facility] regarding what options patient has for care at [facility] will call my TEAMs number at end of week

## 2022-11-17 ENCOUNTER — TELEPHONE (OUTPATIENT)
Dept: HEMATOLOGY ONCOLOGY | Facility: CLINIC | Age: 77
End: 2022-11-17

## 2022-11-17 NOTE — TELEPHONE ENCOUNTER
Spoke with sister in law Abe Swartz was not aware of decision for  hospice care  [facility] will speak with patient and call my TEAMs number to update

## 2022-11-17 NOTE — TELEPHONE ENCOUNTER
Received TEAMS message:  Babita Plummermanuela  This is Laneia  And again I'm a POA for Glenn Samaniego and we spoke earlier today about her having an appointment next Tuesday at around 6 and the nursing home had cancelled it  I spoke with the nurse and I know she's on  I know Naomi's on comfort care and I guess they call it Hospice there  She didn't  I guess they, I guess they had the doctors over there at [place] and the nurses and in the practitioners, leonor, had a meeting at the [facility] this morning and felt that there was no  Reason for her to have any even the virtual appointment with Doctor Shannon Vasquez  If you want to give me a call back regarding this, my number is [phone]  Thank you  Bye        Appt with Dr Shannon Vasquez cancelled  Patient and POA in agreement with plan
